# Patient Record
Sex: FEMALE | Race: WHITE | NOT HISPANIC OR LATINO | Employment: OTHER | URBAN - METROPOLITAN AREA
[De-identification: names, ages, dates, MRNs, and addresses within clinical notes are randomized per-mention and may not be internally consistent; named-entity substitution may affect disease eponyms.]

---

## 2017-01-09 ENCOUNTER — TREATMENT (OUTPATIENT)
Dept: PHYSICAL THERAPY | Facility: CLINIC | Age: 77
End: 2017-01-09

## 2017-01-09 DIAGNOSIS — M54.13 RADICULOPATHY OF CERVICOTHORACIC REGION: Primary | ICD-10-CM

## 2017-01-09 PROCEDURE — G8984 CARRY CURRENT STATUS: HCPCS | Performed by: PHYSICAL THERAPIST

## 2017-01-09 PROCEDURE — G8985 CARRY GOAL STATUS: HCPCS | Performed by: PHYSICAL THERAPIST

## 2017-01-09 PROCEDURE — G0283 ELEC STIM OTHER THAN WOUND: HCPCS | Performed by: PHYSICAL THERAPIST

## 2017-01-09 PROCEDURE — 97140 MANUAL THERAPY 1/> REGIONS: CPT | Performed by: PHYSICAL THERAPIST

## 2017-01-09 PROCEDURE — 97161 PT EVAL LOW COMPLEX 20 MIN: CPT | Performed by: PHYSICAL THERAPIST

## 2017-01-10 NOTE — PROGRESS NOTES
Physical Therapy Plan of Care      Patient Name: Shu Mckeon       Patient MRN: [id number]  : 1940  Physician:Mariann Fallon MD  Date: 2017    Encounter Diagnoses   Name Primary?   • Radiculopathy of cervicothoracic region Yes     OBJECTIVE  General  Palpation: ttp and severe muscle tightness in the upper trap  Observation: right shoulder elevated  AROM:               Cervical Spine Range of Motion  Cervical Spine Flexion: 30  Cervical Spine Extension: 20  Cervical Spine Sidebend Right: 30  Cervical Spine Sidebend Left: 40  Cervical Spine Rotation Right: 40  Cervical Spine Rotation Left: 60       Special Tests:  + ulnar and median nerve testing  Pain Scale:   8/10    THERAPY ASSESSMENT:  Physical Therapy Diagnosis: Patient was referred to physical therapy for cervical radiculopathy.  My exam findings concur with tis diagnosis. Patient has very tight cervical musculature with stiffness and tenderness at the cervical spinous process limiting movement especially on the right side.  Patient's symptoms follow the ulnar nerve distribution. At this time skilled PT is medically necessary to reduce her pain and improve her cerivical ROM so that she may return to PLOF.    Functional Limitations: Lifting, Pushing, Carrying, Pulling, Complaints of Pain, Difficulty moving, Decreased Strength, Decreased ROM, Postural Dysfunction, Decreased ability to perform ADL's, Poor segmental mobility   Length of Therapy: 1 month   PT Frequency: PT 2x week   PT Interventions: Therapeutic exercise - AROM, Therapeutic exercise - stretching, Therapeutic exercise - strengthening, Manual Therapy, Soft Tissue mobilization, Ultrasound, Hot packs/ Moist heat, Traction, Electrical stimulation, Posture training,  training, Home Exercise Program   Patient Agrees with Plan of Care: Yes    REHAB POTENTIAL: fair            Short Term Goals: 2 weeks  1. Patient will report being able to lie on her right side at night to sleep.  2.  Patient will be able to write for 20 minutes without pain.    Long Term Goals: 4 weeks  1. Patient will report a reduction in pain to 2/10 for all ADLS and IADLs.  2. Patient will demonstrate full cervical ROM for performing ADLs.     Manual PT [id number] 25 minutes    Timed Code Treatment: 25   Minutes     Total Treatment Time: 55     Minutes    PT SIGNATURE: Deepa Reese, PT   DATE TREATMENT INITIATED: 1/10/2017    Medicare Initial Certification  Certification Period: 2/9/2017  I certify that the therapy services are furnished while this patient is under my care.  The services outlined above are required by this patient, and will be reviewed every 30 days.     PHYSICIAN: Mariann Fallon MD      DATE:     Please sign and return via fax to [phone].. Thank you, [facility].

## 2017-01-17 ENCOUNTER — TREATMENT (OUTPATIENT)
Dept: PHYSICAL THERAPY | Facility: CLINIC | Age: 77
End: 2017-01-17

## 2017-01-17 DIAGNOSIS — M54.13 RADICULOPATHY OF CERVICOTHORACIC REGION: Primary | ICD-10-CM

## 2017-01-17 PROCEDURE — 97140 MANUAL THERAPY 1/> REGIONS: CPT | Performed by: PHYSICAL THERAPIST

## 2017-01-17 PROCEDURE — G0283 ELEC STIM OTHER THAN WOUND: HCPCS | Performed by: PHYSICAL THERAPIST

## 2017-01-18 NOTE — PROGRESS NOTES
Daily Progress Note      Subjective   Patient reports that her neck and shoulder and arm continually bother her resulting in high levels of pain and limited ability to perform aDLs.    Pain Scale (0-10): 6/10      Objective     See the Objective Evaluation flowsheet for any objective testing that may have been performed today.    PROCEDURES AND MODALITIES:  Paraffin:    Moist Heat: Rx Minutes: 15 mins  Ice:    E-Stim: Rx Minutes: 15 mins  Ultrasound:    Ionto:   Traction:      Manual PT [id number] 25 minutes     Timed Code Treatment: 25 Minutes  Total Treatment Time: 40 Minutes    Assessment/Plan   Shu continues to have a lot of pain in her neck and shoulder.  I spoke with her about her PMHx again to see if she has seen a neurosurgeon.  She has not.  Patient has severe radicular symptoms at this time.  We will continue to work on reducing them.    Progress per Plan of Care         Deepa Reese, PT  Physical Therapist

## 2017-01-20 ENCOUNTER — TREATMENT (OUTPATIENT)
Dept: PHYSICAL THERAPY | Facility: CLINIC | Age: 77
End: 2017-01-20

## 2017-01-20 PROCEDURE — G0283 ELEC STIM OTHER THAN WOUND: HCPCS | Performed by: PHYSICAL THERAPIST

## 2017-01-20 PROCEDURE — 97140 MANUAL THERAPY 1/> REGIONS: CPT | Performed by: PHYSICAL THERAPIST

## 2017-01-23 NOTE — PROGRESS NOTES
Physical Therapy Daily Progress Note      Subjective   Shu Mckeon reports: that her shoulder and neck continue to be very painful. She reports that she is still having a lot of numbness in her arm.    Objective   See Exercise, Manual, and Modality Logs for complete treatment.       Assessment/Plan  Shu continues to have high levels of pain with physical therapy.  She continues to benefit from PT to reduce her cervical radiculopathy.    Progress per Plan of Care           Manual Therapy:    25     mins  49177;  Therapeutic Exercise:        mins  43193;     Neuromuscular Tahira:        mins  81412;    Therapeutic Activity:       mins  38675;     Gait Training:         mins  36750;     Ultrasound:          mins  60115;    Electrical Stimulation:    15     mins  35027 ( );  Dry Needling        mins self-pay    Timed Treatment:   25   mins   Total Treatment:     40   mins    Deepa Reese, SARAH  Physical Therapist  [place] License # [id number]

## 2017-01-24 ENCOUNTER — TREATMENT (OUTPATIENT)
Dept: PHYSICAL THERAPY | Facility: CLINIC | Age: 77
End: 2017-01-24

## 2017-01-24 DIAGNOSIS — M54.13 RADICULOPATHY OF CERVICOTHORACIC REGION: Primary | ICD-10-CM

## 2017-01-24 DIAGNOSIS — S32.9XXD: ICD-10-CM

## 2017-01-24 PROCEDURE — G0283 ELEC STIM OTHER THAN WOUND: HCPCS | Performed by: PHYSICAL THERAPIST

## 2017-01-24 PROCEDURE — 97140 MANUAL THERAPY 1/> REGIONS: CPT | Performed by: PHYSICAL THERAPIST

## 2017-01-25 ENCOUNTER — TELEPHONE (OUTPATIENT)
Dept: INTERNAL MEDICINE | Facility: CLINIC | Age: 77
End: 2017-01-25

## 2017-01-25 RX ORDER — TRAMADOL HYDROCHLORIDE 50 MG/1
50 TABLET ORAL 2 TIMES DAILY PRN
Qty: 30 TABLET | Refills: 1 | OUTPATIENT
Start: 2017-01-25 | End: 2017-09-19

## 2017-01-25 NOTE — PROGRESS NOTES
Physical Therapy Daily Progress Note      Subjective     Shu Mckeon reports: that her neck continues to be extremely sore with severe radicular pain.       Objective   See Exercise, Manual, and Modality Logs for complete treatment.       Assessment/Plan  Shu continues to have a lot of radicular symptoms that have not subsided.  She continues to require skilled PT to reduce these symptoms.  Patient asked if her symptoms would resolve on their own with out PT and stated that they would not.       Progress per Plan of Care           Manual PT [id number] 25 minutes    Timed Treatment:   25   mins   Total Treatment:     40   mins    Deepa Reese, PT  Physical Therapist  [place] License # [id number]

## 2017-01-25 NOTE — TELEPHONE ENCOUNTER
PER VO FROM DR GIL RX FOR TRAMADOL 50 MG WAS CALLED IN TO THE PHARMACY. PT AWARE TO TAKE ONLY AS NEEDED PRIOR TO A PHYSICAL THERAPY APPT.    ----- Message from Mariann Gil MD sent at 1/25/2017  1:52 PM EST -----  Regarding: RE: Physical therapy patient  I already have her on amitriptyline at night as well as an anti-inflammatory and when necessary tramadol.  We will call her and make sure she is taking her tramadol before therapy however that probably means someone should drive her to therapy  ----- Message -----     From: Deepa Reese, PT     Sent: 1/25/2017   1:31 PM       To: Mariann Gil MD  Subject: Physical therapy patient                         Hi Dr. Gil,    I have seen Shu several times now and her radicular symptoms continue to be extremely painful for her.  Is there anything you could prescribe her to alleviate some of the radicular symptoms while I work with her?

## 2017-01-31 ENCOUNTER — TREATMENT (OUTPATIENT)
Dept: PHYSICAL THERAPY | Facility: CLINIC | Age: 77
End: 2017-01-31

## 2017-01-31 DIAGNOSIS — M54.13 RADICULOPATHY OF CERVICOTHORACIC REGION: Primary | ICD-10-CM

## 2017-01-31 PROCEDURE — 97140 MANUAL THERAPY 1/> REGIONS: CPT | Performed by: PHYSICAL THERAPIST

## 2017-01-31 PROCEDURE — G0283 ELEC STIM OTHER THAN WOUND: HCPCS | Performed by: PHYSICAL THERAPIST

## 2017-01-31 NOTE — PROGRESS NOTES
Physical Therapy Daily Progress Note      Subjective     Shu Mckeon reports: that she is not feeling any better and is frustrated. She reports that she does not feel like PT is helping.       Objective   See Exercise, Manual, and Modality Logs for complete treatment.       Assessment/Plan    I spoke with the patient about her phone call with her Md and asked her if she has been taking her medication and she stated that she has not been taking the tramadol.  She continues to have severe tightness in her upper trap which causes her a lot of numbness and tingling in her hand. I spoke with her about returning to her MD and possibly seeing a neurologist.  Patient was in agreement with this.     Progress per Plan of Care           Manual PT 13348 25 minutes    Timed Treatment:   25   mins   Total Treatment:    40   mins    Deepa Reese, PT  Physical Therapist  [place] License # [id number]

## 2017-02-14 RX ORDER — ATORVASTATIN CALCIUM 40 MG/1
TABLET, FILM COATED ORAL
Qty: 90 TABLET | Refills: 1 | Status: SHIPPED | OUTPATIENT
Start: 2017-02-14 | End: 2017-08-16 | Stop reason: SDUPTHER

## 2017-02-23 ENCOUNTER — DOCUMENTATION (OUTPATIENT)
Dept: PHYSICAL THERAPY | Facility: CLINIC | Age: 77
End: 2017-02-23

## 2017-02-23 NOTE — PROGRESS NOTES
Discharge Report    Patient Name: Shu Mckeon       Patient MRN: [id number]  : 1940  Physician:  Date: 2017    No diagnosis found.     Treatment has included therapeutic exercise, manual therapy, electrical stimulation and moist heat    No notes on file      Assessment: Patient was frustrated with her progress so I referred her back to her MD.     Progress towards goals: Not Met    Discharge Reason: Plateau in patient's progress      Plan of Care: Patient to return to referring/providing physician    Prognosis: fair    Understanding at Discharge: good

## 2017-03-13 ENCOUNTER — RESULTS ENCOUNTER (OUTPATIENT)
Dept: INTERNAL MEDICINE | Facility: CLINIC | Age: 77
End: 2017-03-13

## 2017-03-13 DIAGNOSIS — M79.602 PAIN IN BOTH UPPER EXTREMITIES: ICD-10-CM

## 2017-03-13 DIAGNOSIS — M79.601 PAIN IN BOTH UPPER EXTREMITIES: ICD-10-CM

## 2017-03-13 DIAGNOSIS — E11.9 CONTROLLED TYPE 2 DIABETES MELLITUS WITHOUT COMPLICATION, WITHOUT LONG-TERM CURRENT USE OF INSULIN (HCC): ICD-10-CM

## 2017-03-14 ENCOUNTER — OFFICE VISIT (OUTPATIENT)
Dept: INTERNAL MEDICINE | Facility: CLINIC | Age: 77
End: 2017-03-14

## 2017-03-14 DIAGNOSIS — Z00.00 MEDICARE ANNUAL WELLNESS VISIT, INITIAL: Primary | ICD-10-CM

## 2017-03-14 DIAGNOSIS — I10 ESSENTIAL HYPERTENSION: ICD-10-CM

## 2017-03-14 DIAGNOSIS — M54.50 LOW BACK PAIN WITHOUT SCIATICA, UNSPECIFIED BACK PAIN LATERALITY, UNSPECIFIED CHRONICITY: ICD-10-CM

## 2017-03-14 DIAGNOSIS — M25.511 CHRONIC RIGHT SHOULDER PAIN: ICD-10-CM

## 2017-03-14 DIAGNOSIS — G89.29 CHRONIC RIGHT SHOULDER PAIN: ICD-10-CM

## 2017-03-14 PROCEDURE — G0438 PPPS, INITIAL VISIT: HCPCS | Performed by: INTERNAL MEDICINE

## 2017-03-14 PROCEDURE — 99213 OFFICE O/P EST LOW 20 MIN: CPT | Performed by: INTERNAL MEDICINE

## 2017-03-14 NOTE — PROGRESS NOTES
Subjective   Shu Mckeon is a 76 y.o. female.   Fu on right arm pain    History of Present Illness   She is cont to have right arm pain.  Some better with PT or meloxicam.  She is now able to lift her arm over her head better.  Continues to have low back pain.  SHe does not take her tramadol   She says she gets around okay but is not able to do regular exercise  Pt has been taking BP meds as prescribed without any problems.  No HA  No episodes of orthostasis  She does try to watch her carb intake.  She does not check her sugars  She denies any polyuria    The following portions of the patient's history were reviewed and updated as appropriate: allergies, current medications, past medical history, past social history and problem list.  She does not eat a healthy diet she likes chesses   but is not exercising regularly    Review of Systems   Musculoskeletal: Positive for arthralgias (left shoulder) and back pain.   All other systems reviewed and are negative.      Objective   Physical Exam   Constitutional: She is oriented to person, place, and time. She appears well-developed and well-nourished.   HENT:   Head: Normocephalic and atraumatic.   Right Ear: External ear normal.   Left Ear: External ear normal.   Mouth/Throat: Oropharynx is clear and moist.   Eyes: Conjunctivae and EOM are normal. Pupils are equal, round, and reactive to light.   Neck: Normal range of motion. No tracheal deviation present. No thyromegaly present.   Cardiovascular: Normal rate, regular rhythm, normal heart sounds and intact distal pulses.    Pulmonary/Chest: Effort normal and breath sounds normal.   Abdominal: Soft. Bowel sounds are normal. She exhibits no distension. There is no tenderness.   Musculoskeletal: Normal range of motion. She exhibits no edema or deformity.    Shu had a diabetic foot exam performed today.   During the foot exam she had a monofilament test performed (normal).    Neurological Sensory Findings - Unaltered  hot/cold right ankle/foot discrimination and unaltered hot/cold left ankle/foot discrimination. Unaltered sharp/dull right ankle/foot discrimination and unaltered sharp/dull left ankle/foot discrimination. No right ankle/foot altered proprioception and no left ankle/foot altered proprioception    Vascular Status -  Her exam exhibits right foot vasculature abnormal and no right foot edema. Her exam exhibits left foot vasculature abnormal and no left foot edema.   Skin Integrity  -  Her right foot skin is not intact.   Shu's left foot skin is not intact. .   Foot Structure and Biomechanics -  Her right foot exhibits hallux valgus.  Neurological: She is alert and oriented to person, place, and time.   Skin: Skin is warm and dry.   Psychiatric: She has a normal mood and affect. Her behavior is normal. Judgment and thought content normal.   Vitals reviewed.      There were no vitals filed for this visit.       Assessment/Plan   Diagnoses and all orders for this visit:    Medicare annual wellness visit, initial    Low back pain without sciatica, unspecified back pain laterality, unspecified chronicity    Chronic right shoulder pain    Essential hypertension        1.  Right shoulder pain: This is some better but she does continue to have troubles.  I do believe she may have some rotator cuff tendinitis.  As it is still bothering her we will go ahead and refer her to orthopedics  2.  Chronic low back pain: This is doing somewhat better.  She does take occasional tramadol.  She is cautious when walking  3.  Retention: Blood pressure is been well-controlled  4.  Diabetes mellitus: Patient's A1c was 7 last visit.  We will recheck this today

## 2017-03-14 NOTE — PATIENT INSTRUCTIONS
"Heart-Healthy Eating Plan  Heart-healthy meal planning includes:  · Limiting unhealthy fats.  · Increasing healthy fats.  · Making other small dietary changes.  You may need to talk with your doctor or a diet specialist (dietitian) to create an eating plan that is right for you.  WHAT TYPES OF FAT SHOULD I CHOOSE?  · Choose healthy fats. These include olive oil and canola oil, flaxseeds, walnuts, almonds, and seeds.  · Eat more omega-3 fats. These include salmon, mackerel, sardines, tuna, flaxseed oil, and ground flaxseeds. Try to eat fish at least twice each week.  · Limit saturated fats.    Saturated fats are often found in animal products, such as meats, butter, and cream.    Plant sources of saturated fats include palm oil, palm kernel oil, and coconut oil.  · Avoid foods with partially hydrogenated oils in them. These include stick margarine, some tub margarines, cookies, crackers, and other baked goods. These contain trans fats.  WHAT GENERAL GUIDELINES DO I NEED TO FOLLOW?  · Check food labels carefully. Identify foods with trans fats or high amounts of saturated fat.  · Fill one half of your plate with vegetables and green salads. Eat 4-5 servings of vegetables per day. A serving of vegetables is:    1 cup of raw leafy vegetables.    ½ cup of raw or cooked cut-up vegetables.    ½ cup of vegetable juice.  · Fill one fourth of your plate with whole grains. Look for the word \"whole\" as the first word in the ingredient list.  · Fill one fourth of your plate with lean protein foods.  · Eat 4-5 servings of fruit per day. A serving of fruit is:    One medium whole fruit.    ¼ cup of dried fruit.    ½ cup of fresh, frozen, or canned fruit.    ½ cup of 100% fruit juice.  · Eat more foods that contain soluble fiber. These include apples, broccoli, carrots, beans, peas, and barley. Try to get 20-30 g of fiber per day.  · Eat more home-cooked food. Eat less restaurant, buffet, and fast food.  · Limit or avoid " alcohol.  · Limit foods high in starch and sugar.  · Avoid fried foods.  · Avoid frying your food. Try baking, boiling, grilling, or broiling it instead. You can also reduce fat by:    Removing the skin from poultry.    Removing all visible fats from meats.    Skimming the fat off of stews, soups, and gravies before serving them.    Steaming vegetables in water or broth.  · Lose weight if you are overweight.  · Eat 4-5 servings of nuts, legumes, and seeds per week:    One serving of dried beans or legumes equals ½ cup after being cooked.    One serving of nuts equals 1½ ounces.    One serving of seeds equals ½ ounce or one tablespoon.  · You may need to keep track of how much salt or sodium you eat. This is especially true if you have high blood pressure. Talk with your doctor or dietitian to get more information.  WHAT FOODS CAN I EAT?  Grains  Breads, including Libyan, white, srikanth, wheat, raisin, rye, oatmeal, and Italian. Tortillas that are neither fried nor made with lard or trans fat. Low-fat rolls, including hotdog and hamburger buns and English muffins. Biscuits. Muffins. Waffles. Pancakes. Light popcorn. Whole-grain cereals. Flatbread. [place] toast. Pretzels. Breadsticks. Rusks. Low-fat snacks. Low-fat crackers, including oyster, saltine, matzo, joann, animal, and rye. Rice and pasta, including brown rice and pastas that are made with whole wheat.   Vegetables  All vegetables.   Fruits  All fruits, but limit coconut.  Meats and Other Protein Sources  Lean, well-trimmed beef, veal, pork, and lamb. Chicken and turkey without skin. All fish and shellfish. Wild duck, rabbit, pheasant, and venison. Egg whites or low-cholesterol egg substitutes. Dried beans, peas, lentils, and tofu. Seeds and most nuts.  Dairy  Low-fat or nonfat cheeses, including ricotta, string, and mozzarella. Skim or 1% milk that is liquid, powdered, or evaporated. Buttermilk that is made with low-fat milk. Nonfat or low-fat  yogurt.  Beverages  Mineral water. Diet carbonated beverages.  Sweets and Desserts  Sherbets and fruit ices. Honey, jam, marmalade, jelly, and syrups. Meringues and gelatins. Pure sugar candy, such as hard candy, jelly beans, gumdrops, mints, marshmallows, and small amounts of dark chocolate. [place] food cake.  Eat all sweets and desserts in moderation.  Fats and Oils  Nonhydrogenated (trans-free) margarines. Vegetable oils, including soybean, sesame, sunflower, olive, peanut, safflower, corn, canola, and cottonseed. Salad dressings or mayonnaise made with a vegetable oil. Limit added fats and oils that you use for cooking, baking, salads, and as spreads.  Other  Cocoa powder. Coffee and tea. All seasonings and condiments.  The items listed above may not be a complete list of recommended foods or beverages. Contact your dietitian for more options.  WHAT FOODS ARE NOT RECOMMENDED?  Grains  Breads that are made with saturated or trans fats, oils, or whole milk. Croissants. Butter rolls. Cheese breads. Sweet rolls. Donuts. Buttered popcorn. Chow mein noodles. High-fat crackers, such as cheese or butter crackers.  Meats and Other Protein Sources  Fatty meats, such as hotdogs, short ribs, sausage, spareribs, roy, rib eye roast or steak, and mutton. High-fat deli meats, such as salami and bologna. Caviar. Domestic duck and goose. Organ meats, such as kidney, liver, sweetbreads, and heart.  Dairy  Cream, sour cream, cream cheese, and creamed cottage cheese. Whole-milk cheeses, including blue (christine), [place]i, Brie, Guido, American, Havarti, Swiss, cheddar, Camembert, and Byers. Whole or 2% milk that is liquid, evaporated, or condensed. Whole buttermilk. Cream sauce or high-fat cheese sauce. Yogurt that is made from whole milk.  Beverages  Regular sodas and juice drinks with added sugar.  Sweets and Desserts  Frosting. Pudding. Cookies. Cakes other than pacheco food cake. Candy that has milk chocolate or white  chocolate, hydrogenated fat, butter, coconut, or unknown ingredients. Buttered syrups. Full-fat ice cream or ice cream drinks.  Fats and Oils  Gravy that has suet, meat fat, or shortening. Cocoa butter, hydrogenated oils, palm oil, coconut oil, palm kernel oil. These can often be found in baked products, candy, fried foods, nondairy creamers, and whipped toppings. Solid fats and shortenings, including roy fat, salt pork, lard, and butter. Nondairy cream substitutes, such as coffee creamers and sour cream substitutes. Salad dressings that are made of unknown oils, cheese, or sour cream.  The items listed above may not be a complete list of foods and beverages to avoid. Contact your dietitian for more information.     This information is not intended to replace advice given to you by your health care provider. Make sure you discuss any questions you have with your health care provider.     Document Released: 2013 Document Revised: 2016 Document Reviewed: 2015  [employer] Interactive Patient Education © [employer]    Medicare Wellness  Personal Prevention Plan of Service     Date of Office Visit:  2017  Encounter Provider:  Mariann Fallon MD  Place of Service:  [facility] INTER[facility] MEDIC[facility]  Patient Name: Shu Mckeon  :  1940    As part of the Medicare Wellness portion of your visit today, we are providing you with this personalized preventive plan of services (PPPS). This plan is based upon recommendations of the [place] Preventive Services Task Force (USPSTF) and the Advisory Committee on Immunization Practices (ACIP).    This lists the preventive care services that should be considered, and provides dates of when you are due. Items listed as completed are up-to-date and do not require any further intervention.    Health Maintenance   Topic Date Due   • TDAP/TD VACCINES (1 - Tdap) 08/10/1957   • ZOSTER VACCINE  2016   • DXA SCAN  2017   •  HEMOGLOBIN A1C  06/12/2017   • MAMMOGRAM  08/14/2017   • LIPID PANEL  09/16/2017   • MEDICARE ANNUAL WELLNESS  03/14/2018   • DIABETIC FOOT EXAM  03/14/2018   • DIABETIC EYE EXAM  03/14/2018   • URINE MICROALBUMIN  03/14/2018   • INFLUENZA VACCINE  Completed   • PNEUMOCOCCAL VACCINES (65+ LOW/MEDIUM RISK)  Completed         There are no diagnoses linked to this encounter.            Fall Prevention in the Home   Falls can cause injuries and can affect people from all age groups. There are many simple things that you can do to make your home safe and to help prevent falls.  WHAT CAN I DO ON THE OUTSIDE OF MY HOME?  · Regularly repair the edges of walkways and driveways and fix any cracks.  · Remove high doorway thresholds.  · Trim any shrubbery on the main path into your home.  · Use bright outdoor lighting.  · Clear walkways of debris and clutter, including tools and rocks.  · Regularly check that handrails are securely fastened and in good repair. Both sides of any steps should have handrails.  · Install guardrails along the edges of any raised decks or porches.  · Have leaves, snow, and ice cleared regularly.  · Use sand or salt on walkways during winter months.  · In the garage, clean up any spills right away, including grease or oil spills.  WHAT CAN I DO IN THE BATHROOM?  · Use night lights.  · Install grab bars by the toilet and in the tub and shower. Do not use towel bars as grab bars.  · Use non-skid mats or decals on the floor of the tub or shower.  · If you need to sit down while you are in the shower, use a plastic, non-slip stool..  · Keep the floor dry. Immediately clean up any water that spills on the floor.  · Remove soap buildup in the tub or shower on a regular basis.  · Attach bath mats securely with double-sided non-slip rug tape.  · Remove throw rugs and other tripping hazards from the floor.  WHAT CAN I DO IN THE BEDROOM?  · Use night lights.  · Make sure that a bedside light is easy to  reach.  · Do not use oversized bedding that drapes onto the floor.  · Have a firm chair that has side arms to use for getting dressed.  · Remove throw rugs and other tripping hazards from the floor.  WHAT CAN I DO IN THE KITCHEN?   · Clean up any spills right away.  · Avoid walking on wet floors.  · Place frequently used items in easy-to-reach places.  · If you need to reach for something above you, use a sturdy step stool that has a grab bar.  · Keep electrical cables out of the way.  · Do not use floor polish or wax that makes floors slippery. If you have to use wax, make sure that it is non-skid floor wax.  · Remove throw rugs and other tripping hazards from the floor.  WHAT CAN I DO IN THE STAIRWAYS?  · Do not leave any items on the stairs.  · Make sure that there are handrails on both sides of the stairs. Fix handrails that are broken or loose. Make sure that handrails are as long as the stairways.  · Check any carpeting to make sure that it is firmly attached to the stairs. Fix any carpet that is loose or worn.  · Avoid having throw rugs at the top or bottom of stairways, or secure the rugs with carpet tape to prevent them from moving.  · Make sure that you have a light switch at the top of the stairs and the bottom of the stairs. If you do not have them, have them installed.  WHAT ARE SOME OTHER FALL PREVENTION TIPS?  · Wear closed-toe shoes that fit well and support your feet. Wear shoes that have rubber soles or low heels.  · When you use a stepladder, make sure that it is completely opened and that the sides are firmly locked. Have someone hold the ladder while you are using it. Do not climb a closed stepladder.  · Add color or contrast paint or tape to grab bars and handrails in your home. Place contrasting color strips on the first and last steps.  · Use mobility aids as needed, such as canes, walkers, scooters, and crutches.  · Turn on lights if it is dark. Replace any light bulbs that burn out.  · Set  up furniture so that there are clear paths. Keep the furniture in the same spot.  · Fix any uneven floor surfaces.  · Choose a carpet design that does not hide the edge of steps of a stairway.  · Be aware of any and all pets.  · Review your medicines with your healthcare provider. Some medicines can cause dizziness or changes in blood pressure, which increase your risk of falling.  Talk with your health care provider about other ways that you can decrease your risk of falls. This may include working with a physical therapist or  to improve your strength, balance, and endurance.     This information is not intended to replace advice given to you by your health care provider. Make sure you discuss any questions you have with your health care provider.     Document Released: 12/08/2003 Document Revised: 05/03/2016 Document Reviewed: 01/22/2016  Elsevier Interactive Patient Education ©2016 [employer] Inc.

## 2017-03-14 NOTE — PROGRESS NOTES
QUICK REFERENCE INFORMATION:  The ABCs of the Annual Wellness Visit    Initial Medicare Wellness Visit    HEALTH RISK ASSESSMENT    1940    Recent Hospitalizations:  No recent hospitalization(s)..        Current Medical Providers:  Patient Care Team:  Mariann Fallon MD as PCP - General  Mariann Fallon MD as PCP - Family Medicine        Smoking Status:  History   Smoking Status   • Never Smoker   Smokeless Tobacco   • Never Used       Alcohol Consumption:  History   Alcohol Use   • Yes       Depression Screen:   PHQ-9 Depression Screening 3/14/2017   Little interest or pleasure in doing things 0   Feeling down, depressed, or hopeless 0   Trouble falling or staying asleep, or sleeping too much 0   Feeling tired or having little energy 2   Poor appetite or overeating 0   Feeling bad about yourself - or that you are a failure or have let yourself or your family down 0   Trouble concentrating on things, such as reading the newspaper or watching television 0   Moving or speaking so slowly that other people could have noticed. Or the opposite - being so fidgety or restless that you have been moving around a lot more than usual 0   Thoughts that you would be better off dead, or of hurting yourself in some way 0   PHQ-9 Total Score 2       Health Habits and Functional and Cognitive Screening:  Functional & Cognitive Status 3/14/2017   Do you have difficulty preparing food and eating? No   Do you have difficulty bathing yourself? No   Do you have difficulty getting dressed? No   Do you have difficulty using the toilet? No   Do you have difficulty moving around from place to place? No   In the past year have you fallen or experienced a near fall? No   Do you need help using the phone?  No   Are you deaf or do you have serious difficulty hearing?  No   Do you need help with transportation? No   Do you need help shopping? No   Do you need help preparing meals?  No   Do you need help with housework?  Yes   Do you need help with  laundry? No   Do you need help taking your medications? No   Do you need help managing money? No   Do you have difficulty concentrating, remembering or making decisions? No     She does have trouble vacumming and using due to chronic back              Does the patient have evidence of cognitive impairment? No    Asprin use counseling:yes      Recent Lab Results:    Visual Acuity:  No exam data present    Age-appropriate Screening Schedule:  Refer to the list below for future screening recommendations based on patient's age, sex and/or medical conditions. Orders for these recommended tests are listed in the plan section. The patient has been provided with a written plan.    Health Maintenance   Topic Date Due   • TDAP/TD VACCINES (1 - Tdap) 08/10/1957   • ZOSTER VACCINE  02/11/2016   • DXA SCAN  04/01/2017   • HEMOGLOBIN A1C  06/12/2017   • MAMMOGRAM  08/14/2017   • LIPID PANEL  09/16/2017   • DIABETIC FOOT EXAM  03/14/2018   • DIABETIC EYE EXAM  03/14/2018   • URINE MICROALBUMIN  03/14/2018   • INFLUENZA VACCINE  Completed   • PNEUMOCOCCAL VACCINES (65+ LOW/MEDIUM RISK)  Completed        Subjective   History of Present Illness    Shu Mckeon is a 76 y.o. female who presents for an Annual Wellness Visit.    The following portions of the patient's history were reviewed and updated as appropriate: allergies, current medications, past family history, past medical history, past social history, past surgical history and problem list.    Outpatient Medications Prior to Visit   Medication Sig Dispense Refill   • amitriptyline (ELAVIL) 10 MG tablet Take 1 tablet by mouth Every Night. 30 tablet 5   • aspirin 81 MG tablet Take 1 tablet by mouth daily.     • atorvastatin (LIPITOR) 40 MG tablet TAKE 1 TABLET BY MOUTH AT BEDTIME 90 tablet 1   • BIOTIN PO Take 1 tablet by mouth daily.     • Cholecalciferol (VITAMIN D3) 5000 UNITS tablet Take 1 tablet by mouth daily.     • losartan (COZAAR) 50 MG tablet Take 1 tablet by mouth  Daily. 90 tablet 3   • meloxicam (MOBIC) 7.5 MG tablet Take 1 tablet by mouth Daily. With food 30 tablet 1   • Multiple Vitamin (MULTIVITAMIN) capsule Take 1 capsule by mouth daily.     • sertraline (ZOLOFT) 50 MG tablet TAKE 1 TABLET DAILY 90 tablet 1   • traMADol (ULTRAM) 50 MG tablet Take 1 tablet by mouth 2 (Two) Times a Day As Needed for moderate pain (4-6). 30 tablet 1   • Cholecalciferol 5000 UNITS tablet Take  by mouth.       No facility-administered medications prior to visit.        Patient Active Problem List   Diagnosis   • Ankle fracture   • Diabetes type 2, controlled   • Fatigue   • HLD (hyperlipidemia)   • BP (high blood pressure)   • OP (osteoporosis)   • Closed fracture of right pelvis   • Low back pain       Advanced Care Planning:  has an advanced directive - a copy HAS NOT been provided. Have asked the patient to send this to us to add to record    Identification of Risk Factors:  Risk factors include: weight , cardiovascular risk, inactivity, increased fall risk and chronic pain.    Review of Systems    Compared to one year ago, the patient feels her physical health is better.  Compared to one year ago, the patient feels her mental health is better.    Objective     Physical Exam    There were no vitals filed for this visit.    There is no height or weight on file to calculate BMI.  Discussed the patient's BMI with her. The BMI is above average; BMI management plan is completed.    Assessment/Plan   Patient Self-Management and Personalized Health Advice  The patient has been provided with information about: diet, exercise, weight management and fall prevention and preventive services including:   · Exercise counseling provided, Fall Risk plan of care done, Zostavax vaccine (Herpes Zoster).    Visit Diagnoses:  No diagnosis found.    No orders of the defined types were placed in this encounter.      Outpatient Encounter Prescriptions as of 3/14/2017   Medication Sig Dispense Refill   •  amitriptyline (ELAVIL) 10 MG tablet Take 1 tablet by mouth Every Night. 30 tablet 5   • aspirin 81 MG tablet Take 1 tablet by mouth daily.     • atorvastatin (LIPITOR) 40 MG tablet TAKE 1 TABLET BY MOUTH AT BEDTIME 90 tablet 1   • BIOTIN PO Take 1 tablet by mouth daily.     • Cholecalciferol (VITAMIN D3) 5000 UNITS tablet Take 1 tablet by mouth daily.     • losartan (COZAAR) 50 MG tablet Take 1 tablet by mouth Daily. 90 tablet 3   • meloxicam (MOBIC) 7.5 MG tablet Take 1 tablet by mouth Daily. With food 30 tablet 1   • Multiple Vitamin (MULTIVITAMIN) capsule Take 1 capsule by mouth daily.     • sertraline (ZOLOFT) 50 MG tablet TAKE 1 TABLET DAILY 90 tablet 1   • traMADol (ULTRAM) 50 MG tablet Take 1 tablet by mouth 2 (Two) Times a Day As Needed for moderate pain (4-6). 30 tablet 1   • [DISCONTINUED] Cholecalciferol 5000 UNITS tablet Take  by mouth.       No facility-administered encounter medications on file as of 3/14/2017.        Reviewed use of high risk medication in the elderly: yes  Reviewed for potential of harmful drug interactions in the elderly: yes    Follow Up:  No Follow-up on file.     An After Visit Summary and PPPS with all of these plans were given to the patient.   Patient has had some limited activity due to some chronic low back pain and some acute left arm pain.  We did discuss trying to get up to some regular physical activity  In addition we discussed her increased risk of falling due to some of her chronic low back pain.  She is going to be cautious upon standing and make sure she always walks on level ground.  She says physical therapy worked with her balance on this as well  She does not eat a very healthy diet  She is going to work on increasing fruits and vegetables

## 2017-03-15 LAB
ALBUMIN SERPL-MCNC: 4.8 G/DL (ref 3.5–5.2)
ALBUMIN/GLOB SERPL: 1.5 G/DL
ALP SERPL-CCNC: 94 U/L (ref 39–117)
ALT SERPL-CCNC: 15 U/L (ref 1–33)
AST SERPL-CCNC: 21 U/L (ref 1–32)
BILIRUB SERPL-MCNC: 0.7 MG/DL (ref 0.1–1.2)
BUN SERPL-MCNC: 15 MG/DL (ref 8–23)
BUN/CREAT SERPL: 18.1 (ref 7–25)
CALCIUM SERPL-MCNC: 10.3 MG/DL (ref 8.6–10.5)
CHLORIDE SERPL-SCNC: 97 MMOL/L (ref 98–107)
CHOLEST SERPL-MCNC: 151 MG/DL (ref 0–200)
CO2 SERPL-SCNC: 28.6 MMOL/L (ref 22–29)
CREAT SERPL-MCNC: 0.83 MG/DL (ref 0.57–1)
GLOBULIN SER CALC-MCNC: 3.1 GM/DL
GLUCOSE SERPL-MCNC: 117 MG/DL (ref 65–99)
HBA1C MFR BLD: 6.6 % (ref 4.8–5.6)
HDLC SERPL-MCNC: 65 MG/DL (ref 40–60)
LDLC SERPL CALC-MCNC: 68 MG/DL (ref 0–100)
LDLC/HDLC SERPL: 1.05 {RATIO}
POTASSIUM SERPL-SCNC: 4.3 MMOL/L (ref 3.5–5.2)
PROT SERPL-MCNC: 7.9 G/DL (ref 6–8.5)
SODIUM SERPL-SCNC: 139 MMOL/L (ref 136–145)
TRIGL SERPL-MCNC: 90 MG/DL (ref 0–150)
TSH SERPL DL<=0.005 MIU/L-ACNC: 1.72 MIU/ML (ref 0.27–4.2)
VLDLC SERPL CALC-MCNC: 18 MG/DL (ref 5–40)

## 2017-05-08 DIAGNOSIS — M79.601 PAIN IN BOTH UPPER EXTREMITIES: ICD-10-CM

## 2017-05-08 DIAGNOSIS — M79.602 PAIN IN BOTH UPPER EXTREMITIES: ICD-10-CM

## 2017-05-08 RX ORDER — MELOXICAM 7.5 MG/1
TABLET ORAL
Qty: 30 TABLET | Refills: 5 | Status: SHIPPED | OUTPATIENT
Start: 2017-05-08 | End: 2017-09-19

## 2017-08-16 RX ORDER — ATORVASTATIN CALCIUM 40 MG/1
TABLET, FILM COATED ORAL
Qty: 90 TABLET | Refills: 1 | Status: SHIPPED | OUTPATIENT
Start: 2017-08-16 | End: 2017-09-19 | Stop reason: SDUPTHER

## 2017-09-12 ENCOUNTER — TELEPHONE (OUTPATIENT)
Dept: INTERNAL MEDICINE | Facility: CLINIC | Age: 77
End: 2017-09-12

## 2017-09-12 DIAGNOSIS — I10 ESSENTIAL HYPERTENSION: Primary | ICD-10-CM

## 2017-09-12 DIAGNOSIS — E11.9 TYPE 2 DIABETES MELLITUS WITHOUT COMPLICATION, WITHOUT LONG-TERM CURRENT USE OF INSULIN (HCC): ICD-10-CM

## 2017-09-12 DIAGNOSIS — E78.5 HYPERLIPIDEMIA, UNSPECIFIED HYPERLIPIDEMIA TYPE: ICD-10-CM

## 2017-09-12 LAB
ALBUMIN SERPL-MCNC: 4.3 G/DL (ref 3.5–5.2)
ALBUMIN/GLOB SERPL: 1.4 G/DL
ALP SERPL-CCNC: 92 U/L (ref 39–117)
ALT SERPL-CCNC: 13 U/L (ref 1–33)
AST SERPL-CCNC: 13 U/L (ref 1–32)
BILIRUB SERPL-MCNC: 0.5 MG/DL (ref 0.1–1.2)
BUN SERPL-MCNC: 10 MG/DL (ref 8–23)
BUN/CREAT SERPL: 12.7 (ref 7–25)
CALCIUM SERPL-MCNC: 9.8 MG/DL (ref 8.6–10.5)
CHLORIDE SERPL-SCNC: 100 MMOL/L (ref 98–107)
CHOLEST SERPL-MCNC: 141 MG/DL (ref 0–200)
CO2 SERPL-SCNC: 27 MMOL/L (ref 22–29)
CREAT SERPL-MCNC: 0.79 MG/DL (ref 0.57–1)
GLOBULIN SER CALC-MCNC: 3.1 GM/DL
GLUCOSE SERPL-MCNC: 136 MG/DL (ref 65–99)
HBA1C MFR BLD: 6.7 % (ref 4.8–5.6)
HDLC SERPL-MCNC: 59 MG/DL (ref 40–60)
LDLC SERPL CALC-MCNC: 67 MG/DL (ref 0–100)
LDLC/HDLC SERPL: 1.13 {RATIO}
POTASSIUM SERPL-SCNC: 5 MMOL/L (ref 3.5–5.2)
PROT SERPL-MCNC: 7.4 G/DL (ref 6–8.5)
SODIUM SERPL-SCNC: 142 MMOL/L (ref 136–145)
TRIGL SERPL-MCNC: 76 MG/DL (ref 0–150)
TSH SERPL DL<=0.005 MIU/L-ACNC: 1.08 MIU/ML (ref 0.27–4.2)
VLDLC SERPL CALC-MCNC: 15.2 MG/DL (ref 5–40)

## 2017-09-19 ENCOUNTER — OFFICE VISIT (OUTPATIENT)
Dept: INTERNAL MEDICINE | Facility: CLINIC | Age: 77
End: 2017-09-19

## 2017-09-19 VITALS
HEIGHT: 64 IN | HEART RATE: 89 BPM | OXYGEN SATURATION: 96 % | SYSTOLIC BLOOD PRESSURE: 100 MMHG | DIASTOLIC BLOOD PRESSURE: 60 MMHG | WEIGHT: 188 LBS | BODY MASS INDEX: 32.1 KG/M2

## 2017-09-19 DIAGNOSIS — G89.29 CHRONIC LOW BACK PAIN WITHOUT SCIATICA, UNSPECIFIED BACK PAIN LATERALITY: ICD-10-CM

## 2017-09-19 DIAGNOSIS — I10 ESSENTIAL HYPERTENSION: Primary | ICD-10-CM

## 2017-09-19 DIAGNOSIS — M54.50 CHRONIC LOW BACK PAIN WITHOUT SCIATICA, UNSPECIFIED BACK PAIN LATERALITY: ICD-10-CM

## 2017-09-19 DIAGNOSIS — R30.0 DYSURIA: ICD-10-CM

## 2017-09-19 DIAGNOSIS — E78.5 HYPERLIPIDEMIA, UNSPECIFIED HYPERLIPIDEMIA TYPE: ICD-10-CM

## 2017-09-19 LAB
BILIRUB BLD-MCNC: NEGATIVE MG/DL
CLARITY, POC: CLEAR
COLOR UR: YELLOW
GLUCOSE UR STRIP-MCNC: NEGATIVE MG/DL
KETONES UR QL: NEGATIVE
LEUKOCYTE EST, POC: ABNORMAL
NITRITE UR-MCNC: NEGATIVE MG/ML
PH UR: 6 [PH] (ref 5–8)
PROT UR STRIP-MCNC: NEGATIVE MG/DL
RBC # UR STRIP: ABNORMAL /UL
SP GR UR: 1.01 (ref 1–1.03)
UROBILINOGEN UR QL: NORMAL

## 2017-09-19 PROCEDURE — 99214 OFFICE O/P EST MOD 30 MIN: CPT | Performed by: INTERNAL MEDICINE

## 2017-09-19 PROCEDURE — G0008 ADMIN INFLUENZA VIRUS VAC: HCPCS | Performed by: INTERNAL MEDICINE

## 2017-09-19 PROCEDURE — 81003 URINALYSIS AUTO W/O SCOPE: CPT | Performed by: INTERNAL MEDICINE

## 2017-09-19 RX ORDER — ATORVASTATIN CALCIUM 40 MG/1
40 TABLET, FILM COATED ORAL DAILY
Qty: 90 TABLET | Refills: 3 | Status: SHIPPED | OUTPATIENT
Start: 2017-09-19 | End: 2018-12-03 | Stop reason: SDUPTHER

## 2017-09-19 RX ORDER — LOSARTAN POTASSIUM 50 MG/1
50 TABLET ORAL DAILY
Qty: 90 TABLET | Refills: 3 | Status: SHIPPED | OUTPATIENT
Start: 2017-09-19 | End: 2018-12-03 | Stop reason: SDUPTHER

## 2017-09-19 RX ORDER — OXYCODONE AND ACETAMINOPHEN 10; 325 MG/1; MG/1
1 TABLET ORAL
COMMUNITY
End: 2018-04-09

## 2017-09-19 NOTE — PROGRESS NOTES
Subjective   Shu Mckeon is a 77 y.o. female who is here to follow up on DM, HPL, and HTN.     History of Present Illness   sHE HAS HAD A LITTLE ELEVATED SUGAR  SHE WANTS to avoid medicine and work on her diet  Pt has been taking cholesterol meds as prescribed.  No difficulties with myalgias.   She does get a little light headed upon standing  SHe has been having some increasing urinary frequency  She has some suprapubic pain.  No fevers or chills  She cont to have LBP no sig radicular sx.  Worse with walking a lot. Sometimes it does radiate into right hip.  She does not know if the mobic helped.  She had injections in the past with no relief    The following portions of the patient's history were reviewed and updated as appropriate: allergies, current medications, past medical history, past social history and problem list.  She has been eating more sugar lately    Review of Systems   All other systems reviewed and are negative.      Objective   Physical Exam   Constitutional: She is oriented to person, place, and time. She appears well-developed and well-nourished.   HENT:   Head: Normocephalic and atraumatic.   Right Ear: External ear normal.   Left Ear: External ear normal.   Mouth/Throat: Oropharynx is clear and moist.   Eyes: Conjunctivae and EOM are normal. Pupils are equal, round, and reactive to light.   Neck: Normal range of motion. No tracheal deviation present. No thyromegaly present.   Cardiovascular: Normal rate, regular rhythm, normal heart sounds and intact distal pulses.    Pulmonary/Chest: Effort normal and breath sounds normal.   Abdominal: Soft. Bowel sounds are normal. She exhibits no distension. There is no tenderness.   Musculoskeletal: Normal range of motion. She exhibits no edema or deformity.   Neurological: She is alert and oriented to person, place, and time.   Skin: Skin is warm and dry.   Psychiatric: She has a normal mood and affect. Her behavior is normal. Judgment and thought  content normal.   Vitals reviewed.      Assessment/Plan    Telephone on 09/12/2017   Component Date Value Ref Range Status   • Glucose 09/12/2017 136* 65 - 99 mg/dL Final   • BUN 09/12/2017 10  8 - 23 mg/dL Final   • Creatinine 09/12/2017 0.79  0.57 - 1.00 mg/dL Final   • eGFR Non  Am 09/12/2017 70  >60 mL/min/1.73 Final    Comment: The MDRD GFR formula is only valid for adults with stable  renal function between ages 18 and 70.     • eGFR  Am 09/12/2017 85  >60 mL/min/1.73 Final   • BUN/Creatinine Ratio 09/12/2017 12.7  7.0 - 25.0 Final   • Sodium 09/12/2017 142  136 - 145 mmol/L Final   • Potassium 09/12/2017 5.0  3.5 - 5.2 mmol/L Final   • Chloride 09/12/2017 100  98 - 107 mmol/L Final   • Total CO2 09/12/2017 27.0  22.0 - 29.0 mmol/L Final   • Calcium 09/12/2017 9.8  8.6 - 10.5 mg/dL Final   • Total Protein 09/12/2017 7.4  6.0 - 8.5 g/dL Final   • Albumin 09/12/2017 4.30  3.50 - 5.20 g/dL Final   • Globulin 09/12/2017 3.1  gm/dL Final   • A/G Ratio 09/12/2017 1.4  g/dL Final   • Total Bilirubin 09/12/2017 0.5  0.1 - 1.2 mg/dL Final   • Alkaline Phosphatase 09/12/2017 92  39 - 117 U/L Final   • AST (SGOT) 09/12/2017 13  1 - 32 U/L Final   • ALT (SGPT) 09/12/2017 13  1 - 33 U/L Final   • Total Cholesterol 09/12/2017 141  0 - 200 mg/dL Final   • Triglycerides 09/12/2017 76  0 - 150 mg/dL Final   • HDL Cholesterol 09/12/2017 59  40 - 60 mg/dL Final   • VLDL Cholesterol 09/12/2017 15.2  5 - 40 mg/dL Final   • LDL Cholesterol  09/12/2017 67  0 - 100 mg/dL Final   • LDL/HDL Ratio 09/12/2017 1.13   Final   • Hemoglobin A1C 09/12/2017 6.70* 4.80 - 5.60 % Final    Comment: Hemoglobin A1C Ranges:  Increased Risk for Diabetes  5.7% to 6.4%  Diabetes                     >= 6.5%  Diabetic Goal                < 7.0%     • TSH 09/12/2017 1.08  0.27 - 4.2 mIU/mL Final     Shu was seen today for hyperlipidemia, diabetes and hypertension.    Diagnoses and all orders for this visit:    Essential  hypertension    Hyperlipidemia, unspecified hyperlipidemia type    Chronic low back pain without sciatica, unspecified back pain laterality    1. HTN-ok with losartan-  she is a little bit low.  Patient does stop in a few weeks and let us recheck this again  2. HPL-cholesterol is doing well with atorvastatin  3. Elevated gluc -cont to work on diet  4. Dysuria-  We will check a urine cx given her sx  5. LBP-patient may be having worse back pain due to an infection.  I'm going to check a culture on her.  If this is negative she might benefit from either a Flector or Lidoderm patch

## 2017-09-22 LAB
BACTERIA UR CULT: ABNORMAL
BACTERIA UR CULT: ABNORMAL
OTHER ANTIBIOTIC SUSC ISLT: ABNORMAL

## 2017-09-25 RX ORDER — CIPROFLOXACIN 250 MG/1
250 TABLET, FILM COATED ORAL 2 TIMES DAILY
Qty: 10 TABLET | Refills: 0 | Status: SHIPPED | OUTPATIENT
Start: 2017-09-25 | End: 2018-03-27

## 2018-02-05 ENCOUNTER — OFFICE VISIT (OUTPATIENT)
Dept: INTERNAL MEDICINE | Facility: CLINIC | Age: 78
End: 2018-02-05

## 2018-02-05 VITALS
DIASTOLIC BLOOD PRESSURE: 72 MMHG | TEMPERATURE: 98.4 F | WEIGHT: 185 LBS | BODY MASS INDEX: 31.58 KG/M2 | SYSTOLIC BLOOD PRESSURE: 110 MMHG | HEART RATE: 79 BPM | HEIGHT: 64 IN | OXYGEN SATURATION: 97 %

## 2018-02-05 DIAGNOSIS — R31.9 HEMATURIA, UNSPECIFIED TYPE: ICD-10-CM

## 2018-02-05 DIAGNOSIS — R10.9 LEFT SIDED ABDOMINAL PAIN: Primary | ICD-10-CM

## 2018-02-05 LAB
BILIRUB BLD-MCNC: NEGATIVE MG/DL
CLARITY, POC: CLEAR
COLOR UR: YELLOW
GLUCOSE UR STRIP-MCNC: NEGATIVE MG/DL
KETONES UR QL: NEGATIVE
LEUKOCYTE EST, POC: NEGATIVE
NITRITE UR-MCNC: NEGATIVE MG/ML
PH UR: 6.5 [PH] (ref 5–8)
PROT UR STRIP-MCNC: NEGATIVE MG/DL
RBC # UR STRIP: ABNORMAL /UL
SP GR UR: 1.02 (ref 1–1.03)
UROBILINOGEN UR QL: NORMAL

## 2018-02-05 PROCEDURE — 99214 OFFICE O/P EST MOD 30 MIN: CPT | Performed by: INTERNAL MEDICINE

## 2018-02-05 PROCEDURE — 81003 URINALYSIS AUTO W/O SCOPE: CPT | Performed by: INTERNAL MEDICINE

## 2018-02-05 NOTE — PROGRESS NOTES
Subjective   Shu Mckeon is a 77 y.o. female.Patient complains of pain left side that radiates aroudn to back and under rib cage    History of Present Illness   Pt has had an ache in her side over the past few weeks.    SHe says that the pain is constant.  She says it started last week  Pain is really on the side.  Patient denies any burning on urination.  She denies noticing any blood in her urine.  No history of a fall.  She also has not had history of kidney stone.    The following portions of the patient's history were reviewed and updated as appropriate: allergies, current medications, past medical history, past social history and problem list.  Patient denies any new physical activity.  No change in her diet  Review of Systems   Gastrointestinal: Positive for abdominal pain. Negative for abdominal distention, constipation, diarrhea and nausea.   Genitourinary: Positive for flank pain. Negative for dysuria, frequency and hematuria.       Objective   Physical Exam   Constitutional: She is oriented to person, place, and time. She appears well-developed and well-nourished.   HENT:   Head: Normocephalic and atraumatic.   Right Ear: External ear normal.   Left Ear: External ear normal.   Mouth/Throat: Oropharynx is clear and moist.   Eyes: Conjunctivae and EOM are normal. Pupils are equal, round, and reactive to light.   Neck: Normal range of motion. No tracheal deviation present. No thyromegaly present.   Cardiovascular: Normal rate, regular rhythm, normal heart sounds and intact distal pulses.    Pulmonary/Chest: Effort normal and breath sounds normal.   Abdominal: Soft. Bowel sounds are normal. She exhibits no distension. There is no tenderness.   Musculoskeletal: Normal range of motion. She exhibits no edema or deformity.   Neurological: She is alert and oriented to person, place, and time.   Skin: Skin is warm and dry.   Psychiatric: She has a normal mood and affect. Her behavior is normal. Judgment and  thought content normal.   Vitals reviewed.      Assessment/Plan   Shu was seen today for flank pain.    Diagnoses and all orders for this visit:    Left sided abdominal pain  -     CT abdomen pelvis stone protocol  -     Comprehensive Metabolic Panel  -     CBC & Differential    Hematuria, unspecified type  -     CT abdomen pelvis stone protocol  -     Comprehensive Metabolic Panel  -     CBC & Differential      1.  Left-sided abdominal pain with hematuria: Patient doesn't really have flank pain and on exam she really doesn't have any tenderness in the anterior aspect of her abdomen that she is very tender along the side underneath the rib but not on the rib.  I really suspect that this is muscular however given her hematuria we will go ahead and get a stone protocol CT scan.  I have encouraged her to stay well-hydrated and try some gentle stretching.  I will also check some labs today

## 2018-02-06 LAB
ALBUMIN SERPL-MCNC: 4.3 G/DL (ref 3.5–5.2)
ALBUMIN/GLOB SERPL: 1.3 G/DL
ALP SERPL-CCNC: 93 U/L (ref 39–117)
ALT SERPL-CCNC: 12 U/L (ref 1–33)
AST SERPL-CCNC: 14 U/L (ref 1–32)
BASOPHILS # BLD AUTO: 0.03 10*3/MM3 (ref 0–0.2)
BASOPHILS NFR BLD AUTO: 0.4 % (ref 0–1.5)
BILIRUB SERPL-MCNC: 0.6 MG/DL (ref 0.1–1.2)
BUN SERPL-MCNC: 12 MG/DL (ref 8–23)
BUN/CREAT SERPL: 16.7 (ref 7–25)
CALCIUM SERPL-MCNC: 9.8 MG/DL (ref 8.6–10.5)
CHLORIDE SERPL-SCNC: 100 MMOL/L (ref 98–107)
CO2 SERPL-SCNC: 26.3 MMOL/L (ref 22–29)
CREAT SERPL-MCNC: 0.72 MG/DL (ref 0.57–1)
EOSINOPHIL # BLD AUTO: 0.16 10*3/MM3 (ref 0–0.7)
EOSINOPHIL NFR BLD AUTO: 2.1 % (ref 0.3–6.2)
ERYTHROCYTE [DISTWIDTH] IN BLOOD BY AUTOMATED COUNT: 13.9 % (ref 11.7–13)
GFR SERPLBLD CREATININE-BSD FMLA CKD-EPI: 78 ML/MIN/1.73
GFR SERPLBLD CREATININE-BSD FMLA CKD-EPI: 95 ML/MIN/1.73
GLOBULIN SER CALC-MCNC: 3.4 GM/DL
GLUCOSE SERPL-MCNC: 127 MG/DL (ref 65–99)
HCT VFR BLD AUTO: 44.6 % (ref 35.6–45.5)
HGB BLD-MCNC: 13.8 G/DL (ref 11.9–15.5)
IMM GRANULOCYTES # BLD: 0 10*3/MM3 (ref 0–0.03)
IMM GRANULOCYTES NFR BLD: 0 % (ref 0–0.5)
LYMPHOCYTES # BLD AUTO: 1.96 10*3/MM3 (ref 0.9–4.8)
LYMPHOCYTES NFR BLD AUTO: 25.8 % (ref 19.6–45.3)
MCH RBC QN AUTO: 29.2 PG (ref 26.9–32)
MCHC RBC AUTO-ENTMCNC: 30.9 G/DL (ref 32.4–36.3)
MCV RBC AUTO: 94.5 FL (ref 80.5–98.2)
MONOCYTES # BLD AUTO: 0.54 10*3/MM3 (ref 0.2–1.2)
MONOCYTES NFR BLD AUTO: 7.1 % (ref 5–12)
NEUTROPHILS # BLD AUTO: 4.92 10*3/MM3 (ref 1.9–8.1)
NEUTROPHILS NFR BLD AUTO: 64.6 % (ref 42.7–76)
PLATELET # BLD AUTO: 287 10*3/MM3 (ref 140–500)
POTASSIUM SERPL-SCNC: 4.3 MMOL/L (ref 3.5–5.2)
PROT SERPL-MCNC: 7.7 G/DL (ref 6–8.5)
RBC # BLD AUTO: 4.72 10*6/MM3 (ref 3.9–5.2)
SODIUM SERPL-SCNC: 139 MMOL/L (ref 136–145)
WBC # BLD AUTO: 7.61 10*3/MM3 (ref 4.5–10.7)

## 2018-02-08 ENCOUNTER — HOSPITAL ENCOUNTER (OUTPATIENT)
Dept: CT IMAGING | Facility: HOSPITAL | Age: 78
Discharge: HOME | End: 2018-02-08
Admitting: INTERNAL MEDICINE

## 2018-02-08 PROCEDURE — 74176 CT ABD & PELVIS W/O CONTRAST: CPT

## 2018-02-12 DIAGNOSIS — R73.9 HYPERGLYCEMIA: ICD-10-CM

## 2018-02-12 DIAGNOSIS — E78.5 HYPERLIPIDEMIA, UNSPECIFIED HYPERLIPIDEMIA TYPE: ICD-10-CM

## 2018-02-12 DIAGNOSIS — R53.83 FATIGUE, UNSPECIFIED TYPE: Primary | ICD-10-CM

## 2018-03-12 ENCOUNTER — RESULTS ENCOUNTER (OUTPATIENT)
Dept: INTERNAL MEDICINE | Facility: CLINIC | Age: 78
End: 2018-03-12

## 2018-03-12 DIAGNOSIS — R73.9 HYPERGLYCEMIA: ICD-10-CM

## 2018-03-12 DIAGNOSIS — E78.5 HYPERLIPIDEMIA, UNSPECIFIED HYPERLIPIDEMIA TYPE: ICD-10-CM

## 2018-03-12 DIAGNOSIS — R53.83 FATIGUE, UNSPECIFIED TYPE: ICD-10-CM

## 2018-03-20 LAB
ALBUMIN SERPL-MCNC: 4.5 G/DL (ref 3.5–5.2)
ALBUMIN/GLOB SERPL: 1.5 G/DL
ALP SERPL-CCNC: 85 U/L (ref 39–117)
ALT SERPL-CCNC: 14 U/L (ref 1–33)
AST SERPL-CCNC: 14 U/L (ref 1–32)
BILIRUB SERPL-MCNC: 0.6 MG/DL (ref 0.1–1.2)
BUN SERPL-MCNC: 9 MG/DL (ref 8–23)
BUN/CREAT SERPL: 12.5 (ref 7–25)
CALCIUM SERPL-MCNC: 10 MG/DL (ref 8.6–10.5)
CHLORIDE SERPL-SCNC: 102 MMOL/L (ref 98–107)
CHOLEST SERPL-MCNC: 143 MG/DL (ref 0–200)
CO2 SERPL-SCNC: 27 MMOL/L (ref 22–29)
CREAT SERPL-MCNC: 0.72 MG/DL (ref 0.57–1)
GFR SERPLBLD CREATININE-BSD FMLA CKD-EPI: 78 ML/MIN/1.73
GFR SERPLBLD CREATININE-BSD FMLA CKD-EPI: 95 ML/MIN/1.73
GLOBULIN SER CALC-MCNC: 3.1 GM/DL
GLUCOSE SERPL-MCNC: 122 MG/DL (ref 65–99)
HBA1C MFR BLD: 6.35 % (ref 4.8–5.6)
HDLC SERPL-MCNC: 61 MG/DL (ref 40–60)
LDLC SERPL CALC-MCNC: 63 MG/DL (ref 0–100)
LDLC/HDLC SERPL: 1.04 {RATIO}
POTASSIUM SERPL-SCNC: 4.7 MMOL/L (ref 3.5–5.2)
PROT SERPL-MCNC: 7.6 G/DL (ref 6–8.5)
SODIUM SERPL-SCNC: 143 MMOL/L (ref 136–145)
TRIGL SERPL-MCNC: 94 MG/DL (ref 0–150)
TSH SERPL DL<=0.005 MIU/L-ACNC: 1.58 MIU/ML (ref 0.27–4.2)
VIT B12 SERPL-MCNC: 525 PG/ML (ref 211–946)
VLDLC SERPL CALC-MCNC: 18.8 MG/DL (ref 5–40)

## 2018-03-27 ENCOUNTER — OFFICE VISIT (OUTPATIENT)
Dept: INTERNAL MEDICINE | Facility: CLINIC | Age: 78
End: 2018-03-27

## 2018-03-27 VITALS
HEART RATE: 82 BPM | TEMPERATURE: 97.3 F | BODY MASS INDEX: 31.24 KG/M2 | HEIGHT: 64 IN | SYSTOLIC BLOOD PRESSURE: 116 MMHG | WEIGHT: 183 LBS | OXYGEN SATURATION: 97 % | DIASTOLIC BLOOD PRESSURE: 78 MMHG

## 2018-03-27 DIAGNOSIS — M54.50 LOW BACK PAIN WITHOUT SCIATICA, UNSPECIFIED BACK PAIN LATERALITY, UNSPECIFIED CHRONICITY: Primary | ICD-10-CM

## 2018-03-27 DIAGNOSIS — R19.7 DIARRHEA, UNSPECIFIED TYPE: ICD-10-CM

## 2018-03-27 DIAGNOSIS — Z78.0 POSTMENOPAUSE: ICD-10-CM

## 2018-03-27 DIAGNOSIS — I10 ESSENTIAL HYPERTENSION: ICD-10-CM

## 2018-03-27 PROCEDURE — 99214 OFFICE O/P EST MOD 30 MIN: CPT | Performed by: INTERNAL MEDICINE

## 2018-03-27 RX ORDER — AMITRIPTYLINE HYDROCHLORIDE 10 MG/1
10 TABLET, FILM COATED ORAL NIGHTLY
Qty: 30 TABLET | Refills: 3 | Status: SHIPPED | OUTPATIENT
Start: 2018-03-27 | End: 2018-05-01

## 2018-03-27 RX ORDER — TURMERIC ROOT EXTRACT 500 MG
500 TABLET ORAL DAILY
COMMUNITY
Start: 2018-03-27 | End: 2018-07-17

## 2018-03-27 RX ORDER — PRASTERONE (DHEA) 50 MG
500 CAPSULE ORAL DAILY
COMMUNITY
Start: 2018-03-27 | End: 2018-07-17

## 2018-03-27 NOTE — PROGRESS NOTES
Subjective   Shu Mckeon is a 77 y.o. female.   She has cont to have LBP    History of Present Illness   She has chronic LBP that is not getting any better.  Pain is worse on the right than on the left.  She says she is even having a hard time walking.  She has had epidurals in the past with no relief  She has been having worsening sx of int diarrhea.  It seems to come and go  She says coffee triggers it.  She says it happens 2-3 days a week.   Pt has been taking BP meds as prescribed without any problems.  No HA  No episodes of orthostasis  Pt has been taking cholesterol meds as prescribed.  No difficulties with myalgias.     The following portions of the patient's history were reviewed and updated as appropriate: allergies, current medications, past medical history, past social history and problem list.  She denies any change in diet    Review of Systems   All other systems reviewed and are negative.      Objective   Physical Exam   Constitutional: She is oriented to person, place, and time. She appears well-developed and well-nourished.   HENT:   Head: Normocephalic and atraumatic.   Right Ear: External ear normal.   Left Ear: External ear normal.   Mouth/Throat: Oropharynx is clear and moist.   Eyes: Conjunctivae and EOM are normal. Pupils are equal, round, and reactive to light.   Neck: Normal range of motion. No tracheal deviation present. No thyromegaly present.   Cardiovascular: Normal rate, regular rhythm, normal heart sounds and intact distal pulses.    Pulmonary/Chest: Effort normal and breath sounds normal.   Abdominal: Soft. Bowel sounds are normal. She exhibits no distension. There is no tenderness.   Musculoskeletal: Normal range of motion. She exhibits no edema or deformity.   Neurological: She is alert and oriented to person, place, and time.   Skin: Skin is warm and dry.   Psychiatric: She has a normal mood and affect. Her behavior is normal. Judgment and thought content normal.   Vitals  reviewed.      Vitals:    03/27/18 1504   BP: 116/78   Pulse: 82   Temp: 97.3 °F (36.3 °C)   SpO2: 97%          Assessment/Plan   Shu was seen today for hyperlipidemia, hypertension, diabetes and abdominal pain.    Diagnoses and all orders for this visit:    Low back pain without sciatica, unspecified back pain laterality, unspecified chronicity  -     Ambulatory Referral to Pain Management  -     amitriptyline (ELAVIL) 10 MG tablet; Take 1 tablet by mouth Every Night.    Diarrhea, unspecified type  -     amitriptyline (ELAVIL) 10 MG tablet; Take 1 tablet by mouth Every Night.    Essential hypertension    Postmenopause  -     DEXA Bone Density Axial    1.  LBP- bilat  No sig radicular sx.  This is chronic  I am going to refer to pain and see what they say  2.  IBS- with diarrhea.  Likely worse due to stress  She is also going to try elavil 10mg at night and see how she does with this.  Watch diet  Nothing spicy or fatty  3.  HTN- She is doing well with current dose of losartan  4. Osteoporosis-  SHe is due a FU dexa as she has been off fosamax for years  5.  HPL- ok with current

## 2018-04-06 ENCOUNTER — HOSPITAL ENCOUNTER (OUTPATIENT)
Dept: BONE DENSITY | Facility: HOSPITAL | Age: 78
Discharge: HOME | End: 2018-04-06
Admitting: INTERNAL MEDICINE

## 2018-04-06 PROCEDURE — 77080 DXA BONE DENSITY AXIAL: CPT

## 2018-04-09 ENCOUNTER — OFFICE VISIT (OUTPATIENT)
Dept: PAIN MEDICINE | Facility: CLINIC | Age: 78
End: 2018-04-09

## 2018-04-09 ENCOUNTER — PRIOR AUTHORIZATION (OUTPATIENT)
Dept: PAIN MEDICINE | Facility: CLINIC | Age: 78
End: 2018-04-09

## 2018-04-09 VITALS
SYSTOLIC BLOOD PRESSURE: 119 MMHG | HEART RATE: 97 BPM | HEIGHT: 64 IN | BODY MASS INDEX: 31.72 KG/M2 | DIASTOLIC BLOOD PRESSURE: 70 MMHG | RESPIRATION RATE: 18 BRPM | WEIGHT: 185.8 LBS | OXYGEN SATURATION: 97 % | TEMPERATURE: 98.5 F

## 2018-04-09 DIAGNOSIS — M54.50 CHRONIC BILATERAL LOW BACK PAIN WITHOUT SCIATICA: Primary | ICD-10-CM

## 2018-04-09 DIAGNOSIS — G89.29 CHRONIC BILATERAL LOW BACK PAIN WITHOUT SCIATICA: Primary | ICD-10-CM

## 2018-04-09 DIAGNOSIS — M43.06 LUMBAR SPONDYLOLYSIS: ICD-10-CM

## 2018-04-09 LAB
POC AMPHETAMINES: NEGATIVE
POC BARBITURATES: NEGATIVE
POC BENZODIAZEPHINES: NEGATIVE
POC COCAINE: NEGATIVE
POC METHADONE: NEGATIVE
POC METHAMPHETAMINE SCREEN URINE: NEGATIVE
POC OPIATES: NEGATIVE
POC OXYCODONE: NEGATIVE
POC PHENCYCLIDINE: NEGATIVE
POC PROPOXYPHENE: NEGATIVE
POC THC: NEGATIVE
POC TRICYCLIC ANTIDEPRESSANTS: POSITIVE

## 2018-04-09 PROCEDURE — 99204 OFFICE O/P NEW MOD 45 MIN: CPT | Performed by: PAIN MEDICINE

## 2018-04-09 PROCEDURE — 80305 DRUG TEST PRSMV DIR OPT OBS: CPT | Performed by: PAIN MEDICINE

## 2018-04-09 RX ORDER — LIDOCAINE 50 MG/G
1 PATCH TOPICAL EVERY 24 HOURS
Qty: 30 PATCH | Refills: 3 | Status: SHIPPED | OUTPATIENT
Start: 2018-04-09 | End: 2018-07-23
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
## 2018-04-09 NOTE — TELEPHONE ENCOUNTER
Sent PA for Lidocaine to [employer] through [employer] (Key # [id number]) and received approval message:    Shu Mckeon (Key: [id number])     This request has been approved.  Please note any additional information provided by [employer] at the bottom of your screen.

## 2018-04-10 ENCOUNTER — TELEPHONE (OUTPATIENT)
Dept: INTERNAL MEDICINE | Facility: CLINIC | Age: 78
End: 2018-04-10

## 2018-04-10 NOTE — TELEPHONE ENCOUNTER
Per VO FROM DR GIL FOR PT TO TRY THE IBGARD FIRST IF THAT DOES NOT HELP SHE WILL CALL AND WE WILL REFER TO GI    ----- Message from Mariann Gil MD sent at 4/10/2018  8:25 AM EDT -----  Stop it  We can refer to GI  ----- Message -----  From: Araceli Bennett MA  Sent: 4/10/2018   8:06 AM  To: Mariann Gil MD        ----- Message -----  From: Ayala García  Sent: 4/9/2018   3:29 PM  To: Araceli Bennett MA    Pt said Dr Gil prescribed some medication for her irritable bowel. She said now she is having the opposite effect. Now she is so constipated. What to do?  Pt phone: [phone]

## 2018-04-14 ENCOUNTER — RESULTS ENCOUNTER (OUTPATIENT)
Dept: PAIN MEDICINE | Facility: CLINIC | Age: 78
End: 2018-04-14

## 2018-04-14 DIAGNOSIS — M54.50 CHRONIC BILATERAL LOW BACK PAIN WITHOUT SCIATICA: ICD-10-CM

## 2018-04-14 DIAGNOSIS — G89.29 CHRONIC BILATERAL LOW BACK PAIN WITHOUT SCIATICA: ICD-10-CM

## 2018-04-14 DIAGNOSIS — M43.06 LUMBAR SPONDYLOLYSIS: ICD-10-CM

## 2018-04-17 ENCOUNTER — TELEPHONE (OUTPATIENT)
Dept: PAIN MEDICINE | Facility: CLINIC | Age: 78
End: 2018-04-17

## 2018-04-25 ENCOUNTER — OUTSIDE FACILITY SERVICE (OUTPATIENT)
Dept: PAIN MEDICINE | Facility: CLINIC | Age: 78
End: 2018-04-25

## 2018-04-25 ENCOUNTER — DOCUMENTATION (OUTPATIENT)
Dept: PAIN MEDICINE | Facility: CLINIC | Age: 78
End: 2018-04-25

## 2018-04-25 PROCEDURE — 64494 INJ PARAVERT F JNT L/S 2 LEV: CPT | Performed by: PAIN MEDICINE

## 2018-04-25 PROCEDURE — 64493 INJ PARAVERT F JNT L/S 1 LEV: CPT | Performed by: PAIN MEDICINE

## 2018-04-25 NOTE — PROGRESS NOTES
RIGHT L3-5 Lumbar Medial Branch Blockade  [facility]    PREOPERATIVE DIAGNOSIS:  Lumbar spondylosis without myelopathy    POSTOPERATIVE DIAGNOSIS:  Lumbar spondylosis without myelopathy    PROCEDURE:   Diagnostic Right Lumbar Medial Branch Nerve Blockades, with fluoroscopy:  L3, L4, and L5 nerves (at the L4, L5 transverse processes and the sacral alar groove) to block facet joints L4-5, and L5-S1  1. 73122 -- Lumbar Facet block, 1st Level  2. 12866 -- Lumbar Facet block, 2nd  Level    PRE-PROCEDURE DISCUSSION WITH PATIENT:    Risks and complications were discussed with the patient prior to starting the procedure and informed consent was obtained.      SURGEON:   Darleen Cheung MD    REASON FOR PROCEDURE:    The patient complains of pain that seems to have a significant axial component, Pain on extension of the lumbar spine and Positive lumbar facet loading maneuver    SEDATION:  Versed 2mg IV  ANESTHETIC:  Marcaine 0.25%  STEROID:  Methylprednisolone (DEPO MEDROL) 80mg/ml  TOTAL VOLUME OF SOLUTION:  3 mL    DESCRIPTON OF PROCEDURE:  After obtaining informed consent, IV access was obtained in the preoperative area.   The patient was taken to the operating room.  The patient was placed in the prone position with a pillow under the abdomen. All pressure points were well padded.  EKG, blood pressure, and pulse oximeter were monitored.  The patient was monitored and sedated by the RN under my direction. The lumbosacral area was prepped with Chloraprep and draped in a sterile fashion. Under fluoroscopic guidance the transverse processes of the L4 and L5 vertebrae at the junctions of the superior articular processes were identified on the affected side.  Also identified was the groove between the ala and the superior articular process of the sacrum on the ipsilateral side.  Skin and subcutaneous tissue were anesthetized with 1% lidocaine above each of these points. A spinal needle was introduced  under fluoroscopic guidance at the above junctions. Aspiration was negative for blood and CSF.  After confirming the position of the needle with fluoroscope in all views, 1 mL of the anesthetic solution noted above was injected at each of these points.  Needles were removed intact from each of the areas.   Onset of analgesia was noted.  Vital signs remained stable throughout.      ESTIMATED BLOOD LOSS:  <5 mL  SPECIMENS:  none    COMPLICATIONS:   No complications were noted.    TOLERANCE & DISCHARGE CONDITION:    The patient tolerated the procedure well.  The patient was transported to the recovery area without difficulties.  The patient was discharged to home under the care of family in stable and satisfactory condition.    PLAN OF CARE:  1. The patient was given our standard instruction sheet.  2. We discussed that Lumbar Medial Branch Blockade is a diagnostic procedure in consideration for radiofrequency ablation if two diagnostic procedures prove to be positive for significant benefit.  If sustained relief of 6 to eight weeks is obtained, then an alternative plan could be therapeutic lumbar branch blockades.  3. The patient is asked to keep a pain log each hour for 8 hours after the procedure today.  4. The patient will  Repeat injection 4 wks  5. The patient will resume all medications as per the medication reconciliation sheet.

## 2018-05-01 ENCOUNTER — OFFICE VISIT (OUTPATIENT)
Dept: INTERNAL MEDICINE | Facility: CLINIC | Age: 78
End: 2018-05-01

## 2018-05-01 VITALS
SYSTOLIC BLOOD PRESSURE: 122 MMHG | DIASTOLIC BLOOD PRESSURE: 60 MMHG | OXYGEN SATURATION: 97 % | BODY MASS INDEX: 31.09 KG/M2 | HEART RATE: 64 BPM | HEIGHT: 64 IN | WEIGHT: 182.1 LBS | TEMPERATURE: 98 F

## 2018-05-01 DIAGNOSIS — E78.5 HYPERLIPIDEMIA, UNSPECIFIED HYPERLIPIDEMIA TYPE: ICD-10-CM

## 2018-05-01 DIAGNOSIS — R55 POSTURAL DIZZINESS WITH PRESYNCOPE: Primary | ICD-10-CM

## 2018-05-01 DIAGNOSIS — R42 POSTURAL DIZZINESS WITH PRESYNCOPE: Primary | ICD-10-CM

## 2018-05-01 DIAGNOSIS — I10 ESSENTIAL HYPERTENSION: ICD-10-CM

## 2018-05-01 DIAGNOSIS — E11.9 CONTROLLED TYPE 2 DIABETES MELLITUS WITHOUT COMPLICATION, WITHOUT LONG-TERM CURRENT USE OF INSULIN (HCC): ICD-10-CM

## 2018-05-01 DIAGNOSIS — Z00.00 MEDICARE ANNUAL WELLNESS VISIT, SUBSEQUENT: ICD-10-CM

## 2018-05-01 DIAGNOSIS — R07.9 CHEST PAIN, UNSPECIFIED TYPE: ICD-10-CM

## 2018-05-01 PROCEDURE — 99214 OFFICE O/P EST MOD 30 MIN: CPT | Performed by: INTERNAL MEDICINE

## 2018-05-01 PROCEDURE — G0439 PPPS, SUBSEQ VISIT: HCPCS | Performed by: INTERNAL MEDICINE

## 2018-05-01 PROCEDURE — 93000 ELECTROCARDIOGRAM COMPLETE: CPT | Performed by: INTERNAL MEDICINE

## 2018-05-01 NOTE — PROGRESS NOTES
QUICK REFERENCE INFORMATION:  The ABCs of the Annual Wellness Visit    Subsequent Medicare Wellness Visit    HEALTH RISK ASSESSMENT    1940    Recent Hospitalizations:  No hospitalization(s) within the last year..        Current Medical Providers:  Patient Care Team:  Mariann Fallon MD as PCP - General  Mariann Fallon MD as PCP - Family Medicine        Smoking Status:  History   Smoking Status   • Never Smoker   Smokeless Tobacco   • Never Used       Alcohol Consumption:  History   Alcohol Use   • Yes       Depression Screen:   PHQ-2/PHQ-9 Depression Screening 5/1/2018   Little interest or pleasure in doing things 0   Feeling down, depressed, or hopeless 1   Trouble falling or staying asleep, or sleeping too much 2   Feeling tired or having little energy 2   Poor appetite or overeating 0   Feeling bad about yourself - or that you are a failure or have let yourself or your family down 0   Trouble concentrating on things, such as reading the newspaper or watching television 0   Moving or speaking so slowly that other people could have noticed. Or the opposite - being so fidgety or restless that you have been moving around a lot more than usual 0   Thoughts that you would be better off dead, or of hurting yourself in some way 0   Total Score 5   If you checked off any problems, how difficult have these problems made it for you to do your work, take care of things at home, or get along with other people? Not difficult at all       Health Habits and Functional and Cognitive Screening:  Functional & Cognitive Status 5/1/2018   Do you have difficulty preparing food and eating? No   Do you have difficulty bathing yourself, getting dressed or grooming yourself? No   Do you have difficulty using the toilet? No   Do you have difficulty moving around from place to place? No   Do you have trouble with steps or getting out of a bed or a chair? Yes   In the past year have you fallen or experienced a near fall? No   Current Diet  Unhealthy Diet   Dental Exam Up to date   Eye Exam Up to date   Exercise (times per week) 0 times per week   Current Exercise Activities Include Walking   Do you need help using the phone?  No   Are you deaf or do you have serious difficulty hearing?  No   Do you need help with transportation? No   Do you need help shopping? No   Do you need help preparing meals?  No   Do you need help with housework?  Yes   Do you need help with laundry? No   Do you need help taking your medications? No   Do you need help managing money? No   Do you ever drive or ride in a car without wearing a seat belt? No   Have you felt unusual stress, anger or loneliness in the last month? Yes   Who do you live with? Spouse   If you need help, do you have trouble finding someone available to you? No   Have you been bothered in the last four weeks by sexual problems? No   Do you have difficulty concentrating, remembering or making decisions? No           Does the patient have evidence of cognitive impairment? No    Aspirin use counseling: Taking ASA appropriately as indicated      Recent Lab Results:  CMP:  Lab Results   Component Value Date     (H) 03/20/2018    BUN 9 03/20/2018    CREATININE 0.72 03/20/2018    EGFRIFNONA 78 03/20/2018    EGFRIFAFRI 95 03/20/2018    BCR 12.5 03/20/2018     03/20/2018    K 4.7 03/20/2018    CO2 27.0 03/20/2018    CALCIUM 10.0 03/20/2018    PROTENTOTREF 7.6 03/20/2018    ALBUMIN 4.50 03/20/2018    LABGLOBREF 3.1 03/20/2018    LABIL2 1.5 03/20/2018    BILITOT 0.6 03/20/2018    ALKPHOS 85 03/20/2018    AST 14 03/20/2018    ALT 14 03/20/2018     Lipid Panel:  Lab Results   Component Value Date    TRIG 94 03/20/2018    HDL 61 (H) 03/20/2018    VLDL 18.8 03/20/2018    LDLHDL 1.04 03/20/2018     HbA1c:  Lab Results   Component Value Date    HGBA1C 6.35 (H) 03/20/2018       Visual Acuity:  No exam data present    Age-appropriate Screening Schedule:  Refer to the list below for future screening  recommendations based on patient's age, sex and/or medical conditions. Orders for these recommended tests are listed in the plan section. The patient has been provided with a written plan.    Health Maintenance   Topic Date Due   • TDAP/TD VACCINES (1 - Tdap) 08/10/1959   • INFLUENZA VACCINE  08/01/2018   • MAMMOGRAM  08/01/2018   • HEMOGLOBIN A1C  09/20/2018   • LIPID PANEL  03/20/2019   • DIABETIC FOOT EXAM  05/01/2019   • DIABETIC EYE EXAM  05/01/2019   • URINE MICROALBUMIN  05/01/2019   • DXA SCAN  04/06/2020   • PNEUMOCOCCAL VACCINES (65+ LOW/MEDIUM RISK)  Completed   • ZOSTER VACCINE  Addressed        Subjective   History of Present Illness    Shu Mckeon is a 77 y.o. female who presents for an Subsequent Wellness Visit.    The following portions of the patient's history were reviewed and updated as appropriate: allergies, current medications, past family history, past medical history, past social history, past surgical history and problem list.    Outpatient Medications Prior to Visit   Medication Sig Dispense Refill   • aspirin 81 MG tablet Take 1 tablet by mouth daily.     • atorvastatin (LIPITOR) 40 MG tablet Take 1 tablet by mouth Daily. 90 tablet 3   • BIOTIN PO Take 1 tablet by mouth daily.     • Cholecalciferol (VITAMIN D3) 5000 UNITS tablet Take 1 tablet by mouth daily.     • diclofenac (VOLTAREN) 1 % gel gel Apply 4 g topically 4 (Four) Times a Day. 1 tube 3   • Ilana 500 MG capsule Take 500 mg by mouth Daily.     • losartan (COZAAR) 50 MG tablet Take 1 tablet by mouth Daily. 90 tablet 3   • Multiple Vitamin (MULTIVITAMIN) capsule Take 1 capsule by mouth daily.     • Probiotic Product (PROBIOTIC FORMULA PO) Take 1 tablet by mouth Daily.     • sertraline (ZOLOFT) 50 MG tablet TAKE 1 TABLET DAILY 90 tablet 1   • Turmeric 500 MG tablet Take 500 mg by mouth Daily.     • lidocaine (LIDODERM) 5 % Place 1 patch on the skin Daily. Remove & Discard patch within 12 hours or as directed by MD 30 patch 3   •  "amitriptyline (ELAVIL) 10 MG tablet Take 1 tablet by mouth Every Night. 30 tablet 3     No facility-administered medications prior to visit.        Patient Active Problem List   Diagnosis   • Ankle fracture   • Diabetes type 2, controlled   • Fatigue   • HLD (hyperlipidemia)   • BP (high blood pressure)   • OP (osteoporosis)   • Closed fracture of right pelvis   • Low back pain   • Lumbar spondylolysis       Advance Care Planning:  has an advance directive - a copy has been provided and is in file    Identification of Risk Factors:  Risk factors include: weight , cardiovascular risk, inactivity and increased fall risk.    Review of Systems    Compared to one year ago, the patient feels her physical health is better.  Compared to one year ago, the patient feels her mental health is the same.    Objective     Physical Exam    Vitals:    05/01/18 1418   BP: 122/60   BP Location: Left arm   Patient Position: Sitting   Pulse: 64   Temp: 98 °F (36.7 °C)   TempSrc: Oral   SpO2: 97%   Weight: 82.6 kg (182 lb 1.6 oz)   Height: 162.6 cm (64\")   PainSc:   5       Patient's Body mass index is 31.26 kg/m². BMI is above normal parameters. Follow-up plan includes:  exercise counseling and nutrition counseling.      Assessment/Plan   Patient Self-Management and Personalized Health Advice  The patient has been provided with information about: diet, exercise, prevention of cardiac or vascular disease, the relationship between weight and GERD and fall prevention and preventive services including:   · Exercise counseling provided, Fall Risk plan of care done, Nutrition counseling provided.    Visit Diagnoses:    ICD-10-CM ICD-9-CM   1. Postural dizziness with presyncope R42 780.4    R55 780.2   2. Controlled type 2 diabetes mellitus without complication, without long-term current use of insulin E11.9 250.00   3. Essential hypertension I10 401.9   4. Hyperlipidemia, unspecified hyperlipidemia type E78.5 272.4       No orders of the " defined types were placed in this encounter.      Outpatient Encounter Prescriptions as of 5/1/2018   Medication Sig Dispense Refill   • aspirin 81 MG tablet Take 1 tablet by mouth daily.     • atorvastatin (LIPITOR) 40 MG tablet Take 1 tablet by mouth Daily. 90 tablet 3   • BIOTIN PO Take 1 tablet by mouth daily.     • Cholecalciferol (VITAMIN D3) 5000 UNITS tablet Take 1 tablet by mouth daily.     • diclofenac (VOLTAREN) 1 % gel gel Apply 4 g topically 4 (Four) Times a Day. 1 tube 3   • Ilana 500 MG capsule Take 500 mg by mouth Daily.     • losartan (COZAAR) 50 MG tablet Take 1 tablet by mouth Daily. 90 tablet 3   • Multiple Vitamin (MULTIVITAMIN) capsule Take 1 capsule by mouth daily.     • Probiotic Product (PROBIOTIC FORMULA PO) Take 1 tablet by mouth Daily.     • sertraline (ZOLOFT) 50 MG tablet TAKE 1 TABLET DAILY 90 tablet 1   • Turmeric 500 MG tablet Take 500 mg by mouth Daily.     • lidocaine (LIDODERM) 5 % Place 1 patch on the skin Daily. Remove & Discard patch within 12 hours or as directed by MD 30 patch 3   • [DISCONTINUED] amitriptyline (ELAVIL) 10 MG tablet Take 1 tablet by mouth Every Night. 30 tablet 3     No facility-administered encounter medications on file as of 5/1/2018.        Reviewed use of high risk medication in the elderly: yes  Reviewed for potential of harmful drug interactions in the elderly: yes    Follow Up:  No Follow-up on file.     An After Visit Summary and PPPS with all of these plans were given to the patient.    She is not going to the   I have rec silver sneakers classes for balance

## 2018-05-01 NOTE — PROGRESS NOTES
Subjective   Shu Mckeon is a 77 y.o. female here today to f/u on IBS.  Pt c/o dizziness and nauseous on multiple occasions.  Pt states that she has stopped the amitriptyline.      History of Present Illness   Pt has been having some occasional dizzy spells on and off for a few weeks.  Worse upon standing.  The last episode made her feel like she was going to vomit and pass out.  She says she got cold and clammy.  no SOB. No CP  She has not had any new meds and she is compliant with her sertraline      The following portions of the patient's history were reviewed and updated as appropriate: allergies, current medications, past medical history, past social history and problem list.  SHe is watching her diet  No change in PA    Review of Systems   All other systems reviewed and are negative.      Objective   Physical Exam   Constitutional: She is oriented to person, place, and time. She appears well-developed and well-nourished.   HENT:   Head: Normocephalic and atraumatic.   Right Ear: External ear normal.   Left Ear: External ear normal.   Mouth/Throat: Oropharynx is clear and moist.   Eyes: Conjunctivae and EOM are normal. Pupils are equal, round, and reactive to light.   Neck: Normal range of motion. No tracheal deviation present. No thyromegaly present.   Cardiovascular: Normal rate, regular rhythm, normal heart sounds and intact distal pulses.    Pulmonary/Chest: Effort normal and breath sounds normal.   Abdominal: Soft. Bowel sounds are normal. She exhibits no distension. There is no tenderness.   Musculoskeletal: Normal range of motion. She exhibits no edema or deformity.   Neurological: She is alert and oriented to person, place, and time.   Skin: Skin is warm and dry.   Psychiatric: She has a normal mood and affect. Her behavior is normal. Judgment and thought content normal.   Vitals reviewed.      Vitals:    05/01/18 1418   BP: 122/60   Pulse: 64   Temp: 98 °F (36.7 °C)   SpO2: 97%          Current  Outpatient Prescriptions:   •  aspirin 81 MG tablet, Take 1 tablet by mouth daily., Disp: , Rfl:   •  atorvastatin (LIPITOR) 40 MG tablet, Take 1 tablet by mouth Daily., Disp: 90 tablet, Rfl: 3  •  BIOTIN PO, Take 1 tablet by mouth daily., Disp: , Rfl:   •  Cholecalciferol (VITAMIN D3) 5000 UNITS tablet, Take 1 tablet by mouth daily., Disp: , Rfl:   •  diclofenac (VOLTAREN) 1 % gel gel, Apply 4 g topically 4 (Four) Times a Day., Disp: 1 tube, Rfl: 3  •  Ilana 500 MG capsule, Take 500 mg by mouth Daily., Disp: , Rfl:   •  losartan (COZAAR) 50 MG tablet, Take 1 tablet by mouth Daily., Disp: 90 tablet, Rfl: 3  •  Multiple Vitamin (MULTIVITAMIN) capsule, Take 1 capsule by mouth daily., Disp: , Rfl:   •  Probiotic Product (PROBIOTIC FORMULA PO), Take 1 tablet by mouth Daily., Disp: , Rfl:   •  sertraline (ZOLOFT) 50 MG tablet, TAKE 1 TABLET DAILY, Disp: 90 tablet, Rfl: 1  •  Turmeric 500 MG tablet, Take 500 mg by mouth Daily., Disp: , Rfl:   •  lidocaine (LIDODERM) 5 %, Place 1 patch on the skin Daily. Remove & Discard patch within 12 hours or as directed by MD, Disp: 30 patch, Rfl: 3    EKG-  NSR with LBBB ni sig change from 2013    Assessment/Plan   Diagnoses and all orders for this visit:    Postural dizziness with presyncope  -     Ambulatory Referral to Cardiology    Controlled type 2 diabetes mellitus without complication, without long-term current use of insulin    Essential hypertension    Hyperlipidemia, unspecified hyperlipidemia type    Medicare annual wellness visit, subsequent    Chest pain, unspecified type  -     Ambulatory Referral to Cardiology      1.  Presyncope: Patient also had one episode of some chest pain that was not associated with shortness of breath.  Her EKG is a left bundle branch block sinus difficult to read anything from this.  Her most recent episode of dizziness she says she almost passed out.  I'm going to have her see cardiology for an evaluation of this.  2.  hypertension: Blood  pressure is been well-controlled without no evidence of low or high blood pressures.  We will continue losartan  3.  Hyperlipidemia: Continue the atorvastatin  4.  Elevated gluc  Stable

## 2018-05-01 NOTE — PATIENT INSTRUCTIONS
Fall Prevention in the Home  Falls can cause injuries. They can happen to people of all ages. There are many things you can do to make your home safe and to help prevent falls.  What can I do on the outside of my home?  · Regularly fix the edges of walkways and driveways and fix any cracks.  · Remove anything that might make you trip as you walk through a door, such as a raised step or threshold.  · Trim any bushes or trees on the path to your home.  · Use bright outdoor lighting.  · Clear any walking paths of anything that might make someone trip, such as rocks or tools.  · Regularly check to see if handrails are loose or broken. Make sure that both sides of any steps have handrails.  · Any raised decks and porches should have guardrails on the edges.  · Have any leaves, snow, or ice cleared regularly.  · Use sand or salt on walking paths during winter.  · Clean up any spills in your garage right away. This includes oil or grease spills.  What can I do in the bathroom?  · Use night lights.  · Install grab bars by the toilet and in the tub and shower. Do not use towel bars as grab bars.  · Use non-skid mats or decals in the tub or shower.  · If you need to sit down in the shower, use a plastic, non-slip stool.  · Keep the floor dry. Clean up any water that spills on the floor as soon as it happens.  · Remove soap buildup in the tub or shower regularly.  · Attach bath mats securely with double-sided non-slip rug tape.  · Do not have throw rugs and other things on the floor that can make you trip.  What can I do in the bedroom?  · Use night lights.  · Make sure that you have a light by your bed that is easy to reach.  · Do not use any sheets or blankets that are too big for your bed. They should not hang down onto the floor.  · Have a firm chair that has side arms. You can use this for support while you get dressed.  · Do not have throw rugs and other things on the floor that can make you trip.  What can I do in the  kitchen?  · Clean up any spills right away.  · Avoid walking on wet floors.  · Keep items that you use a lot in easy-to-reach places.  · If you need to reach something above you, use a strong step stool that has a grab bar.  · Keep electrical cords out of the way.  · Do not use floor polish or wax that makes floors slippery. If you must use wax, use non-skid floor wax.  · Do not have throw rugs and other things on the floor that can make you trip.  What can I do with my stairs?  · Do not leave any items on the stairs.  · Make sure that there are handrails on both sides of the stairs and use them. Fix handrails that are broken or loose. Make sure that handrails are as long as the stairways.  · Check any carpeting to make sure that it is firmly attached to the stairs. Fix any carpet that is loose or worn.  · Avoid having throw rugs at the top or bottom of the stairs. If you do have throw rugs, attach them to the floor with carpet tape.  · Make sure that you have a light switch at the top of the stairs and the bottom of the stairs. If you do not have them, ask someone to add them for you.  What else can I do to help prevent falls?  · Wear shoes that:  ¨ Do not have high heels.  ¨ Have rubber bottoms.  ¨ Are comfortable and fit you well.  ¨ Are closed at the toe. Do not wear sandals.  · If you use a stepladder:  ¨ Make sure that it is fully opened. Do not climb a closed stepladder.  ¨ Make sure that both sides of the stepladder are locked into place.  ¨ Ask someone to hold it for you, if possible.  · Clearly kourtney and make sure that you can see:  ¨ Any grab bars or handrails.  ¨ First and last steps.  ¨ Where the edge of each step is.  · Use tools that help you move around (mobility aids) if they are needed. These include:  ¨ Canes.  ¨ Walkers.  ¨ Scooters.  ¨ Crutches.  · Turn on the lights when you go into a dark area. Replace any light bulbs as soon as they burn out.  · Set up your furniture so you have a clear path.  Avoid moving your furniture around.  · If any of your floors are uneven, fix them.  · If there are any pets around you, be aware of where they are.  · Review your medicines with your doctor. Some medicines can make you feel dizzy. This can increase your chance of falling.  Ask your doctor what other things that you can do to help prevent falls.  This information is not intended to replace advice given to you by your health care provider. Make sure you discuss any questions you have with your health care provider.  Document Released: 10/14/2010 Document Revised: 05/25/2017 Document Reviewed: 01/22/2016  [employer] Interactive Patient Education © 2017 [employer]  Exercising to Lose Weight  Exercising can help you to lose weight. In order to lose weight through exercise, you need to do vigorous-intensity exercise. You can tell that you are exercising with vigorous intensity if you are breathing very hard and fast and cannot hold a conversation while exercising.  Moderate-intensity exercise helps to maintain your current weight. You can tell that you are exercising at a moderate level if you have a higher heart rate and faster breathing, but you are still able to hold a conversation.  How often should I exercise?  Choose an activity that you enjoy and set realistic goals. Your health care provider can help you to make an activity plan that works for you. Exercise regularly as directed by your health care provider. This may include:  · Doing resistance training twice each week, such as:  ¨ Push-ups.  ¨ Sit-ups.  ¨ Lifting weights.  ¨ Using resistance bands.  · Doing a given intensity of exercise for a given amount of time. Choose from these options:  ¨ 150 minutes of moderate-intensity exercise every week.  ¨ 75 minutes of vigorous-intensity exercise every week.  ¨ A mix of moderate-intensity and vigorous-intensity exercise every week.  Children, pregnant women, people who are out of shape, people who are overweight,  and older adults may need to consult a health care provider for individual recommendations. If you have any sort of medical condition, be sure to consult your health care provider before starting a new exercise program.  What are some activities that can help me to lose weight?  · Walking at a rate of at least 4.5 miles an hour.  · Jogging or running at a rate of 5 miles per hour.  · Biking at a rate of at least 10 miles per hour.  · Lap swimming.  · Roller-skating or in-line skating.  · Cross-country skiing.  · Vigorous competitive sports, such as football, basketball, and soccer.  · Jumping rope.  · Aerobic dancing.  How can I be more active in my day-to-day activities?  · Use the stairs instead of the elevator.  · Take a walk during your lunch break.  · If you drive, park your car farther away from work or school.  · If you take public transportation, get off one stop early and walk the rest of the way.  · Make all of your phone calls while standing up and walking around.  · Get up, stretch, and walk around every 30 minutes throughout the day.  What guidelines should I follow while exercising?  · Do not exercise so much that you hurt yourself, feel dizzy, or get very short of breath.  · Consult your health care provider prior to starting a new exercise program.  · Wear comfortable clothes and shoes with good support.  · Drink plenty of water while you exercise to prevent dehydration or heat stroke. Body water is lost during exercise and must be replaced.  · Work out until you breathe faster and your heart beats faster.  This information is not intended to replace advice given to you by your health care provider. Make sure you discuss any questions you have with your health care provider.  Document Released: 01/20/2012 Document Revised: 05/25/2017 Document Reviewed: 05/21/2015  [employer] Interactive Patient Education © 2017 [employer]    Medicare Wellness  Personal Prevention Plan of Service     Date of Office  Visit:  2018  Encounter Provider:  Mariann Fallon MD  Place of Service:  [facility]  Patient Name: Shu Mckeon  :  1940    As part of the Medicare Wellness portion of your visit today, we are providing you with this personalized preventive plan of services (PPPS). This plan is based upon recommendations of the [place] Preventive Services Task Force (USPSTF) and the Advisory Committee on Immunization Practices (ACIP).    This lists the preventive care services that should be considered, and provides dates of when you are due. Items listed as completed are up-to-date and do not require any further intervention.    Health Maintenance   Topic Date Due   • TDAP/TD VACCINES (1 - Tdap) 08/10/1959   • MEDICARE ANNUAL WELLNESS  2018   • INFLUENZA VACCINE  2018   • MAMMOGRAM  2018   • HEMOGLOBIN A1C  2018   • LIPID PANEL  2019   • DIABETIC FOOT EXAM  2019   • DIABETIC EYE EXAM  2019   • URINE MICROALBUMIN  2019   • DXA SCAN  2020   • PNEUMOCOCCAL VACCINES (65+ LOW/MEDIUM RISK)  Completed   • ZOSTER VACCINE  Addressed       No orders of the defined types were placed in this encounter.      No Follow-up on file.

## 2018-05-09 ENCOUNTER — TELEPHONE (OUTPATIENT)
Dept: PAIN MEDICINE | Facility: CLINIC | Age: 78
End: 2018-05-09

## 2018-05-09 NOTE — TELEPHONE ENCOUNTER
Called pt to get % relief from her Right L3-L5 LMBB on 4-25-18. States she got 90% relief and is still feeling great from it.

## 2018-06-08 ENCOUNTER — APPOINTMENT (OUTPATIENT)
Dept: GENERAL RADIOLOGY | Facility: HOSPITAL | Age: 78
End: 2018-06-08

## 2018-06-08 PROCEDURE — 71101 X-RAY EXAM UNILAT RIBS/CHEST: CPT | Performed by: FAMILY MEDICINE

## 2018-06-12 ENCOUNTER — HOSPITAL ENCOUNTER (OUTPATIENT)
Dept: GENERAL RADIOLOGY | Facility: HOSPITAL | Age: 78
Discharge: HOME | End: 2018-06-12
Admitting: INTERNAL MEDICINE

## 2018-06-12 ENCOUNTER — OFFICE VISIT (OUTPATIENT)
Dept: INTERNAL MEDICINE | Facility: CLINIC | Age: 78
End: 2018-06-12

## 2018-06-12 VITALS
HEART RATE: 85 BPM | WEIGHT: 181 LBS | DIASTOLIC BLOOD PRESSURE: 66 MMHG | TEMPERATURE: 98 F | OXYGEN SATURATION: 97 % | SYSTOLIC BLOOD PRESSURE: 110 MMHG | BODY MASS INDEX: 30.9 KG/M2 | HEIGHT: 64 IN

## 2018-06-12 DIAGNOSIS — M25.532 LEFT WRIST PAIN: ICD-10-CM

## 2018-06-12 DIAGNOSIS — R05.9 COUGH: ICD-10-CM

## 2018-06-12 DIAGNOSIS — M81.6 LOCALIZED OSTEOPOROSIS, UNSPECIFIED PATHOLOGICAL FRACTURE PRESENCE: Primary | ICD-10-CM

## 2018-06-12 DIAGNOSIS — S22.31XA CLOSED FRACTURE OF ONE RIB OF RIGHT SIDE, INITIAL ENCOUNTER: ICD-10-CM

## 2018-06-12 DIAGNOSIS — S20.211A CONTUSION OF RIGHT CHEST WALL, INITIAL ENCOUNTER: ICD-10-CM

## 2018-06-12 LAB
BILIRUB BLD-MCNC: NEGATIVE MG/DL
CLARITY, POC: CLEAR
COLOR UR: YELLOW
GLUCOSE UR STRIP-MCNC: NEGATIVE MG/DL
KETONES UR QL: NEGATIVE
LEUKOCYTE EST, POC: NEGATIVE
NITRITE UR-MCNC: NEGATIVE MG/ML
PH UR: 7.5 [PH] (ref 5–8)
PROT UR STRIP-MCNC: NEGATIVE MG/DL
RBC # UR STRIP: NEGATIVE /UL
SP GR UR: 1.01 (ref 1–1.03)
UROBILINOGEN UR QL: NORMAL

## 2018-06-12 PROCEDURE — 73110 X-RAY EXAM OF WRIST: CPT

## 2018-06-12 PROCEDURE — 99214 OFFICE O/P EST MOD 30 MIN: CPT | Performed by: INTERNAL MEDICINE

## 2018-06-12 PROCEDURE — 81003 URINALYSIS AUTO W/O SCOPE: CPT | Performed by: INTERNAL MEDICINE

## 2018-06-12 RX ORDER — HYDROCODONE BITARTRATE AND ACETAMINOPHEN 5; 325 MG/1; MG/1
TABLET ORAL
Qty: 30 TABLET | Refills: 0 | Status: SHIPPED | OUTPATIENT
Start: 2018-06-12 | End: 2018-06-28 | Stop reason: SDUPTHER

## 2018-06-12 NOTE — PROGRESS NOTES
Subjective   Shu Mckeon is a 77 y.o. female here today to f/u from  for rib fracture from a fall x 6/6/18  Pt c/o left wrist pain, cough and elevated BP.      History of Present Illness   Pt fell at home on Wednesday.  She lost balance and hit the tub and was seen the next day at the [facility].  She was dx with rib fractures.  She is cont to have pain in the area of the rib fractures.  She has a hard time taking a deep breath.  She also has a cold and is coughing that makes it worse.  No fevers or chills  She has also been having some left wrist pain  She says it is a little swollen.  ROM ok but painful with use      The following portions of the patient's history were reviewed and updated as appropriate: allergies, current medications, past medical history, past social history and problem list.  No hx of asthma    Review of Systems   Respiratory: Positive for cough.    Musculoskeletal:        Left wrist pain   All other systems reviewed and are negative.      Objective   Physical Exam   Constitutional: She is oriented to person, place, and time. She appears well-developed and well-nourished.   HENT:   Head: Normocephalic and atraumatic.   Right Ear: External ear normal.   Left Ear: External ear normal.   Mouth/Throat: Oropharynx is clear and moist.   Eyes: Conjunctivae and EOM are normal. Pupils are equal, round, and reactive to light.   Neck: Normal range of motion. No tracheal deviation present. No thyromegaly present.   Cardiovascular: Normal rate, regular rhythm, normal heart sounds and intact distal pulses.    Pulmonary/Chest: Effort normal and breath sounds normal.   Abdominal: Soft. Bowel sounds are normal. She exhibits no distension. There is no tenderness.   Musculoskeletal: Normal range of motion. She exhibits no edema or deformity.   Neurological: She is alert and oriented to person, place, and time.   Skin: Skin is warm and dry.   Psychiatric: She has a normal mood and affect. Her behavior is normal.  Judgment and thought content normal.   Vitals reviewed.      Vitals:    06/12/18 1142   BP: 110/66   Pulse: 85   Temp: 98 °F (36.7 °C)   SpO2: 97%          Current Outpatient Prescriptions:   •  aspirin 81 MG tablet, Take 1 tablet by mouth daily., Disp: , Rfl:   •  atorvastatin (LIPITOR) 40 MG tablet, Take 1 tablet by mouth Daily., Disp: 90 tablet, Rfl: 3  •  BIOTIN PO, Take 1 tablet by mouth daily., Disp: , Rfl:   •  Cholecalciferol (VITAMIN D3) 5000 UNITS tablet, Take 1 tablet by mouth daily., Disp: , Rfl:   •  diclofenac (VOLTAREN) 1 % gel gel, Apply 4 g topically 4 (Four) Times a Day., Disp: 1 tube, Rfl: 3  •  Ilana 500 MG capsule, Take 500 mg by mouth Daily., Disp: , Rfl:   •  HYDROcodone-acetaminophen (NORCO) 5-325 MG per tablet, 1 or 2 tabs PO Q6 hours prn chest pain, Disp: 30 tablet, Rfl: 0  •  lidocaine (LIDODERM) 5 %, Place 1 patch on the skin Daily. Remove & Discard patch within 12 hours or as directed by MD, Disp: 30 patch, Rfl: 3  •  losartan (COZAAR) 50 MG tablet, Take 1 tablet by mouth Daily., Disp: 90 tablet, Rfl: 3  •  Multiple Vitamin (MULTIVITAMIN) capsule, Take 1 capsule by mouth daily., Disp: , Rfl:   •  Probiotic Product (PROBIOTIC FORMULA PO), Take 1 tablet by mouth Daily., Disp: , Rfl:   •  sertraline (ZOLOFT) 50 MG tablet, TAKE 1 TABLET DAILY, Disp: 90 tablet, Rfl: 1  •  Turmeric 500 MG tablet, Take 500 mg by mouth Daily., Disp: , Rfl:       Assessment/Plan   Diagnoses and all orders for this visit:    Localized osteoporosis, unspecified pathological fracture presence    Cough    Contusion of right chest wall, initial encounter  -     HYDROcodone-acetaminophen (NORCO) 5-325 MG per tablet; 1 or 2 tabs PO Q6 hours prn chest pain    Closed fracture of one rib of right side, initial encounter  -     HYDROcodone-acetaminophen (NORCO) 5-325 MG per tablet; 1 or 2 tabs PO Q6 hours prn chest pain    1. left sided rib fractures-  She has been on narco with some minmal relief  We will refill some  today   2.  Cough- recent URI-  Lungs clear  She will monitor sx and try breo for the cough  3.  Osteoporosis-  She has been on fosamax for many years and is now off.  We will consider prolia for this  4. Left wrist pain-  She is cont ot have pain

## 2018-06-15 ENCOUNTER — TELEPHONE (OUTPATIENT)
Dept: PAIN MEDICINE | Facility: CLINIC | Age: 78
End: 2018-06-15

## 2018-06-15 NOTE — TELEPHONE ENCOUNTER
Patient called and states that she fell and fractured some ribs and she has an appointment scheduled 6/20/18 for an injection and wanted to make sure that she could complete her injection.

## 2018-06-20 ENCOUNTER — OUTSIDE FACILITY SERVICE (OUTPATIENT)
Dept: PAIN MEDICINE | Facility: CLINIC | Age: 78
End: 2018-06-20

## 2018-06-20 ENCOUNTER — DOCUMENTATION (OUTPATIENT)
Dept: PAIN MEDICINE | Facility: CLINIC | Age: 78
End: 2018-06-20

## 2018-06-20 PROCEDURE — 64494 INJ PARAVERT F JNT L/S 2 LEV: CPT | Performed by: PAIN MEDICINE

## 2018-06-20 PROCEDURE — 64493 INJ PARAVERT F JNT L/S 1 LEV: CPT | Performed by: PAIN MEDICINE

## 2018-06-20 PROCEDURE — 64495 INJ PARAVERT F JNT L/S 3 LEV: CPT | Performed by: PAIN MEDICINE

## 2018-06-20 NOTE — PROGRESS NOTES
RIGHT L2-5 Lumbar Medial Branch Blockade  [facility]    PREOPERATIVE DIAGNOSIS:  Lumbar spondylosis without myelopathy    POSTOPERATIVE DIAGNOSIS:  Lumbar spondylosis without myelopathy    PROCEDURE:   Diagnostic Right Lumbar Medial Branch Nerve Blockades, with fluoroscopy:  L2, L3, L4, and L5 nerves (at the L3, L4, L5 transverse processes and the sacral alar groove) to block facet joints L3-4, L4-5, and L5-S1  1. 63587 -- Lumbar Facet block, 1st Level  2. 65622 -- Lumbar Facet block, 2nd  Level  3. 30222 -- Lumbar Facet block, 3rd Level    PRE-PROCEDURE DISCUSSION WITH PATIENT:    Risks and complications were discussed with the patient prior to starting the procedure and informed consent was obtained.      SURGEON:   Darleen Cheung MD    REASON FOR PROCEDURE:    The patient complains of pain that seems to have a significant axial component, Pain on extension of the lumbar spine and Positive lumbar facet loading maneuver    SEDATION:  Versed 2mg & Fentanyl 50 mcg IV  ANESTHETIC:  Marcaine 0.25%  STEROID:  Methylprednisolone (DEPO MEDROL) 40mg/ml  TOTAL VOLUME OF SOLUTION:  4 mL    DESCRIPTON OF PROCEDURE:  After obtaining informed consent, IV access was obtained in the preoperative area.   The patient was taken to the operating room.  The patient was placed in the prone position with a pillow under the abdomen. All pressure points were well padded.  EKG, blood pressure, and pulse oximeter were monitored.  The patient was monitored and sedated by the RN under my direction. The lumbosacral area was prepped with Chloraprep and draped in a sterile fashion. Under fluoroscopic guidance the transverse processes of the L3, L4, and L5 vertebrae at the junctions of the superior articular processes were identified on the affected side.  Also identified was the groove between the ala and the superior articular process of the sacrum on the ipsilateral side.  Skin and subcutaneous tissue were anesthetized  with 1% lidocaine above each of these points. A spinal needle was introduced under fluoroscopic guidance at the above junctions. Aspiration was negative for blood and CSF.  After confirming the position of the needle with fluoroscope in all views, 1 mL of the anesthetic solution noted above was injected at each of these points.  Needles were removed intact from each of the areas.   Onset of analgesia was noted.  Vital signs remained stable throughout.      ESTIMATED BLOOD LOSS:  <5 mL  SPECIMENS:  none    COMPLICATIONS:   No complications were noted.    TOLERANCE & DISCHARGE CONDITION:    The patient tolerated the procedure well.  The patient was transported to the recovery area without difficulties.  The patient was discharged to home under the care of family in stable and satisfactory condition.    PLAN OF CARE:  1. The patient was given our standard instruction sheet.  2. We discussed that Lumbar Medial Branch Blockade is a diagnostic procedure in consideration for radiofrequency ablation if two diagnostic procedures prove to be positive for significant benefit.  If sustained relief of 6 to eight weeks is obtained, then an alternative plan could be therapeutic lumbar branch blockades.  3. The patient is asked to keep a pain log each hour for 8 hours after the procedure today.  4. The patient will  Return to clinic 4 wks  5. The patient will resume all medications as per the medication reconciliation sheet.

## 2018-06-28 DIAGNOSIS — S20.211A CONTUSION OF RIGHT CHEST WALL, INITIAL ENCOUNTER: ICD-10-CM

## 2018-06-28 DIAGNOSIS — S22.31XA CLOSED FRACTURE OF ONE RIB OF RIGHT SIDE, INITIAL ENCOUNTER: ICD-10-CM

## 2018-06-28 RX ORDER — HYDROCODONE BITARTRATE AND ACETAMINOPHEN 5; 325 MG/1; MG/1
TABLET ORAL
Qty: 30 TABLET | Refills: 0 | Status: SHIPPED | OUTPATIENT
Start: 2018-06-28 | End: 2018-07-23

## 2018-06-28 NOTE — TELEPHONE ENCOUNTER
[facility]      ----- Message from Todd Fenton sent at 6/28/2018  9:09 AM EDT -----  PT would like a refill onHYDROcodone-acetaminophen (NORCO) 5-325 MG per tablet  .She is out and her ribs are still hurting.       [facility]: [phone] (Phone)

## 2018-07-01 ENCOUNTER — RESULTS ENCOUNTER (OUTPATIENT)
Dept: INTERNAL MEDICINE | Facility: CLINIC | Age: 78
End: 2018-07-01

## 2018-07-01 DIAGNOSIS — I10 ESSENTIAL HYPERTENSION: ICD-10-CM

## 2018-07-01 DIAGNOSIS — E78.5 HYPERLIPIDEMIA, UNSPECIFIED HYPERLIPIDEMIA TYPE: ICD-10-CM

## 2018-07-01 DIAGNOSIS — E11.9 CONTROLLED TYPE 2 DIABETES MELLITUS WITHOUT COMPLICATION, WITHOUT LONG-TERM CURRENT USE OF INSULIN (HCC): ICD-10-CM

## 2018-07-03 LAB
BASOPHILS # BLD AUTO: 0.05 10*3/MM3 (ref 0–0.2)
BASOPHILS NFR BLD AUTO: 0.6 % (ref 0–1.5)
CHOLEST SERPL-MCNC: 161 MG/DL (ref 0–200)
EOSINOPHIL # BLD AUTO: 0.33 10*3/MM3 (ref 0–0.7)
EOSINOPHIL NFR BLD AUTO: 4.2 % (ref 0.3–6.2)
ERYTHROCYTE [DISTWIDTH] IN BLOOD BY AUTOMATED COUNT: 14.7 % (ref 11.7–13)
HBA1C MFR BLD: 6.4 % (ref 4.8–5.6)
HCT VFR BLD AUTO: 44.2 % (ref 35.6–45.5)
HDLC SERPL-MCNC: 67 MG/DL (ref 40–60)
HGB BLD-MCNC: 13.9 G/DL (ref 11.9–15.5)
IMM GRANULOCYTES # BLD: 0.02 10*3/MM3 (ref 0–0.03)
IMM GRANULOCYTES NFR BLD: 0.3 % (ref 0–0.5)
LDLC SERPL CALC-MCNC: 77 MG/DL (ref 0–100)
LDLC/HDLC SERPL: 1.15 {RATIO}
LYMPHOCYTES # BLD AUTO: 1.84 10*3/MM3 (ref 0.9–4.8)
LYMPHOCYTES NFR BLD AUTO: 23.3 % (ref 19.6–45.3)
MCH RBC QN AUTO: 29.6 PG (ref 26.9–32)
MCHC RBC AUTO-ENTMCNC: 31.4 G/DL (ref 32.4–36.3)
MCV RBC AUTO: 94 FL (ref 80.5–98.2)
MONOCYTES # BLD AUTO: 0.67 10*3/MM3 (ref 0.2–1.2)
MONOCYTES NFR BLD AUTO: 8.5 % (ref 5–12)
NEUTROPHILS # BLD AUTO: 5 10*3/MM3 (ref 1.9–8.1)
NEUTROPHILS NFR BLD AUTO: 63.4 % (ref 42.7–76)
PLATELET # BLD AUTO: 336 10*3/MM3 (ref 140–500)
RBC # BLD AUTO: 4.7 10*6/MM3 (ref 3.9–5.2)
TRIGL SERPL-MCNC: 85 MG/DL (ref 0–150)
TSH SERPL DL<=0.005 MIU/L-ACNC: 2 MIU/ML (ref 0.27–4.2)
VLDLC SERPL CALC-MCNC: 17 MG/DL (ref 5–40)
WBC # BLD AUTO: 7.89 10*3/MM3 (ref 4.5–10.7)

## 2018-07-17 ENCOUNTER — OFFICE VISIT (OUTPATIENT)
Dept: INTERNAL MEDICINE | Facility: CLINIC | Age: 78
End: 2018-07-17

## 2018-07-17 VITALS
WEIGHT: 181.8 LBS | HEIGHT: 64 IN | DIASTOLIC BLOOD PRESSURE: 62 MMHG | TEMPERATURE: 98.9 F | BODY MASS INDEX: 31.04 KG/M2 | HEART RATE: 68 BPM | OXYGEN SATURATION: 96 % | SYSTOLIC BLOOD PRESSURE: 110 MMHG

## 2018-07-17 DIAGNOSIS — I10 ESSENTIAL HYPERTENSION: ICD-10-CM

## 2018-07-17 DIAGNOSIS — E78.5 HYPERLIPIDEMIA, UNSPECIFIED HYPERLIPIDEMIA TYPE: Primary | ICD-10-CM

## 2018-07-17 DIAGNOSIS — Z12.11 SCREENING FOR COLON CANCER: ICD-10-CM

## 2018-07-17 DIAGNOSIS — I65.29 STENOSIS OF CAROTID ARTERY, UNSPECIFIED LATERALITY: ICD-10-CM

## 2018-07-17 DIAGNOSIS — M54.50 CHRONIC BILATERAL LOW BACK PAIN WITHOUT SCIATICA: ICD-10-CM

## 2018-07-17 DIAGNOSIS — Z12.31 ENCOUNTER FOR SCREENING MAMMOGRAM FOR MALIGNANT NEOPLASM OF BREAST: ICD-10-CM

## 2018-07-17 DIAGNOSIS — M81.6 LOCALIZED OSTEOPOROSIS, UNSPECIFIED PATHOLOGICAL FRACTURE PRESENCE: ICD-10-CM

## 2018-07-17 DIAGNOSIS — Z00.00 HEALTH CARE MAINTENANCE: ICD-10-CM

## 2018-07-17 DIAGNOSIS — G89.29 CHRONIC BILATERAL LOW BACK PAIN WITHOUT SCIATICA: ICD-10-CM

## 2018-07-17 DIAGNOSIS — I79.8 OTHER DISORDERS OF ARTERIES, ARTERIOLES AND CAPILLARIES IN DISEASES CLASSIFIED ELSEWHERE (HCC): ICD-10-CM

## 2018-07-17 PROCEDURE — 99397 PER PM REEVAL EST PAT 65+ YR: CPT | Performed by: INTERNAL MEDICINE

## 2018-07-17 PROCEDURE — 99214 OFFICE O/P EST MOD 30 MIN: CPT | Performed by: INTERNAL MEDICINE

## 2018-07-17 NOTE — PROGRESS NOTES
Subjective   Shu Mckeon is a 77 y.o. female here today to f/u on HTN, hyperlipidemia, osteoporosis, DM 2 and SOA.  Pt c/o back pain, she does see Pain Management.      History of Present Illness       The following portions of the patient's history were reviewed and updated as appropriate: allergies, current medications, past medical history, past social history and problem list.  She is eating ok but not exercising    Review of Systems   Constitutional: Negative.    HENT: Negative.    Eyes: Negative.    Respiratory: Negative.    All other systems reviewed and are negative.      Objective   Physical Exam   Constitutional: She is oriented to person, place, and time. She appears well-developed and well-nourished.   HENT:   Head: Normocephalic and atraumatic.   Right Ear: External ear normal.   Left Ear: External ear normal.   Mouth/Throat: Oropharynx is clear and moist.   Eyes: Conjunctivae and EOM are normal. Pupils are equal, round, and reactive to light.   Neck: Normal range of motion. No tracheal deviation present. No thyromegaly present.   Cardiovascular: Normal rate, regular rhythm, normal heart sounds and intact distal pulses.    Pulmonary/Chest: Effort normal and breath sounds normal.   Abdominal: Soft. Bowel sounds are normal. She exhibits no distension. There is no tenderness.   Musculoskeletal: Normal range of motion. She exhibits no edema or deformity.   Neurological: She is alert and oriented to person, place, and time.   Skin: Skin is warm and dry.   Psychiatric: She has a normal mood and affect. Her behavior is normal. Judgment and thought content normal.   Vitals reviewed.      Vitals:    07/17/18 1027   BP: 110/62   Pulse: 68   Temp: 98.9 °F (37.2 °C)   SpO2: 96%        Results Encounter on 07/01/2018   Component Date Value Ref Range Status   • WBC 07/03/2018 7.89  4.50 - 10.70 10*3/mm3 Final   • RBC 07/03/2018 4.70  3.90 - 5.20 10*6/mm3 Final   • Hemoglobin 07/03/2018 13.9  11.9 - 15.5 g/dL Final    • Hematocrit 07/03/2018 44.2  35.6 - 45.5 % Final   • MCV 07/03/2018 94.0  80.5 - 98.2 fL Final   • MCH 07/03/2018 29.6  26.9 - 32.0 pg Final   • MCHC 07/03/2018 31.4* 32.4 - 36.3 g/dL Final   • RDW 07/03/2018 14.7* 11.7 - 13.0 % Final   • Platelets 07/03/2018 336  140 - 500 10*3/mm3 Final   • Neutrophil Rel % 07/03/2018 63.4  42.7 - 76.0 % Final   • Lymphocyte Rel % 07/03/2018 23.3  19.6 - 45.3 % Final   • Monocyte Rel % 07/03/2018 8.5  5.0 - 12.0 % Final   • Eosinophil Rel % 07/03/2018 4.2  0.3 - 6.2 % Final   • Basophil Rel % 07/03/2018 0.6  0.0 - 1.5 % Final   • Neutrophils Absolute 07/03/2018 5.00  1.90 - 8.10 10*3/mm3 Final   • Lymphocytes Absolute 07/03/2018 1.84  0.90 - 4.80 10*3/mm3 Final   • Monocytes Absolute 07/03/2018 0.67  0.20 - 1.20 10*3/mm3 Final   • Eosinophils Absolute 07/03/2018 0.33  0.00 - 0.70 10*3/mm3 Final   • Basophils Absolute 07/03/2018 0.05  0.00 - 0.20 10*3/mm3 Final   • Immature Granulocyte Rel % 07/03/2018 0.3  0.0 - 0.5 % Final   • Immature Grans Absolute 07/03/2018 0.02  0.00 - 0.03 10*3/mm3 Final   • Total Cholesterol 07/03/2018 161  0 - 200 mg/dL Final   • Triglycerides 07/03/2018 85  0 - 150 mg/dL Final   • HDL Cholesterol 07/03/2018 67* 40 - 60 mg/dL Final   • VLDL Cholesterol 07/03/2018 17  5 - 40 mg/dL Final   • LDL Cholesterol  07/03/2018 77  0 - 100 mg/dL Final   • LDL/HDL Ratio 07/03/2018 1.15   Final   • TSH 07/03/2018 2.00  0.27 - 4.2 mIU/mL Final   • Hemoglobin A1C 07/03/2018 6.40* 4.80 - 5.60 % Final    Comment: Hemoglobin A1C Ranges:  Increased Risk for Diabetes  5.7% to 6.4%  Diabetes                     >= 6.5%  Diabetic Goal                < 7.0%         Current Outpatient Prescriptions:   •  atorvastatin (LIPITOR) 40 MG tablet, Take 1 tablet by mouth Daily., Disp: 90 tablet, Rfl: 3  •  BIOTIN PO, Take 1 tablet by mouth daily., Disp: , Rfl:   •  Cholecalciferol (VITAMIN D3) 5000 UNITS tablet, Take 1 tablet by mouth daily., Disp: , Rfl:   •  diclofenac (VOLTAREN) 1  % gel gel, Apply 4 g topically 4 (Four) Times a Day., Disp: 1 tube, Rfl: 3  •  HYDROcodone-acetaminophen (NORCO) 5-325 MG per tablet, 1 or 2 tabs PO Q6 hours prn chest pain, Disp: 30 tablet, Rfl: 0  •  losartan (COZAAR) 50 MG tablet, Take 1 tablet by mouth Daily., Disp: 90 tablet, Rfl: 3  •  Multiple Vitamin (MULTIVITAMIN) capsule, Take 1 capsule by mouth daily., Disp: , Rfl:   •  Probiotic Product (PROBIOTIC FORMULA PO), Take 1 tablet by mouth Daily., Disp: , Rfl:   •  sertraline (ZOLOFT) 50 MG tablet, TAKE 1 TABLET DAILY, Disp: 90 tablet, Rfl: 1  •  lidocaine (LIDODERM) 5 %, Place 1 patch on the skin Daily. Remove & Discard patch within 12 hours or as directed by MD, Disp: 30 patch, Rfl: 3      Assessment/Plan   There are no diagnoses linked to this encounter.

## 2018-07-20 ENCOUNTER — OFFICE VISIT (OUTPATIENT)
Dept: CARDIOLOGY | Facility: CLINIC | Age: 78
End: 2018-07-20

## 2018-07-20 VITALS — HEIGHT: 64 IN | WEIGHT: 182 LBS | BODY MASS INDEX: 31.07 KG/M2

## 2018-07-20 DIAGNOSIS — I44.7 LBBB (LEFT BUNDLE BRANCH BLOCK): ICD-10-CM

## 2018-07-20 DIAGNOSIS — I10 ESSENTIAL HYPERTENSION: ICD-10-CM

## 2018-07-20 DIAGNOSIS — R07.2 PRECORDIAL PAIN: Primary | ICD-10-CM

## 2018-07-20 DIAGNOSIS — E78.5 HYPERLIPIDEMIA, UNSPECIFIED HYPERLIPIDEMIA TYPE: ICD-10-CM

## 2018-07-20 PROCEDURE — 99204 OFFICE O/P NEW MOD 45 MIN: CPT | Performed by: INTERNAL MEDICINE

## 2018-07-20 PROCEDURE — 93000 ELECTROCARDIOGRAM COMPLETE: CPT | Performed by: INTERNAL MEDICINE

## 2018-07-20 NOTE — PROGRESS NOTES
Shu Judd  1940  Date of Office Visit: 07/20/2018  Encounter Provider: Nicolas Latif MD  Place of Service: [facility] CARDIOLOGY      CHIEF COMPLAINT:  Chest discomfort  Left bundle-branch block      HISTORY OF PRESENT ILLNESS:Dr. Fallon:    I had the pleasure of seeing your patient in consultation today. She is a very pleasant 77-year-old female with a medical history of hyperlipidemia, left bundle branch block, impaired fasting glucose who presents to me secondary to chest discomfort. The patient states that she did have an episode of chest discomfort at rest within the past month. It was central and moderate in intensity. It lasted for about 20 minutes and then went away on its own. It did not increase with deep inspiration or positional alteration. She denies any chest pain with activity following this. She does have mild dyspnea on exertion. She also complains of lightheadedness upon standing from a seated position. She denies syncope. Her lightheadedness is mild in intensity and short in duration. It tends to resolve within about 15 seconds.        Review of Systems   Constitution: Negative for fever, weakness and malaise/fatigue.   HENT: Negative for nosebleeds and sore throat.    Eyes: Negative for blurred vision and double vision.   Cardiovascular: Negative for chest pain, claudication, palpitations and syncope.   Respiratory: Negative for cough, shortness of breath and snoring.    Endocrine: Negative for cold intolerance, heat intolerance and polydipsia.   Skin: Negative for itching, poor wound healing and rash.   Musculoskeletal: Positive for joint pain. Negative for joint swelling, muscle weakness and myalgias.   Gastrointestinal: Negative for abdominal pain, melena, nausea and vomiting.   Neurological: Positive for light-headedness. Negative for loss of balance, seizures and vertigo.   Psychiatric/Behavioral: Negative for altered mental status and  "depression.          Past Medical History:   Diagnosis Date   • Depression    • Diabetes mellitus (CMS/HCC)    • Fatigue    • Groin pain    • Hypertension    • Impaired fasting glucose    • Impaired fasting glucose    • Osteoporosis    • Status post ORIF of fracture of ankle        The following portions of the patient's history were reviewed and updated as appropriate: Social history , Family history and Surgical history     Current Outpatient Prescriptions on File Prior to Visit   Medication Sig Dispense Refill   • atorvastatin (LIPITOR) 40 MG tablet Take 1 tablet by mouth Daily. 90 tablet 3   • BIOTIN PO Take 1 tablet by mouth daily.     • Cholecalciferol (VITAMIN D3) 5000 UNITS tablet Take 1 tablet by mouth daily.     • diclofenac (VOLTAREN) 1 % gel gel Apply 4 g topically 4 (Four) Times a Day. 1 tube 3   • HYDROcodone-acetaminophen (NORCO) 5-325 MG per tablet 1 or 2 tabs PO Q6 hours prn chest pain 30 tablet 0   • lidocaine (LIDODERM) 5 % Place 1 patch on the skin Daily. Remove & Discard patch within 12 hours or as directed by MD 30 patch 3   • losartan (COZAAR) 50 MG tablet Take 1 tablet by mouth Daily. 90 tablet 3   • Multiple Vitamin (MULTIVITAMIN) capsule Take 1 capsule by mouth daily.     • Probiotic Product (PROBIOTIC FORMULA PO) Take 1 tablet by mouth Daily.     • sertraline (ZOLOFT) 50 MG tablet TAKE 1 TABLET DAILY 90 tablet 1     No current facility-administered medications on file prior to visit.        No Known Allergies     Supine blood pressures  Supine 122/72, pulse 73  Sitting 124/80, pulse 84  Standing 120/80, pulse 83    Vitals:    07/20/18 1214   Weight: 82.6 kg (182 lb)   Height: 162.6 cm (64\")     Physical Exam   Constitutional: She is oriented to person, place, and time. She appears well-developed and well-nourished.   HENT:   Head: Normocephalic and atraumatic.   Eyes: Conjunctivae and EOM are normal. No scleral icterus.   Neck: Normal range of motion. Neck supple. Normal carotid pulses, no " hepatojugular reflux and no JVD present. Carotid bruit is not present. No tracheal deviation present. No thyromegaly present.   Cardiovascular: Normal rate and regular rhythm.  Exam reveals no gallop and no friction rub.    No murmur heard.  Pulses:       Carotid pulses are 2+ on the right side, and 2+ on the left side.       Radial pulses are 2+ on the right side, and 2+ on the left side.        Femoral pulses are 2+ on the right side, and 2+ on the left side.       Dorsalis pedis pulses are 2+ on the right side, and 2+ on the left side.        Posterior tibial pulses are 2+ on the right side, and 2+ on the left side.   Pulmonary/Chest: Breath sounds normal. No respiratory distress. She has no decreased breath sounds. She has no wheezes. She has no rhonchi. She has no rales. She exhibits no tenderness.   Abdominal: Soft. Bowel sounds are normal. She exhibits no distension. There is no tenderness. There is no rebound.   Musculoskeletal: She exhibits no edema or deformity.   Neurological: She is alert and oriented to person, place, and time. She has normal strength. No sensory deficit.   Skin: No rash noted. No erythema.   Psychiatric: She has a normal mood and affect. Her behavior is normal.       No components found for: CBC  No results found for: CMP  No components found for: LIPID  No results found for: BMP      ECG 12 Lead  Date/Time: 7/20/2018 12:33 PM  Performed by: BETITO ARIAS  Authorized by: BETITO ARIAS   Comparison: compared with previous ECG from 5/1/2018  Rhythm: sinus rhythm  Rate: normal  Conduction: left bundle branch block  Clinical impression: abnormal ECG            DISCUSSION/SUMMARYVery pleasant 77-year-old female with a medical history of chronic left bundle branch block, previous cardiac evaluation with no significant coronary artery disease in the setting of left bundle branch block, essential hypertension, impaired fasting glucose levels, and hyperlipidemia who presents to  me for evaluation of chest discomfort and lightheadedness. She is not orthostatic today. Her lightheadedness upon standing from a seated position does suggest that, however.     She had an isolated episode of chest discomfort at rest and has not had a recurrence. She only exerts herself minimally secondary to lower extremity orthopedic issues.    1.  Chest discomfort/pain.  -  I will get her set up for a Lexiscan stress test. I do think that it is unlikely that she is going to have significant CAD. However, her EKG with a left bundle branch block is not helpful.  2.  Lightheadedness: Recommend staying well hydrated and compression stockings. I do not think that she should have any additional workup for this at this time.

## 2018-07-23 ENCOUNTER — OFFICE VISIT (OUTPATIENT)
Dept: PAIN MEDICINE | Facility: CLINIC | Age: 78
End: 2018-07-23

## 2018-07-23 VITALS
OXYGEN SATURATION: 95 % | BODY MASS INDEX: 30.9 KG/M2 | SYSTOLIC BLOOD PRESSURE: 133 MMHG | RESPIRATION RATE: 16 BRPM | HEIGHT: 64 IN | TEMPERATURE: 97.9 F | DIASTOLIC BLOOD PRESSURE: 86 MMHG | WEIGHT: 181 LBS | HEART RATE: 80 BPM

## 2018-07-23 DIAGNOSIS — M54.50 CHRONIC RIGHT-SIDED LOW BACK PAIN WITHOUT SCIATICA: Primary | ICD-10-CM

## 2018-07-23 DIAGNOSIS — G89.29 CHRONIC RIGHT-SIDED LOW BACK PAIN WITHOUT SCIATICA: Primary | ICD-10-CM

## 2018-07-23 DIAGNOSIS — M43.06 LUMBAR SPONDYLOLYSIS: ICD-10-CM

## 2018-07-23 DIAGNOSIS — G58.8 INTERCOSTAL NEURALGIA: ICD-10-CM

## 2018-07-23 PROCEDURE — 99214 OFFICE O/P EST MOD 30 MIN: CPT | Performed by: PAIN MEDICINE

## 2018-07-23 NOTE — PROGRESS NOTES
CHIEF COMPLAINT: Back Pain  Pt states her R sided LBP remains significant since falling on 6/20/18.  HPI  Shu Mckeon is a 77 y.o. female.  She is here to follow up for Back Pain    Shu Mckeon is a 77 y.o. female  who presents to the office for follow-up.  She completed a R L2-5 on 6/20/18. Patient reports no relief from the 2nd procedure but 90% relief with 1st procedure for several days. Does not think she gave the 2nd injection a chance to work since she fell just a few days prior to procedure and had severe pain at that time from rib fractures. Feels her back pain was located down at belt line at last visit and now has moved up to be more in the flank area.   Worse pain is low back pain. Worse pain is with walking. The patient states their pain is a 5 on a scale of 1-10.  The patient describes this pain as constant dull, ache and pressure.  The pain is located in right low back and does not radiate. This painful problem is aggravated by physical activity and walking and is alleviated by past injection and relaxation.    Past pain medications:   Percocet 10/325 mg - post op  Tramadol prn      Current pain medications:   Elavil qhs  Doesn't want to take medication     Past therapies:  Physical Therapy: yes ( neck pain 2017 and pelvic right side)  Chiropractor: yes( didn't help)  Massage Therapy: no  TENS: yes ( didn't help)  Neck or back surgery: no  Past pain management: no    Previous Injection: right L2-L5 LMBB x 2 - 4/25/2018 and 6/20/2018  Effect of Injection (%): 90% with first; minimal with 2nd    PEG Assessment   What number best describes your pain on average in the past week? 8  What number best describes how, during the past week, pain has interfered with your enjoyment of life? 8  What number best describes how, during the past week, pain has interfered with your general activity? 8      Current Outpatient Prescriptions:   •  atorvastatin (LIPITOR) 40 MG tablet, Take 1 tablet by mouth Daily.,  Disp: 90 tablet, Rfl: 3  •  BIOTIN PO, Take 1 tablet by mouth daily., Disp: , Rfl:   •  diclofenac (VOLTAREN) 1 % gel gel, Apply 4 g topically 4 (Four) Times a Day., Disp: 1 tube, Rfl: 3  •  losartan (COZAAR) 50 MG tablet, Take 1 tablet by mouth Daily., Disp: 90 tablet, Rfl: 3  •  Multiple Vitamin (MULTIVITAMIN) capsule, Take 1 capsule by mouth daily., Disp: , Rfl:   •  Probiotic Product (PROBIOTIC FORMULA PO), Take 1 tablet by mouth Daily., Disp: , Rfl:   •  sertraline (ZOLOFT) 50 MG tablet, TAKE 1 TABLET DAILY, Disp: 90 tablet, Rfl: 1  •  Cholecalciferol (VITAMIN D3) 5000 UNITS tablet, Take 1 tablet by mouth daily., Disp: , Rfl:     IMAGING  CHEST AND RIGHT RIB DETAIL X-RAYS - 6/2018   There is an acute nondisplaced fracture of the lateral aspect of the  right seventh rib. No other acute fractures are identified. An old  fracture of the anterior aspect of the right 10th rib is noted. The  lungs are well-expanded and free of infiltrates. There is no  pneumothorax. The heart is normal in size.      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 There is no evidence of herniation. There is a grade 1  anterolisthesis of L4 upon L5, mild neuroforaminal compromise at  multiple levels and multilevel left facet degenerative disease. Facet  degenerative disease is most prominent to the left at L4-L5. There is no  evidence of a focal herniation.    Imaging last reviewed: 07/24/18     PFSH:  The following portions of the patient's history were reviewed and updated as appropriate: problem list, past medical history, past surgery history, social history, family history, medications, and allergies    Review of Systems   Constitutional: Positive for fatigue. Negative for activity change (remains restricted) and appetite change.   HENT: Negative for congestion and trouble swallowing.    Eyes:  "Positive for visual disturbance (blurry vision).   Respiratory: Positive for cough. Negative for shortness of breath and wheezing.    Cardiovascular: Negative.  Negative for chest pain.   Gastrointestinal: Negative for constipation and diarrhea.   Genitourinary: Negative for difficulty urinating and dysuria.   Musculoskeletal: Positive for back pain.   Skin: Negative for rash and wound.   Allergic/Immunologic: Negative for immunocompromised state.   Neurological: Positive for dizziness (occasional-saw cardiologist) and light-headedness. Negative for seizures, weakness and numbness.   Hematological: Does not bruise/bleed easily.   Psychiatric/Behavioral: Positive for sleep disturbance. Negative for suicidal ideas. The patient is nervous/anxious.    All other systems reviewed and are negative.      Vitals:    07/23/18 1538   BP: 133/86   Pulse: 80   Resp: 16   Temp: 97.9 °F (36.6 °C)   SpO2: 95%   Weight: 82.1 kg (181 lb)   Height: 162.6 cm (64.02\")   PainSc: 5  Comment: R sided LBP ranges from 5-8/10   PainLoc: Back       Physical Exam   Constitutional: She appears well-developed and well-nourished. No distress.   HENT:   Head: Normocephalic and atraumatic.   Nose: Nose normal.   Mouth/Throat: Oropharynx is clear and moist.   Eyes: Conjunctivae and EOM are normal.   Neck: Normal range of motion. Neck supple.   Pulmonary/Chest: Effort normal. No stridor. No respiratory distress.   Musculoskeletal:        Thoracic back: She exhibits tenderness and pain.        Lumbar back: She exhibits decreased range of motion, tenderness and pain.        Back:    +bilateral lumbar facet tenderness  +bilateral lumbar facet loading    Neurological: She is alert. She has normal strength. No cranial nerve deficit or sensory deficit. Coordination and gait normal.   Skin: Skin is warm and dry. No rash noted. She is not diaphoretic.   Psychiatric: She has a normal mood and affect. Her speech is normal and behavior is normal.   Nursing note " and vitals reviewed.    Ortho Exam  Neurologic Exam     Mental Status   Speech: speech is normal     Cranial Nerves     CN III, IV, VI   Extraocular motions are normal.     Motor Exam     Strength   Strength 5/5 throughout.       Lab Results   Component Value Date    POCMETH Negative 04/09/2018    POCAMPHET Negative 04/09/2018    POCBARBITUR Negative 04/09/2018    POCBENZO Negative 04/09/2018    POCCOCAINE Negative 04/09/2018    POCMETHADO Negative 04/09/2018    POCOPIATES Negative 04/09/2018    POCOXYCODO Negative 04/09/2018    POCPHENCYC Negative 04/09/2018    POCPROPOXY Negative 04/09/2018    POCTHC Negative 04/09/2018    POCTRICYC Positive 04/09/2018     Last UDS results reviewed: 07/24/18   Last UDS: 4/9/2018  Comments: Inconsistent       Date of last CINTHYA reviewed : 07/24/18   Comments: Harsha Ireland was seen today for back pain.    Diagnoses and all orders for this visit:    Chronic right-sided low back pain without sciatica    Intercostal neuralgia  -     Case Request    Lumbar spondylolysis      Requested Prescriptions      No prescriptions requested or ordered in this encounter      - Imaging reviewed with patient.    - right side of lumbar spine has improved with R LMBB. Will not repeat at this time. I think the first LMBB significantly helped this pain but her pain is now higher up and from her fall/rib fractures.   - Discussed with the patient regarding the etiology of their pain. Informed them that they would likely benefit from a right intercostal nerve block of T9-T12.  The procedure was described in detail and the risks, benefits and alternatives were discussed with the patient (including but not limited to: pneumothorax, shortness of air, bleeding, infection, nerve damage, worsening of pain, inability to perform injection) who agreed to proceed.  Will perform under fluoroscopic guidance.   - will perform one then reassess.   - New patient UDS was INCONSISTENT. +tramadol - not on  CINTHYA.     - doesn't want to take any medication chronically. Not even OTC. Will try Lidoderm patches and diclofenac gel prn as they are topical and have minimal systemic absorption.     Wt Readings from Last 3 Encounters:   07/23/18 82.1 kg (181 lb)   07/20/18 82.6 kg (182 lb)   07/17/18 82.5 kg (181 lb 12.8 oz)     Body mass index is 31.05 kg/m². Patient counseled on the importance of weight loss to help with overall health and pain control. Patient instructed to attempt weight loss.   Plan: Calorie counting  reduce portion size, cut out extra servings and reduce fast food intake    Follow-up in 4 weeks.    Darleen Cheung MD  Pain Management

## 2018-07-27 ENCOUNTER — HOSPITAL ENCOUNTER (OUTPATIENT)
Dept: MAMMOGRAPHY | Facility: HOSPITAL | Age: 78
Discharge: HOME | End: 2018-07-27
Admitting: INTERNAL MEDICINE

## 2018-07-27 ENCOUNTER — HOSPITAL ENCOUNTER (OUTPATIENT)
Dept: CARDIOLOGY | Facility: HOSPITAL | Age: 78
Discharge: HOME | End: 2018-07-27

## 2018-07-27 DIAGNOSIS — I65.29 STENOSIS OF CAROTID ARTERY, UNSPECIFIED LATERALITY: ICD-10-CM

## 2018-07-27 DIAGNOSIS — I79.8 OTHER DISORDERS OF ARTERIES, ARTERIOLES AND CAPILLARIES IN DISEASES CLASSIFIED ELSEWHERE (HCC): ICD-10-CM

## 2018-07-27 LAB
BH CV XLRA MEAS LEFT CCA RATIO VEL: -67.6 CM/SEC
BH CV XLRA MEAS LEFT DIST CCA EDV: -17.8 CM/SEC
BH CV XLRA MEAS LEFT DIST CCA PSV: -67.6 CM/SEC
BH CV XLRA MEAS LEFT DIST ICA EDV: -20.2 CM/SEC
BH CV XLRA MEAS LEFT DIST ICA PSV: -75.9 CM/SEC
BH CV XLRA MEAS LEFT ICA RATIO VEL: -75.9 CM/SEC
BH CV XLRA MEAS LEFT ICA/CCA RATIO: 1.1
BH CV XLRA MEAS LEFT MID ICA EDV: -24.9 CM/SEC
BH CV XLRA MEAS LEFT MID ICA PSV: -72.3 CM/SEC
BH CV XLRA MEAS LEFT PROX CCA EDV: 22.5 CM/SEC
BH CV XLRA MEAS LEFT PROX CCA PSV: 86.5 CM/SEC
BH CV XLRA MEAS LEFT PROX ECA EDV: -10.7 CM/SEC
BH CV XLRA MEAS LEFT PROX ECA PSV: -60.5 CM/SEC
BH CV XLRA MEAS LEFT PROX ICA EDV: 16.6 CM/SEC
BH CV XLRA MEAS LEFT PROX ICA PSV: 65.2 CM/SEC
BH CV XLRA MEAS LEFT PROX SCLA PSV: 137 CM/SEC
BH CV XLRA MEAS LEFT VERTEBRAL A EDV: 13 CM/SEC
BH CV XLRA MEAS LEFT VERTEBRAL A PSV: 59.3 CM/SEC
BH CV XLRA MEAS RIGHT CCA RATIO VEL: -91.3 CM/SEC
BH CV XLRA MEAS RIGHT DIST CCA EDV: -20.2 CM/SEC
BH CV XLRA MEAS RIGHT DIST CCA PSV: -91.3 CM/SEC
BH CV XLRA MEAS RIGHT DIST ICA EDV: -29 CM/SEC
BH CV XLRA MEAS RIGHT DIST ICA PSV: -82.8 CM/SEC
BH CV XLRA MEAS RIGHT ICA RATIO VEL: -82.8 CM/SEC
BH CV XLRA MEAS RIGHT ICA/CCA RATIO: 0.91
BH CV XLRA MEAS RIGHT MID ICA EDV: -28.3 CM/SEC
BH CV XLRA MEAS RIGHT MID ICA PSV: -81.4 CM/SEC
BH CV XLRA MEAS RIGHT PROX CCA EDV: -20.2 CM/SEC
BH CV XLRA MEAS RIGHT PROX CCA PSV: -102 CM/SEC
BH CV XLRA MEAS RIGHT PROX ECA EDV: -12 CM/SEC
BH CV XLRA MEAS RIGHT PROX ECA PSV: -85.6 CM/SEC
BH CV XLRA MEAS RIGHT PROX ICA EDV: 15.6 CM/SEC
BH CV XLRA MEAS RIGHT PROX ICA PSV: 78.5 CM/SEC
BH CV XLRA MEAS RIGHT PROX SCLA PSV: 128 CM/SEC
BH CV XLRA MEAS RIGHT VERTEBRAL A EDV: -8.5 CM/SEC
BH CV XLRA MEAS RIGHT VERTEBRAL A PSV: -35.4 CM/SEC
LEFT ARM BP: NORMAL MMHG
RIGHT ARM BP: NORMAL MMHG

## 2018-07-27 PROCEDURE — 77067 SCR MAMMO BI INCL CAD: CPT

## 2018-07-27 PROCEDURE — 93880 EXTRACRANIAL BILAT STUDY: CPT

## 2018-07-30 ENCOUNTER — HOSPITAL ENCOUNTER (OUTPATIENT)
Dept: CARDIOLOGY | Facility: HOSPITAL | Age: 78
Discharge: HOME | End: 2018-07-30
Attending: INTERNAL MEDICINE | Admitting: INTERNAL MEDICINE

## 2018-07-30 DIAGNOSIS — E78.5 HYPERLIPIDEMIA, UNSPECIFIED HYPERLIPIDEMIA TYPE: ICD-10-CM

## 2018-07-30 DIAGNOSIS — R07.2 PRECORDIAL PAIN: ICD-10-CM

## 2018-07-30 DIAGNOSIS — I10 ESSENTIAL HYPERTENSION: ICD-10-CM

## 2018-07-30 DIAGNOSIS — I44.7 LBBB (LEFT BUNDLE BRANCH BLOCK): ICD-10-CM

## 2018-07-30 LAB
BH CV NUCLEAR PRIOR STUDY: 2
BH CV STRESS BP STAGE 1: NORMAL
BH CV STRESS COMMENTS STAGE 1: NORMAL
BH CV STRESS DOSE REGADENOSON STAGE 1: 0.4
BH CV STRESS DURATION MIN STAGE 1: 0
BH CV STRESS DURATION SEC STAGE 1: 10
BH CV STRESS HR STAGE 1: 90
BH CV STRESS PROTOCOL 1: NORMAL
BH CV STRESS RECOVERY BP: NORMAL MMHG
BH CV STRESS RECOVERY HR: 76 BPM
BH CV STRESS STAGE 1: 1
LV EF NUC BP: 47 %
MAXIMAL PREDICTED HEART RATE: 141 BPM
PERCENT MAX PREDICTED HR: 63.83 %
STRESS BASELINE BP: NORMAL MMHG
STRESS BASELINE HR: 65 BPM
STRESS PERCENT HR: 75 %
STRESS POST EXERCISE DUR SEC: 10 SEC
STRESS POST PEAK BP: NORMAL MMHG
STRESS POST PEAK HR: 90 BPM
STRESS TARGET HR: 120 BPM

## 2018-07-30 PROCEDURE — 78452 HT MUSCLE IMAGE SPECT MULT: CPT

## 2018-07-30 PROCEDURE — 25010000002 REGADENOSON 0.4 MG/5ML SOLUTION: Performed by: INTERNAL MEDICINE

## 2018-07-30 PROCEDURE — 93017 CV STRESS TEST TRACING ONLY: CPT

## 2018-07-30 PROCEDURE — 78452 HT MUSCLE IMAGE SPECT MULT: CPT | Performed by: INTERNAL MEDICINE

## 2018-07-30 PROCEDURE — 93018 CV STRESS TEST I&R ONLY: CPT | Performed by: INTERNAL MEDICINE

## 2018-07-30 PROCEDURE — 93016 CV STRESS TEST SUPVJ ONLY: CPT | Performed by: INTERNAL MEDICINE

## 2018-07-30 PROCEDURE — A9502 TC99M TETROFOSMIN: HCPCS | Performed by: INTERNAL MEDICINE

## 2018-07-30 PROCEDURE — 0 TECHNETIUM TETROFOSMIN KIT: Performed by: INTERNAL MEDICINE

## 2018-07-30 RX ADMIN — TETROFOSMIN 1 DOSE: 1.38 INJECTION, POWDER, LYOPHILIZED, FOR SOLUTION INTRAVENOUS at 11:53

## 2018-07-30 RX ADMIN — TETROFOSMIN 1 DOSE: 1.38 INJECTION, POWDER, LYOPHILIZED, FOR SOLUTION INTRAVENOUS at 13:00

## 2018-07-30 RX ADMIN — REGADENOSON 0.4 MG: 0.08 INJECTION, SOLUTION INTRAVENOUS at 13:00

## 2018-07-31 DIAGNOSIS — R92.1 CALCIFICATION OF LEFT BREAST: Primary | ICD-10-CM

## 2018-08-03 ENCOUNTER — HOSPITAL ENCOUNTER (OUTPATIENT)
Dept: MAMMOGRAPHY | Facility: HOSPITAL | Age: 78
Discharge: HOME | End: 2018-08-03
Admitting: INTERNAL MEDICINE

## 2018-08-03 ENCOUNTER — TELEPHONE (OUTPATIENT)
Dept: CARDIOLOGY | Facility: CLINIC | Age: 78
End: 2018-08-03

## 2018-08-03 DIAGNOSIS — R92.1 CALCIFICATION OF LEFT BREAST: ICD-10-CM

## 2018-08-03 PROCEDURE — 77065 DX MAMMO INCL CAD UNI: CPT

## 2018-08-03 NOTE — TELEPHONE ENCOUNTER
----- Message from Nicolas Latif MD sent at 8/3/2018  1:44 PM EDT -----  Inferior artifact. Please let her know this looks normal.

## 2018-08-06 ENCOUNTER — TELEPHONE (OUTPATIENT)
Dept: SURGERY | Facility: CLINIC | Age: 78
End: 2018-08-06

## 2018-08-06 ENCOUNTER — TELEPHONE (OUTPATIENT)
Dept: INTERNAL MEDICINE | Facility: CLINIC | Age: 78
End: 2018-08-06

## 2018-08-06 DIAGNOSIS — R92.8 ABNORMAL MAMMOGRAM: Primary | ICD-10-CM

## 2018-08-06 NOTE — TELEPHONE ENCOUNTER
LMOM WITH DETAILS    ----- Message from Mariann Fallon MD sent at 8/6/2018  2:27 PM EDT -----  No sig stenosis  ----- Message -----  From: Araceli Bennett MA  Sent: 8/6/2018   2:06 PM  To: Mariann Fallon MD    PT WOULD LIKE HER CAROTID ULTRASOUND RESULTS PLEASE

## 2018-08-10 ENCOUNTER — TELEPHONE (OUTPATIENT)
Dept: SURGERY | Facility: CLINIC | Age: 78
End: 2018-08-10

## 2018-08-10 NOTE — TELEPHONE ENCOUNTER
Scheduled Lt Breast Stereotactic Bx  With Dr LEROY and Dr Tariq  8-21-18 arrive 9:30    Re scheduled pt's NEW PT appt to  8/21/18 arrive 8:00    Spoke to pt's  he will have her call us back  To pre screen for bx    kdl

## 2018-08-14 ENCOUNTER — CLINICAL SUPPORT (OUTPATIENT)
Dept: INTERNAL MEDICINE | Facility: CLINIC | Age: 78
End: 2018-08-14

## 2018-08-14 DIAGNOSIS — M81.6 LOCALIZED OSTEOPOROSIS, UNSPECIFIED PATHOLOGICAL FRACTURE PRESENCE: Primary | ICD-10-CM

## 2018-08-14 PROCEDURE — 96372 THER/PROPH/DIAG INJ SC/IM: CPT | Performed by: INTERNAL MEDICINE

## 2018-08-15 ENCOUNTER — OUTSIDE FACILITY SERVICE (OUTPATIENT)
Dept: PAIN MEDICINE | Facility: CLINIC | Age: 78
End: 2018-08-15

## 2018-08-15 ENCOUNTER — DOCUMENTATION (OUTPATIENT)
Dept: PAIN MEDICINE | Facility: CLINIC | Age: 78
End: 2018-08-15

## 2018-08-15 PROCEDURE — 64483 NJX AA&/STRD TFRM EPI L/S 1: CPT | Performed by: PAIN MEDICINE

## 2018-08-15 PROCEDURE — 64484 NJX AA&/STRD TFRM EPI L/S EA: CPT | Performed by: PAIN MEDICINE

## 2018-08-15 PROCEDURE — 20553 NJX 1/MLT TRIGGER POINTS 3/>: CPT | Performed by: PAIN MEDICINE

## 2018-08-15 NOTE — PROGRESS NOTES
Right L2, L3 Lumbar Transforaminal Epidural Steroid Injection (two levels)  Right trigger point injection: 3+muscle groups  [facility]    PREOPERATIVE DIAGNOSIS:  Chronic low back pain and right sided rib pain, myofascial pain    POSTOPERATIVE DIAGNOSIS:  Same as preop diagnosis    PROCEDURE:    1. CPT 95333 --  Diagnostic & Therapeutic Transforaminal Epidural Steroid Injection at the L2 level, on the right   2. CPT 71372 --  Diagnostic & Therapeutic Transforaminal Epidural Steroid Injection at the L3 level, on the right   3.         CPT 12916 -- Diagnostic & Therapeutic trigger point; 3+ muscles, 3 injection sites, right lateral paraspinal muscles over max area of pain    PRE-PROCEDURE DISCUSSION WITH PATIENT:    Risks and complications were discussed with the patient prior to starting the procedure and informed consent was obtained.  We discussed various topics including but not limited to bleeding, infection, injury, nerve injury, paralysis, coma, death, postprocedural painful flare-up, postprocedural site soreness, and a lack of pain relief.  We discussed the diagnostic aspect of transforaminal epidural / selective nerve root blockade.  Pre op evaluation patient located her max spot of pain. Was visualized under fluoro and was just right of L2, L3 intervertebral levels. Given this is below any ribs, her procedure was discussed with her and agreed to change to right TFESI with TPI for diagnostic reasons. Patient agreed to precede. Consent changed.     SURGEON:  Darleen Cheung MD    REASON FOR PROCEDURE:    Diagnostic injection at this level is needed    SEDATION:  Versed 2mg & Fentanyl 50 mcg IV  ANESTHETIC:  Marcaine 0.25%  STEROID:  Methylprednisolone (DEPO MEDROL) 40mg/ml (for TPI) and Dexamethasone 8mg (For TFESI)    DESCRIPTON OF PROCEDURE:  After obtaining informed consent, an I.V. was started in the preoperative area. The patient taken to the operating room and was placed in the  prone position with a pillow under the abdomen.  All pressure points were well padded.  EKG, blood pressure, and pulse oximeter were monitored.  The lumbar area was prepped with Chloraprep and draped in a sterile fashion.   For two level TFESI:  Under fluoroscopic guidance in an oblique dimension on the above mentioned side, the transverse process of the first aforementioned vertebra at the junction of the body at 6 o'clock position was identified. Skin and subcutaneous tissue was anesthetized with 1% lidocaine. A 22-gauge spinal needle was introduced under fluoroscopic guidance at the above junction into the foramen without parasthesias and into the epidural space. After confirming the position of the needle with PA, lateral, and oblique fluoroscopic views, aspiration was checked and was clear of blood or CSF.  Next, 1 mL of Omnipaque was injected. After seeing adequate spread on the corresponding nerve root, a total volume 2mL of injectate containing local anesthetic as above and half of the above mentioned corticosteroid was injected into the epidural space.  The needle was removed intact.    Next, in similar fashion, the second level mentioned above was addressed and a similar amount of injectate was delivered after similar imaging was achieved.      For TPI:  3 sites were palpated and found to be the max site of her pain. 8 ml of Injectate usin mg of depo was used to inject the 3 sites.   Muscle groups: Latissimus dorsi, erector spinae, serratus posterior, thoracolumbar fascia.     Vital signs were stable throughout.      ESTIMATED BLOOD LOSS:  <5 mL  SPECIMENS:  none    COMPLICATIONS:   No complications were noted., There was no indication of vascular uptake on live injection of contrast dye. and There was no indication of intrathecal uptake on live injection of contrast dye.    TOLERANCE & DISCHARGE CONDITION:    The patient tolerated the procedure well.  The patient was transported to the recovery area  without difficulties.  The patient was discharged to home under the care of family in stable and satisfactory condition.    PLAN OF CARE:  1. The patient was given our standard instruction sheet.  2. The patient will Return to clinic 4 wks.  3. The patient will resume all medications as per the medication reconciliation sheet.

## 2018-08-17 ENCOUNTER — TELEPHONE (OUTPATIENT)
Dept: SURGERY | Facility: CLINIC | Age: 78
End: 2018-08-17

## 2018-08-21 ENCOUNTER — OFFICE VISIT (OUTPATIENT)
Dept: SURGERY | Facility: CLINIC | Age: 78
End: 2018-08-21

## 2018-08-21 ENCOUNTER — HOSPITAL ENCOUNTER (OUTPATIENT)
Dept: MAMMOGRAPHY | Facility: HOSPITAL | Age: 78
Discharge: HOME | End: 2018-08-21
Attending: SURGERY | Admitting: SURGERY

## 2018-08-21 ENCOUNTER — PROCEDURE VISIT (OUTPATIENT)
Dept: SURGERY | Facility: CLINIC | Age: 78
End: 2018-08-21

## 2018-08-21 VITALS
HEIGHT: 64 IN | DIASTOLIC BLOOD PRESSURE: 88 MMHG | WEIGHT: 183 LBS | HEART RATE: 64 BPM | OXYGEN SATURATION: 96 % | SYSTOLIC BLOOD PRESSURE: 146 MMHG | BODY MASS INDEX: 31.24 KG/M2

## 2018-08-21 VITALS
HEIGHT: 64 IN | OXYGEN SATURATION: 97 % | RESPIRATION RATE: 18 BRPM | TEMPERATURE: 97.3 F | DIASTOLIC BLOOD PRESSURE: 74 MMHG | WEIGHT: 181 LBS | HEART RATE: 77 BPM | BODY MASS INDEX: 30.9 KG/M2 | SYSTOLIC BLOOD PRESSURE: 131 MMHG

## 2018-08-21 DIAGNOSIS — R92.1 BREAST CALCIFICATIONS ON MAMMOGRAM: Primary | ICD-10-CM

## 2018-08-21 DIAGNOSIS — Z80.3 FH: BREAST CANCER: ICD-10-CM

## 2018-08-21 PROCEDURE — 88305 TISSUE EXAM BY PATHOLOGIST: CPT | Performed by: SURGERY

## 2018-08-21 PROCEDURE — 19081 BX BREAST 1ST LESION STRTCTC: CPT | Performed by: SURGERY

## 2018-08-21 PROCEDURE — 99203 OFFICE O/P NEW LOW 30 MIN: CPT | Performed by: SURGERY

## 2018-08-21 RX ORDER — LIDOCAINE HYDROCHLORIDE 10 MG/ML
5 INJECTION, SOLUTION INFILTRATION; PERINEURAL ONCE
Status: COMPLETED | OUTPATIENT
Start: 2018-08-21 | End: 2018-08-21

## 2018-08-21 RX ADMIN — LIDOCAINE HYDROCHLORIDE 5 ML: 10 INJECTION, SOLUTION INFILTRATION; PERINEURAL at 10:55

## 2018-08-21 NOTE — PROGRESS NOTES
Stereotactic Breast Biopsy    Procedure: Stereotactic biopsy, LEFT breast  Location:central RA  Approach: Lateral to medial  Device: 8G mammatome  Marker: hydromark  Preoperative Diagnosis: indeterminate calcifications  Indication: BR4  Description of procedure: The patient was brought to the stereo suite and was positioned on the Hoover table in the prone position, with the affected breast through the table aperture.  Her breast was placed in compression, and the lesion located on  views.  We then took a -15 and +15 degree stereotactic pair. We used the used one of the 15 degree images and the  to target the mammographic lesion of interest.  I ensured an adequate stroke margin. The mammotome device was then set into position using [employer] software assistance. We then cleaned the breast skin at the site of the anticipated mammotomy. We then performed a time out to identify correct patient, correct site.  I then anesthetized the breast skin at the site of anticipated mammotomy using 1% lidocaine with epinephrine.  I then anesthetized the breast tissue deep to this along the anticipated track of the mammotome.  I then made a nicking incision and advanced the 8G mammatome to the prefire location. I then took prefire images that demonstrating adequate positioning. I then fired the device.  We then took postfire mammogrpahic pairs to ensure good proximity of the capture aperture to the targetted lesion.  I then anesthetized the breast tissue at the 10:00, 2:00 and 6:00 locations with 1% lidocaine with epinephrine.  I then took sequential samples from the 89-5-3-6-8-10, 1-3-5-7-9-11 locations.  We then took a specimen radiograph to ensure adequate sampling of the target lesion.  I then placed a hydromark marker into the biopsy cavity and withdrew the mammatome device.  We then took post biopsy mammogrpahic pairs to ensure that the target was sampled and to ensure proper placement of the marker in the biopsy  cavity. The marker was satisfactorily seated at the biopsy site, at the site of the targeted caclcifications. We then held pressure,cleaned and dried the skin at the site of the mammotomy, and applied dermabond.  Findings on specimen radiograph and post biopsy mammographic pair:good sampling of the targeted microcalcifications.The patient tolerated the procedure well.  Disposition: We will see her back within one week to review her pathology.

## 2018-08-21 NOTE — NURSING NOTE
Patient brought Valium 10 mg P.O. prescribed by Dr. Blount with her. Administered at 0945 a.m. VSS. Denies pain. Medical/surgical history and medications reviewed. No distress noted. Procedural education completed with patient's questions addressed.

## 2018-08-21 NOTE — PROGRESS NOTES
Chief Complaint: Shu Mckeon is a 78 y.o. female who was seen in consultation at the request of No ref. provider found  for abnormal breast imaging    History of Present Illness:  Patient presents with abnormal breast imaging, LEFT breast. She noted no new masses, skin changes, nipple discharge, nipple changes prior to her most recent imaging.  Her most recent imaging includes the followin/14/15 [facility]  MAMMO SCREEN EVITA  FLUHR, SHU  Benign dermal calcifications. BIRADS category 2: benign.    18 [facility]  MAMMO SCREEN EVITA  FLUHR, SHU  Scattered fibroglandular densities anterior one third retroareolar left breast interval development of a cluster of calcifications. BIRADS category 0.    8/3/18  [facility]  MAMMO DIAG LEFT  FLUHR, SHU  Microcalcifications within the outer inferior aspect of the left breast. BIRADS category 4.      She has not had a breast biopsy in the past.  She has her uterus and ovaries, is postmenopausal, and takes nor hormones.  Her family history includes the following: She has one daughter, one sister, no maternal aunts, 2 paternal aunts.  She has a half sister on paternal he based who had breast cancer in her 70s and a paternal cousin who had breast cancer in her 50s.  She is here for evaluation.    Review of Systems:  Review of Systems   Genitourinary: Positive for nocturia.    Musculoskeletal: Positive for back pain.   Neurological: Positive for light-headedness.   All other systems reviewed and are negative.       Past Medical and Surgical History:  Breast Biopsy History:  Patient has not had a breast biopsy in the past.  Breast Cancer HIstory:  Patient does not have a past medical history of breast cancer.  Breast Operations, and year:  NONE   Obstetric/Gynecologic History:  Age menstrual periods began: 11  Patient is postmenopausal, entered menopause naturally at age:    Number of pregnancies: 3  Number of live births: 3  Number of abortions or miscarriages: 0  Age of delivery of first  "child: 20  Patient did not breast feed.  Length of time taking birth control pills: 10-12 YRS   Patient took hormone replacement during the following dates: 10 YRS   PATIENT STILL HAS UTERUS AND OVARIES.     Past Surgical History:   Procedure Laterality Date   • CARDIAC CATHETERIZATION      not sure when or where   • CARDIAC CATHETERIZATION     • OTHER SURGICAL HISTORY      Leg repair   • REPLACEMENT TOTAL KNEE Right      Past Medical History:   Diagnosis Date   • Back pain    • Depression    • Diabetes mellitus (CMS/HCC)    • Fatigue    • Groin pain    • Hypertension    • Hypertension    • Impaired fasting glucose    • Impaired fasting glucose    • Osteoporosis    • Osteoporosis    • Status post ORIF of fracture of ankle        Prior Hospitalizations, other than for surgery or childbirth, and year:  NONE     Social History     Social History   • Marital status:      Spouse name: N/A   • Number of children: N/A   • Years of education: N/A     Occupational History   • Not on file.     Social History Main Topics   • Smoking status: Never Smoker   • Smokeless tobacco: Never Used   • Alcohol use 4.2 oz/week     7 Glasses of wine per week      Comment: 1/day-socially   • Drug use: No   • Sexual activity: Not on file     Other Topics Concern   • Not on file     Social History Narrative   • No narrative on file     Patient is .  Patient is retired.  Patient drinks 2 servings of caffeine per day.    Family History:  Family History   Problem Relation Age of Onset   • Heart disease Father    • Heart attack Father 81   • Heart attack Paternal Uncle    • Breast cancer Sister 70   • Breast cancer Cousin 50       Vital Signs:  /88 (BP Location: Left arm, Patient Position: Sitting, Cuff Size: Adult)   Pulse 64   Ht 162.6 cm (64\")   Wt 83 kg (183 lb)   SpO2 96%   BMI 31.41 kg/m²      Medications:    Current Outpatient Prescriptions:   •  atorvastatin (LIPITOR) 40 MG tablet, Take 1 tablet by mouth Daily., " "Disp: 90 tablet, Rfl: 3  •  BIOTIN PO, Take 1 tablet by mouth daily., Disp: , Rfl:   •  Cholecalciferol (VITAMIN D3) 5000 UNITS tablet, Take 1 tablet by mouth daily., Disp: , Rfl:   •  losartan (COZAAR) 50 MG tablet, Take 1 tablet by mouth Daily., Disp: 90 tablet, Rfl: 3  •  Multiple Vitamin (MULTIVITAMIN) capsule, Take 1 capsule by mouth daily., Disp: , Rfl:   •  Probiotic Product (PROBIOTIC FORMULA PO), Take 1 tablet by mouth Daily., Disp: , Rfl:   •  PROLIA 60 MG/ML solution syringe, , Disp: , Rfl:   •  sertraline (ZOLOFT) 50 MG tablet, TAKE 1 TABLET DAILY, Disp: 90 tablet, Rfl: 1  •  diclofenac (VOLTAREN) 1 % gel gel, Apply 4 g topically 4 (Four) Times a Day., Disp: 1 tube, Rfl: 3     Allergies:  No Known Allergies    Physical Examination:  /88 (BP Location: Left arm, Patient Position: Sitting, Cuff Size: Adult)   Pulse 64   Ht 162.6 cm (64\")   Wt 83 kg (183 lb)   SpO2 96%   BMI 31.41 kg/m²   General Appearance:  Patient is in no distress.  She is well kept and has an obese build.   Psychiatric:  Patient with appropriate mood and affect. Alert and oriented to self, time, and place.    Breast, RIGHT:  large sized, ptotic, symmetric with the contralateral side.  Breast skin is without erythema, edema, rashes.  There are no visible abnormalities upon inspection during the arm-raising maneuver or with hands on hips in the sitting position. There is no nipple retraction, discharge or nipple/areolar skin changes.There are no masses palpable in the sitting or supine positions.    Breast, LEFT:  large sized, ptotic, symmetric with the contralateral side.  Breast skin is without erythema, edema, rashes.  There are no visible abnormalities upon inspection during the arm-raising maneuver or with hands on hips in the sitting position. There is no nipple retraction, discharge or nipple/areolar skin changes.There are no masses palpable in the sitting or supine positions.    Lymphatic:  There is no axillary, " cervical, infraclavicular, or supraclavicular adenopathy bilaterally.  Eyes:  Pupils are round and reactive to light.  Cardiovascular:  Heart rate and rhythm are regular.  Respiratory:  Lungs are clear bilaterally with no crackles or wheezes in any lung field.  Gastrointestinal:  Abdomen is soft, nondistended, and nontender.     Musculoskeletal:  Good strength in all 4 extremities.   There is good range of motion in both shoulders.    Skin:  No new skin lesions or rashes on the skin excluding the breast (see breast exam above).        Imagin/14/15 [facility]  MAMMO SCREEN EVITA  FLUHR, HEATHER  Benign dermal calcifications. BIRADS category 2: benign.    18 [facility]  MAMMO SCREEN EVITA  FLUHR, HEATHER  Scattered fibroglandular densities anterior one third retroareolar left breast interval development of a cluster of calcifications. BIRADS category 0.    8/3/18  [facility]  MAMMO DIAG LEFT  FLUHR, HEATHER  Microcalcifications within the outer inferior aspect of the left breast. BIRADS category 4.        Procedures:      Assessment:   Diagnosis Plan   1. Breast calcifications on mammogram     2. FH: breast cancer       1  LEFt breast central RA- 1.5 cm cluster- asymptomatic    2  Paternal 1/2 sister in 70s, paternal cousin in 50s    Plan:  We reviewed her most recent imaging, imaging reports, her history, and her exam together today.  The lesion of concern on her imaging is: LEFT breast central retroareolar calcifications. We discussed the nature of microcalcifications and that 1 in 5 biopsies for calcifications will return with atypia.  We discussed that the designation of a BIRADS 4 signifies that there is a 3-89% possibility of malignancy accounting for the imaging finding. We discussed the recommendation to biopsy all BIRADS 4 lesions. We discussed a second option, and recommended only if 1- the above lesion was not technically amenable to either percutaneous or to surgical biopsy, or 2-if the patient refused biopsy. This option  would be imaging surveillance in 3-6 months. She understood and wished to proceed with biopsy.  We plan to perform the following biopsy : LEFT breast stereotactic biopsy.  We discussed the risks of bleeding, infection, failure to sample.  I will call her with the pathology report as it returns, and we have given her after care instructions post biopsy.      Shayna Bolunt MD         We have spent 40 minutes in visit today, 25 in face to face .      Next Appointment:  Return for for biopsy, then post biopsy visit.      EMR Dragon/transcription disclaimer:    Much of this encounter note is an electronic transcription/translocation of spoken language to printed text.  The electronic translation of spoken language may permit erroneous, or at times, nonsensical words or phrases to be inadvertently transcribed.  Although I have reviewed the note from such areas, some may still exist.

## 2018-08-21 NOTE — NURSING NOTE
Biopsy site to left lower breast clear with Steri-strips intact. Tiny amount of blood noted on steri-strips. No firmness or swelling noted at or around biopsy site. Denies pain. Wide Ace pressure wrap applied in lieu of a bra per Dr. Blount's routine orders. Ice pack with protective covering applied between layers of Ace wrap over biopsy site. Discharge instructions discussed with understanding voiced by patient. Copies provided to patient. No distress noted. To home via private vehicle driven by her daughter-in-law, Maeve.

## 2018-08-22 LAB
CYTO UR: NORMAL
LAB AP CASE REPORT: NORMAL
LAB AP CLINICAL INFORMATION: NORMAL
PATH REPORT.FINAL DX SPEC: NORMAL
PATH REPORT.GROSS SPEC: NORMAL

## 2018-08-23 ENCOUNTER — TELEPHONE (OUTPATIENT)
Dept: SURGERY | Facility: CLINIC | Age: 78
End: 2018-08-23

## 2018-08-23 NOTE — TELEPHONE ENCOUNTER
"8-21-18- left breast stereotactic biopsy: Upper outer quadrant, 5:00: Small radial sclerosing lesion with usual hyperplasia, separate foci of usual hyperplasia, columnar cell change without atypia: Focal stromal fibrosis, apically metaplasia, and fibroadenomatoid change.  We will let her know benign. It will require excision.  COncordant per Dr Tariq.      Final Diagnosis   BREAST, LEFT, DESIGNATED \"UPPER OUTER QUADRANT, APPROXIMATE 5 O'CLOCK POSITION\",   STEREOTACTIC BIOPSY:               SMALL RADIAL SCLEROSING LESION WITH ASSOCIATED DUCTAL HYPERPLASIA OF THE                      USUAL TYPE.               SEPARATE FOCI OF DUCTAL HYPERPLASIA OF THE USUAL TYPE.               FOCAL COLUMNAR CELL CHANGE WITHOUT ATYPIA.               FOCAL STROMAL FIBROSIS AND ASSOCIATED CALCIFICATIONS.               FOCAL APOCRINE METAPLASIA.               FOCAL FIBROADENOMATOID CHANGE WITH ASSOCIATED CALCIFICATIONS.               NO EVIDENCE OF MALIGNANCY.     TDJ/th IHC/a/JULIA     CPT CODES:  1. 15599         "

## 2018-08-28 ENCOUNTER — TELEPHONE (OUTPATIENT)
Dept: SURGERY | Facility: CLINIC | Age: 78
End: 2018-08-28

## 2018-08-30 ENCOUNTER — PREP FOR SURGERY (OUTPATIENT)
Dept: OTHER | Facility: HOSPITAL | Age: 78
End: 2018-08-30

## 2018-08-30 ENCOUNTER — OFFICE VISIT (OUTPATIENT)
Dept: SURGERY | Facility: CLINIC | Age: 78
End: 2018-08-30

## 2018-08-30 VITALS
OXYGEN SATURATION: 94 % | WEIGHT: 187 LBS | HEIGHT: 64 IN | BODY MASS INDEX: 31.92 KG/M2 | HEART RATE: 94 BPM | SYSTOLIC BLOOD PRESSURE: 132 MMHG | DIASTOLIC BLOOD PRESSURE: 68 MMHG

## 2018-08-30 DIAGNOSIS — R92.1 BREAST CALCIFICATIONS ON MAMMOGRAM: ICD-10-CM

## 2018-08-30 DIAGNOSIS — Z80.3 FH: BREAST CANCER: ICD-10-CM

## 2018-08-30 DIAGNOSIS — N64.89 RADIAL SCAR OF BREAST: Primary | ICD-10-CM

## 2018-08-30 PROCEDURE — 99213 OFFICE O/P EST LOW 20 MIN: CPT | Performed by: SURGERY

## 2018-08-30 RX ORDER — SODIUM CHLORIDE, SODIUM LACTATE, POTASSIUM CHLORIDE, CALCIUM CHLORIDE 600; 310; 30; 20 MG/100ML; MG/100ML; MG/100ML; MG/100ML
100 INJECTION, SOLUTION INTRAVENOUS CONTINUOUS
Status: CANCELLED | OUTPATIENT
Start: 2018-10-25

## 2018-08-30 RX ORDER — CEFAZOLIN SODIUM 2 G/100ML
2 INJECTION, SOLUTION INTRAVENOUS ONCE
Status: CANCELLED | OUTPATIENT
Start: 2018-10-25 | End: 2018-10-25

## 2018-08-30 NOTE — H&P
There are no changes in the history or physical exam, aside from any that are listed as an addendum at the bottom of this document.   Today, patient will go for the surgery listed in the plan below.    Chief Complaint: Shu Mckeon is a 78 y.o. female who was seen in consultation at the request of No ref. provider found  for abnormal breast imaging    History of Present Illness:  Patient presents with abnormal breast imaging, LEFT breast. She noted no new masses, skin changes, nipple discharge, nipple changes prior to her most recent imaging.  Her most recent imaging includes the followin/14/15 [facility]  MAMMO SCREEN EVITA  FLUHR, SHU  Benign dermal calcifications. BIRADS category 2: benign.    18 [facility]  MAMMO SCREEN EVITA  FLUHR, SHU  Scattered fibroglandular densities anterior one third retroareolar left breast interval development of a cluster of calcifications. BIRADS category 0.    8/3/18  [facility]  MAMMO DIAG LEFT  FLUHR, SHU  Microcalcifications within the outer inferior aspect of the left breast. BIRADS category 4.      She has not had a breast biopsy in the past.  She has her uterus and ovaries, is postmenopausal, and takes nor hormones.  Her family history includes the following: She has one daughter, one sister, no maternal aunts, 2 paternal aunts.  She has a half sister on paternal he based who had breast cancer in her 70s and a paternal cousin who had breast cancer in her 50s.    Interval History:  18- left breast stereotactic biopsy: Upper outer quadrant, 5:00: Small radial sclerosing lesion with usual hyperplasia, separate foci of usual hyperplasia, columnar cell change without atypia: Focal stromal fibrosis, apically metaplasia, and fibroadenomatoid change.   Concordant per Dr Tariq.    She denies significant bruising at her biopsy site.  She denies any redness warmth or drainage from the mammotomy.    Review of Systems:  Review of Systems   Constitutional: Positive for unexpected weight  change (5 lb wt gain).   Genitourinary: Positive for nocturia.    Musculoskeletal: Positive for back pain.   Neurological: Positive for light-headedness.   All other systems reviewed and are negative.       Past Medical and Surgical History:  Breast Biopsy History:  Patient has not had a breast biopsy in the past.  Breast Cancer HIstory:  Patient does not have a past medical history of breast cancer.  Breast Operations, and year:  NONE   Obstetric/Gynecologic History:  Age menstrual periods began: 11  Patient is postmenopausal, entered menopause naturally at age:    Number of pregnancies: 3  Number of live births: 3  Number of abortions or miscarriages: 0  Age of delivery of first child: 20  Patient did not breast feed.  Length of time taking birth control pills: 10-12 YRS   Patient took hormone replacement during the following dates: 10 YRS   PATIENT STILL HAS UTERUS AND OVARIES.     Past Surgical History:   Procedure Laterality Date   • CARDIAC CATHETERIZATION      not sure when or where   • CARDIAC CATHETERIZATION     • OTHER SURGICAL HISTORY      Leg repair   • REPLACEMENT TOTAL KNEE Right      Past Medical History:   Diagnosis Date   • Back pain    • Depression    • Diabetes mellitus (CMS/HCC)    • Fatigue    • Groin pain    • Hypertension    • Hypertension    • Impaired fasting glucose    • Impaired fasting glucose    • Osteoporosis    • Osteoporosis    • Status post ORIF of fracture of ankle        Prior Hospitalizations, other than for surgery or childbirth, and year:  NONE     Social History     Social History   • Marital status:      Spouse name: N/A   • Number of children: N/A   • Years of education: N/A     Occupational History   • Not on file.     Social History Main Topics   • Smoking status: Never Smoker   • Smokeless tobacco: Never Used   • Alcohol use 4.2 oz/week     7 Glasses of wine per week      Comment: 1/day-socially   • Drug use: No   • Sexual activity: Not on file     Other Topics  "Concern   • Not on file     Social History Narrative   • No narrative on file     Patient is .  Patient is retired.  Patient drinks 2 servings of caffeine per day.    Family History:  Family History   Problem Relation Age of Onset   • Heart disease Father    • Heart attack Father 81   • Heart attack Paternal Uncle    • Breast cancer Sister 70   • Breast cancer Cousin 50       Vital Signs:  /68   Pulse 94   Ht 162.6 cm (64\")   Wt 84.8 kg (187 lb)   SpO2 94%   BMI 32.10 kg/m²      Medications:    Current Outpatient Prescriptions:   •  atorvastatin (LIPITOR) 40 MG tablet, Take 1 tablet by mouth Daily., Disp: 90 tablet, Rfl: 3  •  BIOTIN PO, Take 1 tablet by mouth daily., Disp: , Rfl:   •  Cholecalciferol (VITAMIN D3) 5000 UNITS tablet, Take 1 tablet by mouth daily., Disp: , Rfl:   •  diazePAM (VALIUM) 10 MG tablet, Take 1 tablet by mouth 30 minutes prior to procedure, Disp: 1 tablet, Rfl: 0  •  diclofenac (VOLTAREN) 1 % gel gel, Apply 4 g topically 4 (Four) Times a Day., Disp: 1 tube, Rfl: 3  •  losartan (COZAAR) 50 MG tablet, Take 1 tablet by mouth Daily., Disp: 90 tablet, Rfl: 3  •  Multiple Vitamin (MULTIVITAMIN) capsule, Take 1 capsule by mouth daily., Disp: , Rfl:   •  Probiotic Product (PROBIOTIC FORMULA PO), Take 1 tablet by mouth Daily., Disp: , Rfl:   •  PROLIA 60 MG/ML solution syringe, , Disp: , Rfl:   •  sertraline (ZOLOFT) 50 MG tablet, TAKE 1 TABLET DAILY, Disp: 90 tablet, Rfl: 1     Allergies:  No Known Allergies    Physical Examination:  /68   Pulse 94   Ht 162.6 cm (64\")   Wt 84.8 kg (187 lb)   SpO2 94%   BMI 32.10 kg/m²   General Appearance:  Patient is in no distress.  She is well kept and has an obese build.   Psychiatric:  Patient with appropriate mood and affect. Alert and oriented to self, time, and place.    Breast, RIGHT:  large sized, ptotic, symmetric with the contralateral side.  Breast skin is without erythema, edema, rashes.  There are no visible abnormalities " upon inspection during the arm-raising maneuver or with hands on hips in the sitting position. There is no nipple retraction, discharge or nipple/areolar skin changes.There are no masses palpable in the sitting or supine positions.    Breast, LEFT:  large sized, ptotic, symmetric with the contralateral side.  Breast skin is without erythema, edema, rashes.  There are no visible abnormalities upon inspection during the arm-raising maneuver or with hands on hips in the sitting position. There is no nipple retraction, discharge or nipple/areolar skin changes.There are no masses palpable in the sitting or supine positions.  Well-healing mammotomy left lower outer quadrant with minimal ecchymoses from her 2018 stereotactic biopsy for radial scar.    Lymphatic:  There is no axillary, cervical, infraclavicular, or supraclavicular adenopathy bilaterally.  Eyes:  Pupils are round and reactive to light.  Cardiovascular:  Heart rate and rhythm are regular.  Respiratory:  Lungs are clear bilaterally with no crackles or wheezes in any lung field.  Gastrointestinal:  Abdomen is soft, nondistended, and nontender.     Musculoskeletal:  Good strength in all 4 extremities.   There is good range of motion in both shoulders.    Skin:  No new skin lesions or rashes on the skin excluding the breast (see breast exam above).        Imagin/14/15 [facility]  MAMMO SCREEN EVITA  FLUHR, HEATHER  Benign dermal calcifications. BIRADS category 2: benign.    18 [facility]  MAMMO SCREEN EVITA  FLUHR, HEATHER  Scattered fibroglandular densities anterior one third retroareolar left breast interval development of a cluster of calcifications. BIRADS category 0.    8/3/18  [facility]  MAMMO DIAG LEFT  FLUHR, HEATHER  Microcalcifications within the outer inferior aspect of the left breast. BIRADS category 4.    Pathology:   18- left breast stereotactic biopsy: Upper outer quadrant, 5:00: Small radial sclerosing lesion with usual hyperplasia, separate foci of  "usual hyperplasia, columnar cell change without atypia: Focal stromal fibrosis, apically metaplasia, and fibroadenomatoid change.  We will let her know benign. It will require excision.  COncordant per Dr Tariq.        Final Diagnosis   BREAST, LEFT, DESIGNATED \"UPPER OUTER QUADRANT, APPROXIMATE 5 O'CLOCK POSITION\",   STEREOTACTIC BIOPSY:               SMALL RADIAL SCLEROSING LESION WITH ASSOCIATED DUCTAL HYPERPLASIA OF THE                      USUAL TYPE.               SEPARATE FOCI OF DUCTAL HYPERPLASIA OF THE USUAL TYPE.               FOCAL COLUMNAR CELL CHANGE WITHOUT ATYPIA.               FOCAL STROMAL FIBROSIS AND ASSOCIATED CALCIFICATIONS.               FOCAL APOCRINE METAPLASIA.               FOCAL FIBROADENOMATOID CHANGE WITH ASSOCIATED CALCIFICATIONS.               NO EVIDENCE OF MALIGNANCY.     TDJ/th IHC/a/JULIA     CPT CODES:  1. 86424           Procedures:      Assessment:   Diagnosis Plan   1. Radial scar of breast     2. Breast calcifications on mammogram     3. FH: breast cancer       1-2  LEFt breast central RA- 1.5 cm cluster- asymptomatic- stereotactic biopsy August 21, 2018 lateral to the lower outer quadrant left breast returned as   8-21-18- left breast stereotactic biopsy:  5:00: Small radial sclerosing lesion with usual hyperplasia, separate foci of usual hyperplasia, columnar cell change without atypia: Focal stromal fibrosis, apically metaplasia, and fibroadenomatoid change.   Concordant per Dr Tariq.    Hydromark in vicinity of pre biopsy calcifications.    3-  Paternal 1/2 sister in 70s, paternal cousin in 50s    Plan:  We reviewed her examination and her pathology report today.  We discussed the nature of radial scar and a recommendation for excisional biopsy of this.  We will plan for a left breast ultrasound-guided excision of radial scar.  Hydromark marker visible post stereo and in good position at site pf previous calcifications.  She has a trip planned in October and we'll be back " by October 15 and would like to do it after that time which is fine.  We discussed the risks  of bleeding, infection, failure to sample.     Her next routine screening mammogram will be due July 18, 2019 at [facility].      Shayna Blount MD         We have spent 20 minutes in visit today, 15 in face to face .      Next Appointment:  Return for surgery.      EMR Dragon/transcription disclaimer:

## 2018-08-30 NOTE — PROGRESS NOTES
Chief Complaint: Shu Mckeon is a 78 y.o. female who was seen in consultation at the request of No ref. provider found  for abnormal breast imaging    History of Present Illness:  Patient presents with abnormal breast imaging, LEFT breast. She noted no new masses, skin changes, nipple discharge, nipple changes prior to her most recent imaging.  Her most recent imaging includes the followin/14/15 [facility]  MAMMO SCREEN EVITA  FLUHR, SHU  Benign dermal calcifications. BIRADS category 2: benign.    18 [facility]  MAMMO SCREEN EVITA  FLUHR, SHU  Scattered fibroglandular densities anterior one third retroareolar left breast interval development of a cluster of calcifications. BIRADS category 0.    8/3/18  [facility]  MAMMO DIAG LEFT  FLUHR, SHU  Microcalcifications within the outer inferior aspect of the left breast. BIRADS category 4.      She has not had a breast biopsy in the past.  She has her uterus and ovaries, is postmenopausal, and takes nor hormones.  Her family history includes the following: She has one daughter, one sister, no maternal aunts, 2 paternal aunts.  She has a half sister on paternal he based who had breast cancer in her 70s and a paternal cousin who had breast cancer in her 50s.    Interval History:  18- left breast stereotactic biopsy: Upper outer quadrant, 5:00: Small radial sclerosing lesion with usual hyperplasia, separate foci of usual hyperplasia, columnar cell change without atypia: Focal stromal fibrosis, apically metaplasia, and fibroadenomatoid change.   Concordant per Dr Tariq.    She denies significant bruising at her biopsy site.  She denies any redness warmth or drainage from the mammotomy.    Review of Systems:  Review of Systems   Constitutional: Positive for unexpected weight change (5 lb wt gain).   Genitourinary: Positive for nocturia.    Musculoskeletal: Positive for back pain.   Neurological: Positive for light-headedness.   All other systems reviewed and are negative.        Past Medical and Surgical History:  Breast Biopsy History:  Patient has not had a breast biopsy in the past.  Breast Cancer HIstory:  Patient does not have a past medical history of breast cancer.  Breast Operations, and year:  NONE   Obstetric/Gynecologic History:  Age menstrual periods began: 11  Patient is postmenopausal, entered menopause naturally at age:    Number of pregnancies: 3  Number of live births: 3  Number of abortions or miscarriages: 0  Age of delivery of first child: 20  Patient did not breast feed.  Length of time taking birth control pills: 10-12 YRS   Patient took hormone replacement during the following dates: 10 YRS   PATIENT STILL HAS UTERUS AND OVARIES.     Past Surgical History:   Procedure Laterality Date   • CARDIAC CATHETERIZATION      not sure when or where   • CARDIAC CATHETERIZATION     • OTHER SURGICAL HISTORY      Leg repair   • REPLACEMENT TOTAL KNEE Right      Past Medical History:   Diagnosis Date   • Back pain    • Depression    • Diabetes mellitus (CMS/HCC)    • Fatigue    • Groin pain    • Hypertension    • Hypertension    • Impaired fasting glucose    • Impaired fasting glucose    • Osteoporosis    • Osteoporosis    • Status post ORIF of fracture of ankle        Prior Hospitalizations, other than for surgery or childbirth, and year:  NONE     Social History     Social History   • Marital status:      Spouse name: N/A   • Number of children: N/A   • Years of education: N/A     Occupational History   • Not on file.     Social History Main Topics   • Smoking status: Never Smoker   • Smokeless tobacco: Never Used   • Alcohol use 4.2 oz/week     7 Glasses of wine per week      Comment: 1/day-socially   • Drug use: No   • Sexual activity: Not on file     Other Topics Concern   • Not on file     Social History Narrative   • No narrative on file     Patient is .  Patient is retired.  Patient drinks 2 servings of caffeine per day.    Family History:  Family History  "  Problem Relation Age of Onset   • Heart disease Father    • Heart attack Father 81   • Heart attack Paternal Uncle    • Breast cancer Sister 70   • Breast cancer Cousin 50       Vital Signs:  /68   Pulse 94   Ht 162.6 cm (64\")   Wt 84.8 kg (187 lb)   SpO2 94%   BMI 32.10 kg/m²      Medications:    Current Outpatient Prescriptions:   •  atorvastatin (LIPITOR) 40 MG tablet, Take 1 tablet by mouth Daily., Disp: 90 tablet, Rfl: 3  •  BIOTIN PO, Take 1 tablet by mouth daily., Disp: , Rfl:   •  Cholecalciferol (VITAMIN D3) 5000 UNITS tablet, Take 1 tablet by mouth daily., Disp: , Rfl:   •  diazePAM (VALIUM) 10 MG tablet, Take 1 tablet by mouth 30 minutes prior to procedure, Disp: 1 tablet, Rfl: 0  •  diclofenac (VOLTAREN) 1 % gel gel, Apply 4 g topically 4 (Four) Times a Day., Disp: 1 tube, Rfl: 3  •  losartan (COZAAR) 50 MG tablet, Take 1 tablet by mouth Daily., Disp: 90 tablet, Rfl: 3  •  Multiple Vitamin (MULTIVITAMIN) capsule, Take 1 capsule by mouth daily., Disp: , Rfl:   •  Probiotic Product (PROBIOTIC FORMULA PO), Take 1 tablet by mouth Daily., Disp: , Rfl:   •  PROLIA 60 MG/ML solution syringe, , Disp: , Rfl:   •  sertraline (ZOLOFT) 50 MG tablet, TAKE 1 TABLET DAILY, Disp: 90 tablet, Rfl: 1     Allergies:  No Known Allergies    Physical Examination:  /68   Pulse 94   Ht 162.6 cm (64\")   Wt 84.8 kg (187 lb)   SpO2 94%   BMI 32.10 kg/m²   General Appearance:  Patient is in no distress.  She is well kept and has an obese build.   Psychiatric:  Patient with appropriate mood and affect. Alert and oriented to self, time, and place.    Breast, RIGHT:  large sized, ptotic, symmetric with the contralateral side.  Breast skin is without erythema, edema, rashes.  There are no visible abnormalities upon inspection during the arm-raising maneuver or with hands on hips in the sitting position. There is no nipple retraction, discharge or nipple/areolar skin changes.There are no masses palpable in the " sitting or supine positions.    Breast, LEFT:  large sized, ptotic, symmetric with the contralateral side.  Breast skin is without erythema, edema, rashes.  There are no visible abnormalities upon inspection during the arm-raising maneuver or with hands on hips in the sitting position. There is no nipple retraction, discharge or nipple/areolar skin changes.There are no masses palpable in the sitting or supine positions.  Well-healing mammotomy left lower outer quadrant with minimal ecchymoses from her 2018 stereotactic biopsy for radial scar.    Lymphatic:  There is no axillary, cervical, infraclavicular, or supraclavicular adenopathy bilaterally.  Eyes:  Pupils are round and reactive to light.  Cardiovascular:  Heart rate and rhythm are regular.  Respiratory:  Lungs are clear bilaterally with no crackles or wheezes in any lung field.  Gastrointestinal:  Abdomen is soft, nondistended, and nontender.     Musculoskeletal:  Good strength in all 4 extremities.   There is good range of motion in both shoulders.    Skin:  No new skin lesions or rashes on the skin excluding the breast (see breast exam above).        Imagin/14/15 [facility]  MAMMO SCREEN EVITA  FLUHR, HEATHER  Benign dermal calcifications. BIRADS category 2: benign.    18 [facility]  MAMMO SCREEN EVITA  FLUHR, HEATHER  Scattered fibroglandular densities anterior one third retroareolar left breast interval development of a cluster of calcifications. BIRADS category 0.    8/3/18  [facility]  MAMMO DIAG LEFT  FLUHR, HEATHER  Microcalcifications within the outer inferior aspect of the left breast. BIRADS category 4.    Pathology:   18- left breast stereotactic biopsy: Upper outer quadrant, 5:00: Small radial sclerosing lesion with usual hyperplasia, separate foci of usual hyperplasia, columnar cell change without atypia: Focal stromal fibrosis, apically metaplasia, and fibroadenomatoid change.  We will let her know benign. It will require excision.  COncordant per   "Marciano.        Final Diagnosis   BREAST, LEFT, DESIGNATED \"UPPER OUTER QUADRANT, APPROXIMATE 5 O'CLOCK POSITION\",   STEREOTACTIC BIOPSY:               SMALL RADIAL SCLEROSING LESION WITH ASSOCIATED DUCTAL HYPERPLASIA OF THE                      USUAL TYPE.               SEPARATE FOCI OF DUCTAL HYPERPLASIA OF THE USUAL TYPE.               FOCAL COLUMNAR CELL CHANGE WITHOUT ATYPIA.               FOCAL STROMAL FIBROSIS AND ASSOCIATED CALCIFICATIONS.               FOCAL APOCRINE METAPLASIA.               FOCAL FIBROADENOMATOID CHANGE WITH ASSOCIATED CALCIFICATIONS.               NO EVIDENCE OF MALIGNANCY.     TDJ/th IHC/a/JULIA     CPT CODES:  1. 28456           Procedures:      Assessment:   Diagnosis Plan   1. Radial scar of breast     2. Breast calcifications on mammogram     3. FH: breast cancer       1-2  LEFt breast central RA- 1.5 cm cluster- asymptomatic- stereotactic biopsy August 21, 2018 lateral to the lower outer quadrant left breast returned as   8-21-18- left breast stereotactic biopsy:  5:00: Small radial sclerosing lesion with usual hyperplasia, separate foci of usual hyperplasia, columnar cell change without atypia: Focal stromal fibrosis, apically metaplasia, and fibroadenomatoid change.   Concordant per Dr Tariq.    Hydromark in vicinity of pre biopsy calcifications.    3-  Paternal 1/2 sister in 70s, paternal cousin in 50s    Plan:  We reviewed her examination and her pathology report today.  We discussed the nature of radial scar and a recommendation for excisional biopsy of this.  We will plan for a left breast ultrasound-guided excision of radial scar.  Hydromark marker visible post stereo and in good position at site pf previous calcifications.  She has a trip planned in October and we'll be back by October 15 and would like to do it after that time which is fine.  We discussed the risks  of bleeding, infection, failure to sample.     Her next routine screening mammogram will be due July 18, 2019 at " [facility].      Shayna Blount MD         We have spent 20 minutes in visit today, 15 in face to face .      Next Appointment:  Return for surgery.      EMR Dragon/transcription disclaimer:    Much of this encounter note is an electronic transcription/translocation of spoken language to printed text.  The electronic translation of spoken language may permit erroneous, or at times, nonsensical words or phrases to be inadvertently transcribed.  Although I have reviewed the note from such areas, some may still exist.

## 2018-09-11 ENCOUNTER — TELEPHONE (OUTPATIENT)
Dept: SURGERY | Facility: CLINIC | Age: 78
End: 2018-09-11

## 2018-09-11 PROBLEM — N64.89 RADIAL SCAR OF BREAST: Status: ACTIVE | Noted: 2018-09-11

## 2018-09-11 NOTE — TELEPHONE ENCOUNTER
Scheduled Surgery [facility] [place]  10-25-18  Left Breast Ultrasound Guided Excision  Radial Scar    Arrive    5:45am  Surgery  7:30    PAT 10-18-18 @ 12:30  Post Op visit 11-6-18 arrive 12:15    Pt v/u with appointments  erika

## 2018-09-17 ENCOUNTER — OUTSIDE FACILITY SERVICE (OUTPATIENT)
Dept: SURGERY | Facility: CLINIC | Age: 78
End: 2018-09-17

## 2018-09-17 PROCEDURE — G0105 COLORECTAL SCRN; HI RISK IND: HCPCS | Performed by: SURGERY

## 2018-10-18 ENCOUNTER — APPOINTMENT (OUTPATIENT)
Dept: PREADMISSION TESTING | Facility: HOSPITAL | Age: 78
End: 2018-10-18

## 2018-10-18 VITALS
BODY MASS INDEX: 32.25 KG/M2 | HEIGHT: 64 IN | HEART RATE: 74 BPM | TEMPERATURE: 97.7 F | WEIGHT: 188.9 LBS | SYSTOLIC BLOOD PRESSURE: 148 MMHG | RESPIRATION RATE: 18 BRPM | DIASTOLIC BLOOD PRESSURE: 86 MMHG | OXYGEN SATURATION: 97 %

## 2018-10-18 DIAGNOSIS — N64.89 RADIAL SCAR OF BREAST: ICD-10-CM

## 2018-10-18 LAB
ANION GAP SERPL CALCULATED.3IONS-SCNC: 14.4 MMOL/L
BUN BLD-MCNC: 8 MG/DL (ref 8–23)
BUN/CREAT SERPL: 11.9 (ref 7–25)
CALCIUM SPEC-SCNC: 9.5 MG/DL (ref 8.6–10.5)
CHLORIDE SERPL-SCNC: 101 MMOL/L (ref 98–107)
CO2 SERPL-SCNC: 23.6 MMOL/L (ref 22–29)
CREAT BLD-MCNC: 0.67 MG/DL (ref 0.57–1)
DEPRECATED RDW RBC AUTO: 45.3 FL (ref 37–54)
ERYTHROCYTE [DISTWIDTH] IN BLOOD BY AUTOMATED COUNT: 13.5 % (ref 11.7–13)
GFR SERPL CREATININE-BSD FRML MDRD: 85 ML/MIN/1.73
GLUCOSE BLD-MCNC: 116 MG/DL (ref 65–99)
HCT VFR BLD AUTO: 42.2 % (ref 35.6–45.5)
HGB BLD-MCNC: 13.5 G/DL (ref 11.9–15.5)
MCH RBC QN AUTO: 29.6 PG (ref 26.9–32)
MCHC RBC AUTO-ENTMCNC: 32 G/DL (ref 32.4–36.3)
MCV RBC AUTO: 92.5 FL (ref 80.5–98.2)
PLATELET # BLD AUTO: 267 10*3/MM3 (ref 140–500)
PMV BLD AUTO: 10.6 FL (ref 6–12)
POTASSIUM BLD-SCNC: 4.4 MMOL/L (ref 3.5–5.2)
RBC # BLD AUTO: 4.56 10*6/MM3 (ref 3.9–5.2)
SODIUM BLD-SCNC: 139 MMOL/L (ref 136–145)
WBC NRBC COR # BLD: 7.12 10*3/MM3 (ref 4.5–10.7)

## 2018-10-18 PROCEDURE — 85027 COMPLETE CBC AUTOMATED: CPT | Performed by: SURGERY

## 2018-10-18 PROCEDURE — 36415 COLL VENOUS BLD VENIPUNCTURE: CPT

## 2018-10-18 PROCEDURE — 80048 BASIC METABOLIC PNL TOTAL CA: CPT | Performed by: SURGERY

## 2018-10-18 NOTE — DISCHARGE INSTRUCTIONS
Take the following medications the morning of surgery with a small sip of water:  LOSARTAN      Arrive to hospital on your day of surgery at 5:45 AM.        General Instructions:  • Do not eat solid food after midnight the night before surgery.  • You may drink clear liquids day of surgery but must stop at least one hour before your hospital arrival time.  • It is beneficial for you to have a clear drink that contains carbohydrates the day of surgery.  We suggest a 12 to 20 ounce bottle of Gatorade or Powerade for non-diabetic patients or a 12 to 20 ounce bottle of G2 or Powerade Zero for diabetic patients. (Pediatric patients, are not advised to drink a 12 to 20 ounce carbohydrate drink)    Clear liquids are liquids you can see through.  Nothing red in color.     Plain water                               Sports drinks  Sodas                                   Gelatin (Jell-O)  Fruit juices without pulp such as white grape juice and apple juice  Popsicles that contain no fruit or yogurt  Tea or coffee (no cream or milk added)  Gatorade / Powerade  G2 / Powerade Zero    • Infants may have breast milk up to four hours before surgery.  • Infants drinking formula may drink formula up to six hours before surgery.   • Patients who avoid smoking, chewing tobacco and alcohol for 4 weeks prior to surgery have a reduced risk of post-operative complications.  Quit smoking as many days before surgery as you can.  • Do not smoke, use chewing tobacco or drink alcohol the day of surgery.   • If applicable bring your C-PAP/ BI-PAP machine.  • Bring any papers given to you in the doctor’s office.  • Wear clean comfortable clothes and socks.  • Do not wear contact lenses or make-up.  Bring a case for your glasses.   • Bring crutches or walker if applicable.  • Remove all piercings.  Leave jewelry and any other valuables at home.  • Hair extensions with metal clips must be removed prior to surgery.  • The Pre-Admission Testing nurse  will instruct you to bring medications if unable to obtain an accurate list in Pre-Admission Testing.        If you were given a blood bank ID arm band remember to bring it with you the day of surgery.    Preventing a Surgical Site Infection:  • For 2 to 3 days before surgery, avoid shaving with a razor because the razor can irritate skin and make it easier to develop an infection.    • Any areas of open skin can increase the risk of a post-operative wound infection by allowing bacteria to enter and travel throughout the body.  Notify your surgeon if you have any skin wounds / rashes even if it is not near the expected surgical site.  The area will need assessed to determine if surgery should be delayed until it is healed.  • The night prior to surgery sleep in a clean bed with clean clothing.  Do not allow pets to sleep with you.  • Shower on the morning of surgery using a fresh bar of anti-bacterial soap (such as Dial) and clean washcloth.  Dry with a clean towel and dress in clean clothing.  • Ask your surgeon if you will be receiving antibiotics prior to surgery.  • Make sure you, your family, and all healthcare providers clean their hands with soap and water or an alcohol based hand  before caring for you or your wound.    Day of surgery:  Upon arrival, a Pre-op nurse and Anesthesiologist will review your health history, obtain vital signs, and answer questions you may have.  The only belongings needed at this time will be your home medications and if applicable your C-PAP/BI-PAP machine.  If you are staying overnight your family can leave the rest of your belongings in the car and bring them to your room later.  A Pre-op nurse will start an IV and you may receive medication in preparation for surgery, including something to help you relax.  Your family will be able to see you in the Pre-op area.  While you are in surgery your family should notify the waiting room  if they leave the waiting  room area and provide a contact phone number.    Please be aware that surgery does come with discomfort.  We want to make every effort to control your discomfort so please discuss any uncontrolled symptoms with your nurse.   Your doctor will most likely have prescribed pain medications.      If you are going home after surgery you will receive individualized written care instructions before being discharged.  A responsible adult must drive you to and from the hospital on the day of your surgery and stay with you for 24 hours.    If you are staying overnight following surgery, you will be transported to your hospital room following the recovery period.  [facility] has all private rooms.    You have received a list of surgical assistants for your reference.  If you have any questions please call Pre-Admission Testing at [phone].  Deductibles and co-payments are collected on the day of service. Please be prepared to pay the required co-pay, deductible or deposit on the day of service as defined by your plan.

## 2018-10-23 ENCOUNTER — PREP FOR SURGERY (OUTPATIENT)
Dept: OTHER | Facility: HOSPITAL | Age: 78
End: 2018-10-23

## 2018-10-23 NOTE — H&P
There are no changes in the history or physical exam, aside from any that are listed as an addendum at the bottom of this document.   Today, patient will go for the surgery listed in the plan below.    THis HP is created-completed 10-25-18 for surgery today.    Chief Complaint: Shu Mckeon is a 78 y.o. female who was seen in consultation at the request of No ref. provider found  for abnormal breast imaging    History of Present Illness:  Patient presents with abnormal breast imaging, LEFT breast. She noted no new masses, skin changes, nipple discharge, nipple changes prior to her most recent imaging.  Her most recent imaging includes the followin/14/15 [facility]  MAMMO SCREEN EVITA  FLUHR, SHU  Benign dermal calcifications. BIRADS category 2: benign.    18 [facility]  MAMMO SCREEN EVITA  FLUHR, SHU  Scattered fibroglandular densities anterior one third retroareolar left breast interval development of a cluster of calcifications. BIRADS category 0.    8/3/18  [facility]  MAMMO DIAG LEFT  FLUHR, SHU  Microcalcifications within the outer inferior aspect of the left breast. BIRADS category 4.      She has not had a breast biopsy in the past.  She has her uterus and ovaries, is postmenopausal, and takes nor hormones.  Her family history includes the following: She has one daughter, one sister, no maternal aunts, 2 paternal aunts.  She has a half sister on paternal he based who had breast cancer in her 70s and a paternal cousin who had breast cancer in her 50s.    Interval History:  18- left breast stereotactic biopsy: Upper outer quadrant, 5:00: Small radial sclerosing lesion with usual hyperplasia, separate foci of usual hyperplasia, columnar cell change without atypia: Focal stromal fibrosis, apically metaplasia, and fibroadenomatoid change.   Concordant per Dr Tariq.    She denies significant bruising at her biopsy site.  She denies any redness warmth or drainage from the mammotomy.    Review of Systems:  Review of  Systems   Constitutional: Positive for unexpected weight change (5 lb wt gain).   Genitourinary: Positive for nocturia.    Musculoskeletal: Positive for back pain.   Neurological: Positive for light-headedness.   All other systems reviewed and are negative.       Past Medical and Surgical History:  Breast Biopsy History:  Patient has not had a breast biopsy in the past.  Breast Cancer HIstory:  Patient does not have a past medical history of breast cancer.  Breast Operations, and year:  NONE   Obstetric/Gynecologic History:  Age menstrual periods began: 11  Patient is postmenopausal, entered menopause naturally at age:    Number of pregnancies: 3  Number of live births: 3  Number of abortions or miscarriages: 0  Age of delivery of first child: 20  Patient did not breast feed.  Length of time taking birth control pills: 10-12 YRS   Patient took hormone replacement during the following dates: 10 YRS   PATIENT STILL HAS UTERUS AND OVARIES.     Past Surgical History:   Procedure Laterality Date   • CARDIAC CATHETERIZATION      not sure when or where   • CARDIAC CATHETERIZATION     • OTHER SURGICAL HISTORY      Leg repair   • REPLACEMENT TOTAL KNEE Right      Past Medical History:   Diagnosis Date   • Back pain    • Depression    • Diabetes mellitus (CMS/HCC)    • Fatigue    • Groin pain    • Hypertension    • Hypertension    • Impaired fasting glucose    • Impaired fasting glucose    • Osteoporosis    • Osteoporosis    • Status post ORIF of fracture of ankle        Prior Hospitalizations, other than for surgery or childbirth, and year:  NONE     Social History     Social History   • Marital status:      Spouse name: N/A   • Number of children: N/A   • Years of education: N/A     Occupational History   • Not on file.     Social History Main Topics   • Smoking status: Never Smoker   • Smokeless tobacco: Never Used   • Alcohol use 4.2 oz/week     7 Glasses of wine per week      Comment: 1/day-socially   • Drug use:  "No   • Sexual activity: Not on file     Other Topics Concern   • Not on file     Social History Narrative   • No narrative on file     Patient is .  Patient is retired.  Patient drinks 2 servings of caffeine per day.    Family History:  Family History   Problem Relation Age of Onset   • Heart disease Father    • Heart attack Father 81   • Heart attack Paternal Uncle    • Breast cancer Sister 70   • Breast cancer Cousin 50       Vital Signs:  /68   Pulse 94   Ht 162.6 cm (64\")   Wt 84.8 kg (187 lb)   SpO2 94%   BMI 32.10 kg/m²      Medications:    Current Outpatient Prescriptions:   •  atorvastatin (LIPITOR) 40 MG tablet, Take 1 tablet by mouth Daily., Disp: 90 tablet, Rfl: 3  •  BIOTIN PO, Take 1 tablet by mouth daily., Disp: , Rfl:   •  Cholecalciferol (VITAMIN D3) 5000 UNITS tablet, Take 1 tablet by mouth daily., Disp: , Rfl:   •  diazePAM (VALIUM) 10 MG tablet, Take 1 tablet by mouth 30 minutes prior to procedure, Disp: 1 tablet, Rfl: 0  •  diclofenac (VOLTAREN) 1 % gel gel, Apply 4 g topically 4 (Four) Times a Day., Disp: 1 tube, Rfl: 3  •  losartan (COZAAR) 50 MG tablet, Take 1 tablet by mouth Daily., Disp: 90 tablet, Rfl: 3  •  Multiple Vitamin (MULTIVITAMIN) capsule, Take 1 capsule by mouth daily., Disp: , Rfl:   •  Probiotic Product (PROBIOTIC FORMULA PO), Take 1 tablet by mouth Daily., Disp: , Rfl:   •  PROLIA 60 MG/ML solution syringe, , Disp: , Rfl:   •  sertraline (ZOLOFT) 50 MG tablet, TAKE 1 TABLET DAILY, Disp: 90 tablet, Rfl: 1     Allergies:  No Known Allergies    Physical Examination:  /68   Pulse 94   Ht 162.6 cm (64\")   Wt 84.8 kg (187 lb)   SpO2 94%   BMI 32.10 kg/m²   General Appearance:  Patient is in no distress.  She is well kept and has an obese build.   Psychiatric:  Patient with appropriate mood and affect. Alert and oriented to self, time, and place.    Breast, RIGHT:  large sized, ptotic, symmetric with the contralateral side.  Breast skin is without " erythema, edema, rashes.  There are no visible abnormalities upon inspection during the arm-raising maneuver or with hands on hips in the sitting position. There is no nipple retraction, discharge or nipple/areolar skin changes.There are no masses palpable in the sitting or supine positions.    Breast, LEFT:  large sized, ptotic, symmetric with the contralateral side.  Breast skin is without erythema, edema, rashes.  There are no visible abnormalities upon inspection during the arm-raising maneuver or with hands on hips in the sitting position. There is no nipple retraction, discharge or nipple/areolar skin changes.There are no masses palpable in the sitting or supine positions.  Well-healing mammotomy left lower outer quadrant with minimal ecchymoses from her 2018 stereotactic biopsy for radial scar.    Lymphatic:  There is no axillary, cervical, infraclavicular, or supraclavicular adenopathy bilaterally.  Eyes:  Pupils are round and reactive to light.  Cardiovascular:  Heart rate and rhythm are regular.  Respiratory:  Lungs are clear bilaterally with no crackles or wheezes in any lung field.  Gastrointestinal:  Abdomen is soft, nondistended, and nontender.     Musculoskeletal:  Good strength in all 4 extremities.   There is good range of motion in both shoulders.    Skin:  No new skin lesions or rashes on the skin excluding the breast (see breast exam above).        Imagin/14/15 [facility]  MAMMO SCREEN EVITA  FLUHR, HEATHER  Benign dermal calcifications. BIRADS category 2: benign.    18 [facility]  MAMMO SCREEN EVITA  FLUHR, HEATHER  Scattered fibroglandular densities anterior one third retroareolar left breast interval development of a cluster of calcifications. BIRADS category 0.    8/3/18  [facility]  MAMMO DIAG LEFT  FLUHR, HEATHER  Microcalcifications within the outer inferior aspect of the left breast. BIRADS category 4.    Pathology:   18- left breast stereotactic biopsy: Upper outer quadrant, 5:00: Small radial  "sclerosing lesion with usual hyperplasia, separate foci of usual hyperplasia, columnar cell change without atypia: Focal stromal fibrosis, apically metaplasia, and fibroadenomatoid change.  We will let her know benign. It will require excision.  COncordant per Dr Tariq.        Final Diagnosis   BREAST, LEFT, DESIGNATED \"UPPER OUTER QUADRANT, APPROXIMATE 5 O'CLOCK POSITION\",   STEREOTACTIC BIOPSY:               SMALL RADIAL SCLEROSING LESION WITH ASSOCIATED DUCTAL HYPERPLASIA OF THE                      USUAL TYPE.               SEPARATE FOCI OF DUCTAL HYPERPLASIA OF THE USUAL TYPE.               FOCAL COLUMNAR CELL CHANGE WITHOUT ATYPIA.               FOCAL STROMAL FIBROSIS AND ASSOCIATED CALCIFICATIONS.               FOCAL APOCRINE METAPLASIA.               FOCAL FIBROADENOMATOID CHANGE WITH ASSOCIATED CALCIFICATIONS.               NO EVIDENCE OF MALIGNANCY.     TDJ/th IHC/a/JULIA     CPT CODES:  1. 62715           Procedures:      Assessment:   Diagnosis Plan   1. Radial scar of breast     2. Breast calcifications on mammogram     3. FH: breast cancer       1-2  LEFt breast central RA- 1.5 cm cluster- asymptomatic- stereotactic biopsy August 21, 2018 lateral to the lower outer quadrant left breast returned as   8-21-18- left breast stereotactic biopsy:  5:00: Small radial sclerosing lesion with usual hyperplasia, separate foci of usual hyperplasia, columnar cell change without atypia: Focal stromal fibrosis, apically metaplasia, and fibroadenomatoid change.   Concordant per Dr Tariq.    Hydromark in vicinity of pre biopsy calcifications.    3-  Paternal 1/2 sister in 70s, paternal cousin in 50s    Plan:  We reviewed her examination and her pathology report today.  We discussed the nature of radial scar and a recommendation for excisional biopsy of this.  We will plan for a left breast ultrasound-guided excision of radial scar.  Hydromark marker visible post stereo and in good position at site pf previous " calcifications.  She has a trip planned in October and we'll be back by October 15 and would like to do it after that time which is fine.  We discussed the risks  of bleeding, infection, failure to sample.     Her next routine screening mammogram will be due July 18, 2019 at [facility].      Shayna Blount MD         We have spent 20 minutes in visit today, 15 in face to face .      Next Appointment:  Return for surgery.      EMR Dragon/transcription disclaimer:    Much of this encounter note is an electronic transcription/translocation of spoken language to printed text.  The electronic translation of spoken language may permit erroneous, or at times, nonsensical words or phrases to be inadvertently transcribed.  Although I have reviewed the note from such areas, some may still exist.

## 2018-10-25 ENCOUNTER — ANESTHESIA EVENT (OUTPATIENT)
Dept: PERIOP | Facility: HOSPITAL | Age: 78
End: 2018-10-25

## 2018-10-25 ENCOUNTER — HOSPITAL ENCOUNTER (OUTPATIENT)
Facility: HOSPITAL | Age: 78
Setting detail: HOSPITAL OUTPATIENT SURGERY
Discharge: HOME | End: 2018-10-25
Attending: SURGERY | Admitting: SURGERY

## 2018-10-25 ENCOUNTER — ANESTHESIA (OUTPATIENT)
Dept: PERIOP | Facility: HOSPITAL | Age: 78
End: 2018-10-25

## 2018-10-25 VITALS
RESPIRATION RATE: 16 BRPM | DIASTOLIC BLOOD PRESSURE: 61 MMHG | TEMPERATURE: 97.8 F | WEIGHT: 186 LBS | BODY MASS INDEX: 31.93 KG/M2 | OXYGEN SATURATION: 96 % | SYSTOLIC BLOOD PRESSURE: 124 MMHG | HEART RATE: 74 BPM

## 2018-10-25 DIAGNOSIS — N64.89 RADIAL SCAR OF BREAST: ICD-10-CM

## 2018-10-25 LAB — GLUCOSE BLDC GLUCOMTR-MCNC: 162 MG/DL (ref 70–130)

## 2018-10-25 PROCEDURE — 19120 REMOVAL OF BREAST LESION: CPT | Performed by: SURGERY

## 2018-10-25 PROCEDURE — 25010000002 HYDROMORPHONE 1 MG/ML SOLUTION: Performed by: ANESTHESIOLOGY

## 2018-10-25 PROCEDURE — 25010000002 PROPOFOL 10 MG/ML EMULSION: Performed by: NURSE ANESTHETIST, CERTIFIED REGISTERED

## 2018-10-25 PROCEDURE — 82962 GLUCOSE BLOOD TEST: CPT

## 2018-10-25 PROCEDURE — 76998 US GUIDE INTRAOP: CPT | Performed by: SURGERY

## 2018-10-25 PROCEDURE — 25010000003 CEFAZOLIN IN DEXTROSE 2-4 GM/100ML-% SOLUTION: Performed by: SURGERY

## 2018-10-25 PROCEDURE — 25010000002 DEXAMETHASONE PER 1 MG: Performed by: NURSE ANESTHETIST, CERTIFIED REGISTERED

## 2018-10-25 PROCEDURE — 25010000002 ONDANSETRON PER 1 MG: Performed by: NURSE ANESTHETIST, CERTIFIED REGISTERED

## 2018-10-25 PROCEDURE — 88307 TISSUE EXAM BY PATHOLOGIST: CPT | Performed by: SURGERY

## 2018-10-25 PROCEDURE — 25010000002 FENTANYL CITRATE (PF) 100 MCG/2ML SOLUTION: Performed by: NURSE ANESTHETIST, CERTIFIED REGISTERED

## 2018-10-25 PROCEDURE — 25010000002 FENTANYL CITRATE (PF) 100 MCG/2ML SOLUTION: Performed by: ANESTHESIOLOGY

## 2018-10-25 RX ORDER — ACETAMINOPHEN 650 MG/1
650 SUPPOSITORY RECTAL ONCE AS NEEDED
Status: DISCONTINUED | OUTPATIENT
Start: 2018-10-25 | End: 2018-10-25 | Stop reason: HOSPADM

## 2018-10-25 RX ORDER — NALBUPHINE HCL 10 MG/ML
10 AMPUL (ML) INJECTION EVERY 4 HOURS PRN
Status: DISCONTINUED | OUTPATIENT
Start: 2018-10-25 | End: 2018-10-25 | Stop reason: HOSPADM

## 2018-10-25 RX ORDER — FENTANYL CITRATE 50 UG/ML
INJECTION, SOLUTION INTRAMUSCULAR; INTRAVENOUS AS NEEDED
Status: DISCONTINUED | OUTPATIENT
Start: 2018-10-25 | End: 2018-10-25 | Stop reason: SURG

## 2018-10-25 RX ORDER — DIPHENHYDRAMINE HYDROCHLORIDE 50 MG/ML
12.5 INJECTION INTRAMUSCULAR; INTRAVENOUS
Status: DISCONTINUED | OUTPATIENT
Start: 2018-10-25 | End: 2018-10-25 | Stop reason: HOSPADM

## 2018-10-25 RX ORDER — PROMETHAZINE HYDROCHLORIDE 25 MG/ML
6.25 INJECTION, SOLUTION INTRAMUSCULAR; INTRAVENOUS ONCE AS NEEDED
Status: DISCONTINUED | OUTPATIENT
Start: 2018-10-25 | End: 2018-10-25 | Stop reason: HOSPADM

## 2018-10-25 RX ORDER — ACETAMINOPHEN 325 MG/1
650 TABLET ORAL ONCE AS NEEDED
Status: DISCONTINUED | OUTPATIENT
Start: 2018-10-25 | End: 2018-10-25 | Stop reason: HOSPADM

## 2018-10-25 RX ORDER — PROPOFOL 10 MG/ML
VIAL (ML) INTRAVENOUS AS NEEDED
Status: DISCONTINUED | OUTPATIENT
Start: 2018-10-25 | End: 2018-10-25 | Stop reason: SURG

## 2018-10-25 RX ORDER — ACETAMINOPHEN 325 MG/1
650 TABLET ORAL ONCE
Status: CANCELLED | OUTPATIENT
Start: 2018-10-25 | End: 2018-10-25

## 2018-10-25 RX ORDER — PROMETHAZINE HYDROCHLORIDE 25 MG/1
25 SUPPOSITORY RECTAL ONCE AS NEEDED
Status: DISCONTINUED | OUTPATIENT
Start: 2018-10-25 | End: 2018-10-25 | Stop reason: HOSPADM

## 2018-10-25 RX ORDER — EPHEDRINE SULFATE 50 MG/ML
INJECTION, SOLUTION INTRAVENOUS AS NEEDED
Status: DISCONTINUED | OUTPATIENT
Start: 2018-10-25 | End: 2018-10-25 | Stop reason: SURG

## 2018-10-25 RX ORDER — LIDOCAINE HYDROCHLORIDE 10 MG/ML
0.5 INJECTION, SOLUTION EPIDURAL; INFILTRATION; INTRACAUDAL; PERINEURAL ONCE AS NEEDED
Status: CANCELLED | OUTPATIENT
Start: 2018-10-25

## 2018-10-25 RX ORDER — SODIUM CHLORIDE, SODIUM LACTATE, POTASSIUM CHLORIDE, CALCIUM CHLORIDE 600; 310; 30; 20 MG/100ML; MG/100ML; MG/100ML; MG/100ML
9 INJECTION, SOLUTION INTRAVENOUS CONTINUOUS
Status: CANCELLED | OUTPATIENT
Start: 2018-10-25

## 2018-10-25 RX ORDER — LIDOCAINE HYDROCHLORIDE 20 MG/ML
INJECTION, SOLUTION INFILTRATION; PERINEURAL AS NEEDED
Status: DISCONTINUED | OUTPATIENT
Start: 2018-10-25 | End: 2018-10-25 | Stop reason: SURG

## 2018-10-25 RX ORDER — ONDANSETRON 2 MG/ML
INJECTION INTRAMUSCULAR; INTRAVENOUS AS NEEDED
Status: DISCONTINUED | OUTPATIENT
Start: 2018-10-25 | End: 2018-10-25 | Stop reason: SURG

## 2018-10-25 RX ORDER — SODIUM CHLORIDE 0.9 % (FLUSH) 0.9 %
1-10 SYRINGE (ML) INJECTION AS NEEDED
Status: CANCELLED | OUTPATIENT
Start: 2018-10-25

## 2018-10-25 RX ORDER — OXYCODONE HYDROCHLORIDE AND ACETAMINOPHEN 5; 325 MG/1; MG/1
1 TABLET ORAL ONCE AS NEEDED
Status: DISCONTINUED | OUTPATIENT
Start: 2018-10-25 | End: 2018-10-25 | Stop reason: HOSPADM

## 2018-10-25 RX ORDER — DEXAMETHASONE SODIUM PHOSPHATE 10 MG/ML
INJECTION INTRAMUSCULAR; INTRAVENOUS AS NEEDED
Status: DISCONTINUED | OUTPATIENT
Start: 2018-10-25 | End: 2018-10-25 | Stop reason: SURG

## 2018-10-25 RX ORDER — NALOXONE HCL 0.4 MG/ML
0.4 VIAL (ML) INJECTION AS NEEDED
Status: DISCONTINUED | OUTPATIENT
Start: 2018-10-25 | End: 2018-10-25 | Stop reason: HOSPADM

## 2018-10-25 RX ORDER — MIDAZOLAM HYDROCHLORIDE 1 MG/ML
2 INJECTION INTRAMUSCULAR; INTRAVENOUS
Status: CANCELLED | OUTPATIENT
Start: 2018-10-25

## 2018-10-25 RX ORDER — CEFAZOLIN SODIUM 2 G/100ML
2 INJECTION, SOLUTION INTRAVENOUS ONCE
Status: COMPLETED | OUTPATIENT
Start: 2018-10-25 | End: 2018-10-25

## 2018-10-25 RX ORDER — FENTANYL CITRATE 50 UG/ML
50 INJECTION, SOLUTION INTRAMUSCULAR; INTRAVENOUS
Status: DISCONTINUED | OUTPATIENT
Start: 2018-10-25 | End: 2018-10-25 | Stop reason: HOSPADM

## 2018-10-25 RX ORDER — SODIUM CHLORIDE 0.9 % (FLUSH) 0.9 %
3 SYRINGE (ML) INJECTION EVERY 12 HOURS SCHEDULED
Status: CANCELLED | OUTPATIENT
Start: 2018-10-25

## 2018-10-25 RX ORDER — HYDRALAZINE HYDROCHLORIDE 20 MG/ML
5 INJECTION INTRAMUSCULAR; INTRAVENOUS
Status: DISCONTINUED | OUTPATIENT
Start: 2018-10-25 | End: 2018-10-25 | Stop reason: HOSPADM

## 2018-10-25 RX ORDER — NALBUPHINE HCL 10 MG/ML
2 AMPUL (ML) INJECTION EVERY 4 HOURS PRN
Status: DISCONTINUED | OUTPATIENT
Start: 2018-10-25 | End: 2018-10-25 | Stop reason: HOSPADM

## 2018-10-25 RX ORDER — MIDAZOLAM HYDROCHLORIDE 1 MG/ML
1 INJECTION INTRAMUSCULAR; INTRAVENOUS
Status: CANCELLED | OUTPATIENT
Start: 2018-10-25

## 2018-10-25 RX ORDER — PROMETHAZINE HYDROCHLORIDE 25 MG/1
25 TABLET ORAL ONCE AS NEEDED
Status: DISCONTINUED | OUTPATIENT
Start: 2018-10-25 | End: 2018-10-25 | Stop reason: HOSPADM

## 2018-10-25 RX ORDER — SODIUM CHLORIDE, SODIUM LACTATE, POTASSIUM CHLORIDE, CALCIUM CHLORIDE 600; 310; 30; 20 MG/100ML; MG/100ML; MG/100ML; MG/100ML
100 INJECTION, SOLUTION INTRAVENOUS CONTINUOUS
Status: DISCONTINUED | OUTPATIENT
Start: 2018-10-25 | End: 2018-10-25 | Stop reason: HOSPADM

## 2018-10-25 RX ADMIN — FENTANYL CITRATE 50 MCG: 50 INJECTION, SOLUTION INTRAMUSCULAR; INTRAVENOUS at 09:47

## 2018-10-25 RX ADMIN — PROPOFOL 150 MG: 10 INJECTION, EMULSION INTRAVENOUS at 08:26

## 2018-10-25 RX ADMIN — FENTANYL CITRATE 50 MCG: 50 INJECTION INTRAMUSCULAR; INTRAVENOUS at 08:26

## 2018-10-25 RX ADMIN — HYDROMORPHONE HYDROCHLORIDE 0.25 MG: 1 INJECTION, SOLUTION INTRAMUSCULAR; INTRAVENOUS; SUBCUTANEOUS at 10:18

## 2018-10-25 RX ADMIN — DEXAMETHASONE SODIUM PHOSPHATE 8 MG: 10 INJECTION INTRAMUSCULAR; INTRAVENOUS at 08:34

## 2018-10-25 RX ADMIN — EPHEDRINE SULFATE 5 MG: 50 INJECTION INTRAMUSCULAR; INTRAVENOUS; SUBCUTANEOUS at 08:40

## 2018-10-25 RX ADMIN — CEFAZOLIN SODIUM 2 G: 2 INJECTION, SOLUTION INTRAVENOUS at 08:18

## 2018-10-25 RX ADMIN — ONDANSETRON 4 MG: 2 INJECTION INTRAMUSCULAR; INTRAVENOUS at 09:00

## 2018-10-25 RX ADMIN — EPHEDRINE SULFATE 5 MG: 50 INJECTION INTRAMUSCULAR; INTRAVENOUS; SUBCUTANEOUS at 09:05

## 2018-10-25 RX ADMIN — FENTANYL CITRATE 50 MCG: 50 INJECTION INTRAMUSCULAR; INTRAVENOUS at 09:28

## 2018-10-25 RX ADMIN — FENTANYL CITRATE 50 MCG: 50 INJECTION, SOLUTION INTRAMUSCULAR; INTRAVENOUS at 09:58

## 2018-10-25 RX ADMIN — HYDROMORPHONE HYDROCHLORIDE 0.25 MG: 1 INJECTION, SOLUTION INTRAMUSCULAR; INTRAVENOUS; SUBCUTANEOUS at 10:13

## 2018-10-25 RX ADMIN — LIDOCAINE HYDROCHLORIDE 80 MG: 20 INJECTION, SOLUTION INFILTRATION; PERINEURAL at 08:26

## 2018-10-25 RX ADMIN — SODIUM CHLORIDE, POTASSIUM CHLORIDE, SODIUM LACTATE AND CALCIUM CHLORIDE 100 ML/HR: 600; 310; 30; 20 INJECTION, SOLUTION INTRAVENOUS at 07:23

## 2018-10-25 RX ADMIN — OXYCODONE HYDROCHLORIDE AND ACETAMINOPHEN 1 TABLET: 5; 325 TABLET ORAL at 09:57

## 2018-10-25 NOTE — DISCHARGE INSTRUCTIONS
Dr. Shayna Blount  [address]  [phone]    Lumpectomy, Marston Node Biopsy, Excisional Breast Biopsy, Port Placement  Postoperative Discharge Instructions         Keep 6 inch ace wrap in place and dressings dry postoperatively for a total of 72 hours. May then remove dressings and ACE wrap. Can shower and get affected area wet after dressings are removed. Prefer no soaking in the tub for one week. Leave steri-strips in place.   A responsible adult should accompany the patient on discharge and for 24 hours following surgery.   No heavy lifting (>5 lbs) or strenuous upper arm activity, and no raising of arms over the head until followup appointment.   For 24 hours postoperatively, or while taking pain or nausea medications - Do not drive, operate machinery or drink alcohol.   Call the office for any of the following symptoms:    Persistent bleeding   Excessive bruising or swelling   Excessive sleepiness or trouble breathing   Severe pain   Persistent nausea or vomiting   Fever greater than 101.   Patient should keep the postoperative visit that was scheduled for him/her in [facility]. If the patient has forgotten this date, please call the clinic for a reminder of the appointment time. If it is after hours, the patient can call the clinic the next business day for this reminder. [facility] phone number is [phone].

## 2018-10-25 NOTE — ANESTHESIA POSTPROCEDURE EVALUATION
Patient: Shu Mckeon    Procedure Summary     Date:  10/25/18 Room / Location:   [facility]  /  [facility]    Anesthesia Start:  0818 Anesthesia Stop:  0932    Procedure:  left breast ultrasound-guided excision of radial scar. (Left Breast) Diagnosis:       Radial scar of breast      (Radial scar of breast [N64.89])    Surgeon:  Shayna Blount MD Provider:  Evelin, Andre Huff MD    Anesthesia Type:  general ASA Status:  2          Anesthesia Type: general  Last vitals  BP   124/61 (10/25/18 1040)   Temp   36.6 °C (97.8 °F) (10/25/18 0928)   Pulse   74 (10/25/18 1040)   Resp   16 (10/25/18 1040)     SpO2   96 % (10/25/18 1040)     Post Anesthesia Care and Evaluation    Patient location during evaluation: bedside  Patient participation: complete - patient participated  Level of consciousness: awake and alert  Pain management: adequate  Airway patency: patent  Anesthetic complications: No anesthetic complications    Cardiovascular status: acceptable  Respiratory status: acceptable  Hydration status: acceptable    Comments: /61   Pulse 74   Temp 36.6 °C (97.8 °F) (Temporal Artery )   Resp 16   Wt 84.4 kg (186 lb)   SpO2 96%   BMI 31.93 kg/m²

## 2018-10-25 NOTE — ANESTHESIA PREPROCEDURE EVALUATION
Anesthesia Evaluation     no history of anesthetic complications:  NPO Solid Status: > 8 hours             Airway   Mallampati: II  Dental - normal exam     Pulmonary - normal exam   Cardiovascular     ECG reviewed  Rhythm: regular    (+) hypertension, dysrhythmias, hyperlipidemia,       Neuro/Psych  GI/Hepatic/Renal/Endo    (+)  GERD,  diabetes mellitus,     Musculoskeletal     Abdominal  - normal exam   Substance History      OB/GYN          Other                        Anesthesia Plan    ASA 2     general     intravenous induction

## 2018-10-25 NOTE — OP NOTE
Operative note    Preoperative Diagnosis: radial scar    Postoperative Diagnosis: same    Procedure Performed:left breast ultrasound guided excisional biopsy    Dictating physician and surgeon: Shayna Blount MD      EBL:3cc    FINDINGS AND DESCRIPTION OF PROCEDURE:     The patient was brought to the operating room and placed on the table in the supine position. After adequate general LMA anesthesia was obtained, we sterilly prepped and draped the breast and. We did a time out to identify correct patient and correct operative site.  She did receive IV antibiotic within an hour of and prior to incision.     I used the intraoperative ultrasound to examine the lesion of interest.  I marked the intended area for resection and planned my incision based on the imaging.  Radial LOQ incision.    I used a 15 blade to incise the skin. I then dissected using bovie electrocautery superiorly, inferiorly, medially and laterally around the lesion.  I examinted the specimen using the intraoperative ultrasound to document the presence of the lesion within the specimen.. I labelled this as follows: long stitch lateral short stitch superior, double stitch deep.    I then irrigated, assured hemostasis and closed a deep layer of breast tissue using a running 2-0 vicryl, followed by a superficial layer of breast using a running 2-0 vicryl. I then closed the skin in 2 layers- the first being n interrupted 3-0 vicryl layer, followed by a running 4-0 monocryl.    I then applied lengthwise 1/2 inch steri strips, an island dressing. Then I wrapped her in 4x4s and a 6 inch ACE wrap.    She tolerated the procedure well, there were no immediate complications, and all counts were correct at the end of the case.

## 2018-10-26 ENCOUNTER — TELEPHONE (OUTPATIENT)
Dept: SURGERY | Facility: CLINIC | Age: 78
End: 2018-10-26

## 2018-10-26 LAB
CYTO UR: NORMAL
LAB AP CASE REPORT: NORMAL
PATH REPORT.FINAL DX SPEC: NORMAL
PATH REPORT.GROSS SPEC: NORMAL

## 2018-10-26 NOTE — TELEPHONE ENCOUNTER
Pathology from  10-24-18 excision radial scar returend as radial scar and no atypia.    We will let her know benign.    Final Diagnosis   LEFT BREAST LUMPECTOMY:           BENIGN BREAST TISSUE WITH DUCT HYPERPLASIA WITHOUT ATYPIA, ADENOSIS, AND RADIAL                   SCLEROSING LESION (2 MM).          SCAR AND CHANGES OF PRIOR BIOPSY.            COMMENT: I do not detect atypia. The margin is free of radial sclerosing lesion.     CMK/th  IHC/TDJ

## 2018-11-06 ENCOUNTER — OFFICE VISIT (OUTPATIENT)
Dept: SURGERY | Facility: CLINIC | Age: 78
End: 2018-11-06

## 2018-11-06 VITALS
SYSTOLIC BLOOD PRESSURE: 158 MMHG | BODY MASS INDEX: 31.41 KG/M2 | OXYGEN SATURATION: 98 % | HEIGHT: 64 IN | DIASTOLIC BLOOD PRESSURE: 84 MMHG | WEIGHT: 184 LBS | HEART RATE: 77 BPM

## 2018-11-06 DIAGNOSIS — N64.89 RADIAL SCAR OF BREAST: Primary | ICD-10-CM

## 2018-11-06 DIAGNOSIS — Z80.3 FH: BREAST CANCER: ICD-10-CM

## 2018-11-06 DIAGNOSIS — R92.1 BREAST CALCIFICATIONS ON MAMMOGRAM: ICD-10-CM

## 2018-11-06 DIAGNOSIS — Z12.31 ENCOUNTER FOR SCREENING MAMMOGRAM FOR MALIGNANT NEOPLASM OF BREAST: ICD-10-CM

## 2018-11-06 PROCEDURE — 99024 POSTOP FOLLOW-UP VISIT: CPT | Performed by: SURGERY

## 2018-11-06 NOTE — PROGRESS NOTES
Chief Complaint: Shu Mckeon is a 78 y.o. female who was seen in consultation at the request of No ref. provider found  for abnormal breast imaging and a postoperative visit    History of Present Illness:  Patient presents with abnormal breast imaging, LEFT breast. She noted no new masses, skin changes, nipple discharge, nipple changes prior to her most recent imaging.  Her most recent imaging includes the followin/14/15 [facility]  MAMMO SCREEN EVITA  FLUHR, SHU  Benign dermal calcifications. BIRADS category 2: benign.    18 [facility]  MAMMO SCREEN EVITA  FLUHR, SHU  Scattered fibroglandular densities anterior one third retroareolar left breast interval development of a cluster of calcifications. BIRADS category 0.    8/3/18  [facility]  MAMMO DIAG LEFT  FLUHR, SHU  Microcalcifications within the outer inferior aspect of the left breast. BIRADS category 4.      She has not had a breast biopsy in the past.  She has her uterus and ovaries, is postmenopausal, and takes nor hormones.  Her family history includes the following: She has one daughter, one sister, no maternal aunts, 2 paternal aunts.  She has a half sister on paternal he based who had breast cancer in her 70s and a paternal cousin who had breast cancer in her 50s.      18- left breast stereotactic biopsy: Upper outer quadrant, 5:00: Small radial sclerosing lesion with usual hyperplasia, separate foci of usual hyperplasia, columnar cell change without atypia: Focal stromal fibrosis, apically metaplasia, and fibroadenomatoid change.   Concordant per Dr Tariq.    She denies significant bruising at her biopsy site.  She denies any redness warmth or drainage from the mammotomy.    Interval History:  Pathology from  10-24-18 excision radial scar returned as radial scar and no atypia.     She denies any redness, warmth, drainage from her incision.        Review of Systems:  Review of Systems   Constitutional: Positive for unexpected weight change (1 lb wt gain).  "  Genitourinary: Positive for nocturia.    Musculoskeletal: Positive for back pain.   Neurological: Positive for light-headedness.   All other systems reviewed and are negative.       Past Medical and Surgical History:  Breast Biopsy History:  Patient has not had a breast biopsy in the past.  Breast Cancer HIstory:  Patient does not have a past medical history of breast cancer.  Breast Operations, and year:  NONE   Obstetric/Gynecologic History:  Age menstrual periods began: 11  Patient is postmenopausal, entered menopause naturally at age:    Number of pregnancies: 3  Number of live births: 3  Number of abortions or miscarriages: 0  Age of delivery of first child: 20  Patient did not breast feed.  Length of time taking birth control pills: 10-12 YRS   Patient took hormone replacement during the following dates: 10 YRS   PATIENT STILL HAS UTERUS AND OVARIES.     Past Surgical History:   Procedure Laterality Date   • BREAST BIOPSY Left 10/25/2018    Procedure: left breast ultrasound-guided excision of radial scar.;  Surgeon: Shayna Blount MD;  Location: [facility] OR [facility] [place];  Service: General   • CARDIAC CATHETERIZATION      not sure when or where   • CARDIAC CATHETERIZATION     • CATARACT EXTRACTION WITH INTRAOCULAR LENS IMPLANT     • FEMUR FRACTURE SURGERY Right    • FRACTURE SURGERY  2015    LT ANKLE   • REPLACEMENT TOTAL KNEE Right      Past Medical History:   Diagnosis Date   • Arthritis    • Chronic low back pain    • Depression     HISTORY OF BEING \"EMOTIONAL\"   • Fatigue    • GERD (gastroesophageal reflux disease)    • Groin pain    • H/O cardiovascular stress test 07/2018    AKUA, DR ARIAS   • History of diverticulitis    • Hyperlipidemia    • Hypertension    • Hypertension    • Impaired fasting glucose    • Left bundle branch block    • Osteoporosis    • Radial scar of breast     LEFT   • Status post ORIF of fracture of ankle    • Urinary frequency        Prior Hospitalizations, other than for surgery or " "childbirth, and year:  NONE     Social History     Social History   • Marital status:      Spouse name: N/A   • Number of children: N/A   • Years of education: N/A     Occupational History   • Not on file.     Social History Main Topics   • Smoking status: Never Smoker   • Smokeless tobacco: Never Used   • Alcohol use 4.2 oz/week     7 Glasses of wine per week      Comment: 1/day-socially   • Drug use: No   • Sexual activity: Defer     Other Topics Concern   • Not on file     Social History Narrative   • No narrative on file     Patient is .  Patient is retired.  Patient drinks 2 servings of caffeine per day.    Family History:  Family History   Problem Relation Age of Onset   • Heart disease Father    • Heart attack Father 81   • Heart attack Paternal Uncle    • Breast cancer Sister 70   • Breast cancer Cousin 50   • Malig Hyperthermia Neg Hx        Vital Signs:  /84 (BP Location: Right arm, Patient Position: Sitting, Cuff Size: Adult)   Pulse 77   Ht 162.6 cm (64\")   Wt 83.5 kg (184 lb)   SpO2 98%   BMI 31.58 kg/m²      Medications:    Current Outpatient Prescriptions:   •  atorvastatin (LIPITOR) 40 MG tablet, Take 1 tablet by mouth Daily. (Patient taking differently: Take 40 mg by mouth Every Night.), Disp: 90 tablet, Rfl: 3  •  BIOTIN PO, Take 1 tablet by mouth daily., Disp: , Rfl:   •  Cholecalciferol (VITAMIN D3) 5000 UNITS tablet, Take 1 tablet by mouth daily., Disp: , Rfl:   •  losartan (COZAAR) 50 MG tablet, Take 1 tablet by mouth Daily., Disp: 90 tablet, Rfl: 3  •  Multiple Vitamin (MULTIVITAMIN) capsule, Take 1 capsule by mouth daily., Disp: , Rfl:   •  PROLIA 60 MG/ML solution syringe, Inject 60 mg under the skin into the appropriate area as directed Every 6 (Six) Months., Disp: , Rfl:   •  sertraline (ZOLOFT) 50 MG tablet, Take 50 mg by mouth Daily., Disp: , Rfl:   •  TURMERIC PO, Take 1 tablet by mouth Daily. PT TO HOLD FOR SURGERY, Disp: , Rfl:      Allergies:  No Known " "Allergies    Physical Examination:  /84 (BP Location: Right arm, Patient Position: Sitting, Cuff Size: Adult)   Pulse 77   Ht 162.6 cm (64\")   Wt 83.5 kg (184 lb)   SpO2 98%   BMI 31.58 kg/m²   General Appearance:  Patient is in no distress.  She is well kept and has an obese build.   Psychiatric:  Patient with appropriate mood and affect. Alert and oriented to self, time, and place.    Breast, RIGHT:  large sized, ptotic, symmetric with the contralateral side.  Breast skin is without erythema, edema, rashes.  There are no visible abnormalities upon inspection during the arm-raising maneuver or with hands on hips in the sitting position. There is no nipple retraction, discharge or nipple/areolar skin changes.There are no masses palpable in the sitting or supine positions.    Breast, LEFT:  large sized, ptotic, symmetric with the contralateral side.  Breast skin is without erythema, edema, rashes.  There are no visible abnormalities upon inspection during the arm-raising maneuver or with hands on hips in the sitting position. There is no nipple retraction, discharge or nipple/areolar skin changes.There are no masses palpable in the sitting or supine positions.  Well-healed radial biopsy   for radial scar. No erythema, warmth, drainage.    Lymphatic:  There is no axillary, cervical, infraclavicular, or supraclavicular adenopathy bilaterally.  Eyes:  Pupils are round and reactive to light.  Cardiovascular:  Heart rate and rhythm are regular.  Respiratory:  Lungs are clear bilaterally with no crackles or wheezes in any lung field.  Gastrointestinal:  Abdomen is soft, nondistended, and nontender.     Musculoskeletal:  Good strength in all 4 extremities.   There is good range of motion in both shoulders.    Skin:  No new skin lesions or rashes on the skin excluding the breast (see breast exam above).        Imagin/14/15 [facility]  MAMMO SCREEN EVITA  FLUHR, HEATHER  Benign dermal calcifications. BIRADS " "category 2: benign.    7/17/18 [facility]  MAMMO SCREEN EVITA  FLUHR, HEATHER  Scattered fibroglandular densities anterior one third retroareolar left breast interval development of a cluster of calcifications. BIRADS category 0.    8/3/18  [facility]  MAMMO DIAG LEFT  FLUHR, HEATHER  Microcalcifications within the outer inferior aspect of the left breast. BIRADS category 4.    Pathology:   8-21-18- left breast stereotactic biopsy: Upper outer quadrant, 5:00: Small radial sclerosing lesion with usual hyperplasia, separate foci of usual hyperplasia, columnar cell change without atypia: Focal stromal fibrosis, apically metaplasia, and fibroadenomatoid change.  We will let her know benign. It will require excision.  COncordant per Dr Tariq.        Final Diagnosis   BREAST, LEFT, DESIGNATED \"UPPER OUTER QUADRANT, APPROXIMATE 5 O'CLOCK POSITION\",   STEREOTACTIC BIOPSY:               SMALL RADIAL SCLEROSING LESION WITH ASSOCIATED DUCTAL HYPERPLASIA OF THE                      USUAL TYPE.               SEPARATE FOCI OF DUCTAL HYPERPLASIA OF THE USUAL TYPE.               FOCAL COLUMNAR CELL CHANGE WITHOUT ATYPIA.               FOCAL STROMAL FIBROSIS AND ASSOCIATED CALCIFICATIONS.               FOCAL APOCRINE METAPLASIA.               FOCAL FIBROADENOMATOID CHANGE WITH ASSOCIATED CALCIFICATIONS.               NO EVIDENCE OF MALIGNANCY.     TDJ/ IHC/a/JULIA     CPT CODES:  1. 56612       Pathology from  10-24-18 excision radial scar returend as radial scar and no atypia.     We will let her know benign.     Final Diagnosis   LEFT BREAST LUMPECTOMY:           BENIGN BREAST TISSUE WITH DUCT HYPERPLASIA WITHOUT ATYPIA, ADENOSIS, AND RADIAL                   SCLEROSING LESION (2 MM).          SCAR AND CHANGES OF PRIOR BIOPSY.     COMMENT: I do not detect atypia. The margin is free of radial sclerosing lesion.     CMK/th  IHC/TDJ       Procedures:      Assessment:   Diagnosis Plan   1. Radial scar of breast  Mammo Screening Digital Tomosynthesis " Bilateral With CAD   2. Breast calcifications on mammogram     3. FH: breast cancer     4. Encounter for screening mammogram for malignant neoplasm of breast   Mammo Screening Digital Tomosynthesis Bilateral With CAD     1-2  LEFt breast central RA- 1.5 cm cluster- asymptomatic- stereotactic biopsy August 21, 2018 lateral to the lower outer quadrant left breast returned as   8-21-18- left breast stereotactic biopsy:  5:00: Small radial sclerosing lesion with usual hyperplasia, separate foci of usual hyperplasia, columnar cell change without atypia: Focal stromal fibrosis, apically metaplasia, and fibroadenomatoid change.   Concordant per Dr Tariq.    Hydromark in vicinity of pre biopsy calcifications.    Pathology from  10-24-18 excision radial scar returned as radial scar and no atypia.        3-  Paternal 1/2 sister in 70s, paternal cousin in 50s    Plan:  Mrs. Sorenson doing well postoperatively.  She is healing nicely.  We reviewed her pathology and the nature of radial scar together today.  I will arrange for her July 18, 2019 screening mammogram with 3[facility]D at [facility] and we'll see her back after.  I asked her to continue her self breast exam and to call us in the interim with any concerns or changes and we'd be happy to see her back sooner.        Shayna Blount MD             Next Appointment:  Return for Next scheduled follow up, after imaging.      EMR Dragon/transcription disclaimer:    Much of this encounter note is an electronic transcription/translocation of spoken language to printed text.  The electronic translation of spoken language may permit erroneous, or at times, nonsensical words or phrases to be inadvertently transcribed.  Although I have reviewed the note from such areas, some may still exist.

## 2018-12-03 RX ORDER — ATORVASTATIN CALCIUM 40 MG/1
40 TABLET, FILM COATED ORAL DAILY
Qty: 90 TABLET | Refills: 3 | Status: SHIPPED | OUTPATIENT
Start: 2018-12-03 | End: 2019-02-25 | Stop reason: SDUPTHER

## 2018-12-03 RX ORDER — LOSARTAN POTASSIUM 50 MG/1
50 TABLET ORAL DAILY
Qty: 90 TABLET | Refills: 3 | Status: SHIPPED | OUTPATIENT
Start: 2018-12-03 | End: 2019-02-25 | Stop reason: SDUPTHER

## 2019-01-21 DIAGNOSIS — E78.5 HYPERLIPIDEMIA, UNSPECIFIED HYPERLIPIDEMIA TYPE: ICD-10-CM

## 2019-01-21 DIAGNOSIS — I10 ESSENTIAL HYPERTENSION: Primary | ICD-10-CM

## 2019-01-21 DIAGNOSIS — E11.9 CONTROLLED TYPE 2 DIABETES MELLITUS WITHOUT COMPLICATION, WITHOUT LONG-TERM CURRENT USE OF INSULIN (HCC): ICD-10-CM

## 2019-01-23 LAB
ALBUMIN SERPL-MCNC: 4.5 G/DL (ref 3.5–5.2)
ALBUMIN/GLOB SERPL: 1.6 G/DL
ALP SERPL-CCNC: 75 U/L (ref 39–117)
ALT SERPL-CCNC: 11 U/L (ref 1–33)
AST SERPL-CCNC: 14 U/L (ref 1–32)
BILIRUB SERPL-MCNC: 0.5 MG/DL (ref 0.1–1.2)
BUN SERPL-MCNC: 7 MG/DL (ref 8–23)
BUN/CREAT SERPL: 11.1 (ref 7–25)
CALCIUM SERPL-MCNC: 10.4 MG/DL (ref 8.6–10.5)
CHLORIDE SERPL-SCNC: 101 MMOL/L (ref 98–107)
CHOLEST SERPL-MCNC: 146 MG/DL (ref 0–200)
CO2 SERPL-SCNC: 27.1 MMOL/L (ref 22–29)
CREAT SERPL-MCNC: 0.63 MG/DL (ref 0.57–1)
GLOBULIN SER CALC-MCNC: 2.8 GM/DL
GLUCOSE SERPL-MCNC: 118 MG/DL (ref 65–99)
HBA1C MFR BLD: 6.83 % (ref 4.8–5.6)
HDLC SERPL-MCNC: 56 MG/DL (ref 40–60)
LDLC SERPL CALC-MCNC: 73 MG/DL (ref 0–100)
LDLC/HDLC SERPL: 1.31 {RATIO}
POTASSIUM SERPL-SCNC: 4.6 MMOL/L (ref 3.5–5.2)
PROT SERPL-MCNC: 7.3 G/DL (ref 6–8.5)
SODIUM SERPL-SCNC: 139 MMOL/L (ref 136–145)
TRIGL SERPL-MCNC: 84 MG/DL (ref 0–150)
TSH SERPL DL<=0.005 MIU/L-ACNC: 1.99 MIU/ML (ref 0.27–4.2)
VLDLC SERPL CALC-MCNC: 16.8 MG/DL (ref 5–40)

## 2019-01-29 ENCOUNTER — OFFICE VISIT (OUTPATIENT)
Dept: INTERNAL MEDICINE | Facility: CLINIC | Age: 79
End: 2019-01-29

## 2019-01-29 VITALS
SYSTOLIC BLOOD PRESSURE: 130 MMHG | OXYGEN SATURATION: 97 % | HEIGHT: 64 IN | HEART RATE: 79 BPM | WEIGHT: 186 LBS | TEMPERATURE: 97.8 F | DIASTOLIC BLOOD PRESSURE: 76 MMHG | BODY MASS INDEX: 31.76 KG/M2

## 2019-01-29 DIAGNOSIS — G89.29 CHRONIC RIGHT-SIDED LOW BACK PAIN WITHOUT SCIATICA: Primary | ICD-10-CM

## 2019-01-29 DIAGNOSIS — I10 ESSENTIAL HYPERTENSION: ICD-10-CM

## 2019-01-29 DIAGNOSIS — M54.50 CHRONIC RIGHT-SIDED LOW BACK PAIN WITHOUT SCIATICA: Primary | ICD-10-CM

## 2019-01-29 DIAGNOSIS — E78.5 HYPERLIPIDEMIA, UNSPECIFIED HYPERLIPIDEMIA TYPE: ICD-10-CM

## 2019-01-29 DIAGNOSIS — E11.9 CONTROLLED TYPE 2 DIABETES MELLITUS WITHOUT COMPLICATION, WITHOUT LONG-TERM CURRENT USE OF INSULIN (HCC): ICD-10-CM

## 2019-01-29 PROCEDURE — 99214 OFFICE O/P EST MOD 30 MIN: CPT | Performed by: INTERNAL MEDICINE

## 2019-01-29 RX ORDER — MELOXICAM 7.5 MG/1
7.5 TABLET ORAL DAILY
Qty: 30 TABLET | Refills: 2 | Status: SHIPPED | OUTPATIENT
Start: 2019-01-29 | End: 2019-07-17

## 2019-01-29 RX ORDER — MELOXICAM 7.5 MG/1
7.5 TABLET ORAL DAILY
Qty: 30 TABLET | Refills: 5 | Status: SHIPPED | OUTPATIENT
Start: 2019-01-29 | End: 2019-01-29 | Stop reason: SDUPTHER

## 2019-01-29 NOTE — PROGRESS NOTES
Subjective   Shu Mckeon is a 78 y.o. female here to follow up on labs.  Pt c/o back pain.    History of Present Illness   She is cont to have back pain  She is seeing pain management and last injection were around thanksgiving and she had no relief  Pt has been taking BP meds as prescribed without any problems.  No HA  No episodes of orthostasis  Pt has been taking cholesterol meds as prescribed.  No difficulties with myalgias.   She has been to diabetes educator for her DM    The following portions of the patient's history were reviewed and updated as appropriate: allergies, current medications, past medical history, past social history and problem list.  She has been eating poorly    Review of Systems   Musculoskeletal: Positive for arthralgias and back pain.   All other systems reviewed and are negative.      Objective   Physical Exam   Constitutional: She is oriented to person, place, and time. She appears well-developed and well-nourished.   HENT:   Head: Normocephalic and atraumatic.   Right Ear: External ear normal.   Left Ear: External ear normal.   Mouth/Throat: Oropharynx is clear and moist.   Eyes: Conjunctivae and EOM are normal. Pupils are equal, round, and reactive to light.   Neck: Normal range of motion. No tracheal deviation present. No thyromegaly present.   Cardiovascular: Normal rate, regular rhythm, normal heart sounds and intact distal pulses.   Pulmonary/Chest: Effort normal and breath sounds normal.   Abdominal: Soft. Bowel sounds are normal. She exhibits no distension. There is no tenderness.   Musculoskeletal: Normal range of motion. She exhibits no edema or deformity.   Neurological: She is alert and oriented to person, place, and time.   Skin: Skin is warm and dry.   Psychiatric: She has a normal mood and affect. Her behavior is normal. Judgment and thought content normal.   Vitals reviewed.      Vitals:    01/29/19 1113   BP: 130/76   Pulse: 79   Temp: 97.8 °F (36.6 °C)   SpO2: 97%      Orders Only on 01/21/2019   Component Date Value Ref Range Status   • Glucose 01/22/2019 118* 65 - 99 mg/dL Final   • BUN 01/22/2019 7* 8 - 23 mg/dL Final   • Creatinine 01/22/2019 0.63  0.57 - 1.00 mg/dL Final   • eGFR Non African Am 01/22/2019 91  >60 mL/min/1.73 Final    Comment: The MDRD GFR formula is only valid for adults with stable  renal function between ages 18 and 70.     • eGFR  Am 01/22/2019 110  >60 mL/min/1.73 Final   • BUN/Creatinine Ratio 01/22/2019 11.1  7.0 - 25.0 Final   • Sodium 01/22/2019 139  136 - 145 mmol/L Final   • Potassium 01/22/2019 4.6  3.5 - 5.2 mmol/L Final   • Chloride 01/22/2019 101  98 - 107 mmol/L Final   • Total CO2 01/22/2019 27.1  22.0 - 29.0 mmol/L Final   • Calcium 01/22/2019 10.4  8.6 - 10.5 mg/dL Final   • Total Protein 01/22/2019 7.3  6.0 - 8.5 g/dL Final   • Albumin 01/22/2019 4.50  3.50 - 5.20 g/dL Final   • Globulin 01/22/2019 2.8  gm/dL Final   • A/G Ratio 01/22/2019 1.6  g/dL Final   • Total Bilirubin 01/22/2019 0.5  0.1 - 1.2 mg/dL Final   • Alkaline Phosphatase 01/22/2019 75  39 - 117 U/L Final   • AST (SGOT) 01/22/2019 14  1 - 32 U/L Final   • ALT (SGPT) 01/22/2019 11  1 - 33 U/L Final   • Total Cholesterol 01/22/2019 146  0 - 200 mg/dL Final   • Triglycerides 01/22/2019 84  0 - 150 mg/dL Final   • HDL Cholesterol 01/22/2019 56  40 - 60 mg/dL Final   • VLDL Cholesterol 01/22/2019 16.8  5 - 40 mg/dL Final   • LDL Cholesterol  01/22/2019 73  0 - 100 mg/dL Final   • LDL/HDL Ratio 01/22/2019 1.31   Final   • TSH 01/22/2019 1.99  0.27 - 4.2 mIU/mL Final   • Hemoglobin A1C 01/22/2019 6.83* 4.80 - 5.60 % Final    Comment: Hemoglobin A1C Ranges:  Increased Risk for Diabetes  5.7% to 6.4%  Diabetes                     >= 6.5%  Diabetic Goal                < 7.0%       Current Outpatient Medications:   •  atorvastatin (LIPITOR) 40 MG tablet, TAKE 1 TABLET BY MOUTH DAILY., Disp: 90 tablet, Rfl: 3  •  BIOTIN PO, Take 1 tablet by mouth daily., Disp: , Rfl:   •   Cholecalciferol (VITAMIN D3) 5000 UNITS tablet, Take 1 tablet by mouth daily., Disp: , Rfl:   •  losartan (COZAAR) 50 MG tablet, TAKE 1 TABLET BY MOUTH DAILY., Disp: 90 tablet, Rfl: 3  •  Multiple Vitamin (MULTIVITAMIN) capsule, Take 1 capsule by mouth daily., Disp: , Rfl:   •  sertraline (ZOLOFT) 50 MG tablet, Take 50 mg by mouth Daily., Disp: , Rfl:   •  meloxicam (MOBIC) 7.5 MG tablet, Take 1 tablet by mouth Daily., Disp: 30 tablet, Rfl: 2  •  TURMERIC PO, Take 1 tablet by mouth Daily. PT TO HOLD FOR SURGERY, Disp: , Rfl:          Assessment/Plan   Diagnoses and all orders for this visit:    Chronic right-sided low back pain without sciatica    Essential hypertension    Hyperlipidemia, unspecified hyperlipidemia type    Controlled type 2 diabetes mellitus without complication, without long-term current use of insulin (CMS/[facility])    Other orders  -     Discontinue: meloxicam (MOBIC) 7.5 MG tablet; Take 1 tablet by mouth Daily.  -     meloxicam (MOBIC) 7.5 MG tablet; Take 1 tablet by mouth Daily.        1.  LBP-  No help with injections  We will try mobic  2.  HPL- ok with atorvastatin  3.  elevated glucose  She is working on diet  May do [employer] of weight watchers  4. HTN- ok with current meds

## 2019-02-19 ENCOUNTER — CLINICAL SUPPORT (OUTPATIENT)
Dept: INTERNAL MEDICINE | Facility: CLINIC | Age: 79
End: 2019-02-19

## 2019-02-19 DIAGNOSIS — M81.6 LOCALIZED OSTEOPOROSIS, UNSPECIFIED PATHOLOGICAL FRACTURE PRESENCE: Primary | ICD-10-CM

## 2019-02-19 PROCEDURE — 96372 THER/PROPH/DIAG INJ SC/IM: CPT | Performed by: INTERNAL MEDICINE

## 2019-02-25 ENCOUNTER — TELEPHONE (OUTPATIENT)
Dept: INTERNAL MEDICINE | Facility: CLINIC | Age: 79
End: 2019-02-25

## 2019-02-25 RX ORDER — LOSARTAN POTASSIUM 50 MG/1
50 TABLET ORAL DAILY
Qty: 90 TABLET | Refills: 3 | Status: SHIPPED | OUTPATIENT
Start: 2019-02-25 | End: 2020-02-12

## 2019-02-25 RX ORDER — ATORVASTATIN CALCIUM 40 MG/1
40 TABLET, FILM COATED ORAL DAILY
Qty: 90 TABLET | Refills: 3 | Status: SHIPPED | OUTPATIENT
Start: 2019-02-25 | End: 2020-02-12

## 2019-02-25 NOTE — TELEPHONE ENCOUNTER
RX SENT TO PHARMACY      ----- Message from Betty Culver sent at 2/25/2019 10:46 AM EST -----  Regarding: MED REFILL  Patient needs refills for Atorvastatin, Losartan and Sertraline, 90 day supply.  They should be sent to [facility]. thanks

## 2019-03-13 ENCOUNTER — TELEPHONE (OUTPATIENT)
Dept: SURGERY | Facility: CLINIC | Age: 79
End: 2019-03-13

## 2019-03-13 NOTE — TELEPHONE ENCOUNTER
Scheduled Bilateral 3D Screen Mammo ([facility] 7-19-19 @ 1:00  ([facility])        Return to see Dr LEROY 7-30-19 arrive 11:50    LM on patient's phone regarding appointments  erika

## 2019-05-01 ENCOUNTER — RESULTS ENCOUNTER (OUTPATIENT)
Dept: INTERNAL MEDICINE | Facility: CLINIC | Age: 79
End: 2019-05-01

## 2019-05-01 DIAGNOSIS — E11.9 CONTROLLED TYPE 2 DIABETES MELLITUS WITHOUT COMPLICATION, WITHOUT LONG-TERM CURRENT USE OF INSULIN (HCC): ICD-10-CM

## 2019-05-16 LAB
BUN SERPL-MCNC: 12 MG/DL (ref 8–23)
BUN/CREAT SERPL: 16.9 (ref 7–25)
CALCIUM SERPL-MCNC: 10.6 MG/DL (ref 8.6–10.5)
CHLORIDE SERPL-SCNC: 100 MMOL/L (ref 98–107)
CO2 SERPL-SCNC: 28 MMOL/L (ref 22–29)
CREAT SERPL-MCNC: 0.71 MG/DL (ref 0.57–1)
GLUCOSE SERPL-MCNC: 136 MG/DL (ref 65–99)
HBA1C MFR BLD: 6.7 % (ref 4.8–5.6)
POTASSIUM SERPL-SCNC: 4.7 MMOL/L (ref 3.5–5.2)
SODIUM SERPL-SCNC: 141 MMOL/L (ref 136–145)

## 2019-05-28 ENCOUNTER — OFFICE VISIT (OUTPATIENT)
Dept: INTERNAL MEDICINE | Facility: CLINIC | Age: 79
End: 2019-05-28

## 2019-05-28 VITALS
HEIGHT: 64 IN | WEIGHT: 182.4 LBS | TEMPERATURE: 98.8 F | OXYGEN SATURATION: 96 % | HEART RATE: 77 BPM | SYSTOLIC BLOOD PRESSURE: 130 MMHG | BODY MASS INDEX: 31.14 KG/M2 | DIASTOLIC BLOOD PRESSURE: 72 MMHG

## 2019-05-28 DIAGNOSIS — I10 ESSENTIAL HYPERTENSION: Primary | ICD-10-CM

## 2019-05-28 DIAGNOSIS — E78.5 HYPERLIPIDEMIA, UNSPECIFIED HYPERLIPIDEMIA TYPE: ICD-10-CM

## 2019-05-28 DIAGNOSIS — M54.50 CHRONIC RIGHT-SIDED LOW BACK PAIN WITHOUT SCIATICA: ICD-10-CM

## 2019-05-28 DIAGNOSIS — M54.50 LOW BACK PAIN WITHOUT SCIATICA, UNSPECIFIED BACK PAIN LATERALITY, UNSPECIFIED CHRONICITY: ICD-10-CM

## 2019-05-28 DIAGNOSIS — E11.9 CONTROLLED TYPE 2 DIABETES MELLITUS WITHOUT COMPLICATION, WITHOUT LONG-TERM CURRENT USE OF INSULIN (HCC): ICD-10-CM

## 2019-05-28 DIAGNOSIS — R82.998 OTHER ABNORMAL FINDINGS IN URINE: ICD-10-CM

## 2019-05-28 DIAGNOSIS — G89.29 CHRONIC RIGHT-SIDED LOW BACK PAIN WITHOUT SCIATICA: ICD-10-CM

## 2019-05-28 DIAGNOSIS — E55.9 VITAMIN D DEFICIENCY: ICD-10-CM

## 2019-05-28 LAB
BILIRUB BLD-MCNC: NEGATIVE MG/DL
CLARITY, POC: CLEAR
COLOR UR: YELLOW
GLUCOSE UR STRIP-MCNC: NEGATIVE MG/DL
KETONES UR QL: NEGATIVE
LEUKOCYTE EST, POC: ABNORMAL
NITRITE UR-MCNC: NEGATIVE MG/ML
PH UR: 7 [PH] (ref 5–8)
PROT UR STRIP-MCNC: NEGATIVE MG/DL
RBC # UR STRIP: ABNORMAL /UL
SP GR UR: 1.01 (ref 1–1.03)
UROBILINOGEN UR QL: NORMAL

## 2019-05-28 PROCEDURE — G0439 PPPS, SUBSEQ VISIT: HCPCS | Performed by: INTERNAL MEDICINE

## 2019-05-28 PROCEDURE — 99213 OFFICE O/P EST LOW 20 MIN: CPT | Performed by: INTERNAL MEDICINE

## 2019-05-28 PROCEDURE — 81003 URINALYSIS AUTO W/O SCOPE: CPT | Performed by: INTERNAL MEDICINE

## 2019-05-28 NOTE — PROGRESS NOTES
QUICK REFERENCE INFORMATION:  The ABCs of the Annual Wellness Visit    Subsequent Medicare Wellness Visit     HEALTH RISK ASSESSMENT    : 1940    Recent Hospitalizations:  No hospitalization(s) within the last year..  ccc      Current Medical Providers:  Patient Care Team:  Mariann Fallon MD as PCP - General  Mariann Fallon MD as PCP - Family Medicine  Nicolas Latif MD as Consulting Physician (Cardiology)        Smoking Status:  Social History     Tobacco Use   Smoking Status Never Smoker   Smokeless Tobacco Never Used       Alcohol Consumption:  Social History     Substance and Sexual Activity   Alcohol Use Yes   • Alcohol/week: 4.2 oz   • Types: 7 Glasses of wine per week    Comment: 1/day-socially       Depression Screen:   PHQ-2/PHQ-9 Depression Screening 2019   Little interest or pleasure in doing things 0   Feeling down, depressed, or hopeless 0   Trouble falling or staying asleep, or sleeping too much 2   Feeling tired or having little energy 3   Poor appetite or overeating 0   Feeling bad about yourself - or that you are a failure or have let yourself or your family down 0   Trouble concentrating on things, such as reading the newspaper or watching television 0   Moving or speaking so slowly that other people could have noticed. Or the opposite - being so fidgety or restless that you have been moving around a lot more than usual 0   Thoughts that you would be better off dead, or of hurting yourself in some way 0   Total Score 5   If you checked off any problems, how difficult have these problems made it for you to do your work, take care of things at home, or get along with other people? Not difficult at all       Health Habits and Functional and Cognitive Screening:  Functional & Cognitive Status 2019   Do you have difficulty preparing food and eating? No   Do you have difficulty bathing yourself, getting dressed or grooming yourself? No   Do you have difficulty using the toilet? No    Do you have difficulty moving around from place to place? No   Do you have trouble with steps or getting out of a bed or a chair? No   In the past year have you fallen or experienced a near fall? No   Current Diet Unhealthy Diet   Dental Exam Up to date   Eye Exam Up to date   Exercise (times per week) 0 times per week   Current Exercise Activities Include -   Do you need help using the phone?  No   Are you deaf or do you have serious difficulty hearing?  No   Do you need help with transportation? No   Do you need help shopping? No   Do you need help preparing meals?  No   Do you need help with housework?  No   Do you need help with laundry? No   Do you need help taking your medications? No   Do you need help managing money? No   Do you ever drive or ride in a car without wearing a seat belt? No   Have you felt unusual stress, anger or loneliness in the last month? No   Who do you live with? Spouse   If you need help, do you have trouble finding someone available to you? No   Have you been bothered in the last four weeks by sexual problems? No   Do you have difficulty concentrating, remembering or making decisions? No           Does the patient have evidence of cognitive impairment? No    Asiprin use counseling: Does not need ASA (and currently is not on it)      Recent Lab Results:    Lab Results   Component Value Date     (H) 05/16/2019     Lab Results   Component Value Date    HGBA1C 6.70 (H) 05/16/2019     Lab Results   Component Value Date    TRIG 84 01/22/2019    HDL 56 01/22/2019    VLDL 16.8 01/22/2019    LDLHDL 1.31 01/22/2019           Age-appropriate Screening Schedule:  Refer to the list below for future screening recommendations based on patient's age, sex and/or medical conditions. Orders for these recommended tests are listed in the plan section. The patient has been provided with a written plan.    Health Maintenance   Topic Date Due   • TDAP/TD VACCINES (1 - Tdap) 08/10/1959   • ZOSTER  VACCINE (2 of 2) 11/14/2017   • INFLUENZA VACCINE  08/01/2019   • HEMOGLOBIN A1C  11/16/2019   • LIPID PANEL  01/22/2020   • DXA SCAN  04/06/2020   • MAMMOGRAM  08/03/2020   • PNEUMOCOCCAL VACCINES (65+ LOW/MEDIUM RISK)  Completed   • URINE MICROALBUMIN  Discontinued        Subjective   History of Present Illness    Shu Mckeon is a 78 y.o. female who presents for an Annual Wellness Visit.    The following portions of the patient's history were reviewed and updated as appropriate: allergies, past family history, past medical history, past surgical history and problem list.    Outpatient Medications Prior to Visit   Medication Sig Dispense Refill   • atorvastatin (LIPITOR) 40 MG tablet Take 1 tablet by mouth Daily. 90 tablet 3   • BIOTIN PO Take 1 tablet by mouth daily.     • Cholecalciferol (VITAMIN D3) 5000 UNITS tablet Take 1 tablet by mouth daily.     • losartan (COZAAR) 50 MG tablet Take 1 tablet by mouth Daily. 90 tablet 3   • Multiple Vitamin (MULTIVITAMIN) capsule Take 1 capsule by mouth daily.     • Omega-3 Fatty Acids (OMEGA-3 PO) Take  by mouth.     • sertraline (ZOLOFT) 50 MG tablet Take 1 tablet by mouth Daily. 90 tablet 3   • TURMERIC PO Take 1 tablet by mouth Daily. PT TO HOLD FOR SURGERY     • meloxicam (MOBIC) 7.5 MG tablet Take 1 tablet by mouth Daily. 30 tablet 2     No facility-administered medications prior to visit.        Patient Active Problem List   Diagnosis   • Ankle fracture   • Diabetes type 2, controlled (CMS/[facility])   • Fatigue   • HLD (hyperlipidemia)   • BP (high blood pressure)   • OP (osteoporosis)   • Closed fracture of right pelvis (CMS/[facility])   • Low back pain   • Lumbar spondylolysis   • Radial scar of breast       Advance Care Planning:  Patient has an advance directive - a copy has not been provided. Have asked the patient to send this to us to add to record    Identification of Risk Factors:  Risk factors include: unhealthy diet, cardiovascular risk and increased fall  "risk.    Review of Systems    Compared to one year ago, the patient feels her physical health is the same.  Compared to one year ago, the patient feels her mental health is the same.    Objective     Physical Exam    Vitals:    05/28/19 1321   BP: 130/72   BP Location: Left arm   Patient Position: Sitting   Cuff Size: Adult   Pulse: 77   Temp: 98.8 °F (37.1 °C)   TempSrc: Oral   SpO2: 96%   Weight: 82.7 kg (182 lb 6.4 oz)   Height: 162.6 cm (64\")   PainSc:   3       Patient's Body mass index is 31.31 kg/m². BMI is above normal parameters. Recommendations include: exercise counseling and nutrition counseling.      Assessment/Plan   Patient Self-Management and Personalized Health Advice  The patient has been provided with information about: diet, exercise, weight management, the relationship between weight and GERD and fall prevention and preventive services including:   · Exercise counseling provided, Nutrition counseling provided.    Visit Diagnoses:    ICD-10-CM ICD-9-CM   1. Essential hypertension I10 401.9   2. Hyperlipidemia, unspecified hyperlipidemia type E78.5 272.4   3. Controlled type 2 diabetes mellitus without complication, without long-term current use of insulin (CMS/[facility]) E11.9 250.00   4. Chronic right-sided low back pain without sciatica M54.5 724.2    G89.29 338.29       No orders of the defined types were placed in this encounter.      Outpatient Encounter Medications as of 5/28/2019   Medication Sig Dispense Refill   • atorvastatin (LIPITOR) 40 MG tablet Take 1 tablet by mouth Daily. 90 tablet 3   • BIOTIN PO Take 1 tablet by mouth daily.     • Cholecalciferol (VITAMIN D3) 5000 UNITS tablet Take 1 tablet by mouth daily.     • losartan (COZAAR) 50 MG tablet Take 1 tablet by mouth Daily. 90 tablet 3   • Multiple Vitamin (MULTIVITAMIN) capsule Take 1 capsule by mouth daily.     • Omega-3 Fatty Acids (OMEGA-3 PO) Take  by mouth.     • sertraline (ZOLOFT) 50 MG tablet Take 1 tablet by mouth Daily. 90 " tablet 3   • [DISCONTINUED] TURMERIC PO Take 1 tablet by mouth Daily. PT TO HOLD FOR SURGERY     • meloxicam (MOBIC) 7.5 MG tablet Take 1 tablet by mouth Daily. 30 tablet 2     No facility-administered encounter medications on file as of 5/28/2019.        Reviewed use of high risk medication in the elderly: yes  Reviewed for potential of harmful drug interactions in the elderly: yes    Follow Up:  No Follow-up on file.     An After Visit Summary and PPPS with all of these plans were given to the patient.    She has been trying to limit carb intake and she stays active

## 2019-05-28 NOTE — PROGRESS NOTES
Subjective   Shu Mckeon is a 78 y.o. female here to follow up on HTN, HPL, LBP with labs.  Pt states her energy level is very low.    History of Present Illness   She has been having some increased urinary freq.  No blood no bruning no fevers or chill  Pt has been taking BP meds as prescribed without any problems.  No HA  No episodes of orthostasis  Pt has been taking cholesterol meds as prescribed.  No difficulties with myalgias.   Ok with the zoloft  She does cont ot have chronic lbp  Tylenol does not help at all    The following portions of the patient's history were reviewed and updated as appropriate: allergies, current medications, past medical history, past social history and problem list.  Limited PA due to LBP      Review of Systems   All other systems reviewed and are negative.      Objective   Physical Exam   Constitutional: She is oriented to person, place, and time. She appears well-developed and well-nourished.   HENT:   Head: Normocephalic and atraumatic.   Right Ear: External ear normal.   Left Ear: External ear normal.   Mouth/Throat: Oropharynx is clear and moist.   Eyes: Conjunctivae and EOM are normal. Pupils are equal, round, and reactive to light.   Neck: Normal range of motion. No tracheal deviation present. No thyromegaly present.   Cardiovascular: Normal rate, regular rhythm, normal heart sounds and intact distal pulses.   Pulmonary/Chest: Effort normal and breath sounds normal.   Abdominal: Soft. Bowel sounds are normal. She exhibits no distension. There is no tenderness.   Musculoskeletal: Normal range of motion. She exhibits no edema or deformity.   Neurological: She is alert and oriented to person, place, and time.   Skin: Skin is warm and dry.   Psychiatric: She has a normal mood and affect. Her behavior is normal. Judgment and thought content normal.   Vitals reviewed.      Vitals:    05/28/19 1321   BP: 130/72   Pulse: 77   Temp: 98.8 °F (37.1 °C)   SpO2: 96%     Results Encounter  on 05/01/2019   Component Date Value Ref Range Status   • Glucose 05/16/2019 136* 65 - 99 mg/dL Final   • BUN 05/16/2019 12  8 - 23 mg/dL Final   • Creatinine 05/16/2019 0.71  0.57 - 1.00 mg/dL Final   • eGFR Non African Am 05/16/2019 79  >60 mL/min/1.73 Final    Comment: The MDRD GFR formula is only valid for adults with stable  renal function between ages 18 and 70.     • eGFR  Am 05/16/2019 96  >60 mL/min/1.73 Final   • BUN/Creatinine Ratio 05/16/2019 16.9  7.0 - 25.0 Final   • Sodium 05/16/2019 141  136 - 145 mmol/L Final   • Potassium 05/16/2019 4.7  3.5 - 5.2 mmol/L Final   • Chloride 05/16/2019 100  98 - 107 mmol/L Final   • Total CO2 05/16/2019 28.0  22.0 - 29.0 mmol/L Final   • Calcium 05/16/2019 10.6* 8.6 - 10.5 mg/dL Final   • Hemoglobin A1C 05/16/2019 6.70* 4.80 - 5.60 % Final    Comment: Hemoglobin A1C Ranges:  Increased Risk for Diabetes  5.7% to 6.4%  Diabetes                     >= 6.5%  Diabetic Goal                < 7.0%       Current Outpatient Medications:   •  atorvastatin (LIPITOR) 40 MG tablet, Take 1 tablet by mouth Daily., Disp: 90 tablet, Rfl: 3  •  BIOTIN PO, Take 1 tablet by mouth daily., Disp: , Rfl:   •  Cholecalciferol (VITAMIN D3) 5000 UNITS tablet, Take 1 tablet by mouth daily., Disp: , Rfl:   •  losartan (COZAAR) 50 MG tablet, Take 1 tablet by mouth Daily., Disp: 90 tablet, Rfl: 3  •  Multiple Vitamin (MULTIVITAMIN) capsule, Take 1 capsule by mouth daily., Disp: , Rfl:   •  Omega-3 Fatty Acids (OMEGA-3 PO), Take  by mouth., Disp: , Rfl:   •  sertraline (ZOLOFT) 50 MG tablet, Take 1 tablet by mouth Daily., Disp: 90 tablet, Rfl: 3  •  meloxicam (MOBIC) 7.5 MG tablet, Take 1 tablet by mouth Daily., Disp: 30 tablet, Rfl: 2         Assessment/Plan   Diagnoses and all orders for this visit:    Essential hypertension    Hyperlipidemia, unspecified hyperlipidemia type    Controlled type 2 diabetes mellitus without complication, without long-term current use of insulin  (CMS/HCC)    Chronic right-sided low back pain without sciatica    1.  HTN- ok with current meds  2. DM- ok with det  3. OAB- she does have elevated wbc in her urine  We will check a cx  4.  LBP- chronic-  Cont tylenol with occas aleve  She understands not to take the aleve all the time but she can on occas  5.  HPL- ok with atorvastain

## 2019-05-31 LAB
BACTERIA UR CULT: ABNORMAL
BACTERIA UR CULT: ABNORMAL
OTHER ANTIBIOTIC SUSC ISLT: ABNORMAL

## 2019-05-31 RX ORDER — CIPROFLOXACIN 250 MG/1
250 TABLET, FILM COATED ORAL 2 TIMES DAILY
Qty: 10 TABLET | Refills: 0 | Status: SHIPPED | OUTPATIENT
Start: 2019-05-31 | End: 2019-07-17

## 2019-07-08 ENCOUNTER — TELEPHONE (OUTPATIENT)
Dept: INTERNAL MEDICINE | Facility: CLINIC | Age: 79
End: 2019-07-08

## 2019-07-08 RX ORDER — SULFAMETHOXAZOLE AND TRIMETHOPRIM 800; 160 MG/1; MG/1
1 TABLET ORAL 2 TIMES DAILY
Qty: 10 TABLET | Refills: 0 | Status: SHIPPED | OUTPATIENT
Start: 2019-07-08 | End: 2019-11-12

## 2019-07-08 NOTE — TELEPHONE ENCOUNTER
PER VO FROM DR GIL WE SENT IN BACTRIM DS 1 PO BID X 5 DAYS. PT AWARE. RX SENT TO PHARMACY    ----- Message from Mariann Gil MD sent at 7/8/2019  1:44 PM EDT -----  Regarding: RE: MED REQUEST  Contact: [phone]  What did she get last time?  ----- Message -----  From: Araceli Bennett MA  Sent: 7/8/2019  10:36 AM  To: Mariann Gil MD  Subject: FW: MED REQUEST                                  PLEASE ADVISE  ----- Message -----  From: Betty Culver  Sent: 7/8/2019  10:04 AM  To: Araceli Bennett MA  Subject: MED REQUEST                                      Patient is in [place] and her UTI is back. She would like to see if Dr Gil would call in the antibiotic she gave her last time to the [facility] in [place].     [facility]  [address]   [place], [place]   [phone]    Any questions call her on her cell, [phone]. Thanks

## 2019-07-17 ENCOUNTER — OFFICE VISIT (OUTPATIENT)
Dept: INTERNAL MEDICINE | Facility: CLINIC | Age: 79
End: 2019-07-17

## 2019-07-17 VITALS
WEIGHT: 180.3 LBS | BODY MASS INDEX: 30.78 KG/M2 | TEMPERATURE: 98 F | DIASTOLIC BLOOD PRESSURE: 70 MMHG | SYSTOLIC BLOOD PRESSURE: 138 MMHG | OXYGEN SATURATION: 97 % | HEIGHT: 64 IN | HEART RATE: 75 BPM

## 2019-07-17 DIAGNOSIS — R31.1 BENIGN ESSENTIAL MICROSCOPIC HEMATURIA: ICD-10-CM

## 2019-07-17 DIAGNOSIS — M54.50 CHRONIC RIGHT-SIDED LOW BACK PAIN WITHOUT SCIATICA: Primary | ICD-10-CM

## 2019-07-17 DIAGNOSIS — G89.29 CHRONIC RIGHT-SIDED LOW BACK PAIN WITHOUT SCIATICA: Primary | ICD-10-CM

## 2019-07-17 LAB
BILIRUB BLD-MCNC: NEGATIVE MG/DL
CLARITY, POC: CLEAR
COLOR UR: YELLOW
GLUCOSE UR STRIP-MCNC: NEGATIVE MG/DL
KETONES UR QL: NEGATIVE
LEUKOCYTE EST, POC: NEGATIVE
NITRITE UR-MCNC: NEGATIVE MG/ML
PH UR: 7 [PH] (ref 5–8)
PROT UR STRIP-MCNC: NEGATIVE MG/DL
RBC # UR STRIP: ABNORMAL /UL
SP GR UR: 1.01 (ref 1–1.03)
UROBILINOGEN UR QL: NORMAL

## 2019-07-17 PROCEDURE — 81003 URINALYSIS AUTO W/O SCOPE: CPT | Performed by: INTERNAL MEDICINE

## 2019-07-17 PROCEDURE — 99214 OFFICE O/P EST MOD 30 MIN: CPT | Performed by: INTERNAL MEDICINE

## 2019-07-17 RX ORDER — METHOCARBAMOL 500 MG/1
TABLET, FILM COATED ORAL
Refills: 0 | COMMUNITY
Start: 2019-07-13 | End: 2019-12-10

## 2019-07-17 RX ORDER — LIDOCAINE 50 MG/G
1 PATCH TOPICAL EVERY 24 HOURS
Qty: 30 PATCH | Refills: 1 | Status: SHIPPED | OUTPATIENT
Start: 2019-07-17 | End: 2019-12-10

## 2019-07-17 RX ORDER — METHYLPREDNISOLONE 4 MG/1
TABLET ORAL
Refills: 0 | COMMUNITY
Start: 2019-07-13 | End: 2019-11-12

## 2019-07-17 RX ORDER — ONDANSETRON 4 MG/1
TABLET, ORALLY DISINTEGRATING ORAL
Refills: 0 | COMMUNITY
Start: 2019-07-11 | End: 2019-12-10

## 2019-07-17 RX ORDER — HYDROCODONE BITARTRATE AND ACETAMINOPHEN 5; 325 MG/1; MG/1
1 TABLET ORAL EVERY 6 HOURS PRN
Qty: 20 TABLET | Refills: 0 | Status: SHIPPED | OUTPATIENT
Start: 2019-07-17 | End: 2019-12-10

## 2019-07-17 NOTE — PROGRESS NOTES
Subjective   Shu Mckeon is a 78 y.o. female c/o rt LBP that radiated towards front and recurrent uti.    History of Present Illness   Pt treated for a UTI last may  She did well until last Monday she started having right flank pain.  No dysuria no blood in her urine.  She says the pain comes and goes    She went to the [facility] last week and was treated with antibiotic and she not better   She was given steroids which have not helped.  Urine cx was neg  She did have chills and nausea and that is now better but the back pain still remains    The following portions of the patient's history were reviewed and updated as appropriate: allergies, current medications, past medical history, past social history and problem list.  Patient denies any change in physical activity.  She has not really had problems of recurrent urinary tract infections in the past but she did have an issue with flank pain like this a couple of years ago and it took several weeks for to go away.  She received injections at that time that were not helpful    Review of Systems   All other systems reviewed and are negative.      Objective   Physical Exam   Constitutional: She is oriented to person, place, and time. She appears well-developed and well-nourished.   HENT:   Head: Normocephalic and atraumatic.   Right Ear: External ear normal.   Left Ear: External ear normal.   Mouth/Throat: Oropharynx is clear and moist.   Eyes: Conjunctivae and EOM are normal. Pupils are equal, round, and reactive to light.   Neck: Normal range of motion. No tracheal deviation present. No thyromegaly present.   Cardiovascular: Normal rate, regular rhythm, normal heart sounds and intact distal pulses.   Pulmonary/Chest: Effort normal and breath sounds normal.   Abdominal: Soft. Bowel sounds are normal. She exhibits no distension. There is no tenderness.   Musculoskeletal: Normal range of motion. She exhibits no edema or deformity.   Neurological: She is alert and oriented  to person, place, and time.   Skin: Skin is warm and dry.   Psychiatric: She has a normal mood and affect. Her behavior is normal. Judgment and thought content normal.   Vitals reviewed.      Vitals:    07/17/19 1258   BP: 138/70   Pulse: 75   Temp: 98 °F (36.7 °C)   SpO2: 97%     Current Outpatient Medications:   •  atorvastatin (LIPITOR) 40 MG tablet, Take 1 tablet by mouth Daily., Disp: 90 tablet, Rfl: 3  •  BIOTIN PO, Take 1 tablet by mouth daily., Disp: , Rfl:   •  Cholecalciferol (VITAMIN D3) 5000 UNITS tablet, Take 1 tablet by mouth daily., Disp: , Rfl:   •  losartan (COZAAR) 50 MG tablet, Take 1 tablet by mouth Daily., Disp: 90 tablet, Rfl: 3  •  methocarbamol (ROBAXIN) 500 MG tablet, TK 1 T PO QID FOR 7 DAYS, Disp: , Rfl: 0  •  methylPREDNISolone (MEDROL, LAURIE,) 4 MG tablet, FPD, Disp: , Rfl: 0  •  Multiple Vitamin (MULTIVITAMIN) capsule, Take 1 capsule by mouth daily., Disp: , Rfl:   •  Omega-3 Fatty Acids (OMEGA-3 PO), Take  by mouth., Disp: , Rfl:   •  sertraline (ZOLOFT) 50 MG tablet, Take 1 tablet by mouth Daily., Disp: 90 tablet, Rfl: 3  •  sulfamethoxazole-trimethoprim (BACTRIM DS) 800-160 MG per tablet, Take 1 tablet by mouth 2 (Two) Times a Day., Disp: 10 tablet, Rfl: 0  •  ondansetron ODT (ZOFRAN-ODT) 4 MG disintegrating tablet, DISSOLVE 1 TABLET ON THE TONGUE EVERY 6 TO 8 HOURS AS NEEDED FOR NAUSEA, Disp: , Rfl: 0         Assessment/Plan   Diagnoses and all orders for this visit:    Chronic right-sided low back pain without sciatica  -     CT Abdomen Pelvis Stone Protocol  -     CBC & Differential  -     Comprehensive Metabolic Panel    Benign essential microscopic hematuria  -     CT Abdomen Pelvis Stone Protocol  -     CBC & Differential  -     Comprehensive Metabolic Panel      1.  Right flank pain- severe  She had this once before and finally went away afte several weeks  ? Possible kidney stone  We will check CT if neg likely mechanical  Use hydrocodone sparingly and lidoderm patch

## 2019-07-18 LAB
ALBUMIN SERPL-MCNC: 4.3 G/DL (ref 3.5–5.2)
ALBUMIN/GLOB SERPL: 1.4 G/DL
ALP SERPL-CCNC: 110 U/L (ref 39–117)
ALT SERPL-CCNC: 58 U/L (ref 1–33)
AST SERPL-CCNC: 20 U/L (ref 1–32)
BASOPHILS # BLD AUTO: 0.06 10*3/MM3 (ref 0–0.2)
BASOPHILS NFR BLD AUTO: 0.5 % (ref 0–1.5)
BILIRUB SERPL-MCNC: 0.6 MG/DL (ref 0.2–1.2)
BUN SERPL-MCNC: 15 MG/DL (ref 8–23)
BUN/CREAT SERPL: 19.5 (ref 7–25)
CALCIUM SERPL-MCNC: 10.1 MG/DL (ref 8.6–10.5)
CHLORIDE SERPL-SCNC: 95 MMOL/L (ref 98–107)
CO2 SERPL-SCNC: 28.7 MMOL/L (ref 22–29)
CREAT SERPL-MCNC: 0.77 MG/DL (ref 0.57–1)
EOSINOPHIL # BLD AUTO: 0.13 10*3/MM3 (ref 0–0.4)
EOSINOPHIL NFR BLD AUTO: 1.1 % (ref 0.3–6.2)
ERYTHROCYTE [DISTWIDTH] IN BLOOD BY AUTOMATED COUNT: 13.2 % (ref 12.3–15.4)
GLOBULIN SER CALC-MCNC: 3.1 GM/DL
GLUCOSE SERPL-MCNC: 116 MG/DL (ref 65–99)
HCT VFR BLD AUTO: 44 % (ref 34–46.6)
HGB BLD-MCNC: 13.9 G/DL (ref 12–15.9)
IMM GRANULOCYTES # BLD AUTO: 0.05 10*3/MM3 (ref 0–0.05)
IMM GRANULOCYTES NFR BLD AUTO: 0.4 % (ref 0–0.5)
LYMPHOCYTES # BLD AUTO: 3.07 10*3/MM3 (ref 0.7–3.1)
LYMPHOCYTES NFR BLD AUTO: 25.9 % (ref 19.6–45.3)
MCH RBC QN AUTO: 28.9 PG (ref 26.6–33)
MCHC RBC AUTO-ENTMCNC: 31.6 G/DL (ref 31.5–35.7)
MCV RBC AUTO: 91.5 FL (ref 79–97)
MONOCYTES # BLD AUTO: 0.75 10*3/MM3 (ref 0.1–0.9)
MONOCYTES NFR BLD AUTO: 6.3 % (ref 5–12)
NEUTROPHILS # BLD AUTO: 7.78 10*3/MM3 (ref 1.7–7)
NEUTROPHILS NFR BLD AUTO: 65.8 % (ref 42.7–76)
NRBC BLD AUTO-RTO: 0 /100 WBC (ref 0–0.2)
PLATELET # BLD AUTO: 348 10*3/MM3 (ref 140–450)
POTASSIUM SERPL-SCNC: 5.4 MMOL/L (ref 3.5–5.2)
PROT SERPL-MCNC: 7.4 G/DL (ref 6–8.5)
RBC # BLD AUTO: 4.81 10*6/MM3 (ref 3.77–5.28)
SODIUM SERPL-SCNC: 137 MMOL/L (ref 136–145)
WBC # BLD AUTO: 11.84 10*3/MM3 (ref 3.4–10.8)

## 2019-07-19 ENCOUNTER — HOSPITAL ENCOUNTER (OUTPATIENT)
Dept: MAMMOGRAPHY | Facility: HOSPITAL | Age: 79
Discharge: HOME | End: 2019-07-19
Admitting: SURGERY

## 2019-07-19 DIAGNOSIS — Z12.31 ENCOUNTER FOR SCREENING MAMMOGRAM FOR MALIGNANT NEOPLASM OF BREAST: ICD-10-CM

## 2019-07-19 DIAGNOSIS — N64.89 RADIAL SCAR OF BREAST: ICD-10-CM

## 2019-07-19 LAB
BACTERIA UR CULT: NORMAL
BACTERIA UR CULT: NORMAL

## 2019-07-19 PROCEDURE — 77067 SCR MAMMO BI INCL CAD: CPT

## 2019-07-19 PROCEDURE — 77063 BREAST TOMOSYNTHESIS BI: CPT

## 2019-07-22 ENCOUNTER — TELEPHONE (OUTPATIENT)
Dept: SURGERY | Facility: CLINIC | Age: 79
End: 2019-07-22

## 2019-07-22 NOTE — TELEPHONE ENCOUNTER
July 19, 2019 bilateral screening mammogram with [address] [place] [address]: Scattered fibroglandular densities.  No evidence for malignancy in either breast.  BI-RADS 1

## 2019-07-24 ENCOUNTER — HOSPITAL ENCOUNTER (OUTPATIENT)
Dept: CT IMAGING | Facility: HOSPITAL | Age: 79
Discharge: HOME | End: 2019-07-24
Admitting: INTERNAL MEDICINE

## 2019-07-24 PROCEDURE — 74176 CT ABD & PELVIS W/O CONTRAST: CPT

## 2019-07-26 DIAGNOSIS — K57.92 DIVERTICULITIS: Primary | ICD-10-CM

## 2019-07-26 RX ORDER — CIPROFLOXACIN 500 MG/1
500 TABLET, FILM COATED ORAL 2 TIMES DAILY
Qty: 20 TABLET | Refills: 0 | Status: SHIPPED | OUTPATIENT
Start: 2019-07-26 | End: 2019-11-12

## 2019-07-26 RX ORDER — METRONIDAZOLE 500 MG/1
500 TABLET ORAL 3 TIMES DAILY
Qty: 30 TABLET | Refills: 0 | Status: SHIPPED | OUTPATIENT
Start: 2019-07-26 | End: 2019-11-12

## 2019-07-30 ENCOUNTER — TELEPHONE (OUTPATIENT)
Dept: INTERNAL MEDICINE | Facility: CLINIC | Age: 79
End: 2019-07-30

## 2019-07-30 ENCOUNTER — OFFICE VISIT (OUTPATIENT)
Dept: SURGERY | Facility: CLINIC | Age: 79
End: 2019-07-30

## 2019-07-30 VITALS
WEIGHT: 180 LBS | BODY MASS INDEX: 30.73 KG/M2 | OXYGEN SATURATION: 95 % | HEART RATE: 73 BPM | DIASTOLIC BLOOD PRESSURE: 86 MMHG | HEIGHT: 64 IN | SYSTOLIC BLOOD PRESSURE: 126 MMHG

## 2019-07-30 DIAGNOSIS — N64.89 RADIAL SCAR OF BREAST: Primary | ICD-10-CM

## 2019-07-30 DIAGNOSIS — Z80.3 FH: BREAST CANCER: ICD-10-CM

## 2019-07-30 DIAGNOSIS — R92.1 BREAST CALCIFICATIONS ON MAMMOGRAM: ICD-10-CM

## 2019-07-30 PROCEDURE — 99212 OFFICE O/P EST SF 10 MIN: CPT | Performed by: SURGERY

## 2019-07-30 NOTE — PROGRESS NOTES
Chief Complaint: Shu Mckeon is a 78 y.o. female who was seen in consultation at the request of Mariann Fallon MD  for abnormal breast imaging and a followup visit    History of Present Illness:  Patient presents with abnormal breast imaging, LEFT breast. She noted no new masses, skin changes, nipple discharge, nipple changes prior to her most recent imaging.  Her most recent imaging includes the followin/14/15 [facility]  MAMMO SCREEN EVITA  FLUHR, SHU  Benign dermal calcifications. BIRADS category 2: benign.    18 [facility]  MAMMO SCREEN EVITA  FLUHR, SHU  Scattered fibroglandular densities anterior one third retroareolar left breast interval development of a cluster of calcifications. BIRADS category 0.    8/3/18  [facility]  MAMMO DIAG LEFT  FLUHR, SHU  Microcalcifications within the outer inferior aspect of the left breast. BIRADS category 4.      She has not had a breast biopsy in the past.  She has her uterus and ovaries, is postmenopausal, and takes nor hormones.  Her family history includes the following: She has one daughter, one sister, no maternal aunts, 2 paternal aunts.  She has a half sister on paternal he based who had breast cancer in her 70s and a paternal cousin who had breast cancer in her 50s.      18- left breast stereotactic biopsy: Upper outer quadrant, 5:00: Small radial sclerosing lesion with usual hyperplasia, separate foci of usual hyperplasia, columnar cell change without atypia: Focal stromal fibrosis, apically metaplasia, and fibroadenomatoid change.   Concordant per Dr Tariq.    She denies significant bruising at her biopsy site.  She denies any redness warmth or drainage from the mammotomy.    Pathology from  10-24-18 excision radial scar returned as radial scar and no atypia.     She denies any redness, warmth, drainage from her incision.      Interval History:    In the interim,  Shu Mckeon  has done well.  She has noted no changes in her breast exam. No new masses, skin changes,  "nipple changes, nipple discharge either breast.     Her most recent imaging includes the following:  July 19, 2019 bilateral screening mammogram with  [facility]: Scattered fibroglandular densities.  No evidence for malignancy in either breast.  BI-RADS 1      Review of Systems:  Review of Systems   Constitutional: Negative for unexpected weight change (4 lb wt loss).   Genitourinary: Positive for nocturia.    Musculoskeletal: Positive for back pain.   Neurological: Positive for light-headedness.   All other systems reviewed and are negative.       Past Medical and Surgical History:  Breast Biopsy History:  Patient has not had a breast biopsy in the past.  Breast Cancer HIstory:  Patient does not have a past medical history of breast cancer.  Breast Operations, and year:  NONE   Obstetric/Gynecologic History:  Age menstrual periods began: 11  Patient is postmenopausal, entered menopause naturally at age:    Number of pregnancies: 3  Number of live births: 3  Number of abortions or miscarriages: 0  Age of delivery of first child: 20  Patient did not breast feed.  Length of time taking birth control pills: 10-12 YRS   Patient took hormone replacement during the following dates: 10 YRS   PATIENT STILL HAS UTERUS AND OVARIES.     Past Surgical History:   Procedure Laterality Date   • BREAST BIOPSY Left 10/25/2018    Procedure: left breast ultrasound-guided excision of radial scar.;  Surgeon: Shayna Blount MD;  Location: [facility] OR [facility];  Service: General   • BREAST SURGERY Left    • CARDIAC CATHETERIZATION      not sure when or where   • CARDIAC CATHETERIZATION     • CATARACT EXTRACTION WITH INTRAOCULAR LENS IMPLANT     • COLONOSCOPY     • FEMUR FRACTURE SURGERY Right    • FRACTURE SURGERY  2015    LT ANKLE   • REPLACEMENT TOTAL KNEE Right      Past Medical History:   Diagnosis Date   • Arthritis    • Chronic low back pain    • Depression     HISTORY OF BEING \"EMOTIONAL\"   • Fatigue    • GERD " "(gastroesophageal reflux disease)    • Groin pain    • H/O cardiovascular stress test 07/2018    NORMAL, DR ARIAS   • History of diverticulitis    • Hyperlipidemia    • Hypertension    • Hypertension    • Impaired fasting glucose    • Left bundle branch block    • Osteoporosis    • Radial scar of breast     LEFT   • Status post ORIF of fracture of ankle    • Urinary frequency        Prior Hospitalizations, other than for surgery or childbirth, and year:  NONE     Social History     Socioeconomic History   • Marital status:      Spouse name: Not on file   • Number of children: Not on file   • Years of education: Not on file   • Highest education level: Not on file   Tobacco Use   • Smoking status: Never Smoker   • Smokeless tobacco: Never Used   Substance and Sexual Activity   • Alcohol use: Yes     Alcohol/week: 4.2 oz     Types: 7 Glasses of wine per week     Comment: 1/day-socially   • Drug use: No   • Sexual activity: Defer     Patient is .  Patient is retired.  Patient drinks 2 servings of caffeine per day.    Family History:  Family History   Problem Relation Age of Onset   • Heart disease Father    • Heart attack Father 81   • Heart attack Paternal Uncle    • Breast cancer Sister 70   • Breast cancer Cousin 50   • Malig Hyperthermia Neg Hx        Vital Signs:  /86 (BP Location: Left arm, Patient Position: Sitting, Cuff Size: Adult)   Pulse 73   Ht 162.6 cm (64\")   Wt 81.6 kg (180 lb)   LMP  (LMP Unknown)   SpO2 95%   Breastfeeding? No   BMI 30.90 kg/m²      Medications:    Current Outpatient Medications:   •  atorvastatin (LIPITOR) 40 MG tablet, Take 1 tablet by mouth Daily., Disp: 90 tablet, Rfl: 3  •  BIOTIN PO, Take 1 tablet by mouth daily., Disp: , Rfl:   •  Cholecalciferol (VITAMIN D3) 5000 UNITS tablet, Take 1 tablet by mouth daily., Disp: , Rfl:   •  ciprofloxacin (CIPRO) 500 MG tablet, Take 1 tablet by mouth 2 (Two) Times a Day., Disp: 20 tablet, Rfl: 0  •  losartan (COZAAR) " "50 MG tablet, Take 1 tablet by mouth Daily., Disp: 90 tablet, Rfl: 3  •  methocarbamol (ROBAXIN) 500 MG tablet, TK 1 T PO QID FOR 7 DAYS, Disp: , Rfl: 0  •  Multiple Vitamin (MULTIVITAMIN) capsule, Take 1 capsule by mouth daily., Disp: , Rfl:   •  Omega-3 Fatty Acids (OMEGA-3 PO), Take  by mouth., Disp: , Rfl:   •  ondansetron ODT (ZOFRAN-ODT) 4 MG disintegrating tablet, DISSOLVE 1 TABLET ON THE TONGUE EVERY 6 TO 8 HOURS AS NEEDED FOR NAUSEA, Disp: , Rfl: 0  •  sertraline (ZOLOFT) 50 MG tablet, Take 1 tablet by mouth Daily., Disp: 90 tablet, Rfl: 3  •  HYDROcodone-acetaminophen (NORCO) 5-325 MG per tablet, Take 1 tablet by mouth Every 6 (Six) Hours As Needed for Severe Pain ., Disp: 20 tablet, Rfl: 0  •  lidocaine (LIDODERM) 5 %, Place 1 patch on the skin as directed by provider Daily. Remove & Discard patch within 12 hours or as directed by MD, Disp: 30 patch, Rfl: 1  •  methylPREDNISolone (MEDROL, LAURIE,) 4 MG tablet, FPD, Disp: , Rfl: 0  •  metroNIDAZOLE (FLAGYL) 500 MG tablet, Take 1 tablet by mouth 3 (Three) Times a Day., Disp: 30 tablet, Rfl: 0  •  sulfamethoxazole-trimethoprim (BACTRIM DS) 800-160 MG per tablet, Take 1 tablet by mouth 2 (Two) Times a Day., Disp: 10 tablet, Rfl: 0     Allergies:  No Known Allergies    Physical Examination:  /86 (BP Location: Left arm, Patient Position: Sitting, Cuff Size: Adult)   Pulse 73   Ht 162.6 cm (64\")   Wt 81.6 kg (180 lb)   LMP  (LMP Unknown)   SpO2 95%   Breastfeeding? No   BMI 30.90 kg/m²   General Appearance:  Patient is in no distress.  She is well kept and has an obese build.   Psychiatric:  Patient with appropriate mood and affect. Alert and oriented to self, time, and place.    Breast, RIGHT:  large sized, ptotic, symmetric with the contralateral side.  Breast skin is without erythema, edema, rashes.  There are no visible abnormalities upon inspection during the arm-raising maneuver or with hands on hips in the sitting position. There is no nipple " retraction, discharge or nipple/areolar skin changes.There are no masses palpable in the sitting or supine positions.    Breast, LEFT:  large sized, ptotic, symmetric with the contralateral side.  Breast skin is without erythema, edema, rashes.  There are no visible abnormalities upon inspection during the arm-raising maneuver or with hands on hips in the sitting position. There is no nipple retraction, discharge or nipple/areolar skin changes.There are no masses palpable in the sitting or supine positions.  Well-healed radial biopsy   for radial scar. .    Lymphatic:  There is no axillary, cervical, infraclavicular, or supraclavicular adenopathy bilaterally.  Eyes:  Pupils are round and reactive to light.  Cardiovascular:  Heart rate and rhythm are regular.  Respiratory:  Lungs are clear bilaterally with no crackles or wheezes in any lung field.  Gastrointestinal:  Abdomen is soft, nondistended, and nontender.     Musculoskeletal:  Good strength in all 4 extremities.   There is good range of motion in both shoulders.    Skin:  No new skin lesions or rashes on the skin excluding the breast (see breast exam above).        Imagin/14/15 [facility]  MAMMO SCREEN EVITA  FLUHR, HEATHER  Benign dermal calcifications. BIRADS category 2: benign.    18 [facility]  MAMMO SCREEN EVITA  FLUHR, HEATHER  Scattered fibroglandular densities anterior one third retroareolar left breast interval development of a cluster of calcifications. BIRADS category 0.    8/3/18  [facility]  MAMMO DIAG LEFT  FLUHR, HEATHER  Microcalcifications within the outer inferior aspect of the left breast. BIRADS category 4.    [facility] Scattered fibroglandular densities.  No evidence for malignancy in either breast.  BI-RADS 1          Pathology:   18- left breast stereotactic biopsy: Upper outer quadrant, 5:00: Small radial sclerosing lesion with usual hyperplasia, separate foci of usual  "hyperplasia, columnar cell change without atypia: Focal stromal fibrosis, apically metaplasia, and fibroadenomatoid change.  We will let her know benign. It will require excision.  COncordant per Dr Tariq.        Final Diagnosis   BREAST, LEFT, DESIGNATED \"UPPER OUTER QUADRANT, APPROXIMATE 5 O'CLOCK POSITION\",   STEREOTACTIC BIOPSY:               SMALL RADIAL SCLEROSING LESION WITH ASSOCIATED DUCTAL HYPERPLASIA OF THE                      USUAL TYPE.               SEPARATE FOCI OF DUCTAL HYPERPLASIA OF THE USUAL TYPE.               FOCAL COLUMNAR CELL CHANGE WITHOUT ATYPIA.               FOCAL STROMAL FIBROSIS AND ASSOCIATED CALCIFICATIONS.               FOCAL APOCRINE METAPLASIA.               FOCAL FIBROADENOMATOID CHANGE WITH ASSOCIATED CALCIFICATIONS.               NO EVIDENCE OF MALIGNANCY.     TDJ/ IHC/a/JULIA     CPT CODES:  1. 23735       Pathology from  10-24-18 excision radial scar returend as radial scar and no atypia.     We will let her know benign.     Final Diagnosis   LEFT BREAST LUMPECTOMY:           BENIGN BREAST TISSUE WITH DUCT HYPERPLASIA WITHOUT ATYPIA, ADENOSIS, AND RADIAL                   SCLEROSING LESION (2 MM).          SCAR AND CHANGES OF PRIOR BIOPSY.     COMMENT: I do not detect atypia. The margin is free of radial sclerosing lesion.     K/  IHC/TDJ       Procedures:      Assessment:   Diagnosis Plan   1. Radial scar of breast     2. Breast calcifications on mammogram     3. FH: breast cancer       1-2  LEFt breast central RA- 1.5 cm cluster- asymptomatic- stereotactic biopsy August 21, 2018 lateral to the lower outer quadrant left breast returned as   8-21-18- left breast stereotactic biopsy:  5:00: Small radial sclerosing lesion with usual hyperplasia, separate foci of usual hyperplasia, columnar cell change without atypia: Focal stromal fibrosis, apically metaplasia, and fibroadenomatoid change.   Concordant per Dr Tariq.    Hydromark in vicinity of pre biopsy " calcifications.    Pathology from  10-24-18 excision radial scar returned as radial scar and no atypia.  [facility] mammogram 7-2019 BR1    3-  Paternal 1/2 sister in 70s, paternal cousin in 50s    Plan:  Mrs. Mckeon is doing well today at her Fu appt. We reviewed her interval history, imaging, imaging reports and examination together today.  Are no abnormal findings on her imaging.    At this juncture she can return to routine screening with an annual clinical breast exam and annual mammogram.  I have not given her a routine follow-up in our office.    Next routine mammogram will be due July 20, 2020 at [facility].        I asked her to continue her self breast exam and to call us in the interim with any concerns or changes and we'd be happy to see her back sooner.        Shayna Blount MD             Next Appointment:  Return for any future concerns.      EMR Dragon/transcription disclaimer:    Much of this encounter note is an electronic transcription/translocation of spoken language to printed text.  The electronic translation of spoken language may permit erroneous, or at times, nonsensical words or phrases to be inadvertently transcribed.  Although I have reviewed the note from such areas, some may still exist.

## 2019-07-30 NOTE — TELEPHONE ENCOUNTER
I discussed this with the patient and rec she take the cipro  She has not had problems with this in the past and she has already been taking the flagyl    ----- Message from Araceli Bennett MA sent at 7/29/2019 10:17 AM EDT -----  JANY SENT IN CIPRO AND FLAGYL. SHE DOESN'T WANT TO TAKE THE CIPRO BECAUSE OF THE SIDE EFFECTS. THEN SHE IS WORRIED ABOUT DIET FOR THE DIVERTICULITIS. SHE HAS BEEN RESEARCHING ON THE INTERNET.SHE WOULD LIKE TO DISCUSS ALL OF THIS WITH YOU.

## 2019-09-16 NOTE — PROGRESS NOTES
"Subjective   Shu Mckeon is a 77 y.o. female and is here for a comprehensive physical exam. The patient reports problems - chronic pain.  She did get some relief initially with the spinal injections but now not helping.  She does not get relief with tramadol or lidocaine  She has been approved for prolia which we start  Pt has been taking cholesterol meds as prescribed.  No difficulties with myalgias.   Pt has been taking BP meds as prescribed without any problems.  No HA  No episodes of orthostasis  Pt is UTD with annual gyn exam and mammo no pap she does get mammo    Do you take any herbs or supplements that were not prescribed by a doctor? mvi      Social History: she does not eat much but no exercise    Social History     Social History   • Marital status:      Spouse name: N/A   • Number of children: N/A   • Years of education: N/A     Occupational History   • Not on file.     Social History Main Topics   • Smoking status: Never Smoker   • Smokeless tobacco: Never Used   • Alcohol use Yes   • Drug use: No   • Sexual activity: Not on file     Other Topics Concern   • Not on file     Social History Narrative   • No narrative on file       Family History:   Family History   Problem Relation Age of Onset   • Heart attack Father    • Heart disease Father    • Heart attack Paternal Uncle        Past Medical History:   Past Medical History:   Diagnosis Date   • Depression    • Diabetes mellitus (CMS/HCC)    • Fatigue    • Groin pain    • Hypertension    • Impaired fasting glucose    • Impaired fasting glucose    • Osteoporosis    • Status post ORIF of fracture of ankle            Review of Systems    A comprehensive review of systems was negative.    Objective   /62 (BP Location: Left arm, Patient Position: Sitting)   Pulse 68   Temp 98.9 °F (37.2 °C) (Oral)   Ht 162.6 cm (64\")   Wt 82.5 kg (181 lb 12.8 oz)   SpO2 96%   BMI 31.21 kg/m²     General Appearance:    Alert, cooperative, no distress, " appears stated age   Head:    Normocephalic, without obvious abnormality, atraumatic   Eyes:    PERRL, conjunctiva/corneas clear, EOM's intact, fundi     benign, both eyes   Ears:    Normal TM's and external ear canals, both ears   Nose:   Nares normal, septum midline, mucosa normal, no drainage    or sinus tenderness   Throat:   Lips, mucosa, and tongue normal; teeth and gums normal   Neck:   Supple, symmetrical, trachea midline, no adenopathy;     thyroid:  no enlargement/tenderness/nodules; no carotid    bruit or JVD   Back:     Symmetric, no curvature, ROM normal, no CVA tenderness   Lungs:     Clear to auscultation bilaterally, respirations unlabored   Chest Wall:    No tenderness or deformity    Heart:    Regular rate and rhythm, S1 and S2 normal, no murmur, rub   or gallop       Abdomen:     Soft, non-tender, bowel sounds active all four quadrants,     no masses, no organomegaly           Extremities:   Extremities normal, atraumatic, no cyanosis or edema   Pulses:   2+ and symmetric all extremities   Skin:   Skin color, texture, turgor normal, no rashes or lesions   Lymph nodes:   Cervical, supraclavicular, and axillary nodes normal   Neurologic:   CNII-XII intact, normal strength, sensation and reflexes     throughout       Medications:   Current Outpatient Prescriptions:   •  atorvastatin (LIPITOR) 40 MG tablet, Take 1 tablet by mouth Daily., Disp: 90 tablet, Rfl: 3  •  BIOTIN PO, Take 1 tablet by mouth daily., Disp: , Rfl:   •  Cholecalciferol (VITAMIN D3) 5000 UNITS tablet, Take 1 tablet by mouth daily., Disp: , Rfl:   •  diclofenac (VOLTAREN) 1 % gel gel, Apply 4 g topically 4 (Four) Times a Day., Disp: 1 tube, Rfl: 3  •  HYDROcodone-acetaminophen (NORCO) 5-325 MG per tablet, 1 or 2 tabs PO Q6 hours prn chest pain, Disp: 30 tablet, Rfl: 0  •  losartan (COZAAR) 50 MG tablet, Take 1 tablet by mouth Daily., Disp: 90 tablet, Rfl: 3  •  Multiple Vitamin (MULTIVITAMIN) capsule, Take 1 capsule by mouth daily.,  Disp: , Rfl:   •  Probiotic Product (PROBIOTIC FORMULA PO), Take 1 tablet by mouth Daily., Disp: , Rfl:   •  sertraline (ZOLOFT) 50 MG tablet, TAKE 1 TABLET DAILY, Disp: 90 tablet, Rfl: 1  •  lidocaine (LIDODERM) 5 %, Place 1 patch on the skin Daily. Remove & Discard patch within 12 hours or as directed by MD, Disp: 30 patch, Rfl: 3       Assessment/Plan   Healthy female exam.      1. Healthcare Maintenance:  2. Patient Counseling:  --Nutrition: Stressed importance of moderation in sodium/caffeine intake, saturated fat and cholesterol, caloric balance, sufficient intake of fresh fruits, vegetables, fiber, calcium and vit D  --Exercise: no exercise due to pain  --Substance Abuse: no tob 1 glass of wine daily  --Dental health: She is not seeing the dentist reg and I have advised this  --Immunizations reviewed.i have rec the new vaccine  --Discussed benefits of screening colonoscopy.  3. HPL- she is stab;e with current meds  4. Chronic pain in the back  She is about to try nerve  Ablation  She says she has very limited activity due to the pain and she is feeling some depressed  5. HTN- she is doing well with current meds  6.Osteoporosis-  We are going to try prolia  She has had several fracture                  Detail Level: Simple Detail Level: Generalized Detail Level: Detailed Detail Level: Zone

## 2019-09-27 ENCOUNTER — CLINICAL SUPPORT (OUTPATIENT)
Dept: INTERNAL MEDICINE | Facility: CLINIC | Age: 79
End: 2019-09-27

## 2019-09-27 DIAGNOSIS — M81.6 LOCALIZED OSTEOPOROSIS, UNSPECIFIED PATHOLOGICAL FRACTURE PRESENCE: Primary | ICD-10-CM

## 2019-09-27 DIAGNOSIS — Z00.00 HEALTHCARE MAINTENANCE: ICD-10-CM

## 2019-09-27 PROCEDURE — 96372 THER/PROPH/DIAG INJ SC/IM: CPT | Performed by: INTERNAL MEDICINE

## 2019-09-27 PROCEDURE — 90653 IIV ADJUVANT VACCINE IM: CPT | Performed by: INTERNAL MEDICINE

## 2019-09-27 PROCEDURE — G0008 ADMIN INFLUENZA VIRUS VAC: HCPCS | Performed by: INTERNAL MEDICINE

## 2019-11-12 ENCOUNTER — OFFICE VISIT (OUTPATIENT)
Dept: INTERNAL MEDICINE | Facility: CLINIC | Age: 79
End: 2019-11-12

## 2019-11-12 ENCOUNTER — APPOINTMENT (OUTPATIENT)
Dept: LAB | Facility: HOSPITAL | Age: 79
End: 2019-11-12

## 2019-11-12 VITALS
BODY MASS INDEX: 29.4 KG/M2 | HEART RATE: 67 BPM | OXYGEN SATURATION: 97 % | TEMPERATURE: 98 F | HEIGHT: 64 IN | WEIGHT: 172.2 LBS | DIASTOLIC BLOOD PRESSURE: 72 MMHG | SYSTOLIC BLOOD PRESSURE: 128 MMHG

## 2019-11-12 DIAGNOSIS — N30.00 ACUTE CYSTITIS WITHOUT HEMATURIA: Primary | ICD-10-CM

## 2019-11-12 DIAGNOSIS — R19.5 DARK STOOLS: ICD-10-CM

## 2019-11-12 LAB
ALBUMIN SERPL-MCNC: 4.6 G/DL (ref 3.5–5.2)
ALBUMIN/GLOB SERPL: 1.4 G/DL
ALP SERPL-CCNC: 85 U/L (ref 39–117)
ALT SERPL W P-5'-P-CCNC: 12 U/L (ref 1–33)
ANION GAP SERPL CALCULATED.3IONS-SCNC: 11.6 MMOL/L (ref 5–15)
AST SERPL-CCNC: 18 U/L (ref 1–32)
BASOPHILS # BLD AUTO: 0.05 10*3/MM3 (ref 0–0.2)
BASOPHILS NFR BLD AUTO: 0.6 % (ref 0–1.5)
BILIRUB BLD-MCNC: NEGATIVE MG/DL
BILIRUB SERPL-MCNC: 0.6 MG/DL (ref 0.1–1.2)
BUN BLD-MCNC: 11 MG/DL (ref 8–23)
BUN/CREAT SERPL: 19 (ref 7–25)
CALCIUM SPEC-SCNC: 11.1 MG/DL (ref 8.6–10.5)
CHLORIDE SERPL-SCNC: 100 MMOL/L (ref 98–107)
CLARITY, POC: CLEAR
CO2 SERPL-SCNC: 27.4 MMOL/L (ref 22–29)
COLOR UR: YELLOW
CREAT BLD-MCNC: 0.58 MG/DL (ref 0.57–1)
DEPRECATED RDW RBC AUTO: 44.7 FL (ref 37–54)
EOSINOPHIL # BLD AUTO: 0.2 10*3/MM3 (ref 0–0.4)
EOSINOPHIL NFR BLD AUTO: 2.6 % (ref 0.3–6.2)
ERYTHROCYTE [DISTWIDTH] IN BLOOD BY AUTOMATED COUNT: 13.7 % (ref 12.3–15.4)
GFR SERPL CREATININE-BSD FRML MDRD: 100 ML/MIN/1.73
GLOBULIN UR ELPH-MCNC: 3.4 GM/DL
GLUCOSE BLD-MCNC: 121 MG/DL (ref 65–99)
GLUCOSE UR STRIP-MCNC: NEGATIVE MG/DL
HCT VFR BLD AUTO: 41.1 % (ref 34–46.6)
HGB BLD-MCNC: 13.4 G/DL (ref 12–15.9)
IMM GRANULOCYTES # BLD AUTO: 0.02 10*3/MM3 (ref 0–0.05)
IMM GRANULOCYTES NFR BLD AUTO: 0.3 % (ref 0–0.5)
KETONES UR QL: NEGATIVE
LEUKOCYTE EST, POC: ABNORMAL
LYMPHOCYTES # BLD AUTO: 1.56 10*3/MM3 (ref 0.7–3.1)
LYMPHOCYTES NFR BLD AUTO: 19.9 % (ref 19.6–45.3)
MCH RBC QN AUTO: 29.1 PG (ref 26.6–33)
MCHC RBC AUTO-ENTMCNC: 32.6 G/DL (ref 31.5–35.7)
MCV RBC AUTO: 89.2 FL (ref 79–97)
MONOCYTES # BLD AUTO: 0.59 10*3/MM3 (ref 0.1–0.9)
MONOCYTES NFR BLD AUTO: 7.5 % (ref 5–12)
NEUTROPHILS # BLD AUTO: 5.42 10*3/MM3 (ref 1.7–7)
NEUTROPHILS NFR BLD AUTO: 69.1 % (ref 42.7–76)
NITRITE UR-MCNC: NEGATIVE MG/ML
NRBC BLD AUTO-RTO: 0 /100 WBC (ref 0–0.2)
PH UR: 8.5 [PH] (ref 5–8)
PLATELET # BLD AUTO: 272 10*3/MM3 (ref 140–450)
PMV BLD AUTO: 10 FL (ref 6–12)
POTASSIUM BLD-SCNC: 4.5 MMOL/L (ref 3.5–5.2)
PROT SERPL-MCNC: 8 G/DL (ref 6–8.5)
PROT UR STRIP-MCNC: NEGATIVE MG/DL
RBC # BLD AUTO: 4.61 10*6/MM3 (ref 3.77–5.28)
RBC # UR STRIP: NEGATIVE /UL
SODIUM BLD-SCNC: 139 MMOL/L (ref 136–145)
SP GR UR: 1.01 (ref 1–1.03)
UROBILINOGEN UR QL: NORMAL
WBC NRBC COR # BLD: 7.84 10*3/MM3 (ref 3.4–10.8)

## 2019-11-12 PROCEDURE — 80053 COMPREHEN METABOLIC PANEL: CPT | Performed by: INTERNAL MEDICINE

## 2019-11-12 PROCEDURE — 85025 COMPLETE CBC W/AUTO DIFF WBC: CPT | Performed by: INTERNAL MEDICINE

## 2019-11-12 PROCEDURE — 99214 OFFICE O/P EST MOD 30 MIN: CPT | Performed by: INTERNAL MEDICINE

## 2019-11-12 PROCEDURE — 81003 URINALYSIS AUTO W/O SCOPE: CPT | Performed by: INTERNAL MEDICINE

## 2019-11-12 PROCEDURE — 36415 COLL VENOUS BLD VENIPUNCTURE: CPT | Performed by: INTERNAL MEDICINE

## 2019-11-12 RX ORDER — CEPHALEXIN 500 MG/1
500 CAPSULE ORAL 2 TIMES DAILY
Qty: 10 CAPSULE | Refills: 0 | Status: SHIPPED | OUTPATIENT
Start: 2019-11-12 | End: 2019-12-10

## 2019-11-12 NOTE — PROGRESS NOTES
Subjective   Shu Mckeon is a 79 y.o. female here today to dysuria and urinary frequency x 3 days    History of Present Illness   She has been having suprapubic pain and freq for the last 3 days  No fevers  No recent GI illness but she has had loose stool since the diverticulitis.  Until recently now having moire constipation.  She has tried a probiotic with no relief  She has been having very dark stool  No red blood noted    The following portions of the patient's history were reviewed and updated as appropriate: allergies, current medications, past medical history, past social history and problem list.  She denies any change in diet    Review of Systems   Constitutional: Negative for fatigue and fever.   Gastrointestinal: Positive for abdominal distention, constipation and diarrhea.   Genitourinary: Positive for dysuria and frequency.   All other systems reviewed and are negative.      Objective   Physical Exam   Constitutional: She is oriented to person, place, and time. She appears well-developed and well-nourished.   HENT:   Head: Normocephalic and atraumatic.   Right Ear: External ear normal.   Left Ear: External ear normal.   Mouth/Throat: Oropharynx is clear and moist.   Eyes: Conjunctivae and EOM are normal. Pupils are equal, round, and reactive to light.   Neck: Normal range of motion. No tracheal deviation present. No thyromegaly present.   Cardiovascular: Normal rate, regular rhythm, normal heart sounds and intact distal pulses.   Pulmonary/Chest: Effort normal and breath sounds normal.   Abdominal: Soft. Bowel sounds are normal. She exhibits no distension. There is no tenderness.   Musculoskeletal: Normal range of motion. She exhibits no edema or deformity.   Neurological: She is alert and oriented to person, place, and time.   Skin: Skin is warm and dry.   Psychiatric: She has a normal mood and affect. Her behavior is normal. Judgment and thought content normal.   Vitals reviewed.      Vitals:     11/12/19 0914   BP: 128/72   Pulse: 67   Temp: 98 °F (36.7 °C)   SpO2: 97%          Current Outpatient Medications:   •  atorvastatin (LIPITOR) 40 MG tablet, Take 1 tablet by mouth Daily., Disp: 90 tablet, Rfl: 3  •  BIOTIN PO, Take 1 tablet by mouth daily., Disp: , Rfl:   •  Cholecalciferol (VITAMIN D3) 5000 UNITS tablet, Take 1 tablet by mouth daily., Disp: , Rfl:   •  HYDROcodone-acetaminophen (NORCO) 5-325 MG per tablet, Take 1 tablet by mouth Every 6 (Six) Hours As Needed for Severe Pain ., Disp: 20 tablet, Rfl: 0  •  lidocaine (LIDODERM) 5 %, Place 1 patch on the skin as directed by provider Daily. Remove & Discard patch within 12 hours or as directed by MD, Disp: 30 patch, Rfl: 1  •  losartan (COZAAR) 50 MG tablet, Take 1 tablet by mouth Daily., Disp: 90 tablet, Rfl: 3  •  methocarbamol (ROBAXIN) 500 MG tablet, TK 1 T PO QID FOR 7 DAYS, Disp: , Rfl: 0  •  Multiple Vitamin (MULTIVITAMIN) capsule, Take 1 capsule by mouth daily., Disp: , Rfl:   •  Omega-3 Fatty Acids (OMEGA-3 PO), Take  by mouth., Disp: , Rfl:   •  ondansetron ODT (ZOFRAN-ODT) 4 MG disintegrating tablet, DISSOLVE 1 TABLET ON THE TONGUE EVERY 6 TO 8 HOURS AS NEEDED FOR NAUSEA, Disp: , Rfl: 0  •  sertraline (ZOLOFT) 50 MG tablet, Take 1 tablet by mouth Daily., Disp: 90 tablet, Rfl: 3      Assessment/Plan   Diagnoses and all orders for this visit:    Acute cystitis without hematuria  -     CBC & Differential  -     Comprehensive Metabolic Panel    Dark stools      1.  UTI- check a culture  We edda likely use macribid but awaiting for CBC and urine culture.  2.  Alt diarrhea and constipation with dark stool check for blood in stool.  Patient relates this change in bowel habits with her episode of diverticulitis this summer.  She is going to try a probiotic

## 2019-11-13 LAB — HEMOCCULT STL QL IA: NEGATIVE

## 2019-11-14 LAB
BACTERIA UR CULT: NORMAL
BACTERIA UR CULT: NORMAL

## 2019-11-28 ENCOUNTER — RESULTS ENCOUNTER (OUTPATIENT)
Dept: INTERNAL MEDICINE | Facility: CLINIC | Age: 79
End: 2019-11-28

## 2019-11-28 DIAGNOSIS — E78.5 HYPERLIPIDEMIA, UNSPECIFIED HYPERLIPIDEMIA TYPE: ICD-10-CM

## 2019-11-28 DIAGNOSIS — E11.9 CONTROLLED TYPE 2 DIABETES MELLITUS WITHOUT COMPLICATION, WITHOUT LONG-TERM CURRENT USE OF INSULIN (HCC): ICD-10-CM

## 2019-11-28 DIAGNOSIS — E55.9 VITAMIN D DEFICIENCY: ICD-10-CM

## 2019-11-28 DIAGNOSIS — I10 ESSENTIAL HYPERTENSION: ICD-10-CM

## 2019-12-04 LAB
25(OH)D3+25(OH)D2 SERPL-MCNC: 82.3 NG/ML (ref 30–100)
ALBUMIN SERPL-MCNC: 4.5 G/DL (ref 3.5–5.2)
ALBUMIN/GLOB SERPL: 1.6 G/DL
ALP SERPL-CCNC: 74 U/L (ref 39–117)
ALT SERPL-CCNC: 11 U/L (ref 1–33)
AST SERPL-CCNC: 15 U/L (ref 1–32)
BASOPHILS # BLD AUTO: 0.04 10*3/MM3 (ref 0–0.2)
BASOPHILS NFR BLD AUTO: 0.7 % (ref 0–1.5)
BILIRUB SERPL-MCNC: 0.6 MG/DL (ref 0.2–1.2)
BUN SERPL-MCNC: 9 MG/DL (ref 8–23)
BUN/CREAT SERPL: 13.8 (ref 7–25)
CALCIUM SERPL-MCNC: 9.4 MG/DL (ref 8.6–10.5)
CHLORIDE SERPL-SCNC: 102 MMOL/L (ref 98–107)
CHOLEST SERPL-MCNC: 146 MG/DL (ref 0–200)
CO2 SERPL-SCNC: 27.9 MMOL/L (ref 22–29)
CREAT SERPL-MCNC: 0.65 MG/DL (ref 0.57–1)
EOSINOPHIL # BLD AUTO: 0.13 10*3/MM3 (ref 0–0.4)
EOSINOPHIL NFR BLD AUTO: 2.3 % (ref 0.3–6.2)
ERYTHROCYTE [DISTWIDTH] IN BLOOD BY AUTOMATED COUNT: 12.7 % (ref 12.3–15.4)
GLOBULIN SER CALC-MCNC: 2.8 GM/DL
GLUCOSE SERPL-MCNC: 114 MG/DL (ref 65–99)
HBA1C MFR BLD: 6.5 % (ref 4.8–5.6)
HCT VFR BLD AUTO: 40.1 % (ref 34–46.6)
HDLC SERPL-MCNC: 55 MG/DL (ref 40–60)
HGB BLD-MCNC: 13.8 G/DL (ref 12–15.9)
IMM GRANULOCYTES # BLD AUTO: 0.02 10*3/MM3 (ref 0–0.05)
IMM GRANULOCYTES NFR BLD AUTO: 0.3 % (ref 0–0.5)
LDLC SERPL CALC-MCNC: 70 MG/DL (ref 0–100)
LDLC/HDLC SERPL: 1.27 {RATIO}
LYMPHOCYTES # BLD AUTO: 1.66 10*3/MM3 (ref 0.7–3.1)
LYMPHOCYTES NFR BLD AUTO: 28.8 % (ref 19.6–45.3)
MCH RBC QN AUTO: 29.9 PG (ref 26.6–33)
MCHC RBC AUTO-ENTMCNC: 34.4 G/DL (ref 31.5–35.7)
MCV RBC AUTO: 87 FL (ref 79–97)
MONOCYTES # BLD AUTO: 0.43 10*3/MM3 (ref 0.1–0.9)
MONOCYTES NFR BLD AUTO: 7.5 % (ref 5–12)
NEUTROPHILS # BLD AUTO: 3.49 10*3/MM3 (ref 1.7–7)
NEUTROPHILS NFR BLD AUTO: 60.4 % (ref 42.7–76)
NRBC BLD AUTO-RTO: 0 /100 WBC (ref 0–0.2)
PLATELET # BLD AUTO: 298 10*3/MM3 (ref 140–450)
POTASSIUM SERPL-SCNC: 4.2 MMOL/L (ref 3.5–5.2)
PROT SERPL-MCNC: 7.3 G/DL (ref 6–8.5)
RBC # BLD AUTO: 4.61 10*6/MM3 (ref 3.77–5.28)
SODIUM SERPL-SCNC: 142 MMOL/L (ref 136–145)
TRIGL SERPL-MCNC: 105 MG/DL (ref 0–150)
TSH SERPL DL<=0.005 MIU/L-ACNC: 1.7 UIU/ML (ref 0.27–4.2)
VLDLC SERPL CALC-MCNC: 21 MG/DL
WBC # BLD AUTO: 5.77 10*3/MM3 (ref 3.4–10.8)

## 2019-12-10 ENCOUNTER — OFFICE VISIT (OUTPATIENT)
Dept: INTERNAL MEDICINE | Facility: CLINIC | Age: 79
End: 2019-12-10

## 2019-12-10 VITALS
BODY MASS INDEX: 28.77 KG/M2 | OXYGEN SATURATION: 96 % | HEART RATE: 67 BPM | WEIGHT: 168.5 LBS | TEMPERATURE: 98.3 F | HEIGHT: 64 IN | DIASTOLIC BLOOD PRESSURE: 68 MMHG | SYSTOLIC BLOOD PRESSURE: 124 MMHG

## 2019-12-10 DIAGNOSIS — Z00.00 HEALTH CARE MAINTENANCE: Primary | ICD-10-CM

## 2019-12-10 DIAGNOSIS — E78.5 HYPERLIPIDEMIA, UNSPECIFIED HYPERLIPIDEMIA TYPE: ICD-10-CM

## 2019-12-10 DIAGNOSIS — E11.9 CONTROLLED TYPE 2 DIABETES MELLITUS WITHOUT COMPLICATION, WITHOUT LONG-TERM CURRENT USE OF INSULIN (HCC): ICD-10-CM

## 2019-12-10 DIAGNOSIS — I10 ESSENTIAL HYPERTENSION: ICD-10-CM

## 2019-12-10 PROCEDURE — 99397 PER PM REEVAL EST PAT 65+ YR: CPT | Performed by: INTERNAL MEDICINE

## 2019-12-10 NOTE — PROGRESS NOTES
Subjective   Shu Mckeon is a 79 y.o. female here today to f/u on HTN, hyperlipidemia and depression.  Pt c/o twinge in I lower right back. Pt c/o bilateral foot nerve pain.      History of Present Illness     {Common H&P Review Areas:32649}    Review of Systems    Objective   Physical Exam    There were no vitals filed for this visit.  There is no height or weight on file to calculate BMI.       Results Encounter on 11/28/2019   Component Date Value Ref Range Status   • WBC 12/03/2019 5.77  3.40 - 10.80 10*3/mm3 Final   • RBC 12/03/2019 4.61  3.77 - 5.28 10*6/mm3 Final   • Hemoglobin 12/03/2019 13.8  12.0 - 15.9 g/dL Final   • Hematocrit 12/03/2019 40.1  34.0 - 46.6 % Final   • MCV 12/03/2019 87.0  79.0 - 97.0 fL Final   • MCH 12/03/2019 29.9  26.6 - 33.0 pg Final   • MCHC 12/03/2019 34.4  31.5 - 35.7 g/dL Final   • RDW 12/03/2019 12.7  12.3 - 15.4 % Final   • Platelets 12/03/2019 298  140 - 450 10*3/mm3 Final   • Neutrophil Rel % 12/03/2019 60.4  42.7 - 76.0 % Final   • Lymphocyte Rel % 12/03/2019 28.8  19.6 - 45.3 % Final   • Monocyte Rel % 12/03/2019 7.5  5.0 - 12.0 % Final   • Eosinophil Rel % 12/03/2019 2.3  0.3 - 6.2 % Final   • Basophil Rel % 12/03/2019 0.7  0.0 - 1.5 % Final   • Neutrophils Absolute 12/03/2019 3.49  1.70 - 7.00 10*3/mm3 Final   • Lymphocytes Absolute 12/03/2019 1.66  0.70 - 3.10 10*3/mm3 Final   • Monocytes Absolute 12/03/2019 0.43  0.10 - 0.90 10*3/mm3 Final   • Eosinophils Absolute 12/03/2019 0.13  0.00 - 0.40 10*3/mm3 Final   • Basophils Absolute 12/03/2019 0.04  0.00 - 0.20 10*3/mm3 Final   • Immature Granulocyte Rel % 12/03/2019 0.3  0.0 - 0.5 % Final   • Immature Grans Absolute 12/03/2019 0.02  0.00 - 0.05 10*3/mm3 Final   • nRBC 12/03/2019 0.0  0.0 - 0.2 /100 WBC Final   • 25 Hydroxy, Vitamin D 12/03/2019 82.3  30.0 - 100.0 ng/ml Final    Comment: Reference Range for Total Vitamin D 25(OH)  Deficiency <20.0 ng/mL  Insufficiency 21-29 ng/mL  Sufficiency  ng/mL  Toxicity >100  ng/ml     • TSH 12/03/2019 1.700  0.270 - 4.200 uIU/mL Final   • Glucose 12/03/2019 114* 65 - 99 mg/dL Final   • BUN 12/03/2019 9  8 - 23 mg/dL Final   • Creatinine 12/03/2019 0.65  0.57 - 1.00 mg/dL Final   • eGFR Non  Am 12/03/2019 87  >60 mL/min/1.73 Final    Comment: The MDRD GFR formula is only valid for adults with stable  renal function between ages 18 and 70.     • eGFR  Am 12/03/2019 106  >60 mL/min/1.73 Final   • BUN/Creatinine Ratio 12/03/2019 13.8  7.0 - 25.0 Final   • Sodium 12/03/2019 142  136 - 145 mmol/L Final   • Potassium 12/03/2019 4.2  3.5 - 5.2 mmol/L Final   • Chloride 12/03/2019 102  98 - 107 mmol/L Final   • Total CO2 12/03/2019 27.9  22.0 - 29.0 mmol/L Final   • Calcium 12/03/2019 9.4  8.6 - 10.5 mg/dL Final   • Total Protein 12/03/2019 7.3  6.0 - 8.5 g/dL Final   • Albumin 12/03/2019 4.50  3.50 - 5.20 g/dL Final   • Globulin 12/03/2019 2.8  gm/dL Final   • A/G Ratio 12/03/2019 1.6  g/dL Final   • Total Bilirubin 12/03/2019 0.6  0.2 - 1.2 mg/dL Final   • Alkaline Phosphatase 12/03/2019 74  39 - 117 U/L Final   • AST (SGOT) 12/03/2019 15  1 - 32 U/L Final   • ALT (SGPT) 12/03/2019 11  1 - 33 U/L Final   • Total Cholesterol 12/03/2019 146  0 - 200 mg/dL Final   • Triglycerides 12/03/2019 105  0 - 150 mg/dL Final   • HDL Cholesterol 12/03/2019 55  40 - 60 mg/dL Final   • VLDL Cholesterol 12/03/2019 21  mg/dL Final   • LDL Cholesterol  12/03/2019 70  0 - 100 mg/dL Final   • LDL/HDL Ratio 12/03/2019 1.27   Final   • Hemoglobin A1C 12/03/2019 6.50* 4.80 - 5.60 % Final    Comment: Hemoglobin A1C Ranges:  Increased Risk for Diabetes  5.7% to 6.4%  Diabetes                     >= 6.5%  Diabetic Goal                < 7.0%         Current Outpatient Medications:   •  atorvastatin (LIPITOR) 40 MG tablet, Take 1 tablet by mouth Daily., Disp: 90 tablet, Rfl: 3  •  BIOTIN PO, Take 1 tablet by mouth daily., Disp: , Rfl:   •  Cholecalciferol (VITAMIN D3) 5000 UNITS tablet, Take 1 tablet by  mouth daily., Disp: , Rfl:   •  losartan (COZAAR) 50 MG tablet, Take 1 tablet by mouth Daily., Disp: 90 tablet, Rfl: 3  •  Multiple Vitamin (MULTIVITAMIN) capsule, Take 1 capsule by mouth daily., Disp: , Rfl:   •  Multiple Vitamins-Minerals (PRESERVISION AREDS 2 PO), Take 1 tablet by mouth Daily., Disp: , Rfl:   •  Omega-3 Fatty Acids (OMEGA-3 PO), Take  by mouth., Disp: , Rfl:   •  Probiotic Product (PROBIOTIC DAILY PO), Take 1 tablet by mouth Daily., Disp: , Rfl:   •  sertraline (ZOLOFT) 50 MG tablet, Take 1 tablet by mouth Daily., Disp: 90 tablet, Rfl: 3      Assessment/Plan   {Assess/PlanSmartLinks:70579}

## 2019-12-10 NOTE — PROGRESS NOTES
"Subjective   Shu Mckeon is a 79 y.o. female and is here for a comprehensive physical exam. The patient reports problems - numbness in feet.  She does occas have some numbness and restlessness in both of her feet on and off at rest. She does says they feels a little achy but not bad  She does say they are cold from time to time  Pt has been taking cholesterol meds as prescribed.  No difficulties with myalgias.   Pt has been compliant with diabetes meds. No side effects from medication No episodes of hypoglycemia. Patient denies any polyuria or polydipsia  She has recovered from diverticulitis earlier this fall and she has had some alt constipation and diarrhea since then      Pt is UTD with annual gyn exam and mammo utd    Do you take any herbs or supplements that were not prescribed by a doctor? Takes vit D      Social History: she does eat a healthy diet and tries to exercise    Social History     Socioeconomic History   • Marital status:      Spouse name: Not on file   • Number of children: Not on file   • Years of education: Not on file   • Highest education level: Not on file   Tobacco Use   • Smoking status: Never Smoker   • Smokeless tobacco: Never Used   Substance and Sexual Activity   • Alcohol use: Yes     Alcohol/week: 7.0 standard drinks     Types: 7 Glasses of wine per week     Comment: 1/day-socially   • Drug use: No   • Sexual activity: Defer       Family History:   Family History   Problem Relation Age of Onset   • Heart disease Father    • Heart attack Father 81   • Heart attack Paternal Uncle    • Breast cancer Sister 70   • Breast cancer Cousin 50   • Malig Hyperthermia Neg Hx        Past Medical History:   Past Medical History:   Diagnosis Date   • Arthritis    • Chronic low back pain    • Depression     HISTORY OF BEING \"EMOTIONAL\"   • Fatigue    • GERD (gastroesophageal reflux disease)    • Groin pain    • H/O cardiovascular stress test 07/2018    NORMAL, DR ARIAS   • History of " "diverticulitis    • Hyperlipidemia    • Hypertension    • Hypertension    • Impaired fasting glucose    • Left bundle branch block    • Osteoporosis    • Radial scar of breast     LEFT   • Status post ORIF of fracture of ankle    • Urinary frequency            Review of Systems    A comprehensive review of systems was negative.    Objective   /68 (BP Location: Left arm, Patient Position: Sitting)   Pulse 67   Temp 98.3 °F (36.8 °C) (Oral)   Ht 162.6 cm (64\")   Wt 76.4 kg (168 lb 8 oz)   LMP  (LMP Unknown)   SpO2 96%   BMI 28.92 kg/m²     General Appearance:    Alert, cooperative, no distress, appears stated age   Head:    Normocephalic, without obvious abnormality, atraumatic   Eyes:    PERRL, conjunctiva/corneas clear, EOM's intact, fundi     benign, both eyes   Ears:    Normal TM's and external ear canals, both ears   Nose:   Nares normal, septum midline, mucosa normal, no drainage    or sinus tenderness   Throat:   Lips, mucosa, and tongue normal; teeth and gums normal   Neck:   Supple, symmetrical, trachea midline, no adenopathy;     thyroid:  no enlargement/tenderness/nodules; no carotid    bruit or JVD   Back:     Symmetric, no curvature, ROM normal, no CVA tenderness   Lungs:     Clear to auscultation bilaterally, respirations unlabored   Chest Wall:    No tenderness or deformity    Heart:    Regular rate and rhythm, S1 and S2 normal, no murmur, rub   or gallop       Abdomen:     Soft, non-tender, bowel sounds active all four quadrants,     no masses, no organomegaly           Extremities:   Extremities normal, atraumatic, no cyanosis or edema   Pulses:   2+ and symmetric all extremities   Skin:   Skin color, texture, turgor normal, no rashes or lesions   Lymph nodes:   Cervical, supraclavicular, and axillary nodes normal   Neurologic:   CNII-XII intact, normal strength, sensation and reflexes     throughout       Vitals:    12/10/19 1304   BP: 124/68   Pulse: 67   Temp: 98.3 °F (36.8 °C)   SpO2: " 96%     Body mass index is 28.92 kg/m².      Medications:   Current Outpatient Medications:   •  atorvastatin (LIPITOR) 40 MG tablet, Take 1 tablet by mouth Daily., Disp: 90 tablet, Rfl: 3  •  BIOTIN PO, Take 1 tablet by mouth daily., Disp: , Rfl:   •  Cholecalciferol (VITAMIN D3) 5000 UNITS tablet, Take 1 tablet by mouth daily., Disp: , Rfl:   •  losartan (COZAAR) 50 MG tablet, Take 1 tablet by mouth Daily., Disp: 90 tablet, Rfl: 3  •  Multiple Vitamin (MULTIVITAMIN) capsule, Take 1 capsule by mouth daily., Disp: , Rfl:   •  Multiple Vitamins-Minerals (PRESERVISION AREDS 2 PO), Take 1 tablet by mouth Daily., Disp: , Rfl:   •  Omega-3 Fatty Acids (OMEGA-3 PO), Take  by mouth., Disp: , Rfl:   •  Probiotic Product (PROBIOTIC DAILY PO), Take 1 tablet by mouth Daily., Disp: , Rfl:   •  sertraline (ZOLOFT) 50 MG tablet, Take 1 tablet by mouth Daily., Disp: 90 tablet, Rfl: 3       Assessment/Plan   Healthy female exam.      1. Healthcare Maintenance:  2. Patient Counseling:  --Nutrition: Stressed importance of moderation in sodium/caffeine intake, saturated fat and cholesterol, caloric balance, sufficient intake of fresh fruits, vegetables, fiber, calcium and vit D  --Exercise: she does exercise reg  --Substance Abuse: no tob occas etoh  --Dental health: she does go to the dentist reg  --Immunizations reviewed.rec shingles vaccine  --Discussed benefits of screening colonoscopy.  3. HPL- ok on current meds  4. HTN- ok with losartan

## 2020-01-21 ENCOUNTER — OFFICE VISIT (OUTPATIENT)
Dept: INTERNAL MEDICINE | Facility: CLINIC | Age: 80
End: 2020-01-21

## 2020-01-21 ENCOUNTER — APPOINTMENT (OUTPATIENT)
Dept: LAB | Facility: HOSPITAL | Age: 80
End: 2020-01-21

## 2020-01-21 VITALS
TEMPERATURE: 98.8 F | SYSTOLIC BLOOD PRESSURE: 126 MMHG | DIASTOLIC BLOOD PRESSURE: 66 MMHG | OXYGEN SATURATION: 97 % | WEIGHT: 164.8 LBS | HEIGHT: 64 IN | BODY MASS INDEX: 28.13 KG/M2 | HEART RATE: 75 BPM

## 2020-01-21 DIAGNOSIS — R10.31 RIGHT LOWER QUADRANT ABDOMINAL PAIN: Primary | ICD-10-CM

## 2020-01-21 LAB
ALBUMIN SERPL-MCNC: 4.4 G/DL (ref 3.5–5.2)
ALBUMIN/GLOB SERPL: 1.2 G/DL
ALP SERPL-CCNC: 75 U/L (ref 39–117)
ALT SERPL W P-5'-P-CCNC: 12 U/L (ref 1–33)
ANION GAP SERPL CALCULATED.3IONS-SCNC: 12.1 MMOL/L (ref 5–15)
AST SERPL-CCNC: 17 U/L (ref 1–32)
BASOPHILS # BLD AUTO: 0.05 10*3/MM3 (ref 0–0.2)
BASOPHILS NFR BLD AUTO: 0.6 % (ref 0–1.5)
BILIRUB BLD-MCNC: NEGATIVE MG/DL
BILIRUB SERPL-MCNC: 0.7 MG/DL (ref 0.1–1.2)
BUN BLD-MCNC: 10 MG/DL (ref 8–23)
BUN/CREAT SERPL: 15.4 (ref 7–25)
CALCIUM SPEC-SCNC: 10.1 MG/DL (ref 8.6–10.5)
CHLORIDE SERPL-SCNC: 98 MMOL/L (ref 98–107)
CLARITY, POC: CLEAR
CO2 SERPL-SCNC: 26.9 MMOL/L (ref 22–29)
COLOR UR: YELLOW
CREAT BLD-MCNC: 0.65 MG/DL (ref 0.57–1)
DEPRECATED RDW RBC AUTO: 45 FL (ref 37–54)
EOSINOPHIL # BLD AUTO: 0.22 10*3/MM3 (ref 0–0.4)
EOSINOPHIL NFR BLD AUTO: 2.5 % (ref 0.3–6.2)
ERYTHROCYTE [DISTWIDTH] IN BLOOD BY AUTOMATED COUNT: 13.6 % (ref 12.3–15.4)
GFR SERPL CREATININE-BSD FRML MDRD: 87 ML/MIN/1.73
GLOBULIN UR ELPH-MCNC: 3.7 GM/DL
GLUCOSE BLD-MCNC: 120 MG/DL (ref 65–99)
GLUCOSE UR STRIP-MCNC: NEGATIVE MG/DL
HCT VFR BLD AUTO: 42.8 % (ref 34–46.6)
HGB BLD-MCNC: 13.6 G/DL (ref 12–15.9)
IMM GRANULOCYTES # BLD AUTO: 0.02 10*3/MM3 (ref 0–0.05)
IMM GRANULOCYTES NFR BLD AUTO: 0.2 % (ref 0–0.5)
KETONES UR QL: NEGATIVE
LEUKOCYTE EST, POC: NEGATIVE
LYMPHOCYTES # BLD AUTO: 1.92 10*3/MM3 (ref 0.7–3.1)
LYMPHOCYTES NFR BLD AUTO: 21.9 % (ref 19.6–45.3)
MCH RBC QN AUTO: 28.6 PG (ref 26.6–33)
MCHC RBC AUTO-ENTMCNC: 31.8 G/DL (ref 31.5–35.7)
MCV RBC AUTO: 89.9 FL (ref 79–97)
MONOCYTES # BLD AUTO: 0.72 10*3/MM3 (ref 0.1–0.9)
MONOCYTES NFR BLD AUTO: 8.2 % (ref 5–12)
NEUTROPHILS # BLD AUTO: 5.84 10*3/MM3 (ref 1.7–7)
NEUTROPHILS NFR BLD AUTO: 66.6 % (ref 42.7–76)
NITRITE UR-MCNC: NEGATIVE MG/ML
NRBC BLD AUTO-RTO: 0 /100 WBC (ref 0–0.2)
PH UR: 7.5 [PH] (ref 5–8)
PLATELET # BLD AUTO: 274 10*3/MM3 (ref 140–450)
PMV BLD AUTO: 9.7 FL (ref 6–12)
POTASSIUM BLD-SCNC: 4.1 MMOL/L (ref 3.5–5.2)
PROT SERPL-MCNC: 8.1 G/DL (ref 6–8.5)
PROT UR STRIP-MCNC: NEGATIVE MG/DL
RBC # BLD AUTO: 4.76 10*6/MM3 (ref 3.77–5.28)
RBC # UR STRIP: NEGATIVE /UL
SODIUM BLD-SCNC: 137 MMOL/L (ref 136–145)
SP GR UR: 1.01 (ref 1–1.03)
UROBILINOGEN UR QL: NORMAL
WBC NRBC COR # BLD: 8.77 10*3/MM3 (ref 3.4–10.8)

## 2020-01-21 PROCEDURE — 81003 URINALYSIS AUTO W/O SCOPE: CPT | Performed by: INTERNAL MEDICINE

## 2020-01-21 PROCEDURE — 85025 COMPLETE CBC W/AUTO DIFF WBC: CPT | Performed by: INTERNAL MEDICINE

## 2020-01-21 PROCEDURE — 80053 COMPREHEN METABOLIC PANEL: CPT | Performed by: INTERNAL MEDICINE

## 2020-01-21 PROCEDURE — 36415 COLL VENOUS BLD VENIPUNCTURE: CPT | Performed by: INTERNAL MEDICINE

## 2020-01-21 PROCEDURE — 99213 OFFICE O/P EST LOW 20 MIN: CPT | Performed by: INTERNAL MEDICINE

## 2020-01-21 RX ORDER — AMOXICILLIN AND CLAVULANATE POTASSIUM 875; 125 MG/1; MG/1
1 TABLET, FILM COATED ORAL 2 TIMES DAILY
Qty: 14 TABLET | Refills: 0 | Status: SHIPPED | OUTPATIENT
Start: 2020-01-21 | End: 2020-06-16

## 2020-01-21 RX ORDER — LANOLIN ALCOHOL/MO/W.PET/CERES
1000 CREAM (GRAM) TOPICAL DAILY
COMMUNITY

## 2020-01-21 NOTE — PROGRESS NOTES
Subjective   Shu Mckeon is a 79 y.o. female here today for right flank pain that radiates to the front and back.      History of Present Illness   She is having some right sided abd pain starting yesterday  She feels like her bowels have not been reg since her infection in July  No dysuria no hematuria    The following portions of the patient's history were reviewed and updated as appropriate: allergies, current medications, past medical history, past social history and problem list.    Review of Systems   All other systems reviewed and are negative.      Objective   Physical Exam   Constitutional: She is oriented to person, place, and time. She appears well-developed and well-nourished.   HENT:   Head: Normocephalic and atraumatic.   Right Ear: External ear normal.   Left Ear: External ear normal.   Mouth/Throat: Oropharynx is clear and moist.   Eyes: Pupils are equal, round, and reactive to light. Conjunctivae and EOM are normal.   Neck: Normal range of motion. No tracheal deviation present. No thyromegaly present.   Cardiovascular: Normal rate, regular rhythm, normal heart sounds and intact distal pulses.   Pulmonary/Chest: Effort normal and breath sounds normal.   Abdominal: Soft. Bowel sounds are normal. She exhibits no distension. There is no tenderness.   Musculoskeletal: Normal range of motion. She exhibits no edema or deformity.   Neurological: She is alert and oriented to person, place, and time.   Skin: Skin is warm and dry.   Psychiatric: She has a normal mood and affect. Her behavior is normal. Judgment and thought content normal.   Vitals reviewed.      Vitals:    01/21/20 1154   BP: 126/66   Pulse: 75   Temp: 98.8 °F (37.1 °C)   SpO2: 97%     Body mass index is 28.29 kg/m².       Current Outpatient Medications:   •  atorvastatin (LIPITOR) 40 MG tablet, Take 1 tablet by mouth Daily., Disp: 90 tablet, Rfl: 3  •  BIOTIN PO, Take 1 tablet by mouth daily., Disp: , Rfl:   •  Cholecalciferol (VITAMIN D3)  5000 UNITS tablet, Take 1 tablet by mouth daily., Disp: , Rfl:   •  losartan (COZAAR) 50 MG tablet, Take 1 tablet by mouth Daily., Disp: 90 tablet, Rfl: 3  •  Multiple Vitamin (MULTIVITAMIN) capsule, Take 1 capsule by mouth daily., Disp: , Rfl:   •  Multiple Vitamins-Minerals (PRESERVISION AREDS 2 PO), Take 1 tablet by mouth Daily., Disp: , Rfl:   •  Omega-3 Fatty Acids (OMEGA-3 PO), Take  by mouth., Disp: , Rfl:   •  Probiotic Product (PROBIOTIC DAILY PO), Take 1 tablet by mouth Daily., Disp: , Rfl:   •  sertraline (ZOLOFT) 50 MG tablet, Take 1 tablet by mouth Daily., Disp: 90 tablet, Rfl: 3  •  vitamin B-12 (CYANOCOBALAMIN) 1000 MCG tablet, Take 1,000 mcg by mouth Daily., Disp: , Rfl:   •  amoxicillin-clavulanate (AUGMENTIN) 875-125 MG per tablet, Take 1 tablet by mouth 2 (Two) Times a Day., Disp: 14 tablet, Rfl: 0      Assessment/Plan   Diagnoses and all orders for this visit:    Right lower quadrant abdominal pain  -     amoxicillin-clavulanate (AUGMENTIN) 875-125 MG per tablet; Take 1 tablet by mouth 2 (Two) Times a Day.  -     CBC & Differential  -     Comprehensive Metabolic Panel      1.  Right sided abd pain= check labs  She says this feel like when she had diverticulitis-  We will treat and may need a CT depending on labs

## 2020-02-12 RX ORDER — ATORVASTATIN CALCIUM 40 MG/1
40 TABLET, FILM COATED ORAL DAILY
Qty: 90 TABLET | Refills: 1 | Status: SHIPPED | OUTPATIENT
Start: 2020-02-12 | End: 2020-08-17 | Stop reason: SDUPTHER

## 2020-02-12 RX ORDER — LOSARTAN POTASSIUM 50 MG/1
50 TABLET ORAL DAILY
Qty: 90 TABLET | Refills: 1 | Status: SHIPPED | OUTPATIENT
Start: 2020-02-12 | End: 2020-08-25 | Stop reason: SDUPTHER

## 2020-05-06 ENCOUNTER — CLINICAL SUPPORT (OUTPATIENT)
Dept: INTERNAL MEDICINE | Facility: CLINIC | Age: 80
End: 2020-05-06

## 2020-05-06 DIAGNOSIS — M81.6 LOCALIZED OSTEOPOROSIS, UNSPECIFIED PATHOLOGICAL FRACTURE PRESENCE: Primary | ICD-10-CM

## 2020-05-06 PROCEDURE — 96372 THER/PROPH/DIAG INJ SC/IM: CPT | Performed by: INTERNAL MEDICINE

## 2020-06-09 ENCOUNTER — TELEPHONE (OUTPATIENT)
Dept: INTERNAL MEDICINE | Facility: CLINIC | Age: 80
End: 2020-06-09

## 2020-06-09 DIAGNOSIS — E78.5 HYPERLIPIDEMIA, UNSPECIFIED HYPERLIPIDEMIA TYPE: ICD-10-CM

## 2020-06-09 DIAGNOSIS — E11.9 CONTROLLED TYPE 2 DIABETES MELLITUS WITHOUT COMPLICATION, WITHOUT LONG-TERM CURRENT USE OF INSULIN (HCC): ICD-10-CM

## 2020-06-09 DIAGNOSIS — I10 ESSENTIAL HYPERTENSION: Primary | ICD-10-CM

## 2020-06-09 NOTE — TELEPHONE ENCOUNTER
LABS ORDERED      ----- Message from Mariann Fallon MD sent at 6/9/2020  8:09 AM EDT -----  cmp flp cbc tsh  ----- Message -----  From: Araceli Bennett MA  Sent: 6/8/2020   3:26 PM EDT  To: Mariann Fallon MD    PT SCHEDULED FOR LABS PLEASE Advise

## 2020-06-10 LAB
ALBUMIN SERPL-MCNC: 4.3 G/DL (ref 3.5–5.2)
ALBUMIN/GLOB SERPL: 1.5 G/DL
ALP SERPL-CCNC: 72 U/L (ref 39–117)
ALT SERPL-CCNC: 13 U/L (ref 1–33)
AST SERPL-CCNC: 16 U/L (ref 1–32)
BASOPHILS # BLD AUTO: 0.05 10*3/MM3 (ref 0–0.2)
BASOPHILS NFR BLD AUTO: 0.9 % (ref 0–1.5)
BILIRUB SERPL-MCNC: 0.7 MG/DL (ref 0.2–1.2)
BUN SERPL-MCNC: 11 MG/DL (ref 8–23)
BUN/CREAT SERPL: 16.7 (ref 7–25)
CALCIUM SERPL-MCNC: 9.9 MG/DL (ref 8.6–10.5)
CHLORIDE SERPL-SCNC: 102 MMOL/L (ref 98–107)
CHOLEST SERPL-MCNC: 137 MG/DL (ref 0–200)
CO2 SERPL-SCNC: 28.7 MMOL/L (ref 22–29)
CREAT SERPL-MCNC: 0.66 MG/DL (ref 0.57–1)
EOSINOPHIL # BLD AUTO: 0.13 10*3/MM3 (ref 0–0.4)
EOSINOPHIL NFR BLD AUTO: 2.4 % (ref 0.3–6.2)
ERYTHROCYTE [DISTWIDTH] IN BLOOD BY AUTOMATED COUNT: 12.3 % (ref 12.3–15.4)
GLOBULIN SER CALC-MCNC: 2.9 GM/DL
GLUCOSE SERPL-MCNC: 131 MG/DL (ref 65–99)
HBA1C MFR BLD: 6.3 % (ref 4.8–5.6)
HCT VFR BLD AUTO: 40.4 % (ref 34–46.6)
HDLC SERPL-MCNC: 65 MG/DL (ref 40–60)
HGB BLD-MCNC: 13.8 G/DL (ref 12–15.9)
IMM GRANULOCYTES # BLD AUTO: 0.01 10*3/MM3 (ref 0–0.05)
IMM GRANULOCYTES NFR BLD AUTO: 0.2 % (ref 0–0.5)
LDLC SERPL CALC-MCNC: 60 MG/DL (ref 0–100)
LDLC/HDLC SERPL: 0.92 {RATIO}
LYMPHOCYTES # BLD AUTO: 1.48 10*3/MM3 (ref 0.7–3.1)
LYMPHOCYTES NFR BLD AUTO: 27.5 % (ref 19.6–45.3)
MCH RBC QN AUTO: 29.8 PG (ref 26.6–33)
MCHC RBC AUTO-ENTMCNC: 34.2 G/DL (ref 31.5–35.7)
MCV RBC AUTO: 87.3 FL (ref 79–97)
MONOCYTES # BLD AUTO: 0.41 10*3/MM3 (ref 0.1–0.9)
MONOCYTES NFR BLD AUTO: 7.6 % (ref 5–12)
NEUTROPHILS # BLD AUTO: 3.3 10*3/MM3 (ref 1.7–7)
NEUTROPHILS NFR BLD AUTO: 61.4 % (ref 42.7–76)
NRBC BLD AUTO-RTO: 0 /100 WBC (ref 0–0.2)
PLATELET # BLD AUTO: 263 10*3/MM3 (ref 140–450)
POTASSIUM SERPL-SCNC: 4.5 MMOL/L (ref 3.5–5.2)
PROT SERPL-MCNC: 7.2 G/DL (ref 6–8.5)
RBC # BLD AUTO: 4.63 10*6/MM3 (ref 3.77–5.28)
SODIUM SERPL-SCNC: 139 MMOL/L (ref 136–145)
TRIGL SERPL-MCNC: 60 MG/DL (ref 0–150)
TSH SERPL DL<=0.005 MIU/L-ACNC: 1.79 UIU/ML (ref 0.27–4.2)
VLDLC SERPL CALC-MCNC: 12 MG/DL
WBC # BLD AUTO: 5.38 10*3/MM3 (ref 3.4–10.8)

## 2020-06-16 ENCOUNTER — OFFICE VISIT (OUTPATIENT)
Dept: INTERNAL MEDICINE | Facility: CLINIC | Age: 80
End: 2020-06-16

## 2020-06-16 VITALS
WEIGHT: 166.3 LBS | OXYGEN SATURATION: 96 % | DIASTOLIC BLOOD PRESSURE: 74 MMHG | SYSTOLIC BLOOD PRESSURE: 126 MMHG | HEIGHT: 64 IN | HEART RATE: 72 BPM | BODY MASS INDEX: 28.39 KG/M2

## 2020-06-16 DIAGNOSIS — E11.9 CONTROLLED TYPE 2 DIABETES MELLITUS WITHOUT COMPLICATION, WITHOUT LONG-TERM CURRENT USE OF INSULIN (HCC): ICD-10-CM

## 2020-06-16 DIAGNOSIS — I10 ESSENTIAL HYPERTENSION: ICD-10-CM

## 2020-06-16 DIAGNOSIS — E78.5 HYPERLIPIDEMIA, UNSPECIFIED HYPERLIPIDEMIA TYPE: Primary | ICD-10-CM

## 2020-06-16 PROCEDURE — G0439 PPPS, SUBSEQ VISIT: HCPCS | Performed by: INTERNAL MEDICINE

## 2020-06-16 PROCEDURE — 99213 OFFICE O/P EST LOW 20 MIN: CPT | Performed by: INTERNAL MEDICINE

## 2020-06-16 RX ORDER — DENOSUMAB 60 MG/ML
1 INJECTION SUBCUTANEOUS
COMMUNITY
Start: 2020-03-10 | End: 2020-09-01

## 2020-06-16 NOTE — PROGRESS NOTES
Subjective   Shu Mckeon is a 79 y.o. female here today to f/u on HTN, hyperlipidemia and DM 2.      History of Present Illness   Pt has been taking BP meds as prescribed without any problems.  No HA  No episodes of orthostasis  Pt has been taking cholesterol meds as prescribed.  No difficulties with myalgias.   Right medial elbow pain on and off    The following portions of the patient's history were reviewed and updated as appropriate: allergies, current medications, past medical history, past social history and problem list.  She is staying active     Review of Systems   All other systems reviewed and are negative.      Objective   Physical Exam   Constitutional: She is oriented to person, place, and time. She appears well-developed and well-nourished.   HENT:   Head: Normocephalic and atraumatic.   Right Ear: External ear normal.   Left Ear: External ear normal.   Mouth/Throat: Oropharynx is clear and moist.   Eyes: Pupils are equal, round, and reactive to light. Conjunctivae and EOM are normal.   Neck: Normal range of motion. No tracheal deviation present. No thyromegaly present.   Cardiovascular: Normal rate, regular rhythm, normal heart sounds and intact distal pulses.   Pulmonary/Chest: Effort normal and breath sounds normal.   Abdominal: Soft. Bowel sounds are normal. She exhibits no distension. There is no tenderness.   Musculoskeletal: Normal range of motion. She exhibits no edema or deformity.   Neurological: She is alert and oriented to person, place, and time.   Skin: Skin is warm and dry.   Psychiatric: She has a normal mood and affect. Her behavior is normal. Judgment and thought content normal.   Vitals reviewed.      Vitals:    06/16/20 1354   BP: 126/74   Pulse: 72   SpO2: 96%     Body mass index is 28.55 kg/m².       Telephone on 06/09/2020   Component Date Value Ref Range Status   • Glucose 06/09/2020 131* 65 - 99 mg/dL Final   • BUN 06/09/2020 11  8 - 23 mg/dL Final   • Creatinine 06/09/2020  0.66  0.57 - 1.00 mg/dL Final   • eGFR Non African Am 06/09/2020 86  >60 mL/min/1.73 Final    Comment: The MDRD GFR formula is only valid for adults with stable  renal function between ages 18 and 70.     • eGFR  Am 06/09/2020 104  >60 mL/min/1.73 Final   • BUN/Creatinine Ratio 06/09/2020 16.7  7.0 - 25.0 Final   • Sodium 06/09/2020 139  136 - 145 mmol/L Final   • Potassium 06/09/2020 4.5  3.5 - 5.2 mmol/L Final   • Chloride 06/09/2020 102  98 - 107 mmol/L Final   • Total CO2 06/09/2020 28.7  22.0 - 29.0 mmol/L Final   • Calcium 06/09/2020 9.9  8.6 - 10.5 mg/dL Final   • Total Protein 06/09/2020 7.2  6.0 - 8.5 g/dL Final   • Albumin 06/09/2020 4.30  3.50 - 5.20 g/dL Final   • Globulin 06/09/2020 2.9  gm/dL Final   • A/G Ratio 06/09/2020 1.5  g/dL Final   • Total Bilirubin 06/09/2020 0.7  0.2 - 1.2 mg/dL Final   • Alkaline Phosphatase 06/09/2020 72  39 - 117 U/L Final   • AST (SGOT) 06/09/2020 16  1 - 32 U/L Final   • ALT (SGPT) 06/09/2020 13  1 - 33 U/L Final   • Total Cholesterol 06/09/2020 137  0 - 200 mg/dL Final   • Triglycerides 06/09/2020 60  0 - 150 mg/dL Final   • HDL Cholesterol 06/09/2020 65* 40 - 60 mg/dL Final   • VLDL Cholesterol 06/09/2020 12  mg/dL Final   • LDL Cholesterol  06/09/2020 60  0 - 100 mg/dL Final   • LDL/HDL Ratio 06/09/2020 0.92   Final   • WBC 06/09/2020 5.38  3.40 - 10.80 10*3/mm3 Final   • RBC 06/09/2020 4.63  3.77 - 5.28 10*6/mm3 Final   • Hemoglobin 06/09/2020 13.8  12.0 - 15.9 g/dL Final   • Hematocrit 06/09/2020 40.4  34.0 - 46.6 % Final   • MCV 06/09/2020 87.3  79.0 - 97.0 fL Final   • MCH 06/09/2020 29.8  26.6 - 33.0 pg Final   • MCHC 06/09/2020 34.2  31.5 - 35.7 g/dL Final   • RDW 06/09/2020 12.3  12.3 - 15.4 % Final   • Platelets 06/09/2020 263  140 - 450 10*3/mm3 Final   • Neutrophil Rel % 06/09/2020 61.4  42.7 - 76.0 % Final   • Lymphocyte Rel % 06/09/2020 27.5  19.6 - 45.3 % Final   • Monocyte Rel % 06/09/2020 7.6  5.0 - 12.0 % Final   • Eosinophil Rel % 06/09/2020  2.4  0.3 - 6.2 % Final   • Basophil Rel % 06/09/2020 0.9  0.0 - 1.5 % Final   • Neutrophils Absolute 06/09/2020 3.30  1.70 - 7.00 10*3/mm3 Final   • Lymphocytes Absolute 06/09/2020 1.48  0.70 - 3.10 10*3/mm3 Final   • Monocytes Absolute 06/09/2020 0.41  0.10 - 0.90 10*3/mm3 Final   • Eosinophils Absolute 06/09/2020 0.13  0.00 - 0.40 10*3/mm3 Final   • Basophils Absolute 06/09/2020 0.05  0.00 - 0.20 10*3/mm3 Final   • Immature Granulocyte Rel % 06/09/2020 0.2  0.0 - 0.5 % Final   • Immature Grans Absolute 06/09/2020 0.01  0.00 - 0.05 10*3/mm3 Final   • nRBC 06/09/2020 0.0  0.0 - 0.2 /100 WBC Final   • TSH 06/09/2020 1.790  0.270 - 4.200 uIU/mL Final   • Hemoglobin A1C 06/09/2020 6.30* 4.80 - 5.60 % Final    Comment: Hemoglobin A1C Ranges:  Increased Risk for Diabetes  5.7% to 6.4%  Diabetes                     >= 6.5%  Diabetic Goal                < 7.0%         Current Outpatient Medications:   •  atorvastatin (LIPITOR) 40 MG tablet, TAKE 1 TABLET BY MOUTH DAILY, Disp: 90 tablet, Rfl: 1  •  BIOTIN PO, Take 1 tablet by mouth daily., Disp: , Rfl:   •  Cholecalciferol (VITAMIN D3) 5000 UNITS tablet, Take 1 tablet by mouth daily., Disp: , Rfl:   •  losartan (COZAAR) 50 MG tablet, TAKE 1 TABLET BY MOUTH DAILY, Disp: 90 tablet, Rfl: 1  •  Multiple Vitamin (MULTIVITAMIN) capsule, Take 1 capsule by mouth daily., Disp: , Rfl:   •  Multiple Vitamins-Minerals (PRESERVISION AREDS 2 PO), Take 1 tablet by mouth Daily., Disp: , Rfl:   •  Omega-3 Fatty Acids (OMEGA-3 PO), Take  by mouth., Disp: , Rfl:   •  Probiotic Product (PROBIOTIC DAILY PO), Take 1 tablet by mouth Daily., Disp: , Rfl:   •  PROLIA 60 MG/ML solution prefilled syringe syringe, 1 mL Every 6 (Six) Months., Disp: , Rfl:   •  sertraline (ZOLOFT) 50 MG tablet, TAKE 1 TABLET BY MOUTH DAILY, Disp: 90 tablet, Rfl: 1  •  vitamin B-12 (CYANOCOBALAMIN) 1000 MCG tablet, Take 1,000 mcg by mouth Daily., Disp: , Rfl:       Assessment/Plan   Diagnoses and all orders for this  visit:    Hyperlipidemia, unspecified hyperlipidemia type    Essential hypertension    Controlled type 2 diabetes mellitus without complication, without long-term current use of insulin (CMS/[facility])    1.  HTN- ok with current meds  2. HPL_ ok with current meds

## 2020-06-16 NOTE — PATIENT INSTRUCTIONS
Medicare Wellness  Personal Prevention Plan of Service     Date of Office Visit:  2020  Encounter Provider:  Mariann Fallon MD  Place of Service:  [facility]  Patient Name: Shu Mckeon  :  1940    As part of the Medicare Wellness portion of your visit today, we are providing you with this personalized preventive plan of services (PPPS). This plan is based upon recommendations of the [place] Preventive Services Task Force (USPSTF) and the Advisory Committee on Immunization Practices (ACIP).    This lists the preventive care services that should be considered, and provides dates of when you are due. Items listed as completed are up-to-date and do not require any further intervention.    Health Maintenance   Topic Date Due   • TDAP/TD VACCINES (1 - Tdap) [birth year]/10/1949   • ZOSTER VACCINE (2 of 2) 2017   • DXA SCAN  2020   • DIABETIC EYE EXAM  2020   • MEDICARE ANNUAL WELLNESS  2020   • INFLUENZA VACCINE  2020   • HEMOGLOBIN A1C  2020   • LIPID PANEL  2021   • MAMMOGRAM  2021   • Pneumococcal Vaccine Once at 65 Years Old  Completed   • URINE MICROALBUMIN  Discontinued       Orders Placed This Encounter   Procedures   • Comprehensive Metabolic Panel     Standing Status:   Future     Standing Expiration Date:   2021   • Magnesium     Standing Status:   Future     Standing Expiration Date:   2021   • TSH Rfx On Abnormal To Free T4     Standing Status:   Future     Standing Expiration Date:   2021   • Hemoglobin A1c     Standing Status:   Future     Standing Expiration Date:   2021   • CBC & Differential     Standing Status:   Future     Standing Expiration Date:   2021     Order Specific Question:   Manual Differential     Answer:   No       No follow-ups on file.          Golfer's Elbow Rehab  Ask your health care provider which exercises are safe for you. Do exercises exactly as told by your  health care provider and adjust them as directed. It is normal to feel mild stretching, pulling, tightness, or discomfort as you do these exercises. Stop right away if you feel sudden pain or your pain gets worse. Do not begin these exercises until told by your health care provider.  Stretching and range-of-motion exercises  These exercises warm up your muscles and joints and improve the movement and flexibility of your elbow.  Wrist extension    1. Straighten your left / right elbow in front of you with your palm facing up toward the ceiling.  ? If told by your health care provider, bend your left / right elbow to a 90-degree angle (right angle) at your side.  2. With your other hand, gently pull your left / right hand and fingers toward the floor (extension). Stop when you feel a gentle stretch on the palm side of your forearm.  3. Hold this position for __________ seconds.  Repeat __________ times. Complete this exercise __________ times a day.  Wrist flexion    1. Straighten your left / right elbow in front of you with your palm facing down toward the floor.  ? If told by your health care provider, bend your left / right elbow to a 90-degree angle (right angle) at your side.  2. With your other hand, gently push over the back of your left / right hand so your fingers point toward the floor (flexion). Stop when you feel a gentle stretch on the back of your forearm.  3. Hold this position for __________ seconds.  Repeat __________ times. Complete this exercise __________ times a day.  Forearm rotation, supination  1. Sit or stand with your elbows at your side.  2. Bend your left / right elbow to a 90-degree angle (right angle).  3. Using your uninjured hand, turn your left / right palm up toward the ceiling (supination) until you feel a gentle stretch along the inside of your forearm.  4. Hold this position for __________ seconds.  Repeat __________ times. Complete this exercise __________ times a day.  Forearm  rotation, pronation  1. Sit or stand with your elbows at your side.  2. Bend your left / right elbow to a 90-degree angle (right angle).  3. Using your uninjured hand, turn your left / right palm down toward the floor (pronation) until you feel a gentle stretch along the top of your forearm.  4. Hold this position for __________ seconds.  Repeat __________ times. Complete this exercise __________ times a day.  Strengthening exercises  These exercises build strength and endurance in your elbow. Endurance is the ability to use your muscles for a long time, even after they get tired.  Wrist flexion    1. Sit with your left / right forearm supported on a table or other surface and your palm turned up toward the ceiling. Let your left / right wrist extend over the edge of the surface.  2. Hold a __________ weight or a piece of rubber exercise band or tubing.  ? If using a rubber exercise band or tubing, hold the other end of the tubing with your other hand.  3. Slowly bend your wrist so your hand moves up toward the ceiling (flexion). Try to only move your wrist and keep the rest of your arm still.  4. Hold this position for __________ seconds.  5. Slowly return to the starting position.  Repeat __________ times. Complete this exercise __________ times a day.  Wrist flexion, eccentric  1. Sit with your left / right forearm palm-up and supported on a table or other surface. Let your left / right wrist extend over the edge of the surface.  2. Hold a __________ weight or a piece of rubber exercise band or tubing in your left / right hand.  ? If using a rubber exercise band or tubing, hold the other end of the tubing with your other hand.  3. Use your uninjured hand to move your left / right hand up toward the ceiling.  4. Take your uninjured hand away and slowly return to the starting position using only your left / right hand (eccentric flexion).  Repeat __________ times. Complete this exercise __________ times a  day.  Forearm rotation, pronation  To do this exercise, you will need a lightweight hammer or rubber mallet.  1. Sit with your left / right forearm supported on a table or other surface. Bend your elbow to a 90-degree angle (right angle). Position your forearm so that your palm is facing up toward the ceiling, with your hand resting over the edge of the table.  2. Hold a hammer in your left / right hand.  ? To make this exercise easier, hold the hammer near the head of the hammer.  ? To make this exercise harder, hold the hammer near the end of the handle.  3. Without moving your elbow, slowly turn (rotate) your forearm so your palm faces down toward the floor (pronation).  4. Hold this position for __________ seconds.  5. Slowly return to the starting position.  Repeat __________ times. Complete this exercise __________ times a day.  Shoulder blade squeeze  1. Sit in a stable chair or stand with good posture. If you are sitting down, do not let your back touch the back of the chair.  2. Your arms should be at your sides with your elbows bent to a 90-degree angle (right angle). Position your forearms so that your thumbs are facing the ceiling (neutral position).  3. Without lifting your shoulders up, squeeze your shoulder blades tightly together.  4. Hold this position for __________ seconds.  5. Slowly release and return to the starting position.  Repeat __________ times. Complete this exercise __________ times a day.  This information is not intended to replace advice given to you by your health care provider. Make sure you discuss any questions you have with your health care provider.  Document Released: 12/18/2006 Document Revised: 04/09/2020 Document Reviewed: 02/11/2020  Elsevier Patient Education © 2020 [employer]

## 2020-06-16 NOTE — PROGRESS NOTES
The ABCs of the Annual Wellness Visit  Subsequent Medicare Wellness Visit    No chief complaint on file.      Subjective   History of Present Illness:  Shu Mckeon is a 79 y.o. female who presents for a Subsequent Medicare Wellness Visit.    HEALTH RISK ASSESSMENT    Recent Hospitalizations:  No hospitalization(s) within the last year.    Current Medical Providers:  Patient Care Team:  Mariann Fallon MD as PCP - General  Mariann Fallon MD as PCP - Family Medicine  Nicolas Latif MD as Consulting Physician (Cardiology)    Smoking Status:  Social History     Tobacco Use   Smoking Status Never Smoker   Smokeless Tobacco Never Used       Alcohol Consumption:  Social History     Substance and Sexual Activity   Alcohol Use Yes   • Alcohol/week: 7.0 standard drinks   • Types: 7 Glasses of wine per week    Comment: 1/day-socially       Depression Screen:   PHQ-2/PHQ-9 Depression Screening 6/16/2020   Little interest or pleasure in doing things 0   Feeling down, depressed, or hopeless 0   Trouble falling or staying asleep, or sleeping too much 0   Feeling tired or having little energy 0   Poor appetite or overeating 0   Feeling bad about yourself - or that you are a failure or have let yourself or your family down 0   Trouble concentrating on things, such as reading the newspaper or watching television 0   Moving or speaking so slowly that other people could have noticed. Or the opposite - being so fidgety or restless that you have been moving around a lot more than usual 0   Thoughts that you would be better off dead, or of hurting yourself in some way 0   Total Score 0   If you checked off any problems, how difficult have these problems made it for you to do your work, take care of things at home, or get along with other people? Not difficult at all       Fall Risk Screen:  STEADI Fall Risk Assessment was completed, and patient is at LOW risk for falls.Assessment completed on:6/16/2020    Health Habits and Functional  and Cognitive Screening:  Functional & Cognitive Status 6/16/2020   Do you have difficulty preparing food and eating? No   Do you have difficulty bathing yourself, getting dressed or grooming yourself? No   Do you have difficulty using the toilet? No   Do you have difficulty moving around from place to place? No   Do you have trouble with steps or getting out of a bed or a chair? No   Current Diet Low Carb Diet   Dental Exam Not up to date   Eye Exam Up to date   Exercise (times per week) 4 times per week   Current Exercise Activities Include Walking   Do you need help using the phone?  No   Are you deaf or do you have serious difficulty hearing?  No   Do you need help with transportation? No   Do you need help shopping? No   Do you need help preparing meals?  No   Do you need help with housework?  No   Do you need help with laundry? No   Do you need help taking your medications? No   Do you need help managing money? No   Do you ever drive or ride in a car without wearing a seat belt? No   Have you felt unusual stress, anger or loneliness in the last month? No   Who do you live with? Spouse   If you need help, do you have trouble finding someone available to you? No   Have you been bothered in the last four weeks by sexual problems? No   Do you have difficulty concentrating, remembering or making decisions? No         Does the patient have evidence of cognitive impairment? No    Asprin use counseling:Does not need ASA (and currently is not on it)    Age-appropriate Screening Schedule:  Refer to the list below for future screening recommendations based on patient's age, sex and/or medical conditions. Orders for these recommended tests are listed in the plan section. The patient has been provided with a written plan.    Health Maintenance   Topic Date Due   • TDAP/TD VACCINES (1 - Tdap) 08/10/1949   • ZOSTER VACCINE (2 of 2) 11/14/2017   • DXA SCAN  04/06/2020   • DIABETIC EYE EXAM  04/22/2020   • INFLUENZA VACCINE   08/01/2020   • HEMOGLOBIN A1C  12/09/2020   • LIPID PANEL  06/09/2021   • MAMMOGRAM  07/19/2021   • URINE MICROALBUMIN  Discontinued          The following portions of the patient's history were reviewed and updated as appropriate: allergies, current medications, past medical history, past social history and problem list.    Outpatient Medications Prior to Visit   Medication Sig Dispense Refill   • atorvastatin (LIPITOR) 40 MG tablet TAKE 1 TABLET BY MOUTH DAILY 90 tablet 1   • BIOTIN PO Take 1 tablet by mouth daily.     • Cholecalciferol (VITAMIN D3) 5000 UNITS tablet Take 1 tablet by mouth daily.     • losartan (COZAAR) 50 MG tablet TAKE 1 TABLET BY MOUTH DAILY 90 tablet 1   • Multiple Vitamin (MULTIVITAMIN) capsule Take 1 capsule by mouth daily.     • Multiple Vitamins-Minerals (PRESERVISION AREDS 2 PO) Take 1 tablet by mouth Daily.     • Omega-3 Fatty Acids (OMEGA-3 PO) Take  by mouth.     • Probiotic Product (PROBIOTIC DAILY PO) Take 1 tablet by mouth Daily.     • PROLIA 60 MG/ML solution prefilled syringe syringe 1 mL Every 6 (Six) Months.     • sertraline (ZOLOFT) 50 MG tablet TAKE 1 TABLET BY MOUTH DAILY 90 tablet 1   • vitamin B-12 (CYANOCOBALAMIN) 1000 MCG tablet Take 1,000 mcg by mouth Daily.     • amoxicillin-clavulanate (AUGMENTIN) 875-125 MG per tablet Take 1 tablet by mouth 2 (Two) Times a Day. 14 tablet 0     No facility-administered medications prior to visit.        Patient Active Problem List   Diagnosis   • Ankle fracture   • Diabetes type 2, controlled (CMS/[facility])   • Fatigue   • HLD (hyperlipidemia)   • BP (high blood pressure)   • OP (osteoporosis)   • Closed fracture of right pelvis (CMS/HCC)   • Low back pain   • Lumbar spondylolysis   • Radial scar of breast       Advanced Care Planning:  ACP discussion was held with the patient during this visit. Patient has an advance directive in EMR which is still valid.     Review of Systems    Compared to one year ago, the patient feels her physical  "health is better.  Compared to one year ago, the patient feels her mental health is better.    Reviewed chart for potential of high risk medication in the elderly: yes  Reviewed chart for potential of harmful drug interactions in the elderly:yes    Objective         Vitals:    06/16/20 1354   BP: 126/74   BP Location: Left arm   Patient Position: Sitting   Pulse: 72   SpO2: 96%   Weight: 75.4 kg (166 lb 4.8 oz)   Height: 162.6 cm (64\")   PainSc:   4       Body mass index is 28.55 kg/m².  Discussed the patient's BMI with her. The BMI is in the acceptable range.    Physical Exam    Lab Results   Component Value Date     (H) 06/09/2020    CHLPL 137 06/09/2020    TRIG 60 06/09/2020    HDL 65 (H) 06/09/2020    LDL 60 06/09/2020    VLDL 12 06/09/2020    HGBA1C 6.30 (H) 06/09/2020        Assessment/Plan   Medicare Risks and Personalized Health Plan  CMS Preventative Services Quick Reference  Fall Risk    The above risks/problems have been discussed with the patient.  Pertinent information has been shared with the patient in the After Visit Summary.  Follow up plans and orders are seen below in the Assessment/Plan Section.    Diagnoses and all orders for this visit:    1. Hyperlipidemia, unspecified hyperlipidemia type (Primary)    2. Essential hypertension    3. Controlled type 2 diabetes mellitus without complication, without long-term current use of insulin (CMS/[facility])      Follow Up:  No follow-ups on file.     An After Visit Summary and PPPS were given to the patient.     I have rec some bal;ance exercises  She is walking reg          "

## 2020-06-24 ENCOUNTER — TRANSCRIBE ORDERS (OUTPATIENT)
Dept: ADMINISTRATIVE | Facility: HOSPITAL | Age: 80
End: 2020-06-24

## 2020-06-24 DIAGNOSIS — Z12.31 SCREENING MAMMOGRAM, ENCOUNTER FOR: Primary | ICD-10-CM

## 2020-07-20 ENCOUNTER — HOSPITAL ENCOUNTER (OUTPATIENT)
Dept: MAMMOGRAPHY | Facility: HOSPITAL | Age: 80
Discharge: HOME | End: 2020-07-20
Admitting: INTERNAL MEDICINE

## 2020-07-20 DIAGNOSIS — Z12.31 SCREENING MAMMOGRAM, ENCOUNTER FOR: ICD-10-CM

## 2020-07-20 PROCEDURE — 77063 BREAST TOMOSYNTHESIS BI: CPT

## 2020-07-20 PROCEDURE — 77067 SCR MAMMO BI INCL CAD: CPT

## 2020-07-22 DIAGNOSIS — R92.8 ABNORMAL MAMMOGRAM: Primary | ICD-10-CM

## 2020-08-12 ENCOUNTER — HOSPITAL ENCOUNTER (OUTPATIENT)
Dept: ULTRASOUND IMAGING | Facility: HOSPITAL | Age: 80
Discharge: HOME | End: 2020-08-12

## 2020-08-12 ENCOUNTER — HOSPITAL ENCOUNTER (OUTPATIENT)
Dept: MAMMOGRAPHY | Facility: HOSPITAL | Age: 80
Discharge: HOME | End: 2020-08-12
Admitting: INTERNAL MEDICINE

## 2020-08-12 DIAGNOSIS — R92.8 ABNORMAL MAMMOGRAM: ICD-10-CM

## 2020-08-12 PROCEDURE — 76642 ULTRASOUND BREAST LIMITED: CPT

## 2020-08-12 PROCEDURE — 77065 DX MAMMO INCL CAD UNI: CPT

## 2020-08-13 ENCOUNTER — TELEPHONE (OUTPATIENT)
Dept: SURGERY | Facility: CLINIC | Age: 80
End: 2020-08-13

## 2020-08-13 DIAGNOSIS — R92.8 ABNORMAL MAMMOGRAM: Primary | ICD-10-CM

## 2020-08-14 ENCOUNTER — TELEPHONE (OUTPATIENT)
Dept: SURGERY | Facility: CLINIC | Age: 80
End: 2020-08-14

## 2020-08-17 RX ORDER — ATORVASTATIN CALCIUM 40 MG/1
40 TABLET, FILM COATED ORAL DAILY
Qty: 90 TABLET | Refills: 1 | Status: SHIPPED | OUTPATIENT
Start: 2020-08-17 | End: 2021-02-15 | Stop reason: SDUPTHER

## 2020-08-25 ENCOUNTER — OFFICE VISIT (OUTPATIENT)
Dept: INTERNAL MEDICINE | Facility: CLINIC | Age: 80
End: 2020-08-25

## 2020-08-25 VITALS
HEART RATE: 72 BPM | DIASTOLIC BLOOD PRESSURE: 78 MMHG | SYSTOLIC BLOOD PRESSURE: 150 MMHG | HEIGHT: 64 IN | OXYGEN SATURATION: 97 % | WEIGHT: 169.8 LBS | BODY MASS INDEX: 28.99 KG/M2

## 2020-08-25 DIAGNOSIS — E55.9 VITAMIN D DEFICIENCY: ICD-10-CM

## 2020-08-25 DIAGNOSIS — I10 ESSENTIAL HYPERTENSION: Primary | ICD-10-CM

## 2020-08-25 DIAGNOSIS — R53.1 WEAKNESS: ICD-10-CM

## 2020-08-25 PROCEDURE — 93000 ELECTROCARDIOGRAM COMPLETE: CPT | Performed by: INTERNAL MEDICINE

## 2020-08-25 PROCEDURE — 99214 OFFICE O/P EST MOD 30 MIN: CPT | Performed by: INTERNAL MEDICINE

## 2020-08-25 RX ORDER — LOSARTAN POTASSIUM 50 MG/1
50 TABLET ORAL DAILY
Qty: 90 TABLET | Refills: 1 | Status: SHIPPED | OUTPATIENT
Start: 2020-08-25 | End: 2021-03-01 | Stop reason: SDUPTHER

## 2020-08-25 NOTE — PROGRESS NOTES
Subjective   Shu Mckeon is a 80 y.o. female here today to weakness and being shaky.      History of Present Illness   She has been having int spells of feeling light headed and shaky on and off for the past few weeks  She has had these before but now more freq.   Weakness all over nothing focal    The following portions of the patient's history were reviewed and updated as appropriate: allergies, current medications, past medical history, past social history and problem list.  No recent change in  Diet    Review of Systems   All other systems reviewed and are negative.      Objective   Physical Exam   Constitutional: She is oriented to person, place, and time. She appears well-developed and well-nourished.   HENT:   Head: Normocephalic and atraumatic.   Right Ear: External ear normal.   Left Ear: External ear normal.   Mouth/Throat: Oropharynx is clear and moist.   Eyes: Pupils are equal, round, and reactive to light. Conjunctivae and EOM are normal.   Neck: Normal range of motion. No tracheal deviation present. No thyromegaly present.   Cardiovascular: Normal rate, regular rhythm, normal heart sounds and intact distal pulses.   Pulmonary/Chest: Effort normal and breath sounds normal.   Abdominal: Soft. Bowel sounds are normal. She exhibits no distension. There is no tenderness.   Musculoskeletal: Normal range of motion. She exhibits no edema or deformity.   Neurological: She is alert and oriented to person, place, and time.   Skin: Skin is warm and dry.   Psychiatric: She has a normal mood and affect. Her behavior is normal. Judgment and thought content normal.   Vitals reviewed.      Vitals:    08/25/20 1449   BP: 150/78   Pulse: 72   SpO2: 97%     Body mass index is 29.15 kg/m².       Current Outpatient Medications:   •  atorvastatin (LIPITOR) 40 MG tablet, Take 1 tablet by mouth Daily., Disp: 90 tablet, Rfl: 1  •  BIOTIN PO, Take 1 tablet by mouth daily., Disp: , Rfl:   •  Cholecalciferol (VITAMIN D3) 5000  UNITS tablet, Take 1 tablet by mouth daily., Disp: , Rfl:   •  losartan (COZAAR) 50 MG tablet, Take 1 tablet by mouth Daily., Disp: 90 tablet, Rfl: 1  •  Multiple Vitamin (MULTIVITAMIN) capsule, Take 1 capsule by mouth daily., Disp: , Rfl:   •  Multiple Vitamins-Minerals (PRESERVISION AREDS 2 PO), Take 2 tablets by mouth Daily., Disp: , Rfl:   •  Multiple Vitamins-Minerals (ZINC PO), Take  by mouth., Disp: , Rfl:   •  Omega-3 Fatty Acids (OMEGA-3 PO), Take  by mouth., Disp: , Rfl:   •  Probiotic Product (PROBIOTIC DAILY PO), Take 1 tablet by mouth Daily., Disp: , Rfl:   •  PROLIA 60 MG/ML solution prefilled syringe syringe, 1 mL Every 6 (Six) Months., Disp: , Rfl:   •  sertraline (ZOLOFT) 50 MG tablet, Take 1 tablet by mouth Daily., Disp: 90 tablet, Rfl: 1  •  vitamin B-12 (CYANOCOBALAMIN) 1000 MCG tablet, Take 1,000 mcg by mouth Daily., Disp: , Rfl:       Assessment/Plan   Diagnoses and all orders for this visit:    Essential hypertension  -     CBC & Differential  -     Comprehensive Metabolic Panel  -     Vitamin B12  -     Vitamin D 25 Hydroxy    Weakness  -     CBC & Differential  -     Comprehensive Metabolic Panel  -     Vitamin B12  -     Vitamin D 25 Hydroxy    Vitamin D deficiency  -     Vitamin D 25 Hydroxy      1.  Weakness-  We will check labs  Episodes are transient  EKG is stable LBBB.  No other specific findings  I have advised her to stay well hydrated  2.  HTN-  A little high but recheck is ok  3.  Osteoporosis: Patient has had a fracture.  She is doing well with Prolia and we will continue this

## 2020-08-26 LAB
25(OH)D3+25(OH)D2 SERPL-MCNC: 70.6 NG/ML (ref 30–100)
ALBUMIN SERPL-MCNC: 4.7 G/DL (ref 3.5–5.2)
ALBUMIN/GLOB SERPL: 2.2 G/DL
ALP SERPL-CCNC: 64 U/L (ref 39–117)
ALT SERPL-CCNC: 15 U/L (ref 1–33)
AST SERPL-CCNC: 17 U/L (ref 1–32)
BASOPHILS # BLD AUTO: 0.05 10*3/MM3 (ref 0–0.2)
BASOPHILS NFR BLD AUTO: 0.7 % (ref 0–1.5)
BILIRUB SERPL-MCNC: 0.6 MG/DL (ref 0–1.2)
BUN SERPL-MCNC: 11 MG/DL (ref 8–23)
BUN/CREAT SERPL: 17.7 (ref 7–25)
CALCIUM SERPL-MCNC: 9.8 MG/DL (ref 8.6–10.5)
CHLORIDE SERPL-SCNC: 99 MMOL/L (ref 98–107)
CO2 SERPL-SCNC: 26.6 MMOL/L (ref 22–29)
CREAT SERPL-MCNC: 0.62 MG/DL (ref 0.57–1)
EOSINOPHIL # BLD AUTO: 0.21 10*3/MM3 (ref 0–0.4)
EOSINOPHIL NFR BLD AUTO: 2.8 % (ref 0.3–6.2)
ERYTHROCYTE [DISTWIDTH] IN BLOOD BY AUTOMATED COUNT: 12.1 % (ref 12.3–15.4)
GLOBULIN SER CALC-MCNC: 2.1 GM/DL
GLUCOSE SERPL-MCNC: 80 MG/DL (ref 65–99)
HCT VFR BLD AUTO: 40.5 % (ref 34–46.6)
HGB BLD-MCNC: 13.8 G/DL (ref 12–15.9)
IMM GRANULOCYTES # BLD AUTO: 0.03 10*3/MM3 (ref 0–0.05)
IMM GRANULOCYTES NFR BLD AUTO: 0.4 % (ref 0–0.5)
LYMPHOCYTES # BLD AUTO: 2.63 10*3/MM3 (ref 0.7–3.1)
LYMPHOCYTES NFR BLD AUTO: 34.7 % (ref 19.6–45.3)
MCH RBC QN AUTO: 29.4 PG (ref 26.6–33)
MCHC RBC AUTO-ENTMCNC: 34.1 G/DL (ref 31.5–35.7)
MCV RBC AUTO: 86.2 FL (ref 79–97)
MONOCYTES # BLD AUTO: 0.71 10*3/MM3 (ref 0.1–0.9)
MONOCYTES NFR BLD AUTO: 9.4 % (ref 5–12)
NEUTROPHILS # BLD AUTO: 3.94 10*3/MM3 (ref 1.7–7)
NEUTROPHILS NFR BLD AUTO: 52 % (ref 42.7–76)
NRBC BLD AUTO-RTO: 0 /100 WBC (ref 0–0.2)
PLATELET # BLD AUTO: 271 10*3/MM3 (ref 140–450)
POTASSIUM SERPL-SCNC: 3.9 MMOL/L (ref 3.5–5.2)
PROT SERPL-MCNC: 6.8 G/DL (ref 6–8.5)
RBC # BLD AUTO: 4.7 10*6/MM3 (ref 3.77–5.28)
SODIUM SERPL-SCNC: 137 MMOL/L (ref 136–145)
VIT B12 SERPL-MCNC: 776 PG/ML (ref 211–946)
WBC # BLD AUTO: 7.57 10*3/MM3 (ref 3.4–10.8)

## 2020-09-01 RX ORDER — DENOSUMAB 60 MG/ML
INJECTION SUBCUTANEOUS
Qty: 1 ML | Refills: 1 | Status: SHIPPED | OUTPATIENT
Start: 2020-09-01 | End: 2020-11-17

## 2020-09-09 ENCOUNTER — OFFICE VISIT (OUTPATIENT)
Dept: SURGERY | Facility: CLINIC | Age: 80
End: 2020-09-09

## 2020-09-09 VITALS
TEMPERATURE: 98 F | HEART RATE: 83 BPM | BODY MASS INDEX: 29.53 KG/M2 | HEIGHT: 64 IN | DIASTOLIC BLOOD PRESSURE: 74 MMHG | SYSTOLIC BLOOD PRESSURE: 118 MMHG | WEIGHT: 173 LBS | OXYGEN SATURATION: 94 %

## 2020-09-09 DIAGNOSIS — R92.1 BREAST CALCIFICATIONS ON MAMMOGRAM: ICD-10-CM

## 2020-09-09 DIAGNOSIS — R92.8 ABNORMAL MAMMOGRAM: Primary | ICD-10-CM

## 2020-09-09 DIAGNOSIS — R92.8 ABNORMAL ULTRASOUND OF BREAST: ICD-10-CM

## 2020-09-09 DIAGNOSIS — Z80.3 FH: BREAST CANCER: ICD-10-CM

## 2020-09-09 DIAGNOSIS — N64.89 RADIAL SCAR OF BREAST: ICD-10-CM

## 2020-09-09 PROCEDURE — 88305 TISSUE EXAM BY PATHOLOGIST: CPT | Performed by: SURGERY

## 2020-09-09 PROCEDURE — 88341 IMHCHEM/IMCYTCHM EA ADD ANTB: CPT | Performed by: SURGERY

## 2020-09-09 PROCEDURE — 88300 SURGICAL PATH GROSS: CPT | Performed by: SURGERY

## 2020-09-09 PROCEDURE — 88182 CELL MARKER STUDY: CPT

## 2020-09-09 PROCEDURE — 88342 IMHCHEM/IMCYTCHM 1ST ANTB: CPT | Performed by: SURGERY

## 2020-09-09 PROCEDURE — 19083 BX BREAST 1ST LESION US IMAG: CPT | Performed by: SURGERY

## 2020-09-09 PROCEDURE — 88185 FLOWCYTOMETRY/TC ADD-ON: CPT

## 2020-09-09 PROCEDURE — 88184 FLOWCYTOMETRY/ TC 1 MARKER: CPT

## 2020-09-09 PROCEDURE — 99213 OFFICE O/P EST LOW 20 MIN: CPT | Performed by: SURGERY

## 2020-09-09 NOTE — PROGRESS NOTES
Chief Complaint: Shu Mckeon is a 80 y.o. female who was seen in consultation at the request of Mariann Fallon MD  for abnormal breast imaging and a followup visit    History of Present Illness:  Patient presents with abnormal breast imaging, LEFT breast. She noted no new masses, skin changes, nipple discharge, nipple changes prior to her most recent imaging.  Her most recent imaging includes the followin/14/15 [facility]  MAMMO SCREEN EVITA  FLUHR, SHU  Benign dermal calcifications. BIRADS category 2: benign.    18 [facility]  MAMMO SCREEN EVITA  FLUHR, SHU  Scattered fibroglandular densities anterior one third retroareolar left breast interval development of a cluster of calcifications. BIRADS category 0.    8/3/18  [facility]  MAMMO DIAG LEFT  FLUHR, SHU  Microcalcifications within the outer inferior aspect of the left breast. BIRADS category 4.      She has not had a breast biopsy in the past.  She has her uterus and ovaries, is postmenopausal, and takes nor hormones.  Her family history includes the following: She has one daughter, one sister, no maternal aunts, 2 paternal aunts.  She has a half sister on paternal he based who had breast cancer in her 70s and a paternal cousin who had breast cancer in her 50s.      18- left breast stereotactic biopsy: Upper outer quadrant, 5:00: Small radial sclerosing lesion with usual hyperplasia, separate foci of usual hyperplasia, columnar cell change without atypia: Focal stromal fibrosis, apically metaplasia, and fibroadenomatoid change.   Concordant per Dr Tariq.    She denies significant bruising at her biopsy site.  She denies any redness warmth or drainage from the mammotomy.    Pathology from  10-24-18 excision radial scar returned as radial scar and no atypia.     She denies any redness, warmth, drainage from her incision.    In the interim,  Shu Mckeon  has done well.  She has noted no changes in her breast exam. No new masses, skin changes, nipple changes, nipple  discharge either breast.     Her most recent imaging includes the following:  July 19, 2019 bilateral screening mammogram with 3D [facility]: Scattered fibroglandular densities.  No evidence for malignancy in either breast.  BI-RADS 1    Interval History:  In the interim,  Shu Mckeon  has done well.  She has noted no changes in her breast exam. No new masses, skin changes, nipple changes, nipple discharge either breast.     Her most recent imaging includes the following:  [facility] point mammography July 20, 2020 bilateral screening mammogram with tomosynthesis.  Scattered areas of fibroglandular density.  There is a 1 cm mass in the lower left axilla, axillary tail that is slightly increased in size.  BI-RADS 0  August 12, 2020 left diagnostic mammogram with left breast ultrasound [facility].  On mammogram visualization of a 1.1 cm round mass consistent with a lymph node in the axillary tail with a cortex that is thickened relative to other visualized node.  On ultrasound axillary tail and left axillary ultrasound showed on the order of 16 cm from the nipple a 1.1 cm lymph node with a thickened cortex.  Impression 1.1 cm rounded left axillary tail lymph node with a thickened cortex recommend ultrasound-guided left breast biopsy BI-RADS 4.    She is here to review.      She has gained 11#.  She is here to review          Review of Systems:  Review of Systems   Constitutional: Negative for unexpected weight change (7 lb wt loss).   Genitourinary: Positive for nocturia.    Musculoskeletal: Positive for back pain.   Neurological: Positive for light-headedness.   All other systems reviewed and are negative.       Past Medical and Surgical History:  Breast Biopsy History:  Patient has not had a breast biopsy in the past.  Breast Cancer HIstory:  Patient does not have a past medical history of breast cancer.  Breast Operations, and year:  NONE   Obstetric/Gynecologic  "History:  Age menstrual periods began: 11  Patient is postmenopausal, entered menopause naturally at age:    Number of pregnancies: 3  Number of live births: 3  Number of abortions or miscarriages: 0  Age of delivery of first child: 20  Patient did not breast feed.  Length of time taking birth control pills: 10-12 YRS   Patient took hormone replacement during the following dates: 10 YRS   PATIENT STILL HAS UTERUS AND OVARIES.     Past Surgical History:   Procedure Laterality Date   • BREAST BIOPSY Left 10/25/2018    Procedure: left breast ultrasound-guided excision of radial scar.;  Surgeon: Shayna Blount MD;  [place] [facility] [place] [facility] [place]  Service: General   • BREAST SURGERY Left    • CARDIAC CATHETERIZATION      not sure when or where   • CARDIAC CATHETERIZATION     • CATARACT EXTRACTION WITH INTRAOCULAR LENS IMPLANT     • COLONOSCOPY     • FEMUR FRACTURE SURGERY Right    • FRACTURE SURGERY  2015    LT ANKLE   • REPLACEMENT TOTAL KNEE Right      Past Medical History:   Diagnosis Date   • Arthritis    • Chronic low back pain    • Depression     HISTORY OF BEING \"EMOTIONAL\"   • Fatigue    • GERD (gastroesophageal reflux disease)    • Groin pain    • H/O cardiovascular stress test 07/2018    NORMAL, DR ARIAS   • History of diverticulitis    • Hyperlipidemia    • Hypertension    • Hypertension    • Impaired fasting glucose    • Left bundle branch block    • Macular degeneration    • Osteoporosis    • Radial scar of breast     LEFT   • Status post ORIF of fracture of ankle    • Urinary frequency        Prior Hospitalizations, other than for surgery or childbirth, and year:  NONE     Social History     Socioeconomic History   • Marital status:      Spouse name: Not on file   • Number of children: Not on file   • Years of education: Not on file   • Highest education level: Not on file   Tobacco Use   • Smoking status: Never Smoker   • Smokeless tobacco: Never Used   Substance and Sexual Activity   • Alcohol " "use: Yes     Alcohol/week: 7.0 standard drinks     Types: 7 Glasses of wine per week     Comment: 1/day-socially   • Drug use: No   • Sexual activity: Defer     Patient is .  Patient is retired.  Patient drinks 2 servings of caffeine per day.    Family History:  Family History   Problem Relation Age of Onset   • Heart disease Father    • Heart attack Father 81   • Heart attack Paternal Uncle    • Breast cancer Sister 70   • Breast cancer Cousin 50   • Malig Hyperthermia Neg Hx        Vital Signs:  /74   Pulse 83   Temp 98 °F (36.7 °C)   Ht 162.6 cm (64\")   Wt 78.5 kg (173 lb)   LMP  (LMP Unknown)   SpO2 94%   Breastfeeding No   BMI 29.70 kg/m²      Medications:    Current Outpatient Medications:   •  atorvastatin (LIPITOR) 40 MG tablet, Take 1 tablet by mouth Daily., Disp: 90 tablet, Rfl: 1  •  BIOTIN PO, Take 1 tablet by mouth daily., Disp: , Rfl:   •  Cholecalciferol (VITAMIN D3) 5000 UNITS tablet, Take 1 tablet by mouth daily., Disp: , Rfl:   •  losartan (COZAAR) 50 MG tablet, Take 1 tablet by mouth Daily., Disp: 90 tablet, Rfl: 1  •  Multiple Vitamin (MULTIVITAMIN) capsule, Take 1 capsule by mouth daily., Disp: , Rfl:   •  Multiple Vitamins-Minerals (PRESERVISION AREDS 2 PO), Take 2 tablets by mouth Daily., Disp: , Rfl:   •  Multiple Vitamins-Minerals (ZINC PO), Take  by mouth., Disp: , Rfl:   •  Omega-3 Fatty Acids (OMEGA-3 PO), Take  by mouth., Disp: , Rfl:   •  Probiotic Product (PROBIOTIC DAILY PO), Take 1 tablet by mouth Daily., Disp: , Rfl:   •  PROLIA 60 MG/ML solution prefilled syringe syringe, INJECT 1 ML UNDER THE SKIN ONE TIME FOR 1 DOSE AS DIRECTED, Disp: 1 mL, Rfl: 1  •  sertraline (ZOLOFT) 50 MG tablet, Take 1 tablet by mouth Daily., Disp: 90 tablet, Rfl: 1  •  vitamin B-12 (CYANOCOBALAMIN) 1000 MCG tablet, Take 1,000 mcg by mouth Daily., Disp: , Rfl:      Allergies:  No Known Allergies    Physical Examination:  /74   Pulse 83   Temp 98 °F (36.7 °C)   Ht 162.6 cm (64\") "   Wt 78.5 kg (173 lb)   LMP  (LMP Unknown)   SpO2 94%   Breastfeeding No   BMI 29.70 kg/m²   General Appearance:  Patient is in no distress.  She is well kept and has an obese build.   Psychiatric:  Patient with appropriate mood and affect. Alert and oriented to self, time, and place.    Breast, RIGHT:  large sized, ptotic, symmetric with the contralateral side.  Breast skin is without erythema, edema, rashes.  There are no visible abnormalities upon inspection during the arm-raising maneuver or with hands on hips in the sitting position. There is no nipple retraction, discharge or nipple/areolar skin changes.There are no masses palpable in the sitting or supine positions.    Breast, LEFT:  large sized, ptotic, symmetric with the contralateral side.  Breast skin is without erythema, edema, rashes.  There are no visible abnormalities upon inspection during the arm-raising maneuver or with hands on hips in the sitting position. There is no nipple retraction, discharge or nipple/areolar skin changes.There are no masses palpable in the sitting or supine positions.  Well-healed radial biopsy   for radial scar.     Lymphatic:  There is no axillary, cervical, infraclavicular, or supraclavicular adenopathy bilaterally.  Eyes:  Pupils are round and reactive to light.  Cardiovascular:  Heart rate and rhythm are regular.  Respiratory:  Lungs are clear bilaterally with no crackles or wheezes in any lung field.  Gastrointestinal:  Abdomen is soft, nondistended, and nontender.     Musculoskeletal:  Good strength in all 4 extremities.   There is good range of motion in both shoulders.    Skin:  No new skin lesions or rashes on the skin excluding the breast (see breast exam above).        Imagin/14/15 [facility]  MAMMO SCREEN EVITA  FLUHR, HEATHER  Benign dermal calcifications. BIRADS category 2: benign.    18 [facility]  MAMMO SCREEN EVITA  FLUHR, HEATHER  Scattered fibroglandular densities anterior one third retroareolar left  "breast interval development of a cluster of calcifications. BIRADS category 0.    8/3/18  [facility]  MAMMO DIAG LEFT  FLUHR, HEATHER  Microcalcifications within the outer inferior aspect of the left breast. BIRADS category 4.    July 19, 2019 bilateral screening mammogram with 3D [facility]: Scattered fibroglandular densities.  No evidence for malignancy in either breast.  BI-RADS 1    [facility] point mammography July 20, 2020 bilateral screening mammogram with tomosynthesis.  Scattered areas of fibroglandular density.  There is a 1 cm mass in the lower left axilla, axillary tail that is slightly increased in size.  BI-RADS 0  August 12, 2020 left diagnostic mammogram with left breast ultrasound [facility].  On mammogram visualization of a 1.1 cm round mass consistent with a lymph node in the axillary tail with a cortex that is thickened relative to other visualized node.  On ultrasound axillary tail and left axillary ultrasound showed on the order of 16 cm from the nipple a 1.1 cm lymph node with a thickened cortex.  Impression 1.1 cm rounded left axillary tail lymph node with a thickened cortex recommend ultrasound-guided left breast biopsy BI-RADS 4.    Pathology:   8-21-18- left breast stereotactic biopsy: Upper outer quadrant, 5:00: Small radial sclerosing lesion with usual hyperplasia, separate foci of usual hyperplasia, columnar cell change without atypia: Focal stromal fibrosis, apically metaplasia, and fibroadenomatoid change.   It will require excision.  Concordant per Dr Tariq.        Final Diagnosis   BREAST, LEFT, DESIGNATED \"UPPER OUTER QUADRANT, APPROXIMATE 5 O'CLOCK POSITION\",   STEREOTACTIC BIOPSY:               SMALL RADIAL SCLEROSING LESION WITH ASSOCIATED DUCTAL HYPERPLASIA OF THE                      USUAL TYPE.               SEPARATE FOCI OF DUCTAL HYPERPLASIA OF THE USUAL TYPE.               FOCAL COLUMNAR CELL CHANGE WITHOUT ATYPIA.               FOCAL STROMAL " FIBROSIS AND ASSOCIATED CALCIFICATIONS.               FOCAL APOCRINE METAPLASIA.               FOCAL FIBROADENOMATOID CHANGE WITH ASSOCIATED CALCIFICATIONS.               NO EVIDENCE OF MALIGNANCY.     TDJ/ IHC/a/JULIA     CPT CODES:  1. 76240       Pathology from  10-24-18 excision radial scar returend as radial scar and no atypia.     We will let her know benign.     Final Diagnosis   LEFT BREAST LUMPECTOMY:           BENIGN BREAST TISSUE WITH DUCT HYPERPLASIA WITHOUT ATYPIA, ADENOSIS, AND RADIAL                   SCLEROSING LESION (2 MM).          SCAR AND CHANGES OF PRIOR BIOPSY.     COMMENT: I do not detect atypia. The margin is free of radial sclerosing lesion.     K/  IHC/TDJ         Procedures:  Percutaneous ultrasound-guided vacuum-assisted core breast/axillary tail lymph node biopsy 9-8-20  Indication:  ultrasound-and mammo visible rounded node with thickened cortex  Location: LEFT axillary tail, lowaxilla  Consent:  The risks, benefits, and alternatives to the procedure were discussed with the patient, who understood and wished to proceed.  The risks described included, but were not limited to, bleeding, infection, pneumothorax, and inadequate sampling requiring either repeat percutaneous or open excisional biopsy.  Description of Procedure:   After the patient was positioned supine on the procedure table, I located the lesion using ultrasound.  I prepped and draped the breast/axilla skin in sterile fashion.  I anesthetized the breast skin at the site of anticipated mammotomy with 1% lidocaine with epinephrine.  I then anesthetized the underlying subcutaneous tissue and breast parenchyma surrounding the lesion with 1% lidocaine with epinephrine under ultrasound visualization and guidance. I then made a nicking incision with an 11blade and inserted the 14 G celero biopsy device from inferolateral to superomedial through the lesion under ultrasound guidance.   I then took 4 core samples  of the lesion  under direct visualization with ultrasound.   I placed 2 of these cores in formalin and 2 of them in a cup for fresh and sent to pathology.  I withdrew the probe and placed a hydromark marker into the residual rounded node..  We held manual compression for 10 minutes, placed steri -strips at the mammotomy site, and wrapped the patient in a 6 inch super ace wrap and a cold pack.  Marker placed: hydromark  Tolerance: The patient tolerated the procedure well.  Disposition: We will see her back within a week to review her pathology.    Assessment:   Diagnosis Plan   1. Abnormal mammogram     2. Abnormal ultrasound of breast     3. Radial scar of breast     4. Breast calcifications on mammogram     5. FH: breast cancer       1-2  LEFT UOQ axillary tail/low axilla-0.1 cm rounded lymph node with thickened cortex seen on August 2020 mammogram and ultrasound BI-RADS 4.  Target for biopsy today.    3-4  LEFt breast central RA- 1.5 cm cluster- asymptomatic- stereotactic biopsy August 21, 2018 lateral to the lower outer quadrant left breast returned as   8-21-18- left breast stereotactic biopsy:  5:00: Small radial sclerosing lesion with usual hyperplasia, separate foci of usual hyperplasia, columnar cell change without atypia: Focal stromal fibrosis, apically metaplasia, and fibroadenomatoid change.   Concordant per Dr Tariq.    Hydromark in vicinity of pre biopsy calcifications.    Pathology from  10-24-18 excision radial scar returned as radial scar and no atypia.  Fu mammogram 7-2019 BR1    5-  Paternal 1/2 sister in 70s, paternal cousin in 50s    Plan:  Mrs. Mckeon is doing well today.  We reviewed her interval imaging.  We reviewed the nature of a BI-RADS 4 lesion and discussed the option of biopsy versus imaging observation and clinical observation.  She and I were both in favor of biopsy today she tolerated this procedure well.  I took 4 cores putting to in formalin and to sent for fresh to pathology.  I inserted a  hydro-Bryce into the residual lymph node.  She tolerated the procedure well.  I will see her back for post biopsy visit to review her pathology.  Her next routine mammogram will be due [facility] point July 21, 2021.          Shayna Blount MD       Next Appointment:  Return for post biopsy visit.      EMR Dragon/transcription disclaimer:    Much of this encounter note is an electronic transcription/translocation of spoken language to printed text.  The electronic translation of spoken language may permit erroneous, or at times, nonsensical words or phrases to be inadvertently transcribed.  Although I have reviewed the note from such areas, some may still exist.

## 2020-09-10 ENCOUNTER — TELEPHONE (OUTPATIENT)
Dept: SURGERY | Facility: CLINIC | Age: 80
End: 2020-09-10

## 2020-09-11 ENCOUNTER — TELEPHONE (OUTPATIENT)
Dept: SURGERY | Facility: CLINIC | Age: 80
End: 2020-09-11

## 2020-09-11 LAB
CYTO UR: NORMAL
LAB AP CASE REPORT: NORMAL
LAB AP CLINICAL INFORMATION: NORMAL
LAB AP DIAGNOSIS COMMENT: NORMAL
LAB AP FLOW CYTOMETRY SUMMARY: NORMAL
LAB AP SPECIAL STAINS: NORMAL
PATH REPORT.FINAL DX SPEC: NORMAL
PATH REPORT.GROSS SPEC: NORMAL

## 2020-09-11 NOTE — TELEPHONE ENCOUNTER
Pathology from in office core biopsy 9-9-20 returned as :  Final Diagnosis   1. Lymph Node, Left Low Axillary Tail, Core Biopsy:               A. Core-like fragments of reactive appearing lymph node.               B. No morphologic evidence of lymphoma or carcinoma.     2. Lymph Node, Left Low Axillary Tail, Core Biopsy (Gross Diagnosis Only):               A. Core-like tissue submitted to [facility] for Flow Cytometric Analysis.                     Please see the Flow Cytometry Summary below.     [facility]      Flow cytometry from [facility] dated September 9, 2020 returned as immunophenotyping fails to reveal a monoclonal B cell or abnormal T-cell immunophenotype.  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  The significance of this is uncertain.  Correlation with tissue morphology with further studies as indicated is advised.      We will let her know that the lymph node was normal.  We will review this flow cytometry report with Dr Feliciano.    We will let herk now that her histology is normal and that we are waiting on interpretation  Of her flow cytometry.    -Addendum: Reviewed with Dr. Feliciano and he thinks that it would be best for him to just evaluate her in the clinic.  We will place that consultation at the time of her post biopsy visit.

## 2020-09-18 ENCOUNTER — OFFICE VISIT (OUTPATIENT)
Dept: SURGERY | Facility: CLINIC | Age: 80
End: 2020-09-18

## 2020-09-18 ENCOUNTER — TELEPHONE (OUTPATIENT)
Dept: SURGERY | Facility: CLINIC | Age: 80
End: 2020-09-18

## 2020-09-18 VITALS
DIASTOLIC BLOOD PRESSURE: 82 MMHG | HEIGHT: 64 IN | OXYGEN SATURATION: 94 % | TEMPERATURE: 97.8 F | BODY MASS INDEX: 29.71 KG/M2 | WEIGHT: 174 LBS | SYSTOLIC BLOOD PRESSURE: 133 MMHG | HEART RATE: 71 BPM

## 2020-09-18 DIAGNOSIS — Z80.3 FH: BREAST CANCER: ICD-10-CM

## 2020-09-18 DIAGNOSIS — R92.8 ABNORMAL ULTRASOUND OF BREAST: ICD-10-CM

## 2020-09-18 DIAGNOSIS — N64.89 RADIAL SCAR OF BREAST: ICD-10-CM

## 2020-09-18 DIAGNOSIS — R92.1 BREAST CALCIFICATIONS ON MAMMOGRAM: ICD-10-CM

## 2020-09-18 DIAGNOSIS — R92.8 ABNORMAL MAMMOGRAM: Primary | ICD-10-CM

## 2020-09-18 PROCEDURE — 99213 OFFICE O/P EST LOW 20 MIN: CPT | Performed by: SURGERY

## 2020-09-18 NOTE — TELEPHONE ENCOUNTER
Scheduled Lt 3D Diag Mammo @ [facility] 2-18-21  @ 1:00    Return to see Dr LEROY 2-26-21 arrive 12:15    LM on patient's phone  Lise

## 2020-09-18 NOTE — PROGRESS NOTES
Chief Complaint: Shu Mckeon is a 80 y.o. female who was seen in consultation at the request of Mariann Fallon MD  for abnormal breast imaging, a followup visit and a post-biopsy visit    History of Present Illness:  Patient presents with abnormal breast imaging, LEFT breast. She noted no new masses, skin changes, nipple discharge, nipple changes prior to her most recent imaging.  Her most recent imaging includes the followin/14/15 [facility]  MAMMO SCREEN EVITA  FLUHR, SHU  Benign dermal calcifications. BIRADS category 2: benign.    18 [facility]  MAMMO SCREEN EVITA  FLUHR, SHU  Scattered fibroglandular densities anterior one third retroareolar left breast interval development of a cluster of calcifications. BIRADS category 0.    8/3/18  [facility]  MAMMO DIAG LEFT  FLUHR, SHU  Microcalcifications within the outer inferior aspect of the left breast. BIRADS category 4.      She has not had a breast biopsy in the past.  She has her uterus and ovaries, is postmenopausal, and takes nor hormones.  Her family history includes the following: She has one daughter, one sister, no maternal aunts, 2 paternal aunts.  She has a half sister on paternal he based who had breast cancer in her 70s and a paternal cousin who had breast cancer in her 50s.      18- left breast stereotactic biopsy: Upper outer quadrant, 5:00: Small radial sclerosing lesion with usual hyperplasia, separate foci of usual hyperplasia, columnar cell change without atypia: Focal stromal fibrosis, apically metaplasia, and fibroadenomatoid change.   Concordant per Dr Tariq.    She denies significant bruising at her biopsy site.  She denies any redness warmth or drainage from the mammotomy.    Pathology from  10-24-18 excision radial scar returned as radial scar and no atypia.     She denies any redness, warmth, drainage from her incision.    In the interim,  Shu Mckeon  has done well.  She has noted no changes in her breast exam. No new masses, skin changes,  nipple changes, nipple discharge either breast.     Her most recent imaging includes the following:  July 19, 2019 bilateral screening mammogram with 3D [facility]: Scattered fibroglandular densities.  No evidence for malignancy in either breast.  BI-RADS 1      In the interim,  Shu Mckeon  has done well.  She has noted no changes in her breast exam. No new masses, skin changes, nipple changes, nipple discharge either breast.     Her most recent imaging includes the following:  [facility] point mammography July 20, 2020 bilateral screening mammogram with tomosynthesis.  Scattered areas of fibroglandular density.  There is a 1 cm mass in the lower left axilla, axillary tail that is slightly increased in size.  BI-RADS 0  August 12, 2020 left diagnostic mammogram with left breast ultrasound [facility].  On mammogram visualization of a 1.1 cm round mass consistent with a lymph node in the axillary tail with a cortex that is thickened relative to other visualized node.  On ultrasound axillary tail and left axillary ultrasound showed on the order of 16 cm from the nipple a 1.1 cm lymph node with a thickened cortex.  Impression 1.1 cm rounded left axillary tail lymph node with a thickened cortex recommend ultrasound-guided left breast biopsy BI-RADS 4.    She has gained 11#.    Interval History:   Shu has done well since her core biopsy.  She denies any swelling or bruising or discomfort in her left axilla.  Denies any redness warmth or drainage from her dermatotomy    Pathology from in office core biopsy 9-9-20 returned as :  Final Diagnosis   1. Lymph Node, Left Low Axillary Tail, Core Biopsy:               A. Core-like fragments of reactive appearing lymph node.               B. No morphologic evidence of lymphoma or carcinoma.     2. Lymph Node, Left Low Axillary Tail, Core Biopsy (Gross Diagnosis Only):               A. Core-like tissue submitted to [facility] for Flow  Cytometric Analysis.                     Please see the Flow Cytometry Summary below.     [facility]       Flow cytometry from [facility] dated September 9, 2020 returned as immunophenotyping fails to reveal a monoclonal B cell or abnormal T-cell immunophenotype.  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  The significance of this is uncertain.  Correlation with tissue morphology with further studies as indicated is advised.       -Addendum: Reviewed with Dr. Feliciano and he thinks that it would be best for him to just evaluate her in the clinic.       She is here to review              Review of Systems:  Review of Systems   Constitutional: Positive for unexpected weight change (1 LB WT GAIN ).   Genitourinary: Positive for nocturia.    Musculoskeletal: Positive for back pain.   Neurological: Positive for light-headedness.   All other systems reviewed and are negative.       Past Medical and Surgical History:  Breast Biopsy History:  Patient has not had a breast biopsy in the past.  Breast Cancer HIstory:  Patient does not have a past medical history of breast cancer.  Breast Operations, and year:  NONE   Obstetric/Gynecologic History:  Age menstrual periods began: 11  Patient is postmenopausal, entered menopause naturally at age:    Number of pregnancies: 3  Number of live births: 3  Number of abortions or miscarriages: 0  Age of delivery of first child: 20  Patient did not breast feed.  Length of time taking birth control pills: 10-12 YRS   Patient took hormone replacement during the following dates: 10 YRS   PATIENT STILL HAS UTERUS AND OVARIES.     Past Surgical History:   Procedure Laterality Date   • BREAST BIOPSY Left 10/25/2018    Procedure: left breast ultrasound-guided excision of radial scar.;  Surgeon: Shayna Blount MD;  Location: [facility] OR [facility];  Service: General   • BREAST SURGERY Left    • CARDIAC CATHETERIZATION      not sure when or where   •  "CARDIAC CATHETERIZATION     • CATARACT EXTRACTION WITH INTRAOCULAR LENS IMPLANT     • COLONOSCOPY     • FEMUR FRACTURE SURGERY Right    • FRACTURE SURGERY  2015    LT ANKLE   • REPLACEMENT TOTAL KNEE Right      Past Medical History:   Diagnosis Date   • Arthritis    • Chronic low back pain    • Depression     HISTORY OF BEING \"EMOTIONAL\"   • Fatigue    • GERD (gastroesophageal reflux disease)    • Groin pain    • H/O cardiovascular stress test 07/2018    NORMAL, DR ARIAS   • History of diverticulitis    • Hyperlipidemia    • Hypertension    • Hypertension    • Impaired fasting glucose    • Left bundle branch block    • Macular degeneration    • Osteoporosis    • Radial scar of breast     LEFT   • Status post ORIF of fracture of ankle    • Urinary frequency        Prior Hospitalizations, other than for surgery or childbirth, and year:  NONE     Social History     Socioeconomic History   • Marital status:      Spouse name: Not on file   • Number of children: Not on file   • Years of education: Not on file   • Highest education level: Not on file   Tobacco Use   • Smoking status: Never Smoker   • Smokeless tobacco: Never Used   Substance and Sexual Activity   • Alcohol use: Yes     Alcohol/week: 7.0 standard drinks     Types: 7 Glasses of wine per week     Comment: 1/day-socially   • Drug use: No   • Sexual activity: Defer     Patient is .  Patient is retired.  Patient drinks 2 servings of caffeine per day.    Family History:  Family History   Problem Relation Age of Onset   • Heart disease Father    • Heart attack Father 81   • Heart attack Paternal Uncle    • Breast cancer Sister 70   • Breast cancer Cousin 50   • Malig Hyperthermia Neg Hx        Vital Signs:  /82   Pulse 71   Temp 97.8 °F (36.6 °C)   Ht 162.6 cm (64\")   Wt 78.9 kg (174 lb)   LMP  (LMP Unknown)   SpO2 94%   Breastfeeding No   BMI 29.87 kg/m²      Medications:    Current Outpatient Medications:   •  atorvastatin (LIPITOR) " "40 MG tablet, Take 1 tablet by mouth Daily., Disp: 90 tablet, Rfl: 1  •  BIOTIN PO, Take 1 tablet by mouth daily., Disp: , Rfl:   •  Cholecalciferol (VITAMIN D3) 5000 UNITS tablet, Take 1 tablet by mouth daily., Disp: , Rfl:   •  losartan (COZAAR) 50 MG tablet, Take 1 tablet by mouth Daily., Disp: 90 tablet, Rfl: 1  •  Multiple Vitamin (MULTIVITAMIN) capsule, Take 1 capsule by mouth daily., Disp: , Rfl:   •  Multiple Vitamins-Minerals (PRESERVISION AREDS 2 PO), Take 2 tablets by mouth Daily., Disp: , Rfl:   •  Multiple Vitamins-Minerals (ZINC PO), Take  by mouth., Disp: , Rfl:   •  Omega-3 Fatty Acids (OMEGA-3 PO), Take  by mouth., Disp: , Rfl:   •  Probiotic Product (PROBIOTIC DAILY PO), Take 1 tablet by mouth Daily., Disp: , Rfl:   •  PROLIA 60 MG/ML solution prefilled syringe syringe, INJECT 1 ML UNDER THE SKIN ONE TIME FOR 1 DOSE AS DIRECTED, Disp: 1 mL, Rfl: 1  •  sertraline (ZOLOFT) 50 MG tablet, Take 1 tablet by mouth Daily., Disp: 90 tablet, Rfl: 1  •  vitamin B-12 (CYANOCOBALAMIN) 1000 MCG tablet, Take 1,000 mcg by mouth Daily., Disp: , Rfl:      Allergies:  No Known Allergies    Physical Examination:  /82   Pulse 71   Temp 97.8 °F (36.6 °C)   Ht 162.6 cm (64\")   Wt 78.9 kg (174 lb)   LMP  (LMP Unknown)   SpO2 94%   Breastfeeding No   BMI 29.87 kg/m²   General Appearance:  Patient is in no distress.  She is well kept and has an obese build.   Psychiatric:  Patient with appropriate mood and affect. Alert and oriented to self, time, and place.    Breast, RIGHT:  large sized, ptotic, symmetric with the contralateral side.  Breast skin is without erythema, edema, rashes.  There are no visible abnormalities upon inspection during the arm-raising maneuver or with hands on hips in the sitting position. There is no nipple retraction, discharge or nipple/areolar skin changes.There are no masses palpable in the sitting or supine positions.    Breast, LEFT:  large sized, ptotic, symmetric with the " contralateral side.  Breast skin is without erythema, edema, rashes.  There are no visible abnormalities upon inspection during the arm-raising maneuver or with hands on hips in the sitting position. There is no nipple retraction, discharge or nipple/areolar skin changes.There are no masses palpable in the sitting or supine positions.  Well-healed radial biopsy   for radial scar.     Lymphatic:  There is no axillary, cervical, infraclavicular, or supraclavicular adenopathy bilaterally.  Specifically left axilla there is no palpable adenopathy.  There is a well-healed dermatotomy with no erythema warmth or drainage from her lymph node core biopsy.  Eyes:  Pupils are round and reactive to light.  Cardiovascular:  Heart rate and rhythm are regular.  Respiratory:  Lungs are clear bilaterally with no crackles or wheezes in any lung field.  Gastrointestinal:  Abdomen is soft, nondistended, and nontender.     Musculoskeletal:  Good strength in all 4 extremities.   There is good range of motion in both shoulders.    Skin:  No new skin lesions or rashes on the skin excluding the breast (see breast exam above).        Imagin/14/15 [facility]  MAMMO SCREEN EVITA  FLUHR, HEATHER  Benign dermal calcifications. BIRADS category 2: benign.    18 [facility]  MAMMO SCREEN EVITA  FLUHR, HEATHER  Scattered fibroglandular densities anterior one third retroareolar left breast interval development of a cluster of calcifications. BIRADS category 0.    8/3/18  [facility]  MAMMO DIAG LEFT  FLUHR, HEATHER  Microcalcifications within the outer inferior aspect of the left breast. BIRADS category 4.    2019 bilateral screening mammogram with 3D [facility]: Scattered fibroglandular densities.  No evidence for malignancy in either breast.  BI-RADS 1    [facility] mammography 2020 bilateral screening mammogram with tomosynthesis.  Scattered areas of fibroglandular density.  There is a 1 cm mass in the lower left  "axilla, axillary tail that is slightly increased in size.  BI-RADS 0  August 12, 2020 left diagnostic mammogram with left breast ultrasound Psychiatric.  On mammogram visualization of a 1.1 cm round mass consistent with a lymph node in the axillary tail with a cortex that is thickened relative to other visualized node.  On ultrasound axillary tail and left axillary ultrasound showed on the order of 16 cm from the nipple a 1.1 cm lymph node with a thickened cortex.  Impression 1.1 cm rounded left axillary tail lymph node with a thickened cortex recommend ultrasound-guided left breast biopsy BI-RADS 4.    Pathology:   8-21-18- left breast stereotactic biopsy: Upper outer quadrant, 5:00: Small radial sclerosing lesion with usual hyperplasia, separate foci of usual hyperplasia, columnar cell change without atypia: Focal stromal fibrosis, apically metaplasia, and fibroadenomatoid change.     Concordant per Dr Tariq.        Final Diagnosis   BREAST, LEFT, DESIGNATED \"UPPER OUTER QUADRANT, APPROXIMATE 5 O'CLOCK POSITION\",   STEREOTACTIC BIOPSY:               SMALL RADIAL SCLEROSING LESION WITH ASSOCIATED DUCTAL HYPERPLASIA OF THE                      USUAL TYPE.               SEPARATE FOCI OF DUCTAL HYPERPLASIA OF THE USUAL TYPE.               FOCAL COLUMNAR CELL CHANGE WITHOUT ATYPIA.               FOCAL STROMAL FIBROSIS AND ASSOCIATED CALCIFICATIONS.               FOCAL APOCRINE METAPLASIA.               FOCAL FIBROADENOMATOID CHANGE WITH ASSOCIATED CALCIFICATIONS.               NO EVIDENCE OF MALIGNANCY.     TDJ/th IHC/a/JULIA     CPT CODES:  1. 01291       Pathology from  10-24-18 excision radial scar returend as radial scar and no atypia.     We will let her know benign.     Final Diagnosis   LEFT BREAST LUMPECTOMY:           BENIGN BREAST TISSUE WITH DUCT HYPERPLASIA WITHOUT ATYPIA, ADENOSIS, AND RADIAL                   SCLEROSING LESION (2 MM).          SCAR AND CHANGES OF PRIOR BIOPSY.     COMMENT: I " do not detect atypia. The margin is free of radial sclerosing lesion.     CMK/th  IHC/TDJ     Pathology from in office core biopsy 9-9-20 returned as :  Final Diagnosis   1. Lymph Node, Left Low Axillary Tail, Core Biopsy:               A. Core-like fragments of reactive appearing lymph node.               B. No morphologic evidence of lymphoma or carcinoma.     2. Lymph Node, Left Low Axillary Tail, Core Biopsy (Gross Diagnosis Only):               A. Core-like tissue submitted to [facility] for Flow Cytometric Analysis.                     Please see the Flow Cytometry Summary below.     [facility]       Flow cytometry from [facility] dated September 9, 2020 returned as immunophenotyping fails to reveal a monoclonal B cell or abnormal T-cell immunophenotype.  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  The significance of this is uncertain.  Correlation with tissue morphology with further studies as indicated is advised.       Procedures:  Percutaneous ultrasound-guided vacuum-assisted core breast/axillary tail lymph node biopsy 9-8-20  Indication:  ultrasound-and mammo visible rounded node with thickened cortex  Location: LEFT axillary tail, lowaxilla  Consent:  The risks, benefits, and alternatives to the procedure were discussed with the patient, who understood and wished to proceed.  The risks described included, but were not limited to, bleeding, infection, pneumothorax, and inadequate sampling requiring either repeat percutaneous or open excisional biopsy.  Description of Procedure:   After the patient was positioned supine on the procedure table, I located the lesion using ultrasound.  I prepped and draped the breast/axilla skin in sterile fashion.  I anesthetized the breast skin at the site of anticipated mammotomy with 1% lidocaine with epinephrine.  I then anesthetized the underlying subcutaneous tissue and breast parenchyma surrounding the lesion with 1%  lidocaine with epinephrine under ultrasound visualization and guidance. I then made a nicking incision with an 11blade and inserted the 14 G celero biopsy device from inferolateral to superomedial through the lesion under ultrasound guidance.   I then took 4 core samples  of the lesion under direct visualization with ultrasound.   I placed 2 of these cores in formalin and 2 of them in a cup for fresh and sent to pathology.  I withdrew the probe and placed a hydromark marker into the residual rounded node..  We held manual compression for 10 minutes, placed steri -strips at the mammotomy site, and wrapped the patient in a 6 inch super ace wrap and a cold pack.  Marker placed: hydromark  Tolerance: The patient tolerated the procedure well.  Disposition: We will see her back within a week to review her pathology.    Assessment:   Diagnosis Plan   1. Abnormal mammogram  Mammo Diagnostic Digital Tomosynthesis Left With CAD   2. Abnormal ultrasound of breast     3. Radial scar of breast     4. Breast calcifications on mammogram     5. FH: breast cancer       1-2  LEFT UOQ axillary tail/low axilla-0.1 cm rounded lymph node with thickened cortex seen on August 2020 mammogram and ultrasound BI-RADS 4.  Target for biopsy 9-9-20    Pathology from in office core biopsy 9-9-20 returned as :  Final Diagnosis   1. Lymph Node, Left Low Axillary Tail, Core Biopsy:               A. Core-like fragments of reactive appearing lymph node.               B. No morphologic evidence of lymphoma or carcinoma.     2. Lymph Node, Left Low Axillary Tail, Core Biopsy (Gross Diagnosis Only):               A. Core-like tissue submitted to [facility] for Flow Cytometric Analysis.                     Please see the Flow Cytometry Summary below.     [facility]       Flow cytometry from [facility] dated September 9, 2020 returned as immunophenotyping fails to reveal a monoclonal B cell or abnormal T-cell immunophenotype.  A clonal T-LGL population is  detected by the beta analysis representing 7% of total events.  The significance of this is uncertain.  Correlation with tissue morphology with further studies as indicated is advised.     -Addendum: Reviewed with Dr. Feliciano and he thinks that it would be best for him to just evaluate her in the clinic.       3-4  LEFt breast central RA- 1.5 cm cluster- asymptomatic- stereotactic biopsy August 21, 2018 lateral to the lower outer quadrant left breast returned as   8-21-18- left breast stereotactic biopsy:  5:00: Small radial sclerosing lesion with usual hyperplasia, separate foci of usual hyperplasia, columnar cell change without atypia: Focal stromal fibrosis, apically metaplasia, and fibroadenomatoid change.   Concordant per Dr Tariq.    Hydromark in vicinity of pre biopsy calcifications.    Pathology from  10-24-18 excision radial scar returned as radial scar and no atypia.  Fu mammogram 7-2019 BR1    5-  Paternal 1/2 sister in 70s, paternal cousin in 50s    Plan:  Mrs. Mckeon is doing well today.  We reviewed her histology and flow cytometry reports together today.  Overall I reassured her that I did not think anything bad was going on with that lymph node.  I am going to have her see Dr. Feliciano to clarify the small print comment made on the flow cytometry.    I will see her back in February 2021 after a left diagnostic mammogram to reevaluate the lymph node.    I have asked her to call us in the interim with any concerns.      Her next routine mammogram will be due [facility] point July 21, 2021.        Shayna Blount MD       Next Appointment:  Return for Next scheduled follow up, after imaging.    15 min visit, 10 face to face   EMR Dragon/transcription disclaimer:    Much of this encounter note is an electronic transcription/translocation of spoken language to printed text.  The electronic translation of spoken language may permit erroneous, or at times, nonsensical words or phrases to be  inadvertently transcribed.  Although I have reviewed the note from such areas, some may still exist.

## 2020-09-28 ENCOUNTER — LAB (OUTPATIENT)
Dept: OTHER | Facility: HOSPITAL | Age: 80
End: 2020-09-28

## 2020-09-28 ENCOUNTER — CONSULT (OUTPATIENT)
Dept: ONCOLOGY | Facility: CLINIC | Age: 80
End: 2020-09-28

## 2020-09-28 VITALS
WEIGHT: 170.8 LBS | RESPIRATION RATE: 18 BRPM | HEIGHT: 63 IN | BODY MASS INDEX: 30.26 KG/M2 | SYSTOLIC BLOOD PRESSURE: 137 MMHG | HEART RATE: 75 BPM | DIASTOLIC BLOOD PRESSURE: 80 MMHG | OXYGEN SATURATION: 96 %

## 2020-09-28 DIAGNOSIS — R59.9 REACTIVE LYMPHADENOPATHY: Primary | ICD-10-CM

## 2020-09-28 DIAGNOSIS — R92.8 ABNORMAL MAMMOGRAM: Primary | ICD-10-CM

## 2020-09-28 LAB
BASOPHILS # BLD AUTO: 0.04 10*3/MM3 (ref 0–0.2)
BASOPHILS NFR BLD AUTO: 0.6 % (ref 0–1.5)
DEPRECATED RDW RBC AUTO: 43.7 FL (ref 37–54)
EOSINOPHIL # BLD AUTO: 0.17 10*3/MM3 (ref 0–0.4)
EOSINOPHIL NFR BLD AUTO: 2.4 % (ref 0.3–6.2)
ERYTHROCYTE [DISTWIDTH] IN BLOOD BY AUTOMATED COUNT: 13.2 % (ref 12.3–15.4)
HCT VFR BLD AUTO: 42.5 % (ref 34–46.6)
HGB BLD-MCNC: 14 G/DL (ref 12–15.9)
IMM GRANULOCYTES # BLD AUTO: 0.07 10*3/MM3 (ref 0–0.05)
IMM GRANULOCYTES NFR BLD AUTO: 1 % (ref 0–0.5)
LYMPHOCYTES # BLD AUTO: 1.69 10*3/MM3 (ref 0.7–3.1)
LYMPHOCYTES NFR BLD AUTO: 24.3 % (ref 19.6–45.3)
MCH RBC QN AUTO: 29.4 PG (ref 26.6–33)
MCHC RBC AUTO-ENTMCNC: 32.9 G/DL (ref 31.5–35.7)
MCV RBC AUTO: 89.3 FL (ref 79–97)
MONOCYTES # BLD AUTO: 0.6 10*3/MM3 (ref 0.1–0.9)
MONOCYTES NFR BLD AUTO: 8.6 % (ref 5–12)
NEUTROPHILS NFR BLD AUTO: 4.39 10*3/MM3 (ref 1.7–7)
NEUTROPHILS NFR BLD AUTO: 63.1 % (ref 42.7–76)
NRBC BLD AUTO-RTO: 0 /100 WBC (ref 0–0.2)
PLATELET # BLD AUTO: 246 10*3/MM3 (ref 140–450)
PMV BLD AUTO: 10.4 FL (ref 6–12)
RBC # BLD AUTO: 4.76 10*6/MM3 (ref 3.77–5.28)
WBC # BLD AUTO: 6.96 10*3/MM3 (ref 3.4–10.8)

## 2020-09-28 PROCEDURE — 36415 COLL VENOUS BLD VENIPUNCTURE: CPT

## 2020-09-28 PROCEDURE — 85025 COMPLETE CBC W/AUTO DIFF WBC: CPT | Performed by: INTERNAL MEDICINE

## 2020-09-28 PROCEDURE — 99203 OFFICE O/P NEW LOW 30 MIN: CPT | Performed by: INTERNAL MEDICINE

## 2020-09-28 NOTE — PROGRESS NOTES
.     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  Provide an opinion on any further workup or treatment                             REQUESTING PHYSICIAN: Shayna Blount MD    RECORDS OBTAINED:  Records of the patients history including those obtained from the referring provider were reviewed and summarized in detail.    HISTORY OF PRESENT ILLNESS:  The patient is a 80 y.o. year old female who is here for an initial visit with the above-mentioned history.  Patient has past medical history significant for hypertension, hyperlipidemia, osteoporosis (on Prolia).  She has a family history of breast cancer and a half sister (paternal) who had breast cancer at age 70 and a paternal cousin with breast cancer at age 30.  The patient underwent previous left breast biopsy 8/21/2018 with benign findings including ductal hyperplasia of the usual type, focal stromal fibrosis, focal columnar cell change without atypia, a progression metaplasia, fibroadenomatoid change.  The patient is followed by Dr. Blount and underwent routine screening mammogram on 7/20/2020 with finding of increased size of the left axillary/axillary tail mass that may represent a lymph node, BI-RADS 0.  She underwent a diagnostic left mammogram and ultrasound on 8/12/2020 which showed a left axillary tail lymph node 1.1 cm with thickened cortex relative to other lymph nodes and biopsy was recommended.  On 9/9/2020, biopsy was performed showing a reactive lymph node.  Flow cytometry was sent showing an increased CD4 to CD8 ratio, no aberrant T-cell population or B-cell population however there was a clonal T LGL population detected by V beta analysis comprising 7% of total events (TCR alpha/beta positive, CD20 6+).  There was no clonal CD4 positive T-cell population.  Results were not diagnostic for malignancy.    Patient today has no complaints apart from some mild chronic fatigue.  She  "has some chronic low back pain which again is unchanged.  She did lose 20 pounds during COVID 19 pandemic early on intentionally however has gained some of that weight back.  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  She denies any fevers or night sweats.  She denies any abdominal pain nor any left shoulder pain.        Past Medical History:   Diagnosis Date   • Arthritis    • Chronic low back pain    • Depression     HISTORY OF BEING \"EMOTIONAL\"   • Fatigue    • GERD (gastroesophageal reflux disease)    • Groin pain    • H/O cardiovascular stress test 07/2018    NORMAL, DR ARIAS   • History of diverticulitis    • Hyperlipidemia    • Hypertension    • Impaired fasting glucose    • Left bundle branch block    • Macular degeneration    • Osteoporosis    • Radial scar of breast     LEFT   • Status post ORIF of fracture of ankle    • Urinary frequency      Past Surgical History:   Procedure Laterality Date   • BREAST BIOPSY Left 10/25/2018    Procedure: left breast ultrasound-guided excision of radial scar.;  Surgeon: Shayna Blount MD;  [place] [facility] [place] [facility] [place]: General   • BREAST SURGERY Left    • CARDIAC CATHETERIZATION      not sure when or where   • CARDIAC CATHETERIZATION     • CATARACT EXTRACTION WITH INTRAOCULAR LENS IMPLANT     • COLONOSCOPY     • FEMUR FRACTURE SURGERY Right    • FRACTURE SURGERY  2015    LT ANKLE   • REPLACEMENT TOTAL KNEE Right          HEMATOLOGIC/ONCOLOGIC HISTORY:  (History from previous dates can be found in the separate document.)    MEDICATIONS    Current Outpatient Medications:   •  atorvastatin (LIPITOR) 40 MG tablet, Take 1 tablet by mouth Daily., Disp: 90 tablet, Rfl: 1  •  BIOTIN PO, Take 1 tablet by mouth daily., Disp: , Rfl:   •  Cholecalciferol (VITAMIN D3) 5000 UNITS tablet, Take 1 tablet by mouth daily., Disp: , Rfl:   •  losartan (COZAAR) 50 MG tablet, Take 1 tablet by mouth Daily., Disp: 90 tablet, Rfl: 1  •  " "Multiple Vitamin (MULTIVITAMIN) capsule, Take 1 capsule by mouth daily., Disp: , Rfl:   •  Multiple Vitamins-Minerals (PRESERVISION AREDS 2 PO), Take 2 tablets by mouth Daily., Disp: , Rfl:   •  PROLIA 60 MG/ML solution prefilled syringe syringe, INJECT 1 ML UNDER THE SKIN ONE TIME FOR 1 DOSE AS DIRECTED, Disp: 1 mL, Rfl: 1  •  sertraline (ZOLOFT) 50 MG tablet, Take 1 tablet by mouth Daily., Disp: 90 tablet, Rfl: 1  •  vitamin B-12 (CYANOCOBALAMIN) 1000 MCG tablet, Take 1,000 mcg by mouth Daily., Disp: , Rfl:   •  Multiple Vitamins-Minerals (ZINC PO), Take  by mouth., Disp: , Rfl:   •  Omega-3 Fatty Acids (OMEGA-3 PO), Take  by mouth., Disp: , Rfl:   •  Probiotic Product (PROBIOTIC DAILY PO), Take 1 tablet by mouth Daily., Disp: , Rfl:     ALLERGIES:   No Known Allergies    SOCIAL HISTORY:       Social History     Socioeconomic History   • Marital status:      Spouse name: Isidoro   • Number of children: Not on file   • Years of education: Not on file   • Highest education level: Not on file   Occupational History     Employer: RETIRED   Tobacco Use   • Smoking status: Never Smoker   • Smokeless tobacco: Never Used   Substance and Sexual Activity   • Alcohol use: Yes     Alcohol/week: 7.0 standard drinks     Types: 7 Glasses of wine per week     Comment: 1/day-socially   • Drug use: No   • Sexual activity: Defer         FAMILY HISTORY:  Family History   Problem Relation Age of Onset   • Heart disease Father    • Heart attack Father 81   • Heart attack Paternal Uncle    • Breast cancer Sister 70   • Breast cancer Cousin 50   • Malig Hyperthermia Neg Hx        REVIEW OF SYSTEMS:  A comprehensive review of systems was performed and was negative except as mentioned above in the HPI.         Vitals:    09/28/20 0909   BP: 137/80   Pulse: 75   Resp: 18   SpO2: 96%   Weight: 77.5 kg (170 lb 12.8 oz)   Height: 160.9 cm (63.35\")   PainSc: 0-No pain     Current Status 9/28/2020   ECOG score 1       Physical " Exam  Constitutional:       Appearance: She is well-developed.   Eyes:      Conjunctiva/sclera: Conjunctivae normal.   Neck:      Thyroid: No thyromegaly.   Cardiovascular:      Rate and Rhythm: Normal rate and regular rhythm.      Heart sounds: No murmur. No friction rub. No gallop.    Pulmonary:      Effort: No respiratory distress.      Breath sounds: Normal breath sounds.   Abdominal:      General: Bowel sounds are normal. There is no distension.      Palpations: Abdomen is soft.      Tenderness: There is no abdominal tenderness.   Lymphadenopathy:      Head:      Right side of head: No submandibular adenopathy.      Cervical: No cervical adenopathy.      Upper Body:      Right upper body: No supraclavicular adenopathy.      Left upper body: No supraclavicular adenopathy.   Skin:     General: Skin is warm and dry.      Findings: No rash.   Neurological:      Mental Status: She is alert and oriented to person, place, and time.      Cranial Nerves: No cranial nerve deficit.      Motor: No abnormal muscle tone.      Deep Tendon Reflexes: Reflexes normal.   Psychiatric:         Behavior: Behavior normal.         RECENT LABS:        WBC   Date Value Ref Range Status   09/28/2020 6.96 3.40 - 10.80 10*3/mm3 Final   08/25/2020 7.57 3.40 - 10.80 10*3/mm3 Final     Hemoglobin   Date Value Ref Range Status   09/28/2020 14.0 12.0 - 15.9 g/dL Final     Platelets   Date Value Ref Range Status   09/28/2020 246 140 - 450 10*3/mm3 Final     Peripheral blood smear was reviewed today showing normal-appearing WBCs in appearance and distribution.  Specifically, lymphocytes had a normal appearance with no large granular lymphocytes identified morphologically.  RBCs were normochromic and normocytic, platelets appeared normal in appearance and number.    Assessment/Plan      1. 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    · The patient is followed by Dr. Blount and underwent routine screening mammogram on 7/20/2020 with finding of increased size of the left axillary/axillary tail mass that may represent a lymph node, BI-RADS 0.    · She underwent a diagnostic left mammogram and ultrasound on 8/12/2020 which showed a left axillary tail lymph node 1.1 cm with thickened cortex relative to other lymph nodes and biopsy was recommended.    · On 9/9/2020, biopsy was performed showing a reactive lymph node.  Flow cytometry was sent showing an increased CD4 to CD8 ratio, no aberrant T-cell population or B-cell population however there was a clonal T LGL population detected by V beta analysis comprising 7% of total events (TCR alpha/beta positive, CD20 6+).  There was no clonal CD4 positive T-cell population.  Results were not diagnostic for malignancy.   · Today, the patient has no significant symptoms.  She has no palpable lymphadenopathy or splenomegaly.  Her counts were reviewed showing normal WBC at 6.96 with normal differential, no evidence of anemia nor thrombocytopenia.  We reviewed findings from the lymph node biopsy indicating reactive change in the lymph node and a small 7% clonal T LGL population.  This certainly could be a reactive population.  There is no evidence to suggest that she has LGL leukemia at this point.  We did discuss potential additional evaluation with peripheral blood flow cytometry and T-cell gene rearrangement study as well as potential CT scans of the chest abdomen and pelvis to evaluate for additional lymphadenopathy and/or hepatosplenomegaly.  The patient however declines further evaluation at this time.  She is agreeable to a course of observation.  I did suggest that we see her back in 3 months with CBC and will evaluate  clinically for any evidence of lymphadenopathy or splenomegaly.  We will determine the need for any additional evaluation at that time.  It does not appear that she has any specific clinical evidence of underlying systemic inflammatory illness that would explain the reactive change in her lymph node either.    Plan:  1. The patient declines any additional evaluation at this time.  There does not appear to be any clinical evidence of LGL leukemia currently.  2. I will plan to see her back in 3 months with repeat CBC.  She was agreeable to return to the office for follow-up and limited lab evaluation.

## 2020-10-01 ENCOUNTER — FLU SHOT (OUTPATIENT)
Dept: INTERNAL MEDICINE | Facility: CLINIC | Age: 80
End: 2020-10-01

## 2020-10-01 ENCOUNTER — TELEPHONE (OUTPATIENT)
Dept: SURGERY | Facility: CLINIC | Age: 80
End: 2020-10-01

## 2020-10-01 DIAGNOSIS — Z23 NEED FOR INFLUENZA VACCINATION: ICD-10-CM

## 2020-10-01 PROCEDURE — G0008 ADMIN INFLUENZA VIRUS VAC: HCPCS | Performed by: INTERNAL MEDICINE

## 2020-10-01 PROCEDURE — 90694 VACC AIIV4 NO PRSRV 0.5ML IM: CPT | Performed by: INTERNAL MEDICINE

## 2020-10-01 NOTE — TELEPHONE ENCOUNTER
Note from Dr. 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    The patient has no significant symptoms.  She has no palpable lymphadenopathy or splenomegaly.  Her white count was normal.  This certainly could be a reactive nisreen population.  We discussed potential additional evaluation with peripheral blood flow cytometry and T/Gel gene rearrangement study as well as potential CT scans.  The patient declines further evaluation at this time she is agreeable to a course of observation.  We will see her back in 3 months with a CBC and evaluate clinically.

## 2020-10-26 ENCOUNTER — TELEPHONE (OUTPATIENT)
Dept: INTERNAL MEDICINE | Facility: CLINIC | Age: 80
End: 2020-10-26

## 2020-10-26 NOTE — TELEPHONE ENCOUNTER
LMOM WITH DETAILS    ----- Message from Todd Xavier sent at 10/26/2020  8:26 AM EDT -----  Regarding: PATIENT CALL  Contact: [phone]  Patient wants to know if you received her Prolia?

## 2020-10-27 ENCOUNTER — OFFICE VISIT (OUTPATIENT)
Dept: INTERNAL MEDICINE | Facility: CLINIC | Age: 80
End: 2020-10-27

## 2020-10-27 ENCOUNTER — LAB (OUTPATIENT)
Dept: LAB | Facility: HOSPITAL | Age: 80
End: 2020-10-27

## 2020-10-27 ENCOUNTER — HOSPITAL ENCOUNTER (OUTPATIENT)
Dept: CT IMAGING | Facility: HOSPITAL | Age: 80
Discharge: HOME | End: 2020-10-27

## 2020-10-27 VITALS
HEIGHT: 63 IN | WEIGHT: 172.4 LBS | HEART RATE: 70 BPM | BODY MASS INDEX: 30.55 KG/M2 | SYSTOLIC BLOOD PRESSURE: 154 MMHG | OXYGEN SATURATION: 97 % | DIASTOLIC BLOOD PRESSURE: 76 MMHG

## 2020-10-27 DIAGNOSIS — R10.9 RIGHT FLANK PAIN: Primary | ICD-10-CM

## 2020-10-27 DIAGNOSIS — R10.9 RIGHT FLANK PAIN: ICD-10-CM

## 2020-10-27 DIAGNOSIS — R10.31 RIGHT LOWER QUADRANT ABDOMINAL PAIN: ICD-10-CM

## 2020-10-27 DIAGNOSIS — R10.31 RIGHT LOWER QUADRANT ABDOMINAL PAIN: Primary | ICD-10-CM

## 2020-10-27 LAB
ALBUMIN SERPL-MCNC: 4.4 G/DL (ref 3.5–5.2)
ALBUMIN/GLOB SERPL: 1.4 G/DL
ALP SERPL-CCNC: 75 U/L (ref 39–117)
ALT SERPL W P-5'-P-CCNC: 13 U/L (ref 1–33)
ANION GAP SERPL CALCULATED.3IONS-SCNC: 9.5 MMOL/L (ref 5–15)
AST SERPL-CCNC: 19 U/L (ref 1–32)
BASOPHILS # BLD AUTO: 0.06 10*3/MM3 (ref 0–0.2)
BASOPHILS NFR BLD AUTO: 0.7 % (ref 0–1.5)
BILIRUB BLD-MCNC: NEGATIVE MG/DL
BILIRUB SERPL-MCNC: 0.7 MG/DL (ref 0–1.2)
BUN SERPL-MCNC: 11 MG/DL (ref 8–23)
BUN/CREAT SERPL: 19.3 (ref 7–25)
CALCIUM SPEC-SCNC: 10 MG/DL (ref 8.6–10.5)
CHLORIDE SERPL-SCNC: 101 MMOL/L (ref 98–107)
CLARITY, POC: CLEAR
CO2 SERPL-SCNC: 26.5 MMOL/L (ref 22–29)
COLOR UR: YELLOW
CREAT BLDA-MCNC: 0.6 MG/DL (ref 0.6–1.3)
CREAT SERPL-MCNC: 0.57 MG/DL (ref 0.57–1)
DEPRECATED RDW RBC AUTO: 42.9 FL (ref 37–54)
EOSINOPHIL # BLD AUTO: 0.17 10*3/MM3 (ref 0–0.4)
EOSINOPHIL NFR BLD AUTO: 2 % (ref 0.3–6.2)
ERYTHROCYTE [DISTWIDTH] IN BLOOD BY AUTOMATED COUNT: 13.2 % (ref 12.3–15.4)
GFR SERPL CREATININE-BSD FRML MDRD: 102 ML/MIN/1.73
GLOBULIN UR ELPH-MCNC: 3.1 GM/DL
GLUCOSE SERPL-MCNC: 125 MG/DL (ref 65–99)
GLUCOSE UR STRIP-MCNC: NEGATIVE MG/DL
HCT VFR BLD AUTO: 42.3 % (ref 34–46.6)
HGB BLD-MCNC: 13.9 G/DL (ref 12–15.9)
IMM GRANULOCYTES # BLD AUTO: 0.04 10*3/MM3 (ref 0–0.05)
IMM GRANULOCYTES NFR BLD AUTO: 0.5 % (ref 0–0.5)
KETONES UR QL: NEGATIVE
LEUKOCYTE EST, POC: NEGATIVE
LYMPHOCYTES # BLD AUTO: 2.05 10*3/MM3 (ref 0.7–3.1)
LYMPHOCYTES NFR BLD AUTO: 24.1 % (ref 19.6–45.3)
MCH RBC QN AUTO: 29.6 PG (ref 26.6–33)
MCHC RBC AUTO-ENTMCNC: 32.9 G/DL (ref 31.5–35.7)
MCV RBC AUTO: 90.2 FL (ref 79–97)
MONOCYTES # BLD AUTO: 0.64 10*3/MM3 (ref 0.1–0.9)
MONOCYTES NFR BLD AUTO: 7.5 % (ref 5–12)
NEUTROPHILS NFR BLD AUTO: 5.55 10*3/MM3 (ref 1.7–7)
NEUTROPHILS NFR BLD AUTO: 65.2 % (ref 42.7–76)
NITRITE UR-MCNC: NEGATIVE MG/ML
NRBC BLD AUTO-RTO: 0 /100 WBC (ref 0–0.2)
PH UR: 7.5 [PH] (ref 5–8)
PLATELET # BLD AUTO: 273 10*3/MM3 (ref 140–450)
PMV BLD AUTO: 9.5 FL (ref 6–12)
POTASSIUM SERPL-SCNC: 4.4 MMOL/L (ref 3.5–5.2)
PROT SERPL-MCNC: 7.5 G/DL (ref 6–8.5)
PROT UR STRIP-MCNC: NEGATIVE MG/DL
RBC # BLD AUTO: 4.69 10*6/MM3 (ref 3.77–5.28)
RBC # UR STRIP: NEGATIVE /UL
SODIUM SERPL-SCNC: 137 MMOL/L (ref 136–145)
SP GR UR: 1.02 (ref 1–1.03)
UROBILINOGEN UR QL: NORMAL
WBC # BLD AUTO: 8.51 10*3/MM3 (ref 3.4–10.8)

## 2020-10-27 PROCEDURE — 80053 COMPREHEN METABOLIC PANEL: CPT | Performed by: INTERNAL MEDICINE

## 2020-10-27 PROCEDURE — 36415 COLL VENOUS BLD VENIPUNCTURE: CPT | Performed by: INTERNAL MEDICINE

## 2020-10-27 PROCEDURE — 82565 ASSAY OF CREATININE: CPT

## 2020-10-27 PROCEDURE — 85025 COMPLETE CBC W/AUTO DIFF WBC: CPT | Performed by: INTERNAL MEDICINE

## 2020-10-27 PROCEDURE — 81003 URINALYSIS AUTO W/O SCOPE: CPT | Performed by: INTERNAL MEDICINE

## 2020-10-27 PROCEDURE — 25010000002 IOPAMIDOL 61 % SOLUTION: Performed by: INTERNAL MEDICINE

## 2020-10-27 PROCEDURE — 0 DIATRIZOATE MEGLUMINE & SODIUM PER 1 ML: Performed by: INTERNAL MEDICINE

## 2020-10-27 PROCEDURE — 74177 CT ABD & PELVIS W/CONTRAST: CPT

## 2020-10-27 PROCEDURE — 99214 OFFICE O/P EST MOD 30 MIN: CPT | Performed by: INTERNAL MEDICINE

## 2020-10-27 RX ADMIN — IOPAMIDOL 85 ML: 612 INJECTION, SOLUTION INTRAVENOUS at 12:02

## 2020-10-27 RX ADMIN — DIATRIZOATE MEGLUMINE AND DIATRIZOATE SODIUM 30 ML: 660; 100 LIQUID ORAL; RECTAL at 11:40

## 2020-10-27 NOTE — PROGRESS NOTES
Subjective   Shu Mckeon is a 80 y.o. female here today for right flank pain that radiates to the back.        History of Present Illness   Had this in 2019 and was dx with diverticulitis.  She does not have any fevers or chill  Pain all in the back right flank.  No rash   No ant pain  She does have nausea from the pain  No VD    The following portions of the patient's history were reviewed and updated as appropriate: allergies, current medications, past medical history, past social history and problem list.    Review of Systems   All other systems reviewed and are negative.      Objective   Physical Exam  Vitals signs reviewed.   Constitutional:       Appearance: She is well-developed.   HENT:      Head: Normocephalic and atraumatic.      Right Ear: External ear normal.      Left Ear: External ear normal.   Eyes:      Conjunctiva/sclera: Conjunctivae normal.      Pupils: Pupils are equal, round, and reactive to light.   Neck:      Musculoskeletal: Normal range of motion.      Thyroid: No thyromegaly.      Trachea: No tracheal deviation.   Cardiovascular:      Rate and Rhythm: Normal rate and regular rhythm.      Heart sounds: Normal heart sounds.   Pulmonary:      Effort: Pulmonary effort is normal.      Breath sounds: Normal breath sounds.   Abdominal:      General: Bowel sounds are normal. There is no distension.      Palpations: Abdomen is soft. There is no mass.      Tenderness: There is abdominal tenderness. There is no guarding or rebound.      Comments: Pain in the right flank  Pain with palp on the right side but patient says mild     Musculoskeletal: Normal range of motion.         General: No deformity.   Skin:     General: Skin is warm and dry.   Neurological:      Mental Status: She is alert and oriented to person, place, and time.   Psychiatric:         Behavior: Behavior normal.         Thought Content: Thought content normal.         Judgment: Judgment normal.         Vitals:    10/27/20 1031   BP:  154/76   Pulse: 70   SpO2: 97%     Body mass index is 30.2 kg/m².       Current Outpatient Medications:   •  atorvastatin (LIPITOR) 40 MG tablet, Take 1 tablet by mouth Daily., Disp: 90 tablet, Rfl: 1  •  BIOTIN PO, Take 1 tablet by mouth daily., Disp: , Rfl:   •  Cholecalciferol (VITAMIN D3) 5000 UNITS tablet, Take 1 tablet by mouth daily., Disp: , Rfl:   •  losartan (COZAAR) 50 MG tablet, Take 1 tablet by mouth Daily., Disp: 90 tablet, Rfl: 1  •  Multiple Vitamin (MULTIVITAMIN) capsule, Take 1 capsule by mouth daily., Disp: , Rfl:   •  Multiple Vitamins-Minerals (PRESERVISION AREDS 2 PO), Take 2 tablets by mouth Daily., Disp: , Rfl:   •  Multiple Vitamins-Minerals (ZINC PO), Take  by mouth., Disp: , Rfl:   •  Omega-3 Fatty Acids (OMEGA-3 PO), Take  by mouth., Disp: , Rfl:   •  Probiotic Product (PROBIOTIC DAILY PO), Take 1 tablet by mouth Daily., Disp: , Rfl:   •  PROLIA 60 MG/ML solution prefilled syringe syringe, INJECT 1 ML UNDER THE SKIN ONE TIME FOR 1 DOSE AS DIRECTED, Disp: 1 mL, Rfl: 1  •  sertraline (ZOLOFT) 50 MG tablet, Take 1 tablet by mouth Daily., Disp: 90 tablet, Rfl: 1  •  vitamin B-12 (CYANOCOBALAMIN) 1000 MCG tablet, Take 1,000 mcg by mouth Daily., Disp: , Rfl:       Assessment/Plan   Diagnoses and all orders for this visit:    1. Right lower quadrant abdominal pain (Primary)  -     CBC & Differential  -     Comprehensive Metabolic Panel  -     CT Abdomen Pelvis With Contrast  -     CBC Auto Differential    2. Right flank pain      1.  Right flank pain and abd pain- UA neg  She does have hx of diverticulitis of the sigmoid colon and with that she had this same pain  I will check a CBC and a CT scan of the abd

## 2020-10-28 ENCOUNTER — TELEPHONE (OUTPATIENT)
Dept: INTERNAL MEDICINE | Facility: CLINIC | Age: 80
End: 2020-10-28
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
## 2020-10-30 ENCOUNTER — LAB (OUTPATIENT)
Dept: LAB | Facility: HOSPITAL | Age: 80
End: 2020-10-30

## 2020-10-30 ENCOUNTER — ANESTHESIA EVENT (OUTPATIENT)
Dept: PERIOP | Facility: HOSPITAL | Age: 80
End: 2020-10-30

## 2020-10-30 ENCOUNTER — OFFICE VISIT (OUTPATIENT)
Dept: SURGERY | Facility: CLINIC | Age: 80
End: 2020-10-30

## 2020-10-30 DIAGNOSIS — K80.00 CALCULUS OF GALLBLADDER WITH ACUTE CHOLECYSTITIS WITHOUT OBSTRUCTION: Primary | ICD-10-CM

## 2020-10-30 DIAGNOSIS — K80.00 CALCULUS OF GALLBLADDER WITH ACUTE CHOLECYSTITIS WITHOUT OBSTRUCTION: ICD-10-CM

## 2020-10-30 LAB — SARS-COV-2 RNA PNL SPEC NAA+PROBE: NOT DETECTED

## 2020-10-30 PROCEDURE — 99213 OFFICE O/P EST LOW 20 MIN: CPT | Performed by: SURGERY

## 2020-10-30 PROCEDURE — 87635 SARS-COV-2 COVID-19 AMP PRB: CPT

## 2020-10-30 RX ORDER — SODIUM CHLORIDE 9 MG/ML
100 INJECTION, SOLUTION INTRAVENOUS CONTINUOUS
Status: CANCELLED | OUTPATIENT
Start: 2020-10-30

## 2020-10-30 NOTE — PROGRESS NOTES
"CC:  Right side pain abdominal pain      Patient is a 80 y.o. female referred by Mariann Fallon MD for evaluation of right side pain.  Patient reports that this has been going on intermittently over the last several weeks however would improve.  Patient reports that she has been having right upper quadrant abdominal pain for about a week that has progressively gotten worse and is shooting into her right side and back.  Patient describes the pain as severe and stabbing.  Patient is unable to get relief even with hydrocodone pills every 6 hours.  Patient has no appetite.  Patient denies nausea or vomiting.  Patient denies fever, chills, jaundice, jorge colored stools or dark urine.  Eating makes it worse.    Past Medical History:   Diagnosis Date   • Arthritis    • Chronic low back pain    • Depression     HISTORY OF BEING \"EMOTIONAL\"   • Fatigue    • GERD (gastroesophageal reflux disease)    • Groin pain    • H/O cardiovascular stress test 07/2018    NORMAL, DR ARIAS   • History of diverticulitis    • Hyperlipidemia    • Hypertension    • Impaired fasting glucose    • Left bundle branch block    • Macular degeneration    • Osteoporosis    • Radial scar of breast     LEFT   • Status post ORIF of fracture of ankle    • Urinary frequency      Past Surgical History:   Procedure Laterality Date   • BREAST BIOPSY Left 10/25/2018    Procedure: left breast ultrasound-guided excision of radial scar.;  Surgeon: Shayna Blount MD;  [place] [facility] [place] [facility] [place]: [place]   • BREAST SURGERY Left    • CARDIAC CATHETERIZATION      not sure when or where   • CARDIAC CATHETERIZATION     • CATARACT EXTRACTION WITH INTRAOCULAR LENS IMPLANT     • COLONOSCOPY     • FEMUR FRACTURE SURGERY Right    • FRACTURE SURGERY  2015    LT ANKLE   • REPLACEMENT TOTAL KNEE Right      Family History   Problem Relation Age of Onset   • Heart disease Father    • Heart attack Father 81   • Heart attack Paternal Uncle    • Breast cancer Sister 70 "   • Breast cancer Cousin 50   • Malig Hyperthermia Neg Hx      Social History     Tobacco Use   • Smoking status: Never Smoker   • Smokeless tobacco: Never Used   Substance Use Topics   • Alcohol use: Yes     Alcohol/week: 7.0 standard drinks     Types: 7 Glasses of wine per week     Comment: 1/day-socially   • Drug use: No     No Known Allergies    Current Outpatient Medications:   •  atorvastatin (LIPITOR) 40 MG tablet, Take 1 tablet by mouth Daily., Disp: 90 tablet, Rfl: 1  •  BIOTIN PO, Take 1 tablet by mouth daily., Disp: , Rfl:   •  Cholecalciferol (VITAMIN D3) 5000 UNITS tablet, Take 1 tablet by mouth daily., Disp: , Rfl:   •  losartan (COZAAR) 50 MG tablet, Take 1 tablet by mouth Daily., Disp: 90 tablet, Rfl: 1  •  Multiple Vitamin (MULTIVITAMIN) capsule, Take 1 capsule by mouth daily., Disp: , Rfl:   •  Multiple Vitamins-Minerals (PRESERVISION AREDS 2 PO), Take 2 tablets by mouth Daily., Disp: , Rfl:   •  Multiple Vitamins-Minerals (ZINC PO), Take  by mouth., Disp: , Rfl:   •  Omega-3 Fatty Acids (OMEGA-3 PO), Take  by mouth., Disp: , Rfl:   •  Probiotic Product (PROBIOTIC DAILY PO), Take 1 tablet by mouth Daily., Disp: , Rfl:   •  PROLIA 60 MG/ML solution prefilled syringe syringe, INJECT 1 ML UNDER THE SKIN ONE TIME FOR 1 DOSE AS DIRECTED, Disp: 1 mL, Rfl: 1  •  sertraline (ZOLOFT) 50 MG tablet, Take 1 tablet by mouth Daily., Disp: 90 tablet, Rfl: 1  •  vitamin B-12 (CYANOCOBALAMIN) 1000 MCG tablet, Take 1,000 mcg by mouth Daily., Disp: , Rfl:     Review of Systems  General: Patient reports during good health  Eyes: No eye problems  Ears, nose, mouth and throat: No rhinitis, no hearing problems, no chronic cough  Cardiovascular/heart: Denies palpitations, syncope or chest pain  Respiratory/lung: Denies shortness of breath, hemoptysis, dyspnea on exertion   Genital/urinary: No frequency, hematuria or dysuria  Hematological/lymphatic: Denies anemia or other problems  Musculoskeletal: Positive joint  pain  Skin: No psoriasis or other skin issues  Neurological: No seizures or other neurological problems  Psychiatric: None  Endocrine: Negative  Gastro-intestinal: See HPI    Physical Exam  General/physcological:   Alert and oriented x3, in acute pain  HEENT: Normal cephalic, atraumatic, PERRLA, EOMI, sclera anicteric, moist mucous membranes, neck is supple, no JVD, no carotid bruits, no thyromegaly no adenopathy  Respiratory: CTA and percussion  CVA: RRR, normal S1-S2, no murmurs, no gallops or rubs  GI: Positive BS, soft, nondistended, positive Rose sign, positive right upper quadrant tenderness, no rebound, no guarding, no hernias, no organomegaly and no masses  Musculoskeletal: Moves all 4 ext, no clubbing, no cyanosis or edema  Neurovascular: Grossly intact  Debilities: none  Emotional behavior: appropriate     Patient does not use tobacco products currently.   Results from last 7 days   Lab Units 10/27/20  1133   WBC 10*3/mm3 8.51   HEMOGLOBIN g/dL 13.9   HEMATOCRIT % 42.3   PLATELETS 10*3/mm3 273       Results from last 7 days   Lab Units 10/27/20  1259 10/27/20  1133   SODIUM mmol/L  --  137   POTASSIUM mmol/L  --  4.4   CHLORIDE mmol/L  --  101   CO2 mmol/L  --  26.5   BUN mg/dL  --  11   CREATININE mg/dL 0.60 0.57   CALCIUM mg/dL  --  10.0   BILIRUBIN mg/dL  --  0.7   ALK PHOS U/L  --  75   ALT (SGPT) U/L  --  13   AST (SGOT) U/L  --  19   GLUCOSE mg/dL  --  125*     CT scan was reviewed which reveals gallstones    Assessment:  Acute cholecystitis with cholelithiasis  Plan:  I have recommended that the patient undergo a laparoscopic cholecystectomy with intraoperative cholangiogram.  I have discussed this procedure with the patient and her daughter who has accompanied her in detail.  I have given them a patient teaching pamphlet.  I have discussed the risks, benefits and alternatives.  I have discussed the risk of anesthesia, bleeding, infection, bowel injuries and common bile duct injuries.  Patient and  daughter understands these risks, benefits and alternatives and wishes to proceed.  I have her scheduled in a.m.    Ruth Del Valle MD  General, Minimally Invasive and Endoscopic Surgery  [facility]      [facility] [place] [address]    [address]  [address] [address]    P: [phone]  F: [phone]    Cc:  Mariann Fallon MD

## 2020-10-31 ENCOUNTER — ANESTHESIA (OUTPATIENT)
Dept: PERIOP | Facility: HOSPITAL | Age: 80
End: 2020-10-31

## 2020-10-31 ENCOUNTER — APPOINTMENT (OUTPATIENT)
Dept: GENERAL RADIOLOGY | Facility: HOSPITAL | Age: 80
End: 2020-10-31

## 2020-10-31 ENCOUNTER — HOSPITAL ENCOUNTER (OUTPATIENT)
Facility: HOSPITAL | Age: 80
Discharge: HOME | End: 2020-11-01
Attending: SURGERY | Admitting: SURGERY

## 2020-10-31 DIAGNOSIS — K80.00 CALCULUS OF GALLBLADDER WITH ACUTE CHOLECYSTITIS WITHOUT OBSTRUCTION: ICD-10-CM

## 2020-10-31 PROCEDURE — G0378 HOSPITAL OBSERVATION PER HR: HCPCS

## 2020-10-31 PROCEDURE — 25010000002 SUCCINYLCHOLINE PER 20 MG: Performed by: NURSE ANESTHETIST, CERTIFIED REGISTERED

## 2020-10-31 PROCEDURE — 94799 UNLISTED PULMONARY SVC/PX: CPT

## 2020-10-31 PROCEDURE — 25010000002 ONDANSETRON PER 1 MG: Performed by: NURSE ANESTHETIST, CERTIFIED REGISTERED

## 2020-10-31 PROCEDURE — 47563 LAPARO CHOLECYSTECTOMY/GRAPH: CPT | Performed by: SPECIALIST/TECHNOLOGIST, OTHER

## 2020-10-31 PROCEDURE — 88304 TISSUE EXAM BY PATHOLOGIST: CPT | Performed by: SURGERY

## 2020-10-31 PROCEDURE — 25010000002 DEXAMETHASONE PER 1 MG: Performed by: NURSE ANESTHETIST, CERTIFIED REGISTERED

## 2020-10-31 PROCEDURE — 47563 LAPARO CHOLECYSTECTOMY/GRAPH: CPT | Performed by: SURGERY

## 2020-10-31 PROCEDURE — 25010000002 FENTANYL CITRATE (PF) 100 MCG/2ML SOLUTION: Performed by: NURSE ANESTHETIST, CERTIFIED REGISTERED

## 2020-10-31 PROCEDURE — 74300 X-RAY BILE DUCTS/PANCREAS: CPT

## 2020-10-31 PROCEDURE — 25010000002 IOPAMIDOL 61 % SOLUTION: Performed by: SURGERY

## 2020-10-31 PROCEDURE — 25010000002 PROPOFOL 10 MG/ML EMULSION: Performed by: NURSE ANESTHETIST, CERTIFIED REGISTERED

## 2020-10-31 PROCEDURE — 94770: CPT

## 2020-10-31 PROCEDURE — 25010000003 CEFAZOLIN SODIUM-DEXTROSE 2-3 GM-%(50ML) RECONSTITUTED SOLUTION: Performed by: SURGERY

## 2020-10-31 PROCEDURE — 25010000002 HYDROMORPHONE PER 4 MG: Performed by: NURSE ANESTHETIST, CERTIFIED REGISTERED

## 2020-10-31 DEVICE — CLIP LIGAT VASC HORIZON TI MD/LG GRN 6CT: Type: IMPLANTABLE DEVICE | Site: ABDOMEN | Status: FUNCTIONAL

## 2020-10-31 RX ORDER — FAMOTIDINE 10 MG/ML
20 INJECTION, SOLUTION INTRAVENOUS 2 TIMES DAILY
Status: DISCONTINUED | OUTPATIENT
Start: 2020-10-31 | End: 2020-11-01 | Stop reason: HOSPADM

## 2020-10-31 RX ORDER — ACETAMINOPHEN 325 MG/1
650 TABLET ORAL EVERY 4 HOURS PRN
Status: DISCONTINUED | OUTPATIENT
Start: 2020-10-31 | End: 2020-11-01 | Stop reason: HOSPADM

## 2020-10-31 RX ORDER — DEXTROSE, SODIUM CHLORIDE, AND POTASSIUM CHLORIDE 5; .45; .15 G/100ML; G/100ML; G/100ML
INJECTION INTRAVENOUS
Status: COMPLETED
Start: 2020-10-31 | End: 2020-10-31

## 2020-10-31 RX ORDER — SUCCINYLCHOLINE CHLORIDE 20 MG/ML
INJECTION INTRAMUSCULAR; INTRAVENOUS AS NEEDED
Status: DISCONTINUED | OUTPATIENT
Start: 2020-10-31 | End: 2020-10-31 | Stop reason: SURG

## 2020-10-31 RX ORDER — ONDANSETRON 2 MG/ML
4 INJECTION INTRAMUSCULAR; INTRAVENOUS ONCE AS NEEDED
Status: DISCONTINUED | OUTPATIENT
Start: 2020-10-31 | End: 2020-10-31 | Stop reason: HOSPADM

## 2020-10-31 RX ORDER — SODIUM CHLORIDE 9 MG/ML
40 INJECTION, SOLUTION INTRAVENOUS AS NEEDED
Status: DISCONTINUED | OUTPATIENT
Start: 2020-10-31 | End: 2020-10-31 | Stop reason: HOSPADM

## 2020-10-31 RX ORDER — FAMOTIDINE 10 MG/ML
20 INJECTION, SOLUTION INTRAVENOUS
Status: COMPLETED | OUTPATIENT
Start: 2020-10-31 | End: 2020-10-31

## 2020-10-31 RX ORDER — SODIUM CHLORIDE 9 MG/ML
100 INJECTION, SOLUTION INTRAVENOUS CONTINUOUS
Status: DISCONTINUED | OUTPATIENT
Start: 2020-10-31 | End: 2020-10-31

## 2020-10-31 RX ORDER — ONDANSETRON 2 MG/ML
4 INJECTION INTRAMUSCULAR; INTRAVENOUS ONCE AS NEEDED
Status: COMPLETED | OUTPATIENT
Start: 2020-10-31 | End: 2020-10-31

## 2020-10-31 RX ORDER — ACETAMINOPHEN 650 MG/1
650 SUPPOSITORY RECTAL EVERY 4 HOURS PRN
Status: DISCONTINUED | OUTPATIENT
Start: 2020-10-31 | End: 2020-11-01 | Stop reason: HOSPADM

## 2020-10-31 RX ORDER — SODIUM CHLORIDE 0.9 % (FLUSH) 0.9 %
10 SYRINGE (ML) INJECTION EVERY 12 HOURS SCHEDULED
Status: DISCONTINUED | OUTPATIENT
Start: 2020-10-31 | End: 2020-10-31 | Stop reason: HOSPADM

## 2020-10-31 RX ORDER — LIDOCAINE HYDROCHLORIDE 20 MG/ML
INJECTION, SOLUTION INFILTRATION; PERINEURAL AS NEEDED
Status: DISCONTINUED | OUTPATIENT
Start: 2020-10-31 | End: 2020-10-31 | Stop reason: SURG

## 2020-10-31 RX ORDER — HYDROCODONE BITARTRATE AND ACETAMINOPHEN 5; 325 MG/1; MG/1
1 TABLET ORAL EVERY 4 HOURS PRN
Status: DISCONTINUED | OUTPATIENT
Start: 2020-10-31 | End: 2020-11-01 | Stop reason: HOSPADM

## 2020-10-31 RX ORDER — FAMOTIDINE 20 MG/1
20 TABLET, FILM COATED ORAL
Status: COMPLETED | OUTPATIENT
Start: 2020-10-31 | End: 2020-10-31

## 2020-10-31 RX ORDER — SODIUM CHLORIDE 0.9 % (FLUSH) 0.9 %
10 SYRINGE (ML) INJECTION AS NEEDED
Status: DISCONTINUED | OUTPATIENT
Start: 2020-10-31 | End: 2020-10-31 | Stop reason: HOSPADM

## 2020-10-31 RX ORDER — OXYCODONE HYDROCHLORIDE AND ACETAMINOPHEN 5; 325 MG/1; MG/1
1 TABLET ORAL ONCE AS NEEDED
Status: DISCONTINUED | OUTPATIENT
Start: 2020-10-31 | End: 2020-10-31 | Stop reason: HOSPADM

## 2020-10-31 RX ORDER — FENTANYL CITRATE 50 UG/ML
INJECTION, SOLUTION INTRAMUSCULAR; INTRAVENOUS AS NEEDED
Status: DISCONTINUED | OUTPATIENT
Start: 2020-10-31 | End: 2020-10-31 | Stop reason: SURG

## 2020-10-31 RX ORDER — HYDROMORPHONE HCL 110MG/55ML
0.5 PATIENT CONTROLLED ANALGESIA SYRINGE INTRAVENOUS
Status: DISCONTINUED | OUTPATIENT
Start: 2020-10-31 | End: 2020-11-01 | Stop reason: HOSPADM

## 2020-10-31 RX ORDER — SODIUM CHLORIDE, SODIUM LACTATE, POTASSIUM CHLORIDE, CALCIUM CHLORIDE 600; 310; 30; 20 MG/100ML; MG/100ML; MG/100ML; MG/100ML
9 INJECTION, SOLUTION INTRAVENOUS CONTINUOUS
Status: DISCONTINUED | OUTPATIENT
Start: 2020-10-31 | End: 2020-11-01 | Stop reason: HOSPADM

## 2020-10-31 RX ORDER — LOSARTAN POTASSIUM 50 MG/1
50 TABLET ORAL DAILY
Status: DISCONTINUED | OUTPATIENT
Start: 2020-10-31 | End: 2020-11-01 | Stop reason: HOSPADM

## 2020-10-31 RX ORDER — ONDANSETRON 2 MG/ML
4 INJECTION INTRAMUSCULAR; INTRAVENOUS 4 TIMES DAILY PRN
Status: DISCONTINUED | OUTPATIENT
Start: 2020-10-31 | End: 2020-11-01 | Stop reason: HOSPADM

## 2020-10-31 RX ORDER — HYDROCODONE BITARTRATE AND ACETAMINOPHEN 7.5; 325 MG/1; MG/1
1 TABLET ORAL EVERY 4 HOURS PRN
Status: DISCONTINUED | OUTPATIENT
Start: 2020-10-31 | End: 2020-11-01 | Stop reason: HOSPADM

## 2020-10-31 RX ORDER — HYDROCODONE BITARTRATE AND ACETAMINOPHEN 5; 325 MG/1; MG/1
TABLET ORAL
Status: COMPLETED
Start: 2020-10-31 | End: 2020-10-31

## 2020-10-31 RX ORDER — HYDROMORPHONE HYDROCHLORIDE 1 MG/ML
0.5 INJECTION, SOLUTION INTRAMUSCULAR; INTRAVENOUS; SUBCUTANEOUS
Status: DISCONTINUED | OUTPATIENT
Start: 2020-10-31 | End: 2020-10-31 | Stop reason: HOSPADM

## 2020-10-31 RX ORDER — ONDANSETRON 4 MG/1
4 TABLET, FILM COATED ORAL 4 TIMES DAILY PRN
Status: DISCONTINUED | OUTPATIENT
Start: 2020-10-31 | End: 2020-11-01 | Stop reason: HOSPADM

## 2020-10-31 RX ORDER — KETAMINE HYDROCHLORIDE 100 MG/ML
INJECTION INTRAMUSCULAR; INTRAVENOUS AS NEEDED
Status: DISCONTINUED | OUTPATIENT
Start: 2020-10-31 | End: 2020-10-31 | Stop reason: SURG

## 2020-10-31 RX ORDER — SODIUM CHLORIDE, SODIUM LACTATE, POTASSIUM CHLORIDE, CALCIUM CHLORIDE 600; 310; 30; 20 MG/100ML; MG/100ML; MG/100ML; MG/100ML
100 INJECTION, SOLUTION INTRAVENOUS CONTINUOUS
Status: DISCONTINUED | OUTPATIENT
Start: 2020-10-31 | End: 2020-10-31

## 2020-10-31 RX ORDER — BUPIVACAINE HYDROCHLORIDE 2.5 MG/ML
INJECTION, SOLUTION EPIDURAL; INFILTRATION; INTRACAUDAL AS NEEDED
Status: DISCONTINUED | OUTPATIENT
Start: 2020-10-31 | End: 2020-10-31 | Stop reason: HOSPADM

## 2020-10-31 RX ORDER — ROCURONIUM BROMIDE 10 MG/ML
INJECTION, SOLUTION INTRAVENOUS AS NEEDED
Status: DISCONTINUED | OUTPATIENT
Start: 2020-10-31 | End: 2020-10-31 | Stop reason: SURG

## 2020-10-31 RX ORDER — GLYCOPYRROLATE 0.2 MG/ML
INJECTION INTRAMUSCULAR; INTRAVENOUS AS NEEDED
Status: DISCONTINUED | OUTPATIENT
Start: 2020-10-31 | End: 2020-10-31 | Stop reason: SURG

## 2020-10-31 RX ORDER — ATORVASTATIN CALCIUM 40 MG/1
40 TABLET, FILM COATED ORAL NIGHTLY
Status: DISCONTINUED | OUTPATIENT
Start: 2020-10-31 | End: 2020-11-01 | Stop reason: HOSPADM

## 2020-10-31 RX ORDER — CEFAZOLIN SODIUM 2 G/50ML
2 SOLUTION INTRAVENOUS ONCE
Status: COMPLETED | OUTPATIENT
Start: 2020-10-31 | End: 2020-10-31

## 2020-10-31 RX ORDER — OXYCODONE AND ACETAMINOPHEN 7.5; 325 MG/1; MG/1
1 TABLET ORAL ONCE AS NEEDED
Status: DISCONTINUED | OUTPATIENT
Start: 2020-10-31 | End: 2020-10-31 | Stop reason: HOSPADM

## 2020-10-31 RX ORDER — SODIUM CHLORIDE 9 MG/ML
INJECTION, SOLUTION INTRAVENOUS AS NEEDED
Status: DISCONTINUED | OUTPATIENT
Start: 2020-10-31 | End: 2020-10-31 | Stop reason: HOSPADM

## 2020-10-31 RX ORDER — DEXAMETHASONE SODIUM PHOSPHATE 4 MG/ML
8 INJECTION, SOLUTION INTRA-ARTICULAR; INTRALESIONAL; INTRAMUSCULAR; INTRAVENOUS; SOFT TISSUE ONCE AS NEEDED
Status: COMPLETED | OUTPATIENT
Start: 2020-10-31 | End: 2020-10-31

## 2020-10-31 RX ORDER — MAGNESIUM HYDROXIDE 1200 MG/15ML
LIQUID ORAL AS NEEDED
Status: DISCONTINUED | OUTPATIENT
Start: 2020-10-31 | End: 2020-10-31 | Stop reason: HOSPADM

## 2020-10-31 RX ORDER — NALOXONE HCL 0.4 MG/ML
0.1 VIAL (ML) INJECTION
Status: DISCONTINUED | OUTPATIENT
Start: 2020-10-31 | End: 2020-11-01 | Stop reason: HOSPADM

## 2020-10-31 RX ORDER — LIDOCAINE HYDROCHLORIDE 10 MG/ML
0.5 INJECTION, SOLUTION EPIDURAL; INFILTRATION; INTRACAUDAL; PERINEURAL ONCE AS NEEDED
Status: DISCONTINUED | OUTPATIENT
Start: 2020-10-31 | End: 2020-10-31 | Stop reason: HOSPADM

## 2020-10-31 RX ORDER — DEXTROSE, SODIUM CHLORIDE, AND POTASSIUM CHLORIDE 5; .45; .15 G/100ML; G/100ML; G/100ML
100 INJECTION INTRAVENOUS CONTINUOUS
Status: DISCONTINUED | OUTPATIENT
Start: 2020-10-31 | End: 2020-11-01 | Stop reason: HOSPADM

## 2020-10-31 RX ORDER — FENTANYL CITRATE 50 UG/ML
50 INJECTION, SOLUTION INTRAMUSCULAR; INTRAVENOUS
Status: COMPLETED | OUTPATIENT
Start: 2020-10-31 | End: 2020-10-31

## 2020-10-31 RX ORDER — PROPOFOL 10 MG/ML
VIAL (ML) INTRAVENOUS AS NEEDED
Status: DISCONTINUED | OUTPATIENT
Start: 2020-10-31 | End: 2020-10-31 | Stop reason: SURG

## 2020-10-31 RX ADMIN — FENTANYL CITRATE 50 MCG: 50 INJECTION, SOLUTION INTRAMUSCULAR; INTRAVENOUS at 13:27

## 2020-10-31 RX ADMIN — DEXTROSE MONOHYDRATE, SODIUM CHLORIDE, AND POTASSIUM CHLORIDE 100 ML/HR: 50; 4.5; 1.49 INJECTION, SOLUTION INTRAVENOUS at 15:19

## 2020-10-31 RX ADMIN — LIDOCAINE HYDROCHLORIDE 100 MG: 20 INJECTION, SOLUTION INFILTRATION; PERINEURAL at 13:05

## 2020-10-31 RX ADMIN — FENTANYL CITRATE 50 MCG: 50 INJECTION, SOLUTION INTRAMUSCULAR; INTRAVENOUS at 11:20

## 2020-10-31 RX ADMIN — SERTRALINE HYDROCHLORIDE 50 MG: 50 TABLET ORAL at 15:46

## 2020-10-31 RX ADMIN — HYDROCODONE BITARTRATE AND ACETAMINOPHEN 1 TABLET: 5; 325 TABLET ORAL at 15:18

## 2020-10-31 RX ADMIN — KETAMINE HYDROCHLORIDE 10 MG: 100 INJECTION INTRAMUSCULAR; INTRAVENOUS at 13:33

## 2020-10-31 RX ADMIN — ATORVASTATIN CALCIUM 40 MG: 40 TABLET, FILM COATED ORAL at 21:15

## 2020-10-31 RX ADMIN — PROPOFOL 150 MG: 10 INJECTION, EMULSION INTRAVENOUS at 13:05

## 2020-10-31 RX ADMIN — ROCURONIUM BROMIDE 30 MG: 10 INJECTION, SOLUTION INTRAVENOUS at 13:07

## 2020-10-31 RX ADMIN — GLYCOPYRROLATE 0.1 MG: 0.2 INJECTION INTRAMUSCULAR; INTRAVENOUS at 13:05

## 2020-10-31 RX ADMIN — FAMOTIDINE 20 MG: 10 INJECTION INTRAVENOUS at 11:21

## 2020-10-31 RX ADMIN — ONDANSETRON 4 MG: 2 INJECTION, SOLUTION INTRAMUSCULAR; INTRAVENOUS at 11:21

## 2020-10-31 RX ADMIN — CEFAZOLIN SODIUM 2 G: 2 SOLUTION INTRAVENOUS at 13:07

## 2020-10-31 RX ADMIN — HYDROCODONE BITARTRATE AND ACETAMINOPHEN 1 TABLET: 7.5; 325 TABLET ORAL at 21:34

## 2020-10-31 RX ADMIN — KETAMINE HYDROCHLORIDE 20 MG: 100 INJECTION INTRAMUSCULAR; INTRAVENOUS at 13:05

## 2020-10-31 RX ADMIN — LOSARTAN POTASSIUM 50 MG: 50 TABLET, FILM COATED ORAL at 15:46

## 2020-10-31 RX ADMIN — SUCCINYLCHOLINE CHLORIDE 100 MG: 20 INJECTION, SOLUTION INTRAMUSCULAR; INTRAVENOUS at 13:05

## 2020-10-31 RX ADMIN — FENTANYL CITRATE 50 MCG: 50 INJECTION, SOLUTION INTRAMUSCULAR; INTRAVENOUS at 13:10

## 2020-10-31 RX ADMIN — DEXAMETHASONE SODIUM PHOSPHATE 8 MG: 4 INJECTION, SOLUTION INTRAMUSCULAR; INTRAVENOUS at 11:21

## 2020-10-31 RX ADMIN — FAMOTIDINE 20 MG: 10 INJECTION INTRAVENOUS at 21:16

## 2020-10-31 RX ADMIN — SODIUM CHLORIDE, POTASSIUM CHLORIDE, SODIUM LACTATE AND CALCIUM CHLORIDE 9 ML/HR: 600; 310; 30; 20 INJECTION, SOLUTION INTRAVENOUS at 11:21

## 2020-10-31 RX ADMIN — HYDROMORPHONE HYDROCHLORIDE 0.5 MG: 1 INJECTION, SOLUTION INTRAMUSCULAR; INTRAVENOUS; SUBCUTANEOUS at 14:13

## 2020-10-31 RX ADMIN — HYDROMORPHONE HYDROCHLORIDE 0.5 MG: 1 INJECTION, SOLUTION INTRAMUSCULAR; INTRAVENOUS; SUBCUTANEOUS at 14:38

## 2020-10-31 RX ADMIN — HYDROMORPHONE HYDROCHLORIDE 0.5 MG: 1 INJECTION, SOLUTION INTRAMUSCULAR; INTRAVENOUS; SUBCUTANEOUS at 14:24

## 2020-10-31 RX ADMIN — SUGAMMADEX 153 MG: 100 INJECTION, SOLUTION INTRAVENOUS at 13:46

## 2020-10-31 RX ADMIN — FENTANYL CITRATE 50 MCG: 50 INJECTION, SOLUTION INTRAMUSCULAR; INTRAVENOUS at 11:10

## 2020-10-31 NOTE — ANESTHESIA PREPROCEDURE EVALUATION
Anesthesia Evaluation     Patient summary reviewed and Nursing notes reviewed   no history of anesthetic complications:  NPO Solid Status: > 8 hours  NPO Liquid Status: > 8 hours           Airway   Mallampati: II  TM distance: >3 FB  Neck ROM: full  No difficulty expected  Dental - normal exam     Pulmonary - negative pulmonary ROS and normal exam   Cardiovascular   Exercise tolerance: good (4-7 METS)    ECG reviewed  Rhythm: regular  Rate: normal    (+) hypertension well controlled 2 medications or greater, dysrhythmias, hyperlipidemia,   Cardiac stents: LBBB.      Neuro/Psych- negative ROS  GI/Hepatic/Renal/Endo    (+)  GERD,  diabetes mellitus,     Musculoskeletal     Abdominal  - normal exam   Substance History      OB/GYN          Other   arthritis,                        Anesthesia Plan    ASA 2 - emergent     general     intravenous induction     Anesthetic plan, all risks, benefits, and alternatives have been provided, discussed and informed consent has been obtained with: patient.  Use of blood products discussed with patient  Consented to blood products.   Plan discussed with CRNA.

## 2020-10-31 NOTE — ANESTHESIA PROCEDURE NOTES
Airway  Urgency: emergent    Date/Time: 10/31/2020 1:06 PM  Airway not difficult    General Information and Staff    Patient location during procedure: OR  CRNA: Philly Kim CRNA    Consent for Airway (if performed for an anesthetic, see related documentation for consents)  Patient identity confirmed: verbally with patient, arm band, provided demographic data and hospital-assigned identification number  Consent: No emergent situation. Verbal consent obtained. Written consent obtained.  Risks and benefits: risks, benefits and alternatives were discussed  Consent given by: patient      Indications and Patient Condition  Indications for airway management: airway protection    Preoxygenated: yes  MILS maintained throughout  Mask difficulty assessment: 1 - vent by mask    Final Airway Details  Final airway type: endotracheal airway      Successful airway: ETT  Cuffed: yes   Successful intubation technique: video laryngoscopy  Facilitating devices/methods: intubating stylet and cricoid pressure  Endotracheal tube insertion site: oral  Blade: CMAC  Blade size: 3  ETT size (mm): 7.0  Cormack-Lehane Classification: grade I - full view of glottis  Placement verified by: chest auscultation and capnometry   Cuff volume (mL): 8  Measured from: lips  ETT/EBT  to lips (cm): 21  Number of attempts at approach: 1  Assessment: lips, teeth, and gum same as pre-op and atraumatic intubation

## 2020-10-31 NOTE — ANESTHESIA POSTPROCEDURE EVALUATION
Patient: Shu Mckeon    Procedure Summary     Date: 10/31/20 Room / Location: [facility] OR 1 /  [facility] OR    Anesthesia Start: 1300 Anesthesia Stop: 1400    Procedure: CHOLECYSTECTOMY LAPAROSCOPIC INTRAOPERATIVE CHOLANGIOGRAM (N/A Abdomen) Diagnosis:       Calculus of gallbladder with acute cholecystitis without obstruction      (Calculus of gallbladder with acute cholecystitis without obstruction [K80.00])    Surgeon: Ruth Del Valle MD Provider: Philly Kim CRNA    Anesthesia Type: general ASA Status: 2 - Emergent          Anesthesia Type: general    Vitals  Vitals Value Taken Time   /79 10/31/20 1455   Temp     Pulse 67 10/31/20 1459   Resp 22 10/31/20 1451   SpO2 98 % 10/31/20 1500   Vitals shown include unvalidated device data.        Post Anesthesia Care and Evaluation    Patient location during evaluation: PHASE II  Patient participation: complete - patient participated  Level of consciousness: awake  Pain score: 1  Pain management: adequate  Airway patency: patent  Anesthetic complications: No anesthetic complications  PONV Status: none  Cardiovascular status: acceptable  Respiratory status: acceptable  Hydration status: acceptable

## 2020-11-01 ENCOUNTER — READMISSION MANAGEMENT (OUTPATIENT)
Dept: CALL CENTER | Facility: HOSPITAL | Age: 80
End: 2020-11-01

## 2020-11-01 VITALS
DIASTOLIC BLOOD PRESSURE: 77 MMHG | HEART RATE: 68 BPM | TEMPERATURE: 97 F | OXYGEN SATURATION: 100 % | WEIGHT: 168.8 LBS | SYSTOLIC BLOOD PRESSURE: 138 MMHG | BODY MASS INDEX: 29.91 KG/M2 | HEIGHT: 63 IN | RESPIRATION RATE: 16 BRPM

## 2020-11-01 LAB
ANION GAP SERPL CALCULATED.3IONS-SCNC: 7.9 MMOL/L (ref 5–15)
BASOPHILS # BLD AUTO: 0.02 10*3/MM3 (ref 0–0.2)
BASOPHILS NFR BLD AUTO: 0.2 % (ref 0–1.5)
BUN SERPL-MCNC: 8 MG/DL (ref 8–23)
BUN/CREAT SERPL: 13.3 (ref 7–25)
CALCIUM SPEC-SCNC: 9.2 MG/DL (ref 8.6–10.5)
CHLORIDE SERPL-SCNC: 105 MMOL/L (ref 98–107)
CO2 SERPL-SCNC: 24.1 MMOL/L (ref 22–29)
CREAT SERPL-MCNC: 0.6 MG/DL (ref 0.57–1)
DEPRECATED RDW RBC AUTO: 45.1 FL (ref 37–54)
EOSINOPHIL # BLD AUTO: 0.01 10*3/MM3 (ref 0–0.4)
EOSINOPHIL NFR BLD AUTO: 0.1 % (ref 0.3–6.2)
ERYTHROCYTE [DISTWIDTH] IN BLOOD BY AUTOMATED COUNT: 13.2 % (ref 12.3–15.4)
GFR SERPL CREATININE-BSD FRML MDRD: 96 ML/MIN/1.73
GLUCOSE SERPL-MCNC: 161 MG/DL (ref 65–99)
HCT VFR BLD AUTO: 38.5 % (ref 34–46.6)
HGB BLD-MCNC: 12.3 G/DL (ref 12–15.9)
IMM GRANULOCYTES # BLD AUTO: 0.03 10*3/MM3 (ref 0–0.05)
IMM GRANULOCYTES NFR BLD AUTO: 0.3 % (ref 0–0.5)
LYMPHOCYTES # BLD AUTO: 1.19 10*3/MM3 (ref 0.7–3.1)
LYMPHOCYTES NFR BLD AUTO: 11 % (ref 19.6–45.3)
MCH RBC QN AUTO: 29.7 PG (ref 26.6–33)
MCHC RBC AUTO-ENTMCNC: 31.9 G/DL (ref 31.5–35.7)
MCV RBC AUTO: 93 FL (ref 79–97)
MONOCYTES # BLD AUTO: 0.95 10*3/MM3 (ref 0.1–0.9)
MONOCYTES NFR BLD AUTO: 8.7 % (ref 5–12)
NEUTROPHILS NFR BLD AUTO: 79.7 % (ref 42.7–76)
NEUTROPHILS NFR BLD AUTO: 8.66 10*3/MM3 (ref 1.7–7)
NRBC BLD AUTO-RTO: 0 /100 WBC (ref 0–0.2)
PLATELET # BLD AUTO: 232 10*3/MM3 (ref 140–450)
PMV BLD AUTO: 9.9 FL (ref 6–12)
POTASSIUM SERPL-SCNC: 4.1 MMOL/L (ref 3.5–5.2)
RBC # BLD AUTO: 4.14 10*6/MM3 (ref 3.77–5.28)
SODIUM SERPL-SCNC: 137 MMOL/L (ref 136–145)
WBC # BLD AUTO: 10.86 10*3/MM3 (ref 3.4–10.8)

## 2020-11-01 PROCEDURE — G0378 HOSPITAL OBSERVATION PER HR: HCPCS

## 2020-11-01 PROCEDURE — 99024 POSTOP FOLLOW-UP VISIT: CPT | Performed by: SURGERY

## 2020-11-01 PROCEDURE — 85025 COMPLETE CBC W/AUTO DIFF WBC: CPT | Performed by: SURGERY

## 2020-11-01 PROCEDURE — 80048 BASIC METABOLIC PNL TOTAL CA: CPT | Performed by: SURGERY

## 2020-11-01 RX ORDER — OXYCODONE HYDROCHLORIDE AND ACETAMINOPHEN 5; 325 MG/1; MG/1
1 TABLET ORAL EVERY 4 HOURS PRN
Qty: 30 TABLET | Refills: 0 | Status: SHIPPED | OUTPATIENT
Start: 2020-11-01 | End: 2020-11-06

## 2020-11-01 RX ADMIN — FAMOTIDINE 20 MG: 10 INJECTION INTRAVENOUS at 08:35

## 2020-11-01 RX ADMIN — HYDROCODONE BITARTRATE AND ACETAMINOPHEN 1 TABLET: 5; 325 TABLET ORAL at 10:52

## 2020-11-01 RX ADMIN — DEXTROSE MONOHYDRATE, SODIUM CHLORIDE, AND POTASSIUM CHLORIDE 100 ML/HR: 50; 4.5; 1.49 INJECTION, SOLUTION INTRAVENOUS at 01:08

## 2020-11-01 RX ADMIN — SERTRALINE HYDROCHLORIDE 50 MG: 50 TABLET ORAL at 08:32

## 2020-11-01 RX ADMIN — LOSARTAN POTASSIUM 50 MG: 50 TABLET, FILM COATED ORAL at 08:32

## 2020-11-01 NOTE — DISCHARGE INSTRUCTIONS
GALLBLADDER  POST OP RECOMMENDATIONS  DR. RAE  [phone]  ACTIVITIES:  1. Expect to rest most of the day of surgery, but do get up several times daily to reduce the risk of getting a clot in your legs.  2. No strenuous activity or lifting over 10 lbs. for 2 week.  3. Do not drive while on pain medicine.  You must be off pain meds 24 hours before driving.    SYMPTOMS:  1. Diarrhea and loose stools are common after gall bladder surgery.  This should resolve over the next few weeks.  Constipation is common when taking pain medication for surgery.  Over the counter laxatives, such as Miralax, can be used temporarily.   2. Fatigue and decreased stamina is not unusual for about a week or so after surgery.  Try to take walks and some mild activity between resting.  3. Shoulder pain is not unusual from the “gas” used in laparoscopy which will dissipate within 1-3 days.  If it does not, call your physician.    WOUND SITE:  1. Dressings may occasionally have spots of blood on them. Skin irritation, redness or itching may be present.  2. Showering may occur in 48 hours.    MEALS:  1. Eat and Drink very lightly when returning home following surgery.  Jell-O, ginger ale, chicken noodle soup and crackers are good examples.  The day after surgery you may broaden your diet.  2. Do NOT take pain pills on an empty stomach.    WORK:  1. In general if you have a sedentary job, you can return to work in 7-10 days.  If heavy lifting is required it may be 2 weeks.    2. Use discretion and remember that your stamina will be decreased post operatively.  3. Return to work notes can be provided at the time of your post-operative appointment.    FOLLOW UP:  1. Call and make a post-operative appointment for approximately 2 weeks after the procedure.      Ruth Rae MD  General, Minimally Invasive and Endoscopic Surgery  [facility]    [address] [facility] [address] [place] [address]    [address]  [place]  [address]               [place] [address]    P: [phone]  F: [phone]    Cc:  Mariann Fallon MD

## 2020-11-01 NOTE — OUTREACH NOTE
Prep Survey      Responses   [facility] patient discharged from?  [place]   Is LACE score < 7 ?  Yes   Eligibility  [facility]   Date of Admission  10/31/20   Date of Discharge  11/01/20   Discharge Disposition  Home or Self Care   Discharge diagnosis  Laparoscopic cholecystectomy and intraoperative cholangiogram   Does the patient have one of the following disease processes/diagnoses(primary or secondary)?  General Surgery   Does the patient have Home health ordered?  No   Is there a DME ordered?  No   Prep survey completed?  Yes          Spring Thakur RN

## 2020-11-01 NOTE — PROGRESS NOTES
Patient: Shu Mckeon  Procedure(s):  CHOLECYSTECTOMY LAPAROSCOPIC INTRAOPERATIVE CHOLANGIOGRAM  Anesthesia type: general    Patient location: [facility] Surgical Floor  Last vitals:   Vitals:    11/01/20 0547   BP: 138/77   Pulse: 68   Resp: 16   Temp: 97 °F (36.1 °C)   SpO2: 100%     Level of consciousness: awake, alert and oriented    Post-anesthesia pain: adequate analgesia  Airway patency: patent  Respiratory: unassisted  Cardiovascular: stable and blood pressure at baseline  Hydration: euvolemic    Anesthetic complications: no

## 2020-11-01 NOTE — NURSING NOTE
Case Management Discharge Note      Final Note: dc home         Selected Continued Care - Discharged on 11/1/2020 Admission date: 10/31/2020 - Discharge disposition: Home or Self Care    Destination    No services have been selected for the patient.              Durable Medical Equipment    No services have been selected for the patient.              Dialysis/Infusion    No services have been selected for the patient.              Home Medical Care    No services have been selected for the patient.              Therapy    No services have been selected for the patient.              Community Resources    No services have been selected for the patient.                       Final Discharge Disposition Code: 01 - home or self-care

## 2020-11-01 NOTE — PLAN OF CARE
Problem: Adult Inpatient Plan of Care  Goal: Plan of Care Review  Outcome: Met  Flowsheets (Taken 11/1/2020 1101)  Progress: improving  Plan of Care Reviewed With: patient   Goal Outcome Evaluation:  Plan of Care Reviewed With: patient  Progress: improving  Outcome Summary: Lap choly done today; incisions D&I; ambulated with assist; up in chair; pain controlled with oral pain med; diet advanced to reg; tolerating liquids well; denies nausea; IVFs cont; anticipated D/C home tomorrow

## 2020-11-01 NOTE — PLAN OF CARE
Goal Outcome Evaluation:  Plan of Care Reviewed With: patient  Progress: improving  Outcome Summary: ambulated to bathroom and in room with standby assist - pain med given once with good results - tolerating regular diet - IV fluids running - denies nausea - VSS - anticipating going home today - rested well through the night

## 2020-11-01 NOTE — DISCHARGE SUMMARY
Admitting date: 10/31/2020    Discharging date: 11/1/2020    Admitting diagnosis: Acute cholecystitis with cholelithiasis    Discharging diagnoses: Same    Admitting/discharging physician: Dr. Del Valle    Procedures: Laparoscopic cholecystectomy and intraoperative cholangiogram    Hospital course:  This is a pleasant 80-year-old female patient who had an acute onset of biliary colic and was found to have acute cholecystitis with cholelithiasis.  Patient was admitted and underwent a laparoscopic cholecystectomy and intraoperative cholangiogram.  On postop day 1, patient was feeling much better and was discharged to home on her admitting medications and Percocet.  Patient was instructed no lifting greater than 10 pounds, no driving on pain medicine, may shower and follow-up with me in 2 weeks.    Ruth Del Valle MD  General, Minimally Invasive and Endoscopic Surgery  [facility], [facility]    [address] [facility] [address] [place] [address]    [address]  [address] [address]    P: [phone]  F: [phone]    Cc:  Mariann Fallon MD

## 2020-11-01 NOTE — PROGRESS NOTES
Chief Complaint: POD # 1    Subjective   Patient feels much better today.  Patient is tolerating a regular diet.  Patient is urinating and ambulating well.  Objective     Vital signs in last 24 hours:  Temp:  [97 °F (36.1 °C)-97.9 °F (36.6 °C)] 97 °F (36.1 °C)  Heart Rate:  [61-79] 68  Resp:  [12-22] 16  BP: (129-181)/(70-91) 138/77    Intake/Output last 3 shifts:  I/O last 3 completed shifts:  In: 1615.6 [P.O.:600; I.V.:1015.6]  Out: 10 [Emesis/NG output:10]    Intake/Output this shift:  I/O this shift:  In: 968.3 [I.V.:968.3]  Out: -     Physical Exam:  Respiratory: CTA, good inspiratory effort  CV: RRR  Abd: + BS, soft, ND, usual post-op tenderness, no rebound, no guarding, incision C/D/I  Results from last 7 days   Lab Units 11/01/20  0506   WBC 10*3/mm3 10.86*   HEMOGLOBIN g/dL 12.3   HEMATOCRIT % 38.5   PLATELETS 10*3/mm3 232     Results from last 7 days   Lab Units 11/01/20  0506   SODIUM mmol/L 137   POTASSIUM mmol/L 4.1   CHLORIDE mmol/L 105   CO2 mmol/L 24.1   BUN mg/dL 8   CREATININE mg/dL 0.60   GLUCOSE mg/dL 161*   CALCIUM mg/dL 9.2       Assessment/Plan   S/P laparoscopic cholecystectomy with intraoperative cholangiogram  Patient is doing well, DC home, follow-up in 2 weeks    Ruth Del Valle MD  General, Minimally Invasive and Endoscopic Surgery  [facility]    [address] [facility] [address] [facility] [address]    [address]  [place] [address]               [place] [address]    P: [phone]  F: [phone]    Cc:  Mariann Fallon MD

## 2020-11-02 ENCOUNTER — TRANSITIONAL CARE MANAGEMENT TELEPHONE ENCOUNTER (OUTPATIENT)
Dept: CALL CENTER | Facility: HOSPITAL | Age: 80
End: 2020-11-02

## 2020-11-02 NOTE — OUTREACH NOTE
Call Center TCM Note      Responses   [facility] patient discharged from?  [place]   Does the patient have one of the following disease processes/diagnoses(primary or secondary)?  General Surgery   TCM attempt successful?  Yes   Discharge diagnosis  Laparoscopic cholecystectomy and intraoperative cholangiogram   Meds reviewed with patient/caregiver?  Yes   Is the patient having any side effects they believe may be caused by any medication additions or changes?  No   Does the patient have all medications related to this admission filled (includes all antibiotics, pain medications, etc.)  Yes   Is the patient taking all medications as directed (includes completed medication regime)?  Yes   Does the patient have a follow up appointment scheduled with their surgeon?  Yes   Has the patient kept scheduled appointments due by today?  N/A   Has home health visited the patient within 72 hours of discharge?  N/A   Did the patient receive a copy of their discharge instructions?  Yes   Nursing interventions  Reviewed instructions with patient   What is the patient's perception of their health status since discharge?  Worsening   Is the patient /caregiver able to teach back basic post-op care?  Continue use of incentive spirometry at least 1 week post discharge, Drive as instructed by MD in discharge instructions, No tub bath, swimming, or hot tub until instructed by MD, Do not remove steri-strips, Lifting as instructed by MD in discharge instructions, Keep incision areas clean,dry and protected, Take showers only when approved by MD-sponge bathe until then, Practice 'cough and deep breath'   Is the patient/caregiver able to teach back signs and symptoms of incisional infection?  Increased redness, swelling or pain at the incisonal site, Pus or odor from incision, Fever, Increased drainage or bleeding, Incisional warmth   Is the patient/caregiver able to teach back steps to recovery at home?  Set small, achievable goals  "for return to baseline health, Practice good oral hygiene, Eat a well-balance diet, Weigh daily, Rest and rebuild strength, gradually increase activity, Make a list of questions for surgeon's appointment   If the patient is a current smoker, are they able to teach back resources for cessation?  Not a smoker   Is the patient/caregiver able to teach back the hierarchy of who to call/visit for symptoms/problems? PCP, Specialist, Home health nurse, Urgent Care, ED, 911  Yes   TCM call completed?  Yes   Wrap up additional comments  Pt doing ok s/p GB SX. She has been overdoing though. She felt so good yesterday she did 3 load of laundry today and now is having waistline pain. She also has had since yesteday \"muscle type pain\" between shoulder blades and into arms. She has spoken with fay ofc re: this. She has not taken any pain meds but thinks she may take 1/2 one and rest now. No nausea, chest pain, SOB. She is eating and drinking. Strongly discouraged from any more \"chores\" and she agrees. Pt will keep 12/2020 fwp with PCP           Marely Coffey MA    11/2/2020, 15:43 EST      "

## 2020-11-17 ENCOUNTER — OFFICE VISIT (OUTPATIENT)
Dept: SURGERY | Facility: CLINIC | Age: 80
End: 2020-11-17

## 2020-11-17 ENCOUNTER — CLINICAL SUPPORT (OUTPATIENT)
Dept: INTERNAL MEDICINE | Facility: CLINIC | Age: 80
End: 2020-11-17

## 2020-11-17 VITALS
BODY MASS INDEX: 29.18 KG/M2 | OXYGEN SATURATION: 96 % | WEIGHT: 170.9 LBS | RESPIRATION RATE: 18 BRPM | HEIGHT: 64 IN | SYSTOLIC BLOOD PRESSURE: 134 MMHG | HEART RATE: 78 BPM | DIASTOLIC BLOOD PRESSURE: 78 MMHG

## 2020-11-17 DIAGNOSIS — M81.6 LOCALIZED OSTEOPOROSIS, UNSPECIFIED PATHOLOGICAL FRACTURE PRESENCE: Primary | ICD-10-CM

## 2020-11-17 DIAGNOSIS — Z09 FOLLOW UP: Primary | ICD-10-CM

## 2020-11-17 PROCEDURE — 99024 POSTOP FOLLOW-UP VISIT: CPT | Performed by: SURGERY

## 2020-11-17 PROCEDURE — 96372 THER/PROPH/DIAG INJ SC/IM: CPT | Performed by: INTERNAL MEDICINE

## 2020-11-17 NOTE — PROGRESS NOTES
"Chief complaint:    Chief Complaint   Patient presents with   • Cholelithiasis     fup gallbladder removal rt sided abd back pain    • Follow-up   • Abdominal Pain       History of Present Illness    This is Shu Mckeon 80 y.o. status post laparoscopic cholecystectomy and is doing very well.  Patient denies fever, chills, nausea or vomiting.  Patient's pain is well-controlled.      The following portions of the patient's history were reviewed and updated as appropriate: allergies, current medications, past family history, past medical history, past social history, past surgical history and problem list.    Vitals:    11/17/20 1049   Resp: 18   Weight: 77.5 kg (170 lb 14.4 oz)   Height: 162.6 cm (64\")       Physical Exam  Gen.: Patient is alert and oriented ×3, no acute distress  HEENT: Normal cephalic, atraumatic, moist mucous membranes, anicteric  Incision is well-healed without evidence of infection, herniation or seroma.    Assessment/plan:    S/P laparoscopic cholecystectomy  I have instructed the patient not lift greater than 10 pounds for total of 6 weeks from the time of surgery.   I have instructed the patient follow-up as needed.    Ruth Del Valle MD  General, Minimally Invasive and Endoscopic Surgery  [facility] ([facility]    [address] [facility] [address] [facility]   [address]    [address]  [address] [address]    P: [phone]  F: [phone]    Cc:  Mariann Fallon MD  "

## 2020-11-17 NOTE — PROGRESS NOTES
Subjective   Shu Mckeon is a 80 y.o. female  fup gallbladder removal rt sided abd back pain     History of Present Illness    {Common H&P Review Areas:30364}    Review of Systems   Constitutional: Negative.    HENT: Negative.    Eyes: Negative.    Respiratory: Negative.    Cardiovascular: Negative.    Gastrointestinal: Positive for abdominal pain (rt side abd back pain ), diarrhea (loose stools ) and nausea.   Endocrine: Negative.    Genitourinary: Negative.    Musculoskeletal: Positive for back pain.   Skin: Negative.    Allergic/Immunologic: Negative.    Neurological: Negative.    Hematological: Negative.    Psychiatric/Behavioral: Negative.        Objective   Physical Exam      Assessment/Plan   {Assess/PlanSmartLinks:67835}

## 2020-12-09 DIAGNOSIS — E78.5 HYPERLIPIDEMIA, UNSPECIFIED HYPERLIPIDEMIA TYPE: ICD-10-CM

## 2020-12-09 DIAGNOSIS — E11.9 CONTROLLED TYPE 2 DIABETES MELLITUS WITHOUT COMPLICATION, WITHOUT LONG-TERM CURRENT USE OF INSULIN (HCC): ICD-10-CM

## 2020-12-09 DIAGNOSIS — I10 ESSENTIAL HYPERTENSION: ICD-10-CM

## 2020-12-11 LAB
ALBUMIN SERPL-MCNC: 4.3 G/DL (ref 3.5–5.2)
ALBUMIN/GLOB SERPL: 1.6 G/DL
ALP SERPL-CCNC: 83 U/L (ref 39–117)
ALT SERPL-CCNC: 12 U/L (ref 1–33)
AST SERPL-CCNC: 15 U/L (ref 1–32)
BASOPHILS # BLD AUTO: 0.04 10*3/MM3 (ref 0–0.2)
BASOPHILS NFR BLD AUTO: 0.7 % (ref 0–1.5)
BILIRUB SERPL-MCNC: 0.6 MG/DL (ref 0–1.2)
BUN SERPL-MCNC: 10 MG/DL (ref 8–23)
BUN/CREAT SERPL: 14.7 (ref 7–25)
CALCIUM SERPL-MCNC: 9 MG/DL (ref 8.6–10.5)
CHLORIDE SERPL-SCNC: 103 MMOL/L (ref 98–107)
CO2 SERPL-SCNC: 27.1 MMOL/L (ref 22–29)
CREAT SERPL-MCNC: 0.68 MG/DL (ref 0.57–1)
EOSINOPHIL # BLD AUTO: 0.12 10*3/MM3 (ref 0–0.4)
EOSINOPHIL NFR BLD AUTO: 2.2 % (ref 0.3–6.2)
ERYTHROCYTE [DISTWIDTH] IN BLOOD BY AUTOMATED COUNT: 12.3 % (ref 12.3–15.4)
GLOBULIN SER CALC-MCNC: 2.7 GM/DL
GLUCOSE SERPL-MCNC: 117 MG/DL (ref 65–99)
HBA1C MFR BLD: 6.34 % (ref 4.8–5.6)
HCT VFR BLD AUTO: 40.7 % (ref 34–46.6)
HGB BLD-MCNC: 13.4 G/DL (ref 12–15.9)
IMM GRANULOCYTES # BLD AUTO: 0.01 10*3/MM3 (ref 0–0.05)
IMM GRANULOCYTES NFR BLD AUTO: 0.2 % (ref 0–0.5)
LYMPHOCYTES # BLD AUTO: 1.55 10*3/MM3 (ref 0.7–3.1)
LYMPHOCYTES NFR BLD AUTO: 27.8 % (ref 19.6–45.3)
MAGNESIUM SERPL-MCNC: 2.3 MG/DL (ref 1.6–2.4)
MCH RBC QN AUTO: 29.3 PG (ref 26.6–33)
MCHC RBC AUTO-ENTMCNC: 32.9 G/DL (ref 31.5–35.7)
MCV RBC AUTO: 88.9 FL (ref 79–97)
MONOCYTES # BLD AUTO: 0.44 10*3/MM3 (ref 0.1–0.9)
MONOCYTES NFR BLD AUTO: 7.9 % (ref 5–12)
NEUTROPHILS # BLD AUTO: 3.41 10*3/MM3 (ref 1.7–7)
NEUTROPHILS NFR BLD AUTO: 61.2 % (ref 42.7–76)
NRBC BLD AUTO-RTO: 0 /100 WBC (ref 0–0.2)
PLATELET # BLD AUTO: 256 10*3/MM3 (ref 140–450)
POTASSIUM SERPL-SCNC: 4.3 MMOL/L (ref 3.5–5.2)
PROT SERPL-MCNC: 7 G/DL (ref 6–8.5)
RBC # BLD AUTO: 4.58 10*6/MM3 (ref 3.77–5.28)
SODIUM SERPL-SCNC: 138 MMOL/L (ref 136–145)
TSH SERPL DL<=0.005 MIU/L-ACNC: 1.32 UIU/ML (ref 0.27–4.2)
WBC # BLD AUTO: 5.57 10*3/MM3 (ref 3.4–10.8)

## 2020-12-14 ENCOUNTER — OFFICE VISIT (OUTPATIENT)
Dept: INTERNAL MEDICINE | Facility: CLINIC | Age: 80
End: 2020-12-14

## 2020-12-14 ENCOUNTER — TELEPHONE (OUTPATIENT)
Dept: SURGERY | Facility: CLINIC | Age: 80
End: 2020-12-14

## 2020-12-14 VITALS
HEART RATE: 72 BPM | HEIGHT: 64 IN | SYSTOLIC BLOOD PRESSURE: 134 MMHG | DIASTOLIC BLOOD PRESSURE: 76 MMHG | OXYGEN SATURATION: 98 % | WEIGHT: 171.6 LBS | BODY MASS INDEX: 29.3 KG/M2

## 2020-12-14 DIAGNOSIS — Z78.0 POST-MENOPAUSAL: Primary | ICD-10-CM

## 2020-12-14 DIAGNOSIS — I10 ESSENTIAL HYPERTENSION: ICD-10-CM

## 2020-12-14 DIAGNOSIS — M81.6 LOCALIZED OSTEOPOROSIS, UNSPECIFIED PATHOLOGICAL FRACTURE PRESENCE: ICD-10-CM

## 2020-12-14 DIAGNOSIS — M43.06 LUMBAR SPONDYLOLYSIS: ICD-10-CM

## 2020-12-14 DIAGNOSIS — G89.29 CHRONIC RIGHT-SIDED LOW BACK PAIN WITHOUT SCIATICA: ICD-10-CM

## 2020-12-14 DIAGNOSIS — M54.50 CHRONIC RIGHT-SIDED LOW BACK PAIN WITHOUT SCIATICA: ICD-10-CM

## 2020-12-14 DIAGNOSIS — E11.9 CONTROLLED TYPE 2 DIABETES MELLITUS WITHOUT COMPLICATION, WITHOUT LONG-TERM CURRENT USE OF INSULIN (HCC): ICD-10-CM

## 2020-12-14 DIAGNOSIS — E78.5 HYPERLIPIDEMIA, UNSPECIFIED HYPERLIPIDEMIA TYPE: ICD-10-CM

## 2020-12-14 PROCEDURE — 99214 OFFICE O/P EST MOD 30 MIN: CPT | Performed by: INTERNAL MEDICINE

## 2020-12-14 NOTE — PROGRESS NOTES
Subjective   Shu Mckeon is a 80 y.o. female here today to f/u on DM 2, hyperlipidemia and HTN.  Pt c/o lower leg burning sensation.      History of Present Illness   Pt has been compliant with diabetes meds. No side effects from medication No episodes of hypoglycemia. Patient denies any polyuria or polydipsia  Pt has been taking cholesterol meds as prescribed.  No difficulties with myalgias.   Pt has been taking BP meds as prescribed without any problems.  No HA  No episodes of orthostasis  She was on fosamax  And ok prolia  She does have numbness and tingling in both feet  Her back pain is worse the longer she is up  She cannot walk for long.  SHe has not had   The following portions of the patient's history were reviewed and updated as appropriate: allergies, current medications, past medical history, past social history and problem list. she does eat more     Review of Systems   All other systems reviewed and are negative.      Objective   Physical Exam  Vitals signs reviewed.   Constitutional:       Appearance: She is well-developed.   HENT:      Head: Normocephalic and atraumatic.      Right Ear: External ear normal.      Left Ear: External ear normal.   Eyes:      Conjunctiva/sclera: Conjunctivae normal.      Pupils: Pupils are equal, round, and reactive to light.   Neck:      Musculoskeletal: Normal range of motion.      Thyroid: No thyromegaly.      Trachea: No tracheal deviation.   Cardiovascular:      Rate and Rhythm: Normal rate and regular rhythm.      Heart sounds: Normal heart sounds.   Pulmonary:      Effort: Pulmonary effort is normal.      Breath sounds: Normal breath sounds.   Abdominal:      General: Bowel sounds are normal. There is no distension.      Palpations: Abdomen is soft.      Tenderness: There is no abdominal tenderness.   Musculoskeletal: Normal range of motion.         General: No deformity.   Skin:     General: Skin is warm and dry.   Neurological:      Mental Status: She is alert  and oriented to person, place, and time.   Psychiatric:         Behavior: Behavior normal.         Thought Content: Thought content normal.         Judgment: Judgment normal.         Vitals:    12/14/20 1029   BP: 134/76   Pulse: 72   SpO2: 98%     Body mass index is 29.46 kg/m².    Orders Only on 12/09/2020   Component Date Value Ref Range Status   • WBC 12/10/2020 5.57  3.40 - 10.80 10*3/mm3 Final   • RBC 12/10/2020 4.58  3.77 - 5.28 10*6/mm3 Final   • Hemoglobin 12/10/2020 13.4  12.0 - 15.9 g/dL Final   • Hematocrit 12/10/2020 40.7  34.0 - 46.6 % Final   • MCV 12/10/2020 88.9  79.0 - 97.0 fL Final   • MCH 12/10/2020 29.3  26.6 - 33.0 pg Final   • MCHC 12/10/2020 32.9  31.5 - 35.7 g/dL Final   • RDW 12/10/2020 12.3  12.3 - 15.4 % Final   • Platelets 12/10/2020 256  140 - 450 10*3/mm3 Final   • Neutrophil Rel % 12/10/2020 61.2  42.7 - 76.0 % Final   • Lymphocyte Rel % 12/10/2020 27.8  19.6 - 45.3 % Final   • Monocyte Rel % 12/10/2020 7.9  5.0 - 12.0 % Final   • Eosinophil Rel % 12/10/2020 2.2  0.3 - 6.2 % Final   • Basophil Rel % 12/10/2020 0.7  0.0 - 1.5 % Final   • Neutrophils Absolute 12/10/2020 3.41  1.70 - 7.00 10*3/mm3 Final   • Lymphocytes Absolute 12/10/2020 1.55  0.70 - 3.10 10*3/mm3 Final   • Monocytes Absolute 12/10/2020 0.44  0.10 - 0.90 10*3/mm3 Final   • Eosinophils Absolute 12/10/2020 0.12  0.00 - 0.40 10*3/mm3 Final   • Basophils Absolute 12/10/2020 0.04  0.00 - 0.20 10*3/mm3 Final   • Immature Granulocyte Rel % 12/10/2020 0.2  0.0 - 0.5 % Final   • Immature Grans Absolute 12/10/2020 0.01  0.00 - 0.05 10*3/mm3 Final   • nRBC 12/10/2020 0.0  0.0 - 0.2 /100 WBC Final   • Glucose 12/10/2020 117* 65 - 99 mg/dL Final   • BUN 12/10/2020 10  8 - 23 mg/dL Final   • Creatinine 12/10/2020 0.68  0.57 - 1.00 mg/dL Final   • eGFR Non African Am 12/10/2020 83  >60 mL/min/1.73 Final    Comment: The MDRD GFR formula is only valid for adults with stable  renal function between ages 18 and 70.     • eGFR  Am  12/10/2020 100  >60 mL/min/1.73 Final   • BUN/Creatinine Ratio 12/10/2020 14.7  7.0 - 25.0 Final   • Sodium 12/10/2020 138  136 - 145 mmol/L Final   • Potassium 12/10/2020 4.3  3.5 - 5.2 mmol/L Final   • Chloride 12/10/2020 103  98 - 107 mmol/L Final   • Total CO2 12/10/2020 27.1  22.0 - 29.0 mmol/L Final   • Calcium 12/10/2020 9.0  8.6 - 10.5 mg/dL Final   • Total Protein 12/10/2020 7.0  6.0 - 8.5 g/dL Final   • Albumin 12/10/2020 4.30  3.50 - 5.20 g/dL Final   • Globulin 12/10/2020 2.7  gm/dL Final   • A/G Ratio 12/10/2020 1.6  g/dL Final   • Total Bilirubin 12/10/2020 0.6  0.0 - 1.2 mg/dL Final   • Alkaline Phosphatase 12/10/2020 83  39 - 117 U/L Final   • AST (SGOT) 12/10/2020 15  1 - 32 U/L Final   • ALT (SGPT) 12/10/2020 12  1 - 33 U/L Final   • Magnesium 12/10/2020 2.3  1.6 - 2.4 mg/dL Final   • TSH 12/10/2020 1.320  0.270 - 4.200 uIU/mL Final   • Hemoglobin A1C 12/10/2020 6.34* 4.80 - 5.60 % Final    Comment: Hemoglobin A1C Ranges:  Increased Risk for Diabetes  5.7% to 6.4%  Diabetes                     >= 6.5%  Diabetic Goal                < 7.0%          Current Outpatient Medications:   •  atorvastatin (LIPITOR) 40 MG tablet, Take 1 tablet by mouth Daily. (Patient taking differently: Take 40 mg by mouth Every Night.), Disp: 90 tablet, Rfl: 1  •  BIOTIN PO, Take 1 tablet by mouth daily., Disp: , Rfl:   •  Cholecalciferol (VITAMIN D3) 5000 UNITS tablet, Take 1 tablet by mouth daily., Disp: , Rfl:   •  losartan (COZAAR) 50 MG tablet, Take 1 tablet by mouth Daily., Disp: 90 tablet, Rfl: 1  •  Multiple Vitamin (MULTIVITAMIN) capsule, Take 1 capsule by mouth daily., Disp: , Rfl:   •  Multiple Vitamins-Minerals (PRESERVISION AREDS 2 PO), Take 2 tablets by mouth 2 (two) times a day., Disp: , Rfl:   •  Multiple Vitamins-Minerals (ZINC PO), Take  by mouth., Disp: , Rfl:   •  Omega-3 Fatty Acids (OMEGA-3 PO), Take  by mouth., Disp: , Rfl:   •  Probiotic Product (PROBIOTIC DAILY PO), Take 1 tablet by mouth Daily.,  Disp: , Rfl:   •  sertraline (ZOLOFT) 50 MG tablet, Take 1 tablet by mouth Daily., Disp: 90 tablet, Rfl: 1  •  vitamin B-12 (CYANOCOBALAMIN) 1000 MCG tablet, Take 1,000 mcg by mouth Daily., Disp: , Rfl:       Assessment/Plan   Diagnoses and all orders for this visit:    1. Post-menopausal (Primary)  -     DEXA Bone Density Axial    2. Hyperlipidemia, unspecified hyperlipidemia type    3. Essential hypertension    4. Localized osteoporosis, unspecified pathological fracture presence    5. Controlled type 2 diabetes mellitus without complication, without long-term current use of insulin (CMS/[facility])      1.  HPL-  She is doing well with current meds  2. HTN-  Ok with current meds  3.  osteopororsis on prolia-  Recheck dexa  4.  DM_ well controlled  5.  LBP-  She does cont to have lbp chronic  She saw pain managemnt and has seen PT without relief

## 2021-01-04 ENCOUNTER — OFFICE VISIT (OUTPATIENT)
Dept: ONCOLOGY | Facility: CLINIC | Age: 81
End: 2021-01-04

## 2021-01-04 ENCOUNTER — LAB (OUTPATIENT)
Dept: OTHER | Facility: HOSPITAL | Age: 81
End: 2021-01-04

## 2021-01-04 VITALS
HEIGHT: 64 IN | WEIGHT: 171.8 LBS | RESPIRATION RATE: 18 BRPM | SYSTOLIC BLOOD PRESSURE: 128 MMHG | OXYGEN SATURATION: 95 % | HEART RATE: 77 BPM | DIASTOLIC BLOOD PRESSURE: 83 MMHG | TEMPERATURE: 97.8 F | BODY MASS INDEX: 29.33 KG/M2

## 2021-01-04 DIAGNOSIS — R59.9 REACTIVE LYMPHADENOPATHY: ICD-10-CM

## 2021-01-04 DIAGNOSIS — R59.9 REACTIVE LYMPHADENOPATHY: Primary | ICD-10-CM

## 2021-01-04 LAB
BASOPHILS # BLD AUTO: 0.05 10*3/MM3 (ref 0–0.2)
BASOPHILS NFR BLD AUTO: 0.7 % (ref 0–1.5)
DEPRECATED RDW RBC AUTO: 44.2 FL (ref 37–54)
EOSINOPHIL # BLD AUTO: 0.22 10*3/MM3 (ref 0–0.4)
EOSINOPHIL NFR BLD AUTO: 3.2 % (ref 0.3–6.2)
ERYTHROCYTE [DISTWIDTH] IN BLOOD BY AUTOMATED COUNT: 13.5 % (ref 12.3–15.4)
HCT VFR BLD AUTO: 41.4 % (ref 34–46.6)
HGB BLD-MCNC: 13.6 G/DL (ref 12–15.9)
IMM GRANULOCYTES # BLD AUTO: 0.03 10*3/MM3 (ref 0–0.05)
IMM GRANULOCYTES NFR BLD AUTO: 0.4 % (ref 0–0.5)
LYMPHOCYTES # BLD AUTO: 1.91 10*3/MM3 (ref 0.7–3.1)
LYMPHOCYTES NFR BLD AUTO: 28 % (ref 19.6–45.3)
MCH RBC QN AUTO: 29.4 PG (ref 26.6–33)
MCHC RBC AUTO-ENTMCNC: 32.9 G/DL (ref 31.5–35.7)
MCV RBC AUTO: 89.4 FL (ref 79–97)
MONOCYTES # BLD AUTO: 0.66 10*3/MM3 (ref 0.1–0.9)
MONOCYTES NFR BLD AUTO: 9.7 % (ref 5–12)
NEUTROPHILS NFR BLD AUTO: 3.95 10*3/MM3 (ref 1.7–7)
NEUTROPHILS NFR BLD AUTO: 58 % (ref 42.7–76)
NRBC BLD AUTO-RTO: 0 /100 WBC (ref 0–0.2)
PLATELET # BLD AUTO: 255 10*3/MM3 (ref 140–450)
PMV BLD AUTO: 10.3 FL (ref 6–12)
RBC # BLD AUTO: 4.63 10*6/MM3 (ref 3.77–5.28)
WBC # BLD AUTO: 6.82 10*3/MM3 (ref 3.4–10.8)

## 2021-01-04 PROCEDURE — 85025 COMPLETE CBC W/AUTO DIFF WBC: CPT | Performed by: INTERNAL MEDICINE

## 2021-01-04 PROCEDURE — 99212 OFFICE O/P EST SF 10 MIN: CPT | Performed by: INTERNAL MEDICINE

## 2021-01-04 PROCEDURE — 36415 COLL VENOUS BLD VENIPUNCTURE: CPT

## 2021-01-05 NOTE — PROGRESS NOTES
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  In the interval however she did develop abdominal pain and underwent laparoscopic cholecystectomy on 10/31/2020 with Dr. Del Valle.  She tolerated the procedure well and abdominal pain resolved.  She has not experienced any other difficulties.  She does continue to care for her  who is now 89 years old but does function fairly well.      Objective   Vital Signs:   /83   Pulse 77   Temp 97.8 °F (36.6 °C) (Temporal)   Resp 18   Ht 162.6 cm (64.02\")   Wt 77.9 kg (171 lb 12.8 oz)   SpO2 95%   BMI 29.47 kg/m²     Physical Exam  Constitutional:       Appearance: She is well-developed.   Eyes:      Conjunctiva/sclera: Conjunctivae normal.   Neck:      Thyroid: No thyromegaly.   Cardiovascular:      Rate and Rhythm: Normal rate and regular rhythm.      Heart sounds: No murmur. No friction rub. No gallop.    Pulmonary:      Effort: No respiratory distress.      Breath sounds: Normal breath sounds.   Abdominal:      General: Bowel sounds are normal. There is no distension.      Palpations: Abdomen is soft.      Tenderness: There is no abdominal tenderness.   Lymphadenopathy:      Head:      Right side of head: No submandibular adenopathy.      Cervical: No cervical adenopathy.      Upper Body:      Right upper body: No supraclavicular adenopathy.      Left upper body: No supraclavicular adenopathy.   Skin:     General: Skin is warm and dry.      Findings: No rash.   Neurological:      Mental Status: She is alert and oriented to person, place, and time.      Cranial Nerves: No cranial nerve deficit.      Motor: No abnormal muscle tone.      Deep Tendon Reflexes: Reflexes normal.   Psychiatric:         Behavior: Behavior normal.        Result Review : I " did review CBC from today.  Also reviewed records from cholecystectomy 10/21/2020 including operative note and pathologic results (mild acute calculus cholecystitis).  Reviewed CT abdomen and pelvis result from 10/27/2020.       Assessment and Plan   1. 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    · The patient is followed by Dr. Blount and underwent routine screening mammogram on 7/20/2020 with finding of increased size of the left axillary/axillary tail mass that may represent a lymph node, BI-RADS 0.    · She underwent a diagnostic left mammogram and ultrasound on 8/12/2020 which showed a left axillary tail lymph node 1.1 cm with thickened cortex relative to other lymph nodes and biopsy was recommended.    · On 9/9/2020, biopsy was performed showing a reactive lymph node.  Flow cytometry was sent showing an increased CD4 to CD8 ratio, no aberrant T-cell population or B-cell population however there was a clonal T LGL population detected by V beta analysis comprising 7% of total events (TCR alpha/beta positive, CD20 6+).  There was no clonal CD4 positive T-cell population.  Results were not diagnostic for malignancy.   · The patient was seen in initial consultation on 9/28/2020.  She had no palpable lymphadenopathy or splenomegaly.  Her counts were reviewed showing normal WBC at 6.96 with normal differential, no evidence of anemia nor thrombocytopenia.  The clonal population seen on flow cytometry from lymph node biopsy certainly could be a reactive population.  There was no evidence to suggest LGL leukemia.  We did discuss potential additional evaluation with peripheral blood flow cytometry and T-cell gene  rearrangement study as well as potential CT scans of the chest abdomen and pelvis to evaluate for additional lymphadenopathy and/or hepatosplenomegaly.  The patient however declined further evaluation but was agreeable to a course of observation.    · The patient developed abdominal pain and underwent CT abdomen pelvis on 10/27/2020 which showed cholelithiasis, no evidence of adenopathy or splenomegaly.  She ultimately underwent laparoscopic cholecystectomy with pathology showing mild chronic calculus cholecystitis.  · Patient returns today in follow-up doing quite well.  We reviewed her CBC today which again shows normal WBC and normal differential with no evidence of anemia nor thrombocytopenia.  Recent CT scan of the abdomen pelvis 10/27/2020 showed no evidence of splenomegaly or lymphadenopathy.  She has no palpable lymphadenopathy on exam today and no palpable splenomegaly.  I cannot see that there is any evidence of underlying LGL leukemia at this point and patient does not wish to pursue any further evaluation.  We discussed potential follow-up in our office however at this point I feel that she could follow-up with her primary care physician and be referred back here if any subsequent abnormalities arise.  Patient was agreeable with this approach.     Plan:  1. We will not plan any further routine follow-up in our office  2. The patient will return for follow-up with Dr. Fallon, her PCP.  She can be referred back at anytime with any additional concerns.

## 2021-02-15 RX ORDER — ATORVASTATIN CALCIUM 40 MG/1
40 TABLET, FILM COATED ORAL NIGHTLY
Qty: 90 TABLET | Refills: 1 | Status: SHIPPED | OUTPATIENT
Start: 2021-02-15 | End: 2021-08-09

## 2021-02-18 ENCOUNTER — HOSPITAL ENCOUNTER (OUTPATIENT)
Dept: GENERAL RADIOLOGY | Facility: HOSPITAL | Age: 81
Discharge: HOME | End: 2021-02-18

## 2021-02-18 ENCOUNTER — APPOINTMENT (OUTPATIENT)
Dept: MAMMOGRAPHY | Facility: HOSPITAL | Age: 81
End: 2021-02-18

## 2021-02-18 ENCOUNTER — OFFICE VISIT (OUTPATIENT)
Dept: INTERNAL MEDICINE | Facility: CLINIC | Age: 81
End: 2021-02-18

## 2021-02-18 VITALS
WEIGHT: 169.7 LBS | TEMPERATURE: 97.8 F | SYSTOLIC BLOOD PRESSURE: 142 MMHG | OXYGEN SATURATION: 95 % | HEART RATE: 79 BPM | DIASTOLIC BLOOD PRESSURE: 78 MMHG | HEIGHT: 64 IN | BODY MASS INDEX: 28.97 KG/M2

## 2021-02-18 DIAGNOSIS — M79.601 ARM PAIN, ANTERIOR, RIGHT: ICD-10-CM
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
## 2021-02-18 DIAGNOSIS — M25.531 ACUTE PAIN OF RIGHT WRIST: ICD-10-CM

## 2021-02-18 DIAGNOSIS — M25.511 ACUTE PAIN OF RIGHT SHOULDER: ICD-10-CM

## 2021-02-18 PROCEDURE — 73110 X-RAY EXAM OF WRIST: CPT

## 2021-02-18 PROCEDURE — 73090 X-RAY EXAM OF FOREARM: CPT

## 2021-02-18 PROCEDURE — 99213 OFFICE O/P EST LOW 20 MIN: CPT | Performed by: NURSE PRACTITIONER

## 2021-02-18 PROCEDURE — 73030 X-RAY EXAM OF SHOULDER: CPT

## 2021-02-18 PROCEDURE — 73060 X-RAY EXAM OF HUMERUS: CPT

## 2021-02-18 NOTE — PROGRESS NOTES
"Chief Complaint  Arm Pain (Pt states she has a fall on ice this morning and hurt her rt side arm.)    Subjective          Shu Mckeon presents to [facility]  History of Present Illness  She is here today as a new patient to me with c/o right shoulder, arm and wrist pain s/p falling this morning on the pavement on the ice. She hit her right shoulder when she fell. She denies falling onto an outstretched hand. She is having severe pain in the deltoid and shoulder area. Pain worse with raising the arm. Rates pain as 6/10. Denies any weakness, radiation of pain, numbness or tingling, or trauma to the head. She has not tried anything for the pain.    Review of Systems   Constitutional: Negative.    Respiratory: Negative.    Cardiovascular: Negative.    Musculoskeletal: Positive for arthralgias.   Neurological: Negative.      Objective   Vital Signs:   /78 (BP Location: Left arm, Patient Position: Sitting, Cuff Size: Adult)   Pulse 79   Temp 97.8 °F (36.6 °C)   Ht 162.6 cm (64.02\")   Wt 77 kg (169 lb 11.2 oz)   SpO2 95%   BMI 29.11 kg/m²     Physical Exam  Constitutional:       Appearance: She is well-developed.   Neck:      Thyroid: No thyroid mass, thyromegaly or thyroid tenderness.      Vascular: No carotid bruit.      Trachea: Trachea normal.   Cardiovascular:      Rate and Rhythm: Normal rate and regular rhythm.      Chest Wall: PMI is not displaced.      Pulses:           Radial pulses are 2+ on the right side and 2+ on the left side.        Dorsalis pedis pulses are 2+ on the right side and 2+ on the left side.        Posterior tibial pulses are 2+ on the right side and 2+ on the left side.      Heart sounds: S1 normal and S2 normal.   Pulmonary:      Effort: Pulmonary effort is normal.      Breath sounds: Normal breath sounds.   Musculoskeletal:      Right shoulder: She exhibits decreased range of motion, tenderness, pain and decreased strength. She exhibits no bony " tenderness, no swelling, no effusion, no crepitus, no deformity, no laceration, no spasm and normal pulse.      Right elbow: Normal.     Right wrist: She exhibits decreased range of motion, tenderness and bony tenderness. She exhibits no swelling, no effusion, no crepitus, no deformity and no laceration.      Right lower leg: No edema.      Left lower leg: No edema.      Comments: Pain and weakness with abduction of the right shoulder   strengths 5/5 symmetric and equal  Negative snuff box tenderness  Right wrist pain to palpation  Bony tenderness at the middle of the right humerus   Deltoid tender to palpation.   Lymphadenopathy:      Head:      Right side of head: No submental, submandibular, tonsillar or occipital adenopathy.      Left side of head: No submental, submandibular, tonsillar or occipital adenopathy.      Cervical: No cervical adenopathy.   Skin:     General: Skin is warm and dry.      Capillary Refill: Capillary refill takes less than 2 seconds.      Nails: There is no clubbing.     Neurological:      General: No focal deficit present.      Mental Status: She is alert and oriented to person, place, and time.      Cranial Nerves: Cranial nerves are intact.      Sensory: Sensation is intact.      Motor: Motor function is intact.      Coordination: Coordination is intact.      Gait: Gait is intact.   Psychiatric:         Attention and Perception: Attention normal.         Mood and Affect: Mood and affect normal.         Speech: Speech normal.         Behavior: Behavior normal.         Thought Content: Thought content normal.         Cognition and Memory: Cognition normal.        Result Review :     Common labs    Common Labsle 11/1/20 11/1/20 12/10/20 12/10/20 12/10/20 1/4/21    0506 [id number] 0923 0923 0923    Glucose  161 (A)       Glucose    117 (A)     BUN  8  10     Creatinine  0.60  0.68     eGFR Non  Am  96  83     eGFR African Am    100     Sodium  137  138     Potassium  4.1  4.3      Chloride  105  103     Calcium  9.2  9.0     Total Protein    7.0     Albumin    4.30     Total Bilirubin    0.6     Alkaline Phosphatase    83     AST (SGOT)    15     ALT (SGPT)    12     WBC 10.86 (A)  5.57   6.82   Hemoglobin 12.3  13.4   13.6   Hematocrit 38.5  40.7   41.4   Platelets 232  256   255   Hemoglobin A1C     6.34 (A)    (A) Abnormal value       Comments are available for some flowsheets but are not being displayed.                     Assessment and Plan    Diagnoses and all orders for this visit:    1. Fall, initial encounter (Primary)  -     XR Shoulder 2+ View Right  -     XR humerus right  -     XR Wrist 3+ View Right  -     XR forearm 2 vw right    2. Acute pain of right shoulder  -     XR Shoulder 2+ View Right  -     XR humerus right  -     XR Wrist 3+ View Right  -     XR forearm 2 vw right    3. Arm pain, anterior, right  -     XR Shoulder 2+ View Right  -     XR humerus right  -     XR Wrist 3+ View Right  -     XR forearm 2 vw right    4. Acute pain of right wrist  -     XR Shoulder 2+ View Right  -     XR humerus right  -     XR Wrist 3+ View Right  -     XR forearm 2 vw right    1. Fall with acute right sided arm pain- check X ray right arm series today. Ok for diclofenac 50 mg BID PRN for pain and inflammation. Take with food, hydrate well, no other NSAIDs with this. Apply ice 15 minutes, rest the arm. If acute findings found on imaging with refer to specialist for further management.      Follow Up   Return if symptoms worsen or fail to improve.  Patient was given instructions and counseling regarding her condition or for health maintenance advice. Please see specific information pulled into the AVS if appropriate.

## 2021-03-01 RX ORDER — LOSARTAN POTASSIUM 50 MG/1
50 TABLET ORAL DAILY
Qty: 90 TABLET | Refills: 1 | Status: SHIPPED | OUTPATIENT
Start: 2021-03-01 | End: 2021-08-30

## 2021-03-05 ENCOUNTER — HOSPITAL ENCOUNTER (OUTPATIENT)
Dept: MAMMOGRAPHY | Facility: HOSPITAL | Age: 81
End: 2021-03-05

## 2021-03-09 DIAGNOSIS — Z23 IMMUNIZATION DUE: ICD-10-CM

## 2021-03-24 ENCOUNTER — APPOINTMENT (OUTPATIENT)
Dept: BONE DENSITY | Facility: HOSPITAL | Age: 81
End: 2021-03-24

## 2021-03-26 ENCOUNTER — HOSPITAL ENCOUNTER (OUTPATIENT)
Dept: BONE DENSITY | Facility: HOSPITAL | Age: 81
Discharge: HOME | End: 2021-03-26
Admitting: INTERNAL MEDICINE

## 2021-03-26 PROCEDURE — 77080 DXA BONE DENSITY AXIAL: CPT

## 2021-04-27 ENCOUNTER — HOSPITAL ENCOUNTER (OUTPATIENT)
Dept: ULTRASOUND IMAGING | Facility: HOSPITAL | Age: 81
Discharge: HOME | End: 2021-04-27

## 2021-04-27 ENCOUNTER — HOSPITAL ENCOUNTER (OUTPATIENT)
Dept: MAMMOGRAPHY | Facility: HOSPITAL | Age: 81
Discharge: HOME | End: 2021-04-27

## 2021-04-27 DIAGNOSIS — R92.8 ABNORMAL MAMMOGRAM: ICD-10-CM

## 2021-04-27 PROCEDURE — 76882 US LMTD JT/FCL EVL NVASC XTR: CPT

## 2021-04-27 PROCEDURE — 77065 DX MAMMO INCL CAD UNI: CPT

## 2021-04-27 PROCEDURE — G0279 TOMOSYNTHESIS, MAMMO: HCPCS

## 2021-05-03 ENCOUNTER — OFFICE VISIT (OUTPATIENT)
Dept: SURGERY | Facility: CLINIC | Age: 81
End: 2021-05-03

## 2021-05-03 VITALS
HEART RATE: 74 BPM | WEIGHT: 169 LBS | BODY MASS INDEX: 28.85 KG/M2 | DIASTOLIC BLOOD PRESSURE: 77 MMHG | OXYGEN SATURATION: 97 % | SYSTOLIC BLOOD PRESSURE: 136 MMHG | HEIGHT: 64 IN

## 2021-05-03 DIAGNOSIS — R59.9 REACTIVE LYMPHADENOPATHY: Primary | ICD-10-CM

## 2021-05-03 DIAGNOSIS — N64.89 RADIAL SCAR OF BREAST: ICD-10-CM

## 2021-05-03 DIAGNOSIS — Z12.31 ENCOUNTER FOR SCREENING MAMMOGRAM FOR BREAST CANCER: ICD-10-CM

## 2021-05-03 PROCEDURE — 99213 OFFICE O/P EST LOW 20 MIN: CPT | Performed by: NURSE PRACTITIONER

## 2021-05-03 RX ORDER — DENOSUMAB 60 MG/ML
INJECTION SUBCUTANEOUS
COMMUNITY
Start: 2021-04-15 | End: 2021-05-18

## 2021-05-03 NOTE — PROGRESS NOTES
Chief Complaint: Shu Mckeon is a 80 y.o. female who was seen in consultation at the request of Mariann Fallon MD  for abnormal breast imaging, a followup visit and a post-biopsy visit    History of Present Illness:  2018:  Patient presents with abnormal breast imaging, LEFT breast. She noted no new masses, skin changes, nipple discharge, nipple changes prior to her most recent imaging.  Her most recent imaging includes the followin/14/15 [facility]  MAMMO SCREEN EVITA  FLUHR, SHU  Benign dermal calcifications. BIRADS category 2: benign.    18 [facility]  MAMMO SCREEN EVITA  FLUHR, SHU  Scattered fibroglandular densities anterior one third retroareolar left breast interval development of a cluster of calcifications. BIRADS category 0.    8/3/18  [facility]  MAMMO DIAG LEFT  FLUHR, SHU  Microcalcifications within the outer inferior aspect of the left breast. BIRADS category 4.      She has not had a breast biopsy in the past.  She has her uterus and ovaries, is postmenopausal, and takes nor hormones.  Her family history includes the following: She has one daughter, one sister, no maternal aunts, 2 paternal aunts.  She has a half sister on paternal he based who had breast cancer in her 70s and a paternal cousin who had breast cancer in her 50s.      18- left breast stereotactic biopsy: Upper outer quadrant, 5:00: Small radial sclerosing lesion with usual hyperplasia, separate foci of usual hyperplasia, columnar cell change without atypia: Focal stromal fibrosis, apically metaplasia, and fibroadenomatoid change.   Concordant per Dr Tariq.    She denies significant bruising at her biopsy site.  She denies any redness warmth or drainage from the mammotomy.  2018:  Pathology from  10-24-18 excision radial scar returned as radial scar and no atypia.     She denies any redness, warmth, drainage from her incision.  2019:  In the interim,  Shu Mckeon  has done well.  She has noted no changes in her breast exam.  No new masses, skin changes, nipple changes, nipple discharge either breast.     Her most recent imaging includes the following:  July 19, 2019 bilateral screening mammogram with 3D [facility]: Scattered fibroglandular densities.  No evidence for malignancy in either breast.  BI-RADS 1    9/09/2020:  In the interim,  Shu Mckeon  has done well.  She has noted no changes in her breast exam. No new masses, skin changes, nipple changes, nipple discharge either breast.     Her most recent imaging includes the following:  [facility] point mammography July 20, 2020 bilateral screening mammogram with tomosynthesis.  Scattered areas of fibroglandular density.  There is a 1 cm mass in the lower left axilla, axillary tail that is slightly increased in size.  BI-RADS 0  August 12, 2020 left diagnostic mammogram with left breast ultrasound [facility].  On mammogram visualization of a 1.1 cm round mass consistent with a lymph node in the axillary tail with a cortex that is thickened relative to other visualized node.  On ultrasound axillary tail and left axillary ultrasound showed on the order of 16 cm from the nipple a 1.1 cm lymph node with a thickened cortex.  Impression 1.1 cm rounded left axillary tail lymph node with a thickened cortex recommend ultrasound-guided left breast biopsy BI-RADS 4.    She has gained 11#.    9/18/2020:  Suh has done well since her core biopsy.  She denies any swelling or bruising or discomfort in her left axilla.  Denies any redness warmth or drainage from her dermatotomy    Pathology from in office core biopsy 9-9-20 returned as :  Final Diagnosis   1. Lymph Node, Left Low Axillary Tail, Core Biopsy:               A. Core-like fragments of reactive appearing lymph node.               B. No morphologic evidence of lymphoma or carcinoma.     2. Lymph Node, Left Low Axillary Tail, Core Biopsy (Gross Diagnosis Only):               A. Core-like tissue  submitted to [facility] for Flow Cytometric Analysis.                     Please see the Flow Cytometry Summary below.     [facility]       Flow cytometry from [facility] dated September 9, 2020 returned as immunophenotyping fails to reveal a monoclonal B cell or abnormal T-cell immunophenotype.  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  The significance of this is uncertain.  Correlation with tissue morphology with further studies as indicated is advised.       -Addendum: Reviewed with Dr. Feliciano and he thinks that it would be best for him to just evaluate her in the clinic.     She is here to review    Interval History:   She returns today for follow up with no breast complaints today.  She continues to take care of her 92 year old  who was working before a recent fall.  He has been in rehab and is now home and in her care.    She has seen Dr Feliciano in medical oncology given the left axillary lymph node biopsy with reactive changes and clonal T LGL population by flow cytometry.  At her most recent visit in 1/2021: normal WBC and normal differential with no evidence of anemia nor thrombocytopenia.  Recent CT scan of the abdomen pelvis 10/27/2020 showed no evidence of splenomegaly or lymphadenopathy.  We will not plan any further routine follow-up in our office  The patient will return for follow-up with Dr. Fallon, her PCP.     Left diagnostic mammogram with tomosynthesis, left axillary US on 4/27/2021 was stable, BiRads 2 ( see full report below )      Review of Systems:  Review of Systems   Constitutional: Positive for unexpected weight change (1 LB WT GAIN ).   Genitourinary: Positive for nocturia.    Musculoskeletal: Positive for back pain.   Neurological: Positive for light-headedness.   All other systems reviewed and are negative.       Past Medical and Surgical History:  Breast Biopsy History:  Patient has not had a breast biopsy in the past.  Breast  "Cancer HIstory:  Patient does not have a past medical history of breast cancer.  Breast Operations, and year:  NONE   Obstetric/Gynecologic History:  Age menstrual periods began: 11  Patient is postmenopausal, entered menopause naturally at age:    Number of pregnancies: 3  Number of live births: 3  Number of abortions or miscarriages: 0  Age of delivery of first child: 20  Patient did not breast feed.  Length of time taking birth control pills: 10-12 YRS   Patient took hormone replacement during the following dates: 10 YRS   PATIENT STILL HAS UTERUS AND OVARIES.     Past Surgical History:   Procedure Laterality Date   • BREAST BIOPSY Left 10/25/2018    Procedure: left breast ultrasound-guided excision of radial scar.;  Surgeon: Shayna Blount MD;  Location: [facility] OR [facility];  Service: General   • BREAST SURGERY Left    • CARDIAC CATHETERIZATION      not sure when or where   • CARDIAC CATHETERIZATION     • CATARACT EXTRACTION WITH INTRAOCULAR LENS IMPLANT     • CHOLECYSTECTOMY WITH INTRAOPERATIVE CHOLANGIOGRAM N/A 10/31/2020    Procedure: CHOLECYSTECTOMY LAPAROSCOPIC INTRAOPERATIVE CHOLANGIOGRAM;  Surgeon: Ruth Del Valle MD;  Location: [facility];  Service: General;  Laterality: N/A;   • COLONOSCOPY     • FEMUR FRACTURE SURGERY Right    • FRACTURE SURGERY  2015    LT ANKLE   • REPLACEMENT TOTAL KNEE Right      Past Medical History:   Diagnosis Date   • Arthritis    • Chronic low back pain    • Depression     HISTORY OF BEING \"EMOTIONAL\"   • Fatigue    • GERD (gastroesophageal reflux disease)    • Groin pain    • H/O cardiovascular stress test 07/2018    AKUA, DR ARIAS   • History of diverticulitis    • Hyperlipidemia    • Hypertension    • Impaired fasting glucose    • Left bundle branch block    • Macular degeneration    • Osteoporosis    • Radial scar of breast     LEFT   • Status post ORIF of fracture of ankle    • Urinary frequency        Prior Hospitalizations, other than for surgery or childbirth, " "and year:  NONE     Social History     Socioeconomic History   • Marital status:      Spouse name: Isidoro   • Number of children: Not on file   • Years of education: Not on file   • Highest education level: Not on file   Tobacco Use   • Smoking status: Never Smoker   • Smokeless tobacco: Never Used   Vaping Use   • Vaping Use: Never used   Substance and Sexual Activity   • Alcohol use: Yes     Alcohol/week: 7.0 standard drinks     Types: 7 Glasses of wine per week     Comment: 1/day-socially   • Drug use: No   • Sexual activity: Defer     Patient is .  Patient is retired.  Patient drinks 2 servings of caffeine per day.    Family History:  Family History   Problem Relation Age of Onset   • Heart disease Father    • Heart attack Father 81   • Heart attack Paternal Uncle    • Breast cancer Sister 70   • Breast cancer Cousin 50   • Malig Hyperthermia Neg Hx        Vital Signs:  /77 (BP Location: Right arm)   Pulse 74   Ht 162.6 cm (64\")   Wt 76.7 kg (169 lb)   LMP  (LMP Unknown)   SpO2 97%   BMI 29.01 kg/m²      Medications:    Current Outpatient Medications:   •  atorvastatin (LIPITOR) 40 MG tablet, Take 1 tablet by mouth Every Night., Disp: 90 tablet, Rfl: 1  •  BIOTIN PO, Take 1 tablet by mouth daily., Disp: , Rfl:   •  Cholecalciferol (VITAMIN D3) 5000 UNITS tablet, Take 1 tablet by mouth daily., Disp: , Rfl:   •  losartan (COZAAR) 50 MG tablet, Take 1 tablet by mouth Daily., Disp: 90 tablet, Rfl: 1  •  Multiple Vitamin (MULTIVITAMIN) capsule, Take 1 capsule by mouth daily., Disp: , Rfl:   •  Multiple Vitamins-Minerals (PRESERVISION AREDS 2 PO), Take 2 tablets by mouth 2 (two) times a day., Disp: , Rfl:   •  Multiple Vitamins-Minerals (ZINC PO), Take  by mouth., Disp: , Rfl:   •  Prolia 60 MG/ML solution prefilled syringe syringe, INJECT 1ML UNDER THE SKIN ONE TIME FOR 1 DOSE AS DIRECTED, Disp: , Rfl:   •  sertraline (ZOLOFT) 50 MG tablet, Take 1 tablet by mouth Daily., Disp: 90 tablet, Rfl: " "1  •  vitamin B-12 (CYANOCOBALAMIN) 1000 MCG tablet, Take 1,000 mcg by mouth Daily., Disp: , Rfl:      Allergies:  No Known Allergies    Physical Examination:  /77 (BP Location: Right arm)   Pulse 74   Ht 162.6 cm (64\")   Wt 76.7 kg (169 lb)   LMP  (LMP Unknown)   SpO2 97%   BMI 29.01 kg/m²   General Appearance:  Patient is in no distress.  She is well kept and has an obese build.   Psychiatric:  Patient with appropriate mood and affect. Alert and oriented to self, time, and place.    Breast, RIGHT:  large sized, ptotic, symmetric with the contralateral side.  Breast skin is without erythema, edema, rashes.  There are no visible abnormalities upon inspection during the arm-raising maneuver or with hands on hips in the sitting position. There is no nipple retraction, discharge or nipple/areolar skin changes.There are no masses palpable in the sitting or supine positions.    Breast, LEFT:  large sized, ptotic, symmetric with the contralateral side.  Breast skin is without erythema, edema, rashes.  There are no visible abnormalities upon inspection during the arm-raising maneuver or with hands on hips in the sitting position. There is no nipple retraction, discharge or nipple/areolar skin changes.There are no masses palpable in the sitting or supine positions.  Well-healed radial biopsy   for radial scar.     Lymphatic:  There is no axillary, cervical, infraclavicular, or supraclavicular adenopathy bilaterally.  Specifically left axilla there is no palpable adenopathy.  There is a well-healed dermatotomy with no erythema warmth or drainage from her lymph node core biopsy.  Musculoskeletal:  Good strength in all 4 extremities.   There is good range of motion in both shoulders.    Skin:  No new skin lesions or rashes on the skin excluding the breast (see breast exam above).        Imagin2021: left diagnostic mammogram with tomosynthesis and left axillary US at [facility]  MAMMO DIAGNOSTIC " DIGITAL TOMOSYNTHESIS LEFT W CAD-     CLINICAL INDICATION: 80 years old woman presents for short-term  follow-up imaging of the left breast. She underwent benign left axillary  lymph node biopsy in September.      COMPARISON: Prior examinations dating back to 7/10/2012     FINDINGS:  MAMMOGRAM: Left CC, MLO, and MLO spot compression 2-D and Tomosynthesis  views were obtained.       There are scattered areas of fibroglandular density.      There is a 1.0 cm low left axillary lymph node containing a hydromark coil clip, which is not significantly changed in size from 7/20/2020 and corresponds to the biopsy-proven benign lymph node. An additional smaller lymph node measuring approximately 0.9 cm is located more inferiorly and was not clearly included in the field-of-view on prior mammograms.   Otherwise, there are no new suspicious masses, calcifications, or areas of architectural distortion in the left breast.     ULTRASOUND:  Targeted left axillary ultrasound was performed for follow-up. In the superficial left axilla, there is a 0.9 x 0.5 x 0.9 cm lymph node, which is not significantly changed in size from 8/12/2020, previously 1.1 x 0.6 x 0.9 cm. A central echogenic focus is also unchanged and likely represents the fatty hilum. A deeper 0.8 x 0.7 x 1.1 cm left axillary lymph node is identified, which contains an echogenic focus within appearance of a biopsy clip, which may represent a biopsy-proven benign lymph node rather than the previously evaluated lymph node.        IMPRESSION:  A 0.9 cm superficial left axillary lymph node is not significantly changed in size from 8/12/2020. An additional 1.1 cm left axillary lymph node appears to contain an echogenic focus from a biopsy clip and represents the biopsy-proven benign lymph node. This lymph node was not previously assessed with ultrasound, however, the mammographic appearance of the previously biopsied lymph node is not significantly changed. Recommend clinical  "follow-up. Consider continued follow-up imaging.     BI-RADS Category 2: Benign      Pathology:   8-21-18- left breast stereotactic biopsy: Upper outer quadrant, 5:00: Small radial sclerosing lesion with usual hyperplasia, separate foci of usual hyperplasia, columnar cell change without atypia: Focal stromal fibrosis, apically metaplasia, and fibroadenomatoid change.     Concordant per Dr Tariq.        Final Diagnosis   BREAST, LEFT, DESIGNATED \"UPPER OUTER QUADRANT, APPROXIMATE 5 O'CLOCK POSITION\",   STEREOTACTIC BIOPSY:               SMALL RADIAL SCLEROSING LESION WITH ASSOCIATED DUCTAL HYPERPLASIA OF THE                      USUAL TYPE.               SEPARATE FOCI OF DUCTAL HYPERPLASIA OF THE USUAL TYPE.               FOCAL COLUMNAR CELL CHANGE WITHOUT ATYPIA.               FOCAL STROMAL FIBROSIS AND ASSOCIATED CALCIFICATIONS.               FOCAL APOCRINE METAPLASIA.               FOCAL FIBROADENOMATOID CHANGE WITH ASSOCIATED CALCIFICATIONS.               NO EVIDENCE OF MALIGNANCY.     TDJ/ IHC/a/JULIA     CPT CODES:  1. 60884       Pathology from  10-24-18 excision radial scar returend as radial scar and no atypia.     We will let her know benign.     Final Diagnosis   LEFT BREAST LUMPECTOMY:           BENIGN BREAST TISSUE WITH DUCT HYPERPLASIA WITHOUT ATYPIA, ADENOSIS, AND RADIAL                   SCLEROSING LESION (2 MM).          SCAR AND CHANGES OF PRIOR BIOPSY.     COMMENT: I do not detect atypia. The margin is free of radial sclerosing lesion.     K/  IHC/TDJ     Pathology from in office core biopsy 9-9-20 returned as :  Final Diagnosis   1. Lymph Node, Left Low Axillary Tail, Core Biopsy:               A. Core-like fragments of reactive appearing lymph node.               B. No morphologic evidence of lymphoma or carcinoma.     2. Lymph Node, Left Low Axillary Tail, Core Biopsy (Gross Diagnosis Only):               A. Core-like tissue submitted to [facility] for Flow Cytometric Analysis. "                     Please see the Flow Cytometry Summary below.     [facility]       Flow cytometry from [facility] dated September 9, 2020 returned as immunophenotyping fails to reveal a monoclonal B cell or abnormal T-cell immunophenotype.  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  The significance of this is uncertain.  Correlation with tissue morphology with further studies as indicated is advised.       Procedures:  Percutaneous ultrasound-guided vacuum-assisted core breast/axillary tail lymph node biopsy 9-8-20  Indication:  ultrasound-and mammo visible rounded node with thickened cortex  Location: LEFT axillary tail, lowaxilla  Consent:  The risks, benefits, and alternatives to the procedure were discussed with the patient, who understood and wished to proceed.  The risks described included, but were not limited to, bleeding, infection, pneumothorax, and inadequate sampling requiring either repeat percutaneous or open excisional biopsy.  Description of Procedure:   After the patient was positioned supine on the procedure table, I located the lesion using ultrasound.  I prepped and draped the breast/axilla skin in sterile fashion.  I anesthetized the breast skin at the site of anticipated mammotomy with 1% lidocaine with epinephrine.  I then anesthetized the underlying subcutaneous tissue and breast parenchyma surrounding the lesion with 1% lidocaine with epinephrine under ultrasound visualization and guidance. I then made a nicking incision with an 11blade and inserted the 14 G celero biopsy device from inferolateral to superomedial through the lesion under ultrasound guidance.   I then took 4 core samples  of the lesion under direct visualization with ultrasound.   I placed 2 of these cores in formalin and 2 of them in a cup for fresh and sent to pathology.  I withdrew the probe and placed a hydromark marker into the residual rounded node..  We held  manual compression for 10 minutes, placed steri -strips at the mammotomy site, and wrapped the patient in a 6 inch super ace wrap and a cold pack.  Marker placed: hydromark  Tolerance: The patient tolerated the procedure well.  Disposition: We will see her back within a week to review her pathology.    Assessment:    1- benign left breast/ left axillary biopsy 9/2020  LEFT UOQ axillary tail/low axilla-0.1 cm rounded lymph node with thickened cortex seen on August 2020 mammogram and ultrasound BI-RADS 4.  Target for biopsy 9-9-20    Pathology from in office core biopsy 9-9-20 returned as :  Final Diagnosis   1. Lymph Node, Left Low Axillary Tail, Core Biopsy:               A. Core-like fragments of reactive appearing lymph node.               B. No morphologic evidence of lymphoma or carcinoma.     2. Lymph Node, Left Low Axillary Tail, Core Biopsy (Gross Diagnosis Only):               A. Core-like tissue submitted to [facility] for Flow Cytometric Analysis.                     Please see the Flow Cytometry Summary below.     [facility]       Flow cytometry from [facility] dated September 9, 2020 returned as immunophenotyping fails to reveal a monoclonal B cell or abnormal T-cell immunophenotype.  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  The significance of this is uncertain.  Correlation with tissue morphology with further studies as indicated is advised.     -Addendum: Reviewed with Dr. Feliciano and he thinks that it would be best for him to just evaluate her in the clinic.       2- left excisional biopsy: radial scar and no atypia. 10/2018  LEFt breast central RA- 1.5 cm cluster- asymptomatic- stereotactic biopsy August 21, 2018 lateral to the lower outer quadrant left breast returned as   8-21-18- left breast stereotactic biopsy:  5:00: Small radial sclerosing lesion with usual hyperplasia, separate foci of usual hyperplasia, columnar cell change without atypia:  Focal stromal fibrosis, apically metaplasia, and fibroadenomatoid change.   Concordant per Dr Tariq.  Hydromark in vicinity of pre biopsy calcifications.    Pathology from  10-24-18 excision radial scar returned as radial scar and no atypia.  Fu mammogram 7-2019 BR1    3- remote family history of breast cancer  Paternal 1/2 sister in 70s, paternal cousin in 50s    Plan:  Mrs. Mckeon is doing well today.  We reviewed her histology and flow cytometry reports together today.  Overall I reassured her that I did not think anything bad was going on with that lymph node.  She no longer will be in follow up with Dr Feliciano.    - screening mammogram with tomosynthesis at [facility] in 6 months followed by exam  If screening mammogram is stable, she will continue follow up with her PCP for further imaging and exams.  I have asked her to call us in the interim with any concerns.           SAMUEL Stevens     CC:    Mariann Fallon MD EMR Dragon/transcription disclaimer:    Much of this encounter note is an electronic transcription/translocation of spoken language to printed text.  The electronic translation of spoken language may permit erroneous, or at times, nonsensical words or phrases to be inadvertently transcribed.  Although I have reviewed the note from such areas, some may still exist.

## 2021-05-06 ENCOUNTER — TELEPHONE (OUTPATIENT)
Dept: MAMMOGRAPHY | Facility: CLINIC | Age: 81
End: 2021-05-06

## 2021-05-10 ENCOUNTER — TELEPHONE (OUTPATIENT)
Dept: SURGERY | Facility: CLINIC | Age: 81
End: 2021-05-10

## 2021-05-10 NOTE — TELEPHONE ENCOUNTER
Scheduled bilateral 3D screen mammogram at [facility]  11-1-21 at 10:00      11-9-21 12:15 for Marely Colon    Spoke to pt richa Lezama

## 2021-05-18 ENCOUNTER — OFFICE VISIT (OUTPATIENT)
Dept: INTERNAL MEDICINE | Facility: CLINIC | Age: 81
End: 2021-05-18

## 2021-05-18 VITALS
DIASTOLIC BLOOD PRESSURE: 68 MMHG | WEIGHT: 167.9 LBS | SYSTOLIC BLOOD PRESSURE: 112 MMHG | TEMPERATURE: 98.9 F | BODY MASS INDEX: 28.66 KG/M2 | OXYGEN SATURATION: 98 % | HEIGHT: 64 IN | HEART RATE: 83 BPM

## 2021-05-18 DIAGNOSIS — B02.9 HERPES ZOSTER WITHOUT COMPLICATION: Primary | ICD-10-CM

## 2021-05-18 DIAGNOSIS — M81.6 LOCALIZED OSTEOPOROSIS, UNSPECIFIED PATHOLOGICAL FRACTURE PRESENCE: ICD-10-CM

## 2021-05-18 DIAGNOSIS — M25.511 ACUTE PAIN OF RIGHT SHOULDER: ICD-10-CM

## 2021-05-18 PROCEDURE — 99214 OFFICE O/P EST MOD 30 MIN: CPT | Performed by: INTERNAL MEDICINE

## 2021-05-18 PROCEDURE — 96372 THER/PROPH/DIAG INJ SC/IM: CPT | Performed by: INTERNAL MEDICINE

## 2021-05-18 NOTE — PROGRESS NOTES
Subjective   Shu Mckeon is a 80 y.o. female here today for right arm pain from fall in  2/2021  Pt c/o rash on the right falnk x 2 weeks    History of Present Illness   She has had pain on the right side with a rash for the last 2 weeks  She has cont to have right arm pain since she fell last fall xray ok and she cont to have pain with raising her arm. Some night pain    The following portions of the patient's history were reviewed and updated as appropriate: allergies, current medications, past medical history, past social history and problem list.    Review of Systems    Objective   Physical Exam  Vitals reviewed.   Constitutional:       Appearance: She is well-developed.   HENT:      Head: Normocephalic and atraumatic.      Right Ear: External ear normal.      Left Ear: External ear normal.   Eyes:      Conjunctiva/sclera: Conjunctivae normal.      Pupils: Pupils are equal, round, and reactive to light.   Neck:      Thyroid: No thyromegaly.      Trachea: No tracheal deviation.   Cardiovascular:      Rate and Rhythm: Normal rate and regular rhythm.      Heart sounds: Normal heart sounds.   Pulmonary:      Effort: Pulmonary effort is normal.      Breath sounds: Normal breath sounds.   Abdominal:      General: Bowel sounds are normal. There is no distension.      Palpations: Abdomen is soft.      Tenderness: There is no abdominal tenderness.   Musculoskeletal:         General: No deformity. Normal range of motion.      Cervical back: Normal range of motion.   Skin:     General: Skin is warm and dry.   Neurological:      Mental Status: She is alert and oriented to person, place, and time.   Psychiatric:         Behavior: Behavior normal.         Thought Content: Thought content normal.         Judgment: Judgment normal.         Vitals:    05/18/21 1326   BP: 112/68   Pulse: 83   Temp: 98.9 °F (37.2 °C)   SpO2: 98%     Body mass index is 28.82 kg/m².       Current Outpatient Medications   Medication Instructions    • atorvastatin (LIPITOR) 40 mg, Oral, Nightly   • BIOTIN PO 1 tablet, Oral, Daily   • Cholecalciferol (VITAMIN D3) 5000 UNITS tablet 1 tablet, Oral, Daily   • losartan (COZAAR) 50 mg, Oral, Daily   • Multiple Vitamin (MULTIVITAMIN) capsule 1 capsule, Oral, Daily   • Multiple Vitamins-Minerals (PRESERVISION AREDS 2 PO) 2 tablets, Oral, 2 times daily   • Multiple Vitamins-Minerals (ZINC PO) Oral   • sertraline (ZOLOFT) 50 mg, Oral, Daily   • vitamin B-12 (CYANOCOBALAMIN) 1,000 mcg, Oral, Daily         Assessment/Plan   Diagnoses and all orders for this visit:    1. Herpes zoster without complication (Primary)    2. Acute pain of right shoulder  -     Ambulatory Referral to Sports Medicine    3. Localized osteoporosis, unspecified pathological fracture presence  -     denosumab (PROLIA) syringe 60 mg      1.  Right shoulder pain-  After fall better but still bothering her  Will refer to sports medicine as she may venefit from an injection  2.  SHingle on the right side  Will do a cream for this to help sent to [facility] compounding pharmacy

## 2021-05-26 ENCOUNTER — OFFICE VISIT (OUTPATIENT)
Dept: SPORTS MEDICINE | Facility: CLINIC | Age: 81
End: 2021-05-26

## 2021-05-26 VITALS
RESPIRATION RATE: 16 BRPM | DIASTOLIC BLOOD PRESSURE: 72 MMHG | WEIGHT: 167 LBS | BODY MASS INDEX: 28.51 KG/M2 | OXYGEN SATURATION: 97 % | SYSTOLIC BLOOD PRESSURE: 116 MMHG | HEART RATE: 88 BPM | HEIGHT: 64 IN | TEMPERATURE: 97.8 F

## 2021-05-26 DIAGNOSIS — M75.51 SUBACROMIAL BURSITIS OF RIGHT SHOULDER JOINT: ICD-10-CM

## 2021-05-26 DIAGNOSIS — M25.511 ACUTE PAIN OF RIGHT SHOULDER: Primary | ICD-10-CM

## 2021-05-26 PROCEDURE — 20610 DRAIN/INJ JOINT/BURSA W/O US: CPT | Performed by: FAMILY MEDICINE

## 2021-05-26 PROCEDURE — 99214 OFFICE O/P EST MOD 30 MIN: CPT | Performed by: FAMILY MEDICINE

## 2021-05-26 RX ORDER — TRIAMCINOLONE ACETONIDE 40 MG/ML
40 INJECTION, SUSPENSION INTRA-ARTICULAR; INTRAMUSCULAR
Status: DISCONTINUED | OUTPATIENT
Start: 2021-05-26 | End: 2021-05-26 | Stop reason: HOSPADM

## 2021-05-26 RX ADMIN — TRIAMCINOLONE ACETONIDE 40 MG: 40 INJECTION, SUSPENSION INTRA-ARTICULAR; INTRAMUSCULAR at 14:51

## 2021-05-26 NOTE — PROGRESS NOTES
"Chief Complaint  Shoulder Pain (right shoulder - pain after suffering fall back in february 2021 - pain when raising arm above shoulder level)    Subjective          Shu Mckeon presents to [facility]  History of Present Illness  Fell on the ice onto unprotected upper arm in February 2021.  She has since had ongoing right shoulder pain especially when raising her arm overhead.  She does endorse nighttime pain and she does try to sleep on her right side typically.  Has tried over-the-counter medication.  Requests injection.    Objective   Vital Signs:   /72 (BP Location: Right arm, Patient Position: Sitting, Cuff Size: Adult)   Pulse 88   Temp 97.8 °F (36.6 °C) (Temporal)   Resp 16   Ht 162.6 cm (64\")   Wt 75.8 kg (167 lb)   SpO2 97%   BMI 28.67 kg/m²     Physical Exam  Vitals and nursing note reviewed.   Constitutional:       Appearance: She is well-developed.   HENT:      Head: Normocephalic and atraumatic.   Eyes:      Conjunctiva/sclera: Conjunctivae normal.   Pulmonary:      Effort: No accessory muscle usage or respiratory distress.   Musculoskeletal:         General: No deformity.      Comments: R shoulder: Full range of motion.  Negative drop arm.  Positive empty can.  Positive Stephens.   Skin:     General: Skin is warm and dry.      Findings: No rash.   Neurological:      Mental Status: She is alert and oriented to person, place, and time.   Psychiatric:         Behavior: Behavior normal.        Result Review :       Data reviewed: Radiologic studies XR Shoulder 2+ View Right (02/18/2021 12:49)   Large Joint Arthrocentesis: R subacromial bursa  Date/Time: 5/26/2021 2:51 PM  Consent given by: patient  Site marked: site marked  Timeout: Immediately prior to procedure a time out was called to verify the correct patient, procedure, equipment, support staff and site/side marked as required   Supporting Documentation  Indications: pain   Procedure Details  Location: " shoulder - R subacromial bursa  Needle size: 25 G  Approach: posterior  Medications administered: 40 mg triamcinolone acetonide 40 MG/ML  Patient tolerance: patient tolerated the procedure well with no immediate complications            Assessment and Plan    Diagnoses and all orders for this visit:    1. Acute pain of right shoulder (Primary)  -     Large Joint Arthrocentesis: R subacromial bursa    2. Subacromial bursitis of right shoulder joint  -     Large Joint Arthrocentesis: R subacromial bursa    Reviewed x-ray from outside source.  No fracture.  Subacromial bursitis favored.  Injection today.  Home exercise program given.  Follow-up in 4 weeks.  Consider MRI.      Follow Up   Return in about 4 weeks (around 6/23/2021) for rotator cuff tendinitis handout, Recheck.  Patient was given instructions and counseling regarding her condition or for health maintenance advice. Please see specific information pulled into the AVS if appropriate.

## 2021-06-14 ENCOUNTER — TELEPHONE (OUTPATIENT)
Dept: INTERNAL MEDICINE | Facility: CLINIC | Age: 81
End: 2021-06-14

## 2021-06-14 NOTE — TELEPHONE ENCOUNTER
PATIENT HAS HAD SHINGLES   THEY HAVE FADED   NOW SHE IS HAVING IN DIFFERENT LOCATION UNDER BREAST  CAN SHE GET A VACCINE NOW DURING THE OUTBREAK      SHE IS NEEDING A CALL BACK FROM OFFICE  Shu Mckeon (Self) [phone] (M)

## 2021-06-15 RX ORDER — NYSTATIN 100000 [USP'U]/G
POWDER TOPICAL 3 TIMES DAILY
Qty: 60 G | Refills: 0 | Status: SHIPPED | OUTPATIENT
Start: 2021-06-15 | End: 2021-11-09

## 2021-06-22 ENCOUNTER — OFFICE VISIT (OUTPATIENT)
Dept: INTERNAL MEDICINE | Facility: CLINIC | Age: 81
End: 2021-06-22

## 2021-06-22 VITALS
HEART RATE: 80 BPM | HEIGHT: 64 IN | TEMPERATURE: 98 F | OXYGEN SATURATION: 98 % | BODY MASS INDEX: 29.02 KG/M2 | WEIGHT: 170 LBS | SYSTOLIC BLOOD PRESSURE: 122 MMHG | DIASTOLIC BLOOD PRESSURE: 68 MMHG

## 2021-06-22 DIAGNOSIS — B36.9 FUNGAL DERMATITIS: Primary | ICD-10-CM

## 2021-06-22 PROCEDURE — 99213 OFFICE O/P EST LOW 20 MIN: CPT | Performed by: NURSE PRACTITIONER

## 2021-06-22 RX ORDER — CLOTRIMAZOLE AND BETAMETHASONE DIPROPIONATE 10; .64 MG/G; MG/G
CREAM TOPICAL 2 TIMES DAILY
Qty: 45 G | Refills: 1 | Status: SHIPPED | OUTPATIENT
Start: 2021-06-22 | End: 2021-11-09

## 2021-06-22 NOTE — PROGRESS NOTES
Subjective   Shu Mckeon is a 80 y.o. female. Patient is here today for   Chief Complaint   Patient presents with   • Breast Problem     Pt complains of having redness, irritation & itchy skin under bilateral breast x2 weeks. Pt recently recovered from shingles x1 month ago.    .    History of Present Illness   New problem to this provider: C/o rash under breast x 2 weeks. She called the office and her PCP prescribed nystatin powder. She has been using nystatin powder x 1 week with no relief.  She does have a history of shingles on her right torso about a month ago, but that rash has resolved    The following portions of the patient's history were reviewed and updated as appropriate: allergies, current medications, past family history, past medical history, past social history, past surgical history and problem list.    Review of Systems    Objective   Vitals:    06/22/21 0917   BP: 122/68   Pulse: 80   Temp: 98 °F (36.7 °C)   SpO2: 98%     Body mass index is 29.18 kg/m².  Physical Exam  Vitals and nursing note reviewed.   Constitutional:       Appearance: Normal appearance. She is well-developed.   Cardiovascular:      Rate and Rhythm: Normal rate and regular rhythm.      Heart sounds: Normal heart sounds.   Pulmonary:      Effort: Pulmonary effort is normal.      Breath sounds: Normal breath sounds.   Skin:     General: Skin is warm and dry.      Findings: Erythema (inferior to both breasts with the right worse than the left; erythematous between breasts) present.   Neurological:      Mental Status: She is alert.   Psychiatric:         Speech: Speech normal.         Behavior: Behavior normal.         Thought Content: Thought content normal.         Assessment/Plan   Diagnoses and all orders for this visit:    1. Fungal dermatitis (Primary)  -     clotrimazole-betamethasone (Lotrisone) 1-0.05 % cream; Apply  topically to the appropriate area as directed 2 (Two) Times a Day.  Dispense: 45 g; Refill: 1      Will  try the above cream. If no improvement, will need to be referred to dermatology

## 2021-06-29 ENCOUNTER — OFFICE VISIT (OUTPATIENT)
Dept: INTERNAL MEDICINE | Facility: CLINIC | Age: 81
End: 2021-06-29

## 2021-06-29 VITALS
HEART RATE: 75 BPM | BODY MASS INDEX: 29.16 KG/M2 | WEIGHT: 170.8 LBS | SYSTOLIC BLOOD PRESSURE: 126 MMHG | DIASTOLIC BLOOD PRESSURE: 78 MMHG | OXYGEN SATURATION: 98 % | HEIGHT: 64 IN | TEMPERATURE: 97.8 F

## 2021-06-29 DIAGNOSIS — E11.9 CONTROLLED TYPE 2 DIABETES MELLITUS WITHOUT COMPLICATION, WITHOUT LONG-TERM CURRENT USE OF INSULIN (HCC): Primary | ICD-10-CM

## 2021-06-29 DIAGNOSIS — F41.9 ANXIETY: ICD-10-CM

## 2021-06-29 DIAGNOSIS — I10 ESSENTIAL HYPERTENSION: ICD-10-CM

## 2021-06-29 PROCEDURE — 99214 OFFICE O/P EST MOD 30 MIN: CPT | Performed by: INTERNAL MEDICINE

## 2021-06-29 RX ORDER — SERTRALINE HYDROCHLORIDE 100 MG/1
100 TABLET, FILM COATED ORAL DAILY
Qty: 90 TABLET | Refills: 1 | Status: SHIPPED | OUTPATIENT
Start: 2021-06-29 | End: 2022-01-11

## 2021-06-29 NOTE — PROGRESS NOTES
Subjective   Shu Mckeon is a 80 y.o. female here today to f/u on hyperlipidemia, HTN, DM 2 and depression.  Pt c/o light headedness, dizziness and nausea.    History of Present Illness   She complains of having increasing trouble with feeling anxious and dizzy.  Sx come and go  No relted to any specific movement or activity.no cp no sob no orthopnea.  She does have int blurred vision with she think may be making things worse  She I seeing th eye doctor   She does take prolia every 6 months  Pt has been compliant with diabetic diet. No side effects from medication No episodes of hypoglycemia. Patient denies any polyuria or polydipsia  Pt has been taking BP meds as prescribed without any problems.  No HA  No episodes of orthostasis    The following portions of the patient's history were reviewed and updated as appropriate: allergies, current medications, past medical history, past social history and problem list.she is doing well with current meds    Review of Systems    Objective   Physical Exam  Vitals reviewed.   Constitutional:       Appearance: She is well-developed.   HENT:      Head: Normocephalic and atraumatic.      Right Ear: External ear normal.      Left Ear: External ear normal.   Eyes:      Conjunctiva/sclera: Conjunctivae normal.      Pupils: Pupils are equal, round, and reactive to light.   Neck:      Thyroid: No thyromegaly.      Trachea: No tracheal deviation.   Cardiovascular:      Rate and Rhythm: Normal rate and regular rhythm.      Heart sounds: Normal heart sounds.   Pulmonary:      Effort: Pulmonary effort is normal.      Breath sounds: Normal breath sounds.   Abdominal:      General: Bowel sounds are normal. There is no distension.      Palpations: Abdomen is soft.      Tenderness: There is no abdominal tenderness.   Musculoskeletal:         General: No deformity. Normal range of motion.      Cervical back: Normal range of motion.   Skin:     General: Skin is warm and dry.   Neurological:       Mental Status: She is alert and oriented to person, place, and time.   Psychiatric:         Behavior: Behavior normal.         Thought Content: Thought content normal.         Judgment: Judgment normal.         Vitals:    06/29/21 1011   BP: 126/78   Pulse: 75   Temp: 97.8 °F (36.6 °C)   SpO2: 98%     Body mass index is 29.32 kg/m².       Results Encounter on 06/14/2021   Component Date Value Ref Range Status   • WBC 06/22/2021 6.45  3.40 - 10.80 10*3/mm3 Final   • RBC 06/22/2021 4.72  3.77 - 5.28 10*6/mm3 Final   • Hemoglobin 06/22/2021 14.1  12.0 - 15.9 g/dL Final   • Hematocrit 06/22/2021 42.5  34.0 - 46.6 % Final   • MCV 06/22/2021 90.0  79.0 - 97.0 fL Final   • MCH 06/22/2021 29.9  26.6 - 33.0 pg Final   • MCHC 06/22/2021 33.2  31.5 - 35.7 g/dL Final   • RDW 06/22/2021 12.7  12.3 - 15.4 % Final   • Platelets 06/22/2021 255  140 - 450 10*3/mm3 Final   • Neutrophil Rel % 06/22/2021 65.9  42.7 - 76.0 % Final   • Lymphocyte Rel % 06/22/2021 21.7  19.6 - 45.3 % Final   • Monocyte Rel % 06/22/2021 9.0  5.0 - 12.0 % Final   • Eosinophil Rel % 06/22/2021 2.5  0.3 - 6.2 % Final   • Basophil Rel % 06/22/2021 0.6  0.0 - 1.5 % Final   • Neutrophils Absolute 06/22/2021 4.25  1.70 - 7.00 10*3/mm3 Final   • Lymphocytes Absolute 06/22/2021 1.40  0.70 - 3.10 10*3/mm3 Final   • Monocytes Absolute 06/22/2021 0.58  0.10 - 0.90 10*3/mm3 Final   • Eosinophils Absolute 06/22/2021 0.16  0.00 - 0.40 10*3/mm3 Final   • Basophils Absolute 06/22/2021 0.04  0.00 - 0.20 10*3/mm3 Final   • Immature Granulocyte Rel % 06/22/2021 0.3  0.0 - 0.5 % Final   • Immature Grans Absolute 06/22/2021 0.02  0.00 - 0.05 10*3/mm3 Final   • nRBC 06/22/2021 0.0  0.0 - 0.2 /100 WBC Final   • Glucose 06/22/2021 122* 65 - 99 mg/dL Final   • BUN 06/22/2021 11  8 - 23 mg/dL Final   • Creatinine 06/22/2021 0.58  0.57 - 1.00 mg/dL Final   • eGFR Non African Am 06/22/2021 100  >60 mL/min/1.73 Final    Comment: The MDRD GFR formula is only valid for adults with  stable  renal function between ages 18 and 70.     • eGFR  Am 06/22/2021 121  >60 mL/min/1.73 Final   • BUN/Creatinine Ratio 06/22/2021 19.0  7.0 - 25.0 Final   • Sodium 06/22/2021 138  136 - 145 mmol/L Final   • Potassium 06/22/2021 4.6  3.5 - 5.2 mmol/L Final   • Chloride 06/22/2021 103  98 - 107 mmol/L Final   • Total CO2 06/22/2021 27.9  22.0 - 29.0 mmol/L Final   • Calcium 06/22/2021 9.3  8.6 - 10.5 mg/dL Final   • Total Protein 06/22/2021 6.8  6.0 - 8.5 g/dL Final   • Albumin 06/22/2021 4.30  3.50 - 5.20 g/dL Final   • Globulin 06/22/2021 2.5  gm/dL Final   • A/G Ratio 06/22/2021 1.7  g/dL Final   • Total Bilirubin 06/22/2021 0.5  0.0 - 1.2 mg/dL Final   • Alkaline Phosphatase 06/22/2021 72  39 - 117 U/L Final   • AST (SGOT) 06/22/2021 13  1 - 32 U/L Final   • ALT (SGPT) 06/22/2021 11  1 - 33 U/L Final   • Hemoglobin A1C 06/22/2021 6.40* 4.80 - 5.60 % Final    Comment: Hemoglobin A1C Ranges:  Increased Risk for Diabetes  5.7% to 6.4%  Diabetes                     >= 6.5%  Diabetic Goal                < 7.0%     • Total Cholesterol 06/22/2021 138  0 - 200 mg/dL Final    Comment: Cholesterol Reference Ranges  (U.S. Department of Health and Human Services ATP III  Classifications)  Desirable          <200 mg/dL  Borderline High    200-239 mg/dL  High Risk          >240 mg/dL  Triglyceride Reference Ranges  (U.S. Department of Health and Human Services ATP III  Classifications)  Normal           <150 mg/dL  Borderline High  150-199 mg/dL  High             200-499 mg/dL  Very High        >500 mg/dL  HDL Reference Ranges  (U.S. Department of Health and Human Services ATP III  Classifcations)  Low     <40 mg/dl (major risk factor for CHD)  High    >60 mg/dl ('negative' risk factor for CHD)  LDL Reference Ranges  (U.S. Department of Health and Human Services ATP III  Classifcations)  Optimal          <100 mg/dL  Near Optimal     100-129 mg/dL  Borderline High  130-159 mg/dL  High             160-189 mg/dL  Very  High        >189 mg/dL     • Triglycerides 06/22/2021 63  0 - 150 mg/dL Final   • HDL Cholesterol 06/22/2021 70* 40 - 60 mg/dL Final   • VLDL Cholesterol Fro[place] 06/22/2021 13  5 - 40 mg/dL Final   • LDL Chol Calc (NIH) 06/22/2021 55  0 - 100 mg/dL Final   • LDL/HDL RATIO 06/22/2021 0.79   Final   • TSH 06/22/2021 1.950  0.270 - 4.200 uIU/mL Final       Current Outpatient Medications:   •  atorvastatin (LIPITOR) 40 MG tablet, Take 1 tablet by mouth Every Night., Disp: 90 tablet, Rfl: 1  •  BIOTIN PO, Take 1 tablet by mouth daily., Disp: , Rfl:   •  Cholecalciferol (VITAMIN D3) 5000 UNITS tablet, Take 1 tablet by mouth daily., Disp: , Rfl:   •  clotrimazole-betamethasone (Lotrisone) 1-0.05 % cream, Apply  topically to the appropriate area as directed 2 (Two) Times a Day., Disp: 45 g, Rfl: 1  •  losartan (COZAAR) 50 MG tablet, Take 1 tablet by mouth Daily., Disp: 90 tablet, Rfl: 1  •  Multiple Vitamin (MULTIVITAMIN) capsule, Take 1 capsule by mouth daily., Disp: , Rfl:   •  Multiple Vitamins-Minerals (PRESERVISION AREDS 2 PO), Take 2 tablets by mouth 2 (two) times a day., Disp: , Rfl:   •  Multiple Vitamins-Minerals (ZINC PO), Take  by mouth., Disp: , Rfl:   •  sertraline (ZOLOFT) 100 MG tablet, Take 1 tablet by mouth Daily., Disp: 90 tablet, Rfl: 1  •  vitamin B-12 (CYANOCOBALAMIN) 1000 MCG tablet, Take 1,000 mcg by mouth Daily., Disp: , Rfl:   •  nystatin (MYCOSTATIN) 131064 UNIT/GM powder, Apply  topically to the appropriate area as directed 3 (Three) Times a Day., Disp: 60 g, Rfl: 0      Assessment/Plan   Diagnoses and all orders for this visit:    1. Controlled type 2 diabetes mellitus without complication, without long-term current use of insulin (CMS/[facility]) (Primary)    2. Essential hypertension    3. Anxiety    Other orders  -     sertraline (ZOLOFT) 100 MG tablet; Take 1 tablet by mouth Daily.  Dispense: 90 tablet; Refill: 1    1. HTN- ok with current meds  2.  DM- she is well controlled with diet  She will cont to  wokr on this  3. Osteopororis-  Stable with prolia  4.  ANxiety-we will try to increase the zoloft to 100mg and see if that helps with some of her sx

## 2021-07-21 ENCOUNTER — APPOINTMENT (OUTPATIENT)
Dept: MAMMOGRAPHY | Facility: HOSPITAL | Age: 81
End: 2021-07-21

## 2021-08-09 RX ORDER — ATORVASTATIN CALCIUM 40 MG/1
40 TABLET, FILM COATED ORAL NIGHTLY
Qty: 90 TABLET | Refills: 1 | Status: SHIPPED | OUTPATIENT
Start: 2021-08-09 | End: 2022-01-21

## 2021-08-10 ENCOUNTER — OFFICE VISIT (OUTPATIENT)
Dept: INTERNAL MEDICINE | Facility: CLINIC | Age: 81
End: 2021-08-10

## 2021-08-10 VITALS
WEIGHT: 172.9 LBS | DIASTOLIC BLOOD PRESSURE: 64 MMHG | OXYGEN SATURATION: 97 % | HEIGHT: 64 IN | SYSTOLIC BLOOD PRESSURE: 116 MMHG | HEART RATE: 79 BPM | BODY MASS INDEX: 29.52 KG/M2 | TEMPERATURE: 97.8 F

## 2021-08-10 DIAGNOSIS — E11.9 CONTROLLED TYPE 2 DIABETES MELLITUS WITHOUT COMPLICATION, WITHOUT LONG-TERM CURRENT USE OF INSULIN (HCC): ICD-10-CM

## 2021-08-10 DIAGNOSIS — I10 ESSENTIAL HYPERTENSION: ICD-10-CM

## 2021-08-10 DIAGNOSIS — E78.5 HYPERLIPIDEMIA, UNSPECIFIED HYPERLIPIDEMIA TYPE: Primary | ICD-10-CM

## 2021-08-10 PROCEDURE — 1125F AMNT PAIN NOTED PAIN PRSNT: CPT | Performed by: INTERNAL MEDICINE

## 2021-08-10 PROCEDURE — 99213 OFFICE O/P EST LOW 20 MIN: CPT | Performed by: INTERNAL MEDICINE

## 2021-08-10 PROCEDURE — 1170F FXNL STATUS ASSESSED: CPT | Performed by: INTERNAL MEDICINE

## 2021-08-10 PROCEDURE — G0439 PPPS, SUBSEQ VISIT: HCPCS | Performed by: INTERNAL MEDICINE

## 2021-08-10 PROCEDURE — 1160F RVW MEDS BY RX/DR IN RCRD: CPT | Performed by: INTERNAL MEDICINE

## 2021-08-10 NOTE — PATIENT INSTRUCTIONS
Medicare Wellness  Personal Prevention Plan of Service     Date of Office Visit:  08/10/2021  Encounter Provider:  Mariann Fallon MD  Place of Service:  [facility]  Patient Name: Shu Mckeon  :  1940    As part of the Medicare Wellness portion of your visit today, we are providing you with this personalized preventive plan of services (PPPS). This plan is based upon recommendations of the [place] Preventive Services Task Force (USPSTF) and the Advisory Committee on Immunization Practices (ACIP).    This lists the preventive care services that should be considered, and provides dates of when you are due. Items listed as completed are up-to-date and do not require any further intervention.    Health Maintenance   Topic Date Due   • TDAP/TD VACCINES (1 - Tdap) Never done   • ZOSTER VACCINE (2 of 2) 2017   • INFLUENZA VACCINE  10/01/2021   • HEMOGLOBIN A1C  2021   • DIABETIC EYE EXAM  2022   • LIPID PANEL  2022   • ANNUAL WELLNESS VISIT  08/10/2022   • DXA SCAN  2023   • MAMMOGRAM  2023   • COVID-19 Vaccine  Completed   • Pneumococcal Vaccine 65+  Completed   • URINE MICROALBUMIN  Discontinued       No orders of the defined types were placed in this encounter.      No follow-ups on file.

## 2021-08-10 NOTE — PROGRESS NOTES
Subjective   Shu Mckeon is a 81 y.o. female here to follow up on anxiety depression, dizzy spells, and medicare wellness.    History of Present Illness   No more bad dizzy spells  She is doing better  Pt has been taking BP meds as prescribed without any problems.  No HA  No episodes of orthostasis  She does feel like the zoloft is helping    The following portions of the patient's history were reviewed and updated as appropriate: allergies, current medications, past medical history, past social history and problem list.    Review of Systems    Objective   Physical Exam  Vitals reviewed.   Constitutional:       Appearance: She is well-developed.   HENT:      Head: Normocephalic and atraumatic.      Right Ear: External ear normal.      Left Ear: External ear normal.   Eyes:      Conjunctiva/sclera: Conjunctivae normal.      Pupils: Pupils are equal, round, and reactive to light.   Neck:      Thyroid: No thyromegaly.      Trachea: No tracheal deviation.   Cardiovascular:      Rate and Rhythm: Normal rate and regular rhythm.      Heart sounds: Normal heart sounds.   Pulmonary:      Effort: Pulmonary effort is normal.      Breath sounds: Normal breath sounds.   Abdominal:      General: Bowel sounds are normal. There is no distension.      Palpations: Abdomen is soft.      Tenderness: There is no abdominal tenderness.   Musculoskeletal:         General: No deformity. Normal range of motion.      Cervical back: Normal range of motion.   Skin:     General: Skin is warm and dry.   Neurological:      Mental Status: She is alert and oriented to person, place, and time.   Psychiatric:         Behavior: Behavior normal.         Thought Content: Thought content normal.         Judgment: Judgment normal.         Vitals:    08/10/21 1104   BP: 116/64   Pulse: 79   Temp: 97.8 °F (36.6 °C)   SpO2: 97%     Current Outpatient Medications:   •  atorvastatin (LIPITOR) 40 MG tablet, TAKE 1 TABLET BY MOUTH EVERY NIGHT, Disp: 90 tablet,  Rfl: 1  •  BIOTIN PO, Take 1 tablet by mouth daily., Disp: , Rfl:   •  Cholecalciferol (VITAMIN D3) 5000 UNITS tablet, Take 1 tablet by mouth daily., Disp: , Rfl:   •  losartan (COZAAR) 50 MG tablet, Take 1 tablet by mouth Daily., Disp: 90 tablet, Rfl: 1  •  Multiple Vitamin (MULTIVITAMIN) capsule, Take 1 capsule by mouth daily., Disp: , Rfl:   •  Multiple Vitamins-Minerals (PRESERVISION AREDS 2 PO), Take 2 tablets by mouth 2 (two) times a day., Disp: , Rfl:   •  Multiple Vitamins-Minerals (ZINC PO), Take  by mouth., Disp: , Rfl:   •  nystatin (MYCOSTATIN) 502744 UNIT/GM powder, Apply  topically to the appropriate area as directed 3 (Three) Times a Day., Disp: 60 g, Rfl: 0  •  sertraline (ZOLOFT) 100 MG tablet, Take 1 tablet by mouth Daily., Disp: 90 tablet, Rfl: 1  •  vitamin B-12 (CYANOCOBALAMIN) 1000 MCG tablet, Take 1,000 mcg by mouth Daily., Disp: , Rfl:   •  clotrimazole-betamethasone (Lotrisone) 1-0.05 % cream, Apply  topically to the appropriate area as directed 2 (Two) Times a Day., Disp: 45 g, Rfl: 1    Body mass index is 29.68 kg/m².         Assessment/Plan   Diagnoses and all orders for this visit:    1. Hyperlipidemia, unspecified hyperlipidemia type (Primary)    2. Essential hypertension    3. Controlled type 2 diabetes mellitus without complication, without long-term current use of insulin (CMS/[facility])      1. HTN- ok with current meds  2. HPL- ok with atorvastatin  3.  DM- ok with diet and exercise

## 2021-08-10 NOTE — PROGRESS NOTES
The ABCs of the Annual Wellness Visit  Subsequent Medicare Wellness Visit    No chief complaint on file.      Subjective   History of Present Illness:  Shu Mckeon is a 81 y.o. female who presents for a Subsequent Medicare Wellness Visit.    HEALTH RISK ASSESSMENT    Recent Hospitalizations:  No hospitalization(s) within the last year.    Current Medical Providers:  Patient Care Team:  Mariann Fallon MD as PCP - General  Mariann Fallon MD as PCP - Family Medicine  Nicolas Latif MD as Consulting Physician (Cardiology)  Shayna Blount MD as Referring Physician (Breast Surgery)  Brown Feliciano Jr., MD as Consulting Physician (Hematology and Oncology)    Smoking Status:  Social History     Tobacco Use   Smoking Status Never Smoker   Smokeless Tobacco Never Used       Alcohol Consumption:  Social History     Substance and Sexual Activity   Alcohol Use Yes   • Alcohol/week: 7.0 standard drinks   • Types: 7 Glasses of wine per week    Comment: 1/day-socially       Depression Screen:   PHQ-2/PHQ-9 Depression Screening 8/10/2021   Little interest or pleasure in doing things 0   Feeling down, depressed, or hopeless 0   Trouble falling or staying asleep, or sleeping too much 0   Feeling tired or having little energy 1   Poor appetite or overeating 0   Feeling bad about yourself - or that you are a failure or have let yourself or your family down 0   Trouble concentrating on things, such as reading the newspaper or watching television 0   Moving or speaking so slowly that other people could have noticed. Or the opposite - being so fidgety or restless that you have been moving around a lot more than usual 0   Thoughts that you would be better off dead, or of hurting yourself in some way 0   Total Score 1   If you checked off any problems, how difficult have these problems made it for you to do your work, take care of things at home, or get along with other people? Not difficult at all       Fall Risk Screen:  PEÑA  Fall Risk Assessment was completed, and patient is at HIGH risk for falls. Assessment completed on:2/18/2021    Health Habits and Functional and Cognitive Screening:  Functional & Cognitive Status 8/10/2021   Do you have difficulty preparing food and eating? No   Do you have difficulty bathing yourself, getting dressed or grooming yourself? No   Do you have difficulty using the toilet? No   Do you have difficulty moving around from place to place? No   Do you have trouble with steps or getting out of a bed or a chair? Yes   Current Diet Well Balanced Diet   Dental Exam Up to date   Eye Exam Up to date   Exercise (times per week) 2 times per week   Current Exercises Include Walking   Current Exercise Activities Include -   Do you need help using the phone?  No   Are you deaf or do you have serious difficulty hearing?  No   Do you need help with transportation? No   Do you need help shopping? No   Do you need help preparing meals?  No   Do you need help with housework?  Yes   Do you need help with laundry? No   Do you need help taking your medications? No   Do you need help managing money? No   Do you ever drive or ride in a car without wearing a seat belt? No   Have you felt unusual stress, anger or loneliness in the last month? No   Who do you live with? Spouse   If you need help, do you have trouble finding someone available to you? No   Have you been bothered in the last four weeks by sexual problems? No   Do you have difficulty concentrating, remembering or making decisions? No         Does the patient have evidence of cognitive impairment? No    Asprin use counseling:Does not need ASA (and currently is not on it)    Age-appropriate Screening Schedule:  Refer to the list below for future screening recommendations based on patient's age, sex and/or medical conditions. Orders for these recommended tests are listed in the plan section. The patient has been provided with a written plan.    Health Maintenance   Topic  Date Due   • TDAP/TD VACCINES (1 - Tdap) Never done   • ZOSTER VACCINE (2 of 2) 11/14/2017   • INFLUENZA VACCINE  10/01/2021   • HEMOGLOBIN A1C  12/22/2021   • DIABETIC EYE EXAM  01/06/2022   • LIPID PANEL  06/22/2022   • DXA SCAN  03/26/2023   • MAMMOGRAM  04/27/2023   • URINE MICROALBUMIN  Discontinued          The following portions of the patient's history were reviewed and updated as appropriate: allergies, current medications, past medical history, past social history and problem list.    Outpatient Medications Prior to Visit   Medication Sig Dispense Refill   • atorvastatin (LIPITOR) 40 MG tablet TAKE 1 TABLET BY MOUTH EVERY NIGHT 90 tablet 1   • BIOTIN PO Take 1 tablet by mouth daily.     • Cholecalciferol (VITAMIN D3) 5000 UNITS tablet Take 1 tablet by mouth daily.     • losartan (COZAAR) 50 MG tablet Take 1 tablet by mouth Daily. 90 tablet 1   • Multiple Vitamin (MULTIVITAMIN) capsule Take 1 capsule by mouth daily.     • Multiple Vitamins-Minerals (PRESERVISION AREDS 2 PO) Take 2 tablets by mouth 2 (two) times a day.     • Multiple Vitamins-Minerals (ZINC PO) Take  by mouth.     • nystatin (MYCOSTATIN) 157029 UNIT/GM powder Apply  topically to the appropriate area as directed 3 (Three) Times a Day. 60 g 0   • sertraline (ZOLOFT) 100 MG tablet Take 1 tablet by mouth Daily. 90 tablet 1   • vitamin B-12 (CYANOCOBALAMIN) 1000 MCG tablet Take 1,000 mcg by mouth Daily.     • clotrimazole-betamethasone (Lotrisone) 1-0.05 % cream Apply  topically to the appropriate area as directed 2 (Two) Times a Day. 45 g 1     No facility-administered medications prior to visit.       Patient Active Problem List   Diagnosis   • Ankle fracture   • Diabetes type 2, controlled (CMS/[facility])   • Fatigue   • HLD (hyperlipidemia)   • BP (high blood pressure)   • OP (osteoporosis)   • Closed fracture of right pelvis (CMS/HCC)   • Low back pain   • Lumbar spondylolysis   • Radial scar of breast   • Reactive lymphadenopathy   • Calculus of  "gallbladder with acute cholecystitis without obstruction   • Encounter for screening mammogram for breast cancer       Advanced Care Planning:  ACP discussion was held with the patient during this visit. Patient has an advance directive in EMR which is still valid.     Review of Systems    Compared to one year ago, the patient feels her physical health is worse.balnce a little worse  Compared to one year ago, the patient feels her mental health is the same.    Reviewed chart for potential of high risk medication in the elderly: yes  Reviewed chart for potential of harmful drug interactions in the elderly:yes    Objective         Vitals:    08/10/21 1104   BP: 116/64   BP Location: Left arm   Patient Position: Sitting   Cuff Size: Large Adult   Pulse: 79   Temp: 97.8 °F (36.6 °C)   SpO2: 97%   Weight: 78.4 kg (172 lb 14.4 oz)   Height: 162.6 cm (64\")   PainSc:   3       Body mass index is 29.68 kg/m².  Discussed the patient's BMI with her. The BMI is in the acceptable range.    Physical Exam    Lab Results   Component Value Date     (H) 06/22/2021    CHLPL 138 06/22/2021    TRIG 63 06/22/2021    HDL 70 (H) 06/22/2021    LDL 55 06/22/2021    VLDL 13 06/22/2021    HGBA1C 6.40 (H) 06/22/2021        Assessment/Plan   Medicare Risks and Personalized Health Plan  CMS Preventative Services Quick Reference  Fall Risk  She does feel a little off balance      The above risks/problems have been discussed with the patient.  Pertinent information has been shared with the patient in the After Visit Summary.  Follow up plans and orders are seen below in the Assessment/Plan Section.    Diagnoses and all orders for this visit:    1. Hyperlipidemia, unspecified hyperlipidemia type (Primary)    2. Essential hypertension    3. Controlled type 2 diabetes mellitus without complication, without long-term current use of insulin (CMS/[facility])      Follow Up:  No follow-ups on file.     An After Visit Summary and PPPS were given to the " patient.     I did discuss balance exercises

## 2021-08-30 ENCOUNTER — TELEPHONE (OUTPATIENT)
Dept: INTERNAL MEDICINE | Facility: CLINIC | Age: 81
End: 2021-08-30

## 2021-08-30 RX ORDER — LOSARTAN POTASSIUM 50 MG/1
50 TABLET ORAL DAILY
Qty: 90 TABLET | Refills: 1 | Status: SHIPPED | OUTPATIENT
Start: 2021-08-30 | End: 2022-01-21

## 2021-08-30 NOTE — TELEPHONE ENCOUNTER
PATIENT WOULD LIKE TO KNOW IF DR. GIL WOULD ADVISE HER TO RECEIVE THE BOOSTER SHOT FOR COVID.      PLEASE ADVISE

## 2021-11-01 ENCOUNTER — HOSPITAL ENCOUNTER (OUTPATIENT)
Dept: MAMMOGRAPHY | Facility: HOSPITAL | Age: 81
Discharge: HOME | End: 2021-11-01
Admitting: NURSE PRACTITIONER

## 2021-11-01 DIAGNOSIS — Z12.31 ENCOUNTER FOR SCREENING MAMMOGRAM FOR BREAST CANCER: ICD-10-CM

## 2021-11-01 PROCEDURE — 77063 BREAST TOMOSYNTHESIS BI: CPT

## 2021-11-01 PROCEDURE — 77067 SCR MAMMO BI INCL CAD: CPT

## 2021-11-05 ENCOUNTER — TELEPHONE (OUTPATIENT)
Dept: INTERNAL MEDICINE | Facility: CLINIC | Age: 81
End: 2021-11-05

## 2021-11-05 NOTE — TELEPHONE ENCOUNTER
Caller: Shu Mckeon    Relationship to patient: Self    Best call back number: [phone] (M)    Patient is needing: PATIENT CALLED REGARDING HER PROLIA SHOT. STATES SHE CAN NOT GET ANY FUNDING FOR IT  CURRENTLY AND WOULD LIKE TO SPEAK T MD BHARATHI GIL OR HER MA TO DISCUSS THE SIDE EFFECTS SHE THINKS IT MAY HAVE ON HER. PLEASE ADVISE. THANK YOU.

## 2021-11-09 ENCOUNTER — OFFICE VISIT (OUTPATIENT)
Dept: SURGERY | Facility: CLINIC | Age: 81
End: 2021-11-09

## 2021-11-09 VITALS
HEIGHT: 64 IN | OXYGEN SATURATION: 97 % | SYSTOLIC BLOOD PRESSURE: 123 MMHG | HEART RATE: 81 BPM | WEIGHT: 172 LBS | BODY MASS INDEX: 29.37 KG/M2 | DIASTOLIC BLOOD PRESSURE: 80 MMHG

## 2021-11-09 DIAGNOSIS — N64.89 RADIAL SCAR OF BREAST: Primary | ICD-10-CM

## 2021-11-09 PROCEDURE — 99213 OFFICE O/P EST LOW 20 MIN: CPT | Performed by: NURSE PRACTITIONER

## 2021-11-09 NOTE — PROGRESS NOTES
Chief Complaint: Shu Mckeon is a 81 y.o. female who was seen in consultation at the request of Mariann Fallon MD  for abnormal breast imaging, a followup visit and a post-biopsy visit    History of Present Illness:  2018:  Patient presents with abnormal breast imaging, LEFT breast. She noted no new masses, skin changes, nipple discharge, nipple changes prior to her most recent imaging.  Her most recent imaging includes the followin/14/15 [facility]  MAMMO SCREEN EVITA  FLUHR, SHU  Benign dermal calcifications. BIRADS category 2: benign.    18 [facility]  MAMMO SCREEN EVITA  FLUHR, SHU  Scattered fibroglandular densities anterior one third retroareolar left breast interval development of a cluster of calcifications. BIRADS category 0.    8/3/18  [facility]  MAMMO DIAG LEFT  FLUHR, SHU  Microcalcifications within the outer inferior aspect of the left breast. BIRADS category 4.      She has not had a breast biopsy in the past.  She has her uterus and ovaries, is postmenopausal, and takes nor hormones.  Her family history includes the following: She has one daughter, one sister, no maternal aunts, 2 paternal aunts.  She has a half sister on paternal he based who had breast cancer in her 70s and a paternal cousin who had breast cancer in her 50s.      18- left breast stereotactic biopsy: Upper outer quadrant, 5:00: Small radial sclerosing lesion with usual hyperplasia, separate foci of usual hyperplasia, columnar cell change without atypia: Focal stromal fibrosis, apically metaplasia, and fibroadenomatoid change.   Concordant per Dr Tariq.    She denies significant bruising at her biopsy site.  She denies any redness warmth or drainage from the mammotomy.  2018:  Pathology from  10-24-18 excision radial scar returned as radial scar and no atypia.     She denies any redness, warmth, drainage from her incision.  2019:  In the interim,  Shu Mckeon  has done well.  She has noted no changes in her breast exam.  No new masses, skin changes, nipple changes, nipple discharge either breast.     Her most recent imaging includes the following:  July 19, 2019 bilateral screening mammogram with 3D [facility]: Scattered fibroglandular densities.  No evidence for malignancy in either breast.  BI-RADS 1    9/09/2020:  In the interim,  Shu Mckeon  has done well.  She has noted no changes in her breast exam. No new masses, skin changes, nipple changes, nipple discharge either breast.     Her most recent imaging includes the following:  [facility] point mammography July 20, 2020 bilateral screening mammogram with tomosynthesis.  Scattered areas of fibroglandular density.  There is a 1 cm mass in the lower left axilla, axillary tail that is slightly increased in size.  BI-RADS 0  August 12, 2020 left diagnostic mammogram with left breast ultrasound [facility].  On mammogram visualization of a 1.1 cm round mass consistent with a lymph node in the axillary tail with a cortex that is thickened relative to other visualized node.  On ultrasound axillary tail and left axillary ultrasound showed on the order of 16 cm from the nipple a 1.1 cm lymph node with a thickened cortex.  Impression 1.1 cm rounded left axillary tail lymph node with a thickened cortex recommend ultrasound-guided left breast biopsy BI-RADS 4.    She has gained 11#.    9/18/2020:  Shu has done well since her core biopsy.  She denies any swelling or bruising or discomfort in her left axilla.  Denies any redness warmth or drainage from her dermatotomy    Pathology from in office core biopsy 9-9-20 returned as :  Final Diagnosis   1. Lymph Node, Left Low Axillary Tail, Core Biopsy:               A. Core-like fragments of reactive appearing lymph node.               B. No morphologic evidence of lymphoma or carcinoma.     2. Lymph Node, Left Low Axillary Tail, Core Biopsy (Gross Diagnosis Only):               A. Core-like tissue  submitted to [facility] for Flow Cytometric Analysis.                     Please see the Flow Cytometry Summary below.     [facility]       Flow cytometry from [facility] dated September 9, 2020 returned as immunophenotyping fails to reveal a monoclonal B cell or abnormal T-cell immunophenotype.  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  The significance of this is uncertain.  Correlation with tissue morphology with further studies as indicated is advised.       -Addendum: Reviewed with Dr. Feliciano and he thinks that it would be best for him to just evaluate her in the clinic.     She is here to review    5/3/21:  She returns today for follow up with no breast complaints today.  She continues to take care of her 92 year old  who was working before a recent fall.  He has been in rehab and is now home and in her care.    She has seen Dr Feliciano in medical oncology given the left axillary lymph node biopsy with reactive changes and clonal T LGL population by flow cytometry.  At her most recent visit in 1/2021: normal WBC and normal differential with no evidence of anemia nor thrombocytopenia.  Recent CT scan of the abdomen pelvis 10/27/2020 showed no evidence of splenomegaly or lymphadenopathy.  We will not plan any further routine follow-up in our office  The patient will return for follow-up with Dr. Fallon, her PCP.     Left diagnostic mammogram with tomosynthesis, left axillary US on 4/27/2021 was stable, BiRads 2 ( see full report below )    11/9/21, Interval History:   She returns today for follow up with non breast concerns, her 's health continues to decline.  She is taking care of him mostly on her own.  He is more mobile, uses a walker.    Screening mammogram on 11/1/21 was stable, BiRads 1      Review of Systems:  Review of Systems   Constitutional: Positive for unexpected weight change (1 LB WT GAIN ).   Genitourinary: Positive for nocturia.   "  Musculoskeletal: Positive for back pain.   Neurological: Positive for light-headedness.   All other systems reviewed and are negative.       Past Medical and Surgical History:  Breast Biopsy History:  Patient has not had a breast biopsy in the past.  Breast Cancer HIstory:  Patient does not have a past medical history of breast cancer.  Breast Operations, and year:  NONE   Obstetric/Gynecologic History:  Age menstrual periods began: 11  Patient is postmenopausal, entered menopause naturally at age:    Number of pregnancies: 3  Number of live births: 3  Number of abortions or miscarriages: 0  Age of delivery of first child: 20  Patient did not breast feed.  Length of time taking birth control pills: 10-12 YRS   Patient took hormone replacement during the following dates: 10 YRS   PATIENT STILL HAS UTERUS AND OVARIES.     Past Surgical History:   Procedure Laterality Date   • BREAST BIOPSY Left 10/25/2018    Procedure: left breast ultrasound-guided excision of radial scar.;  Surgeon: Shayna Blount MD;  [place] [facility] [place] [facility] [place]  Service: General   • BREAST SURGERY Left    • CARDIAC CATHETERIZATION      not sure when or where   • CARDIAC CATHETERIZATION     • CATARACT EXTRACTION WITH INTRAOCULAR LENS IMPLANT     • CHOLECYSTECTOMY WITH INTRAOPERATIVE CHOLANGIOGRAM N/A 10/31/2020    Procedure: CHOLECYSTECTOMY LAPAROSCOPIC INTRAOPERATIVE CHOLANGIOGRAM;  Surgeon: Ruth Del Valle MD;  Location: [facility];  Service: General;  Laterality: N/A;   • COLONOSCOPY     • FEMUR FRACTURE SURGERY Right    • FRACTURE SURGERY  2015    LT ANKLE   • REPLACEMENT TOTAL KNEE Right      Past Medical History:   Diagnosis Date   • Arthritis    • Chronic low back pain    • Depression     HISTORY OF BEING \"EMOTIONAL\"   • Fatigue    • GERD (gastroesophageal reflux disease)    • Groin pain    • H/O cardiovascular stress test 07/2018    NORMAL, DR ARIAS   • History of diverticulitis    • Hyperlipidemia    • Hypertension    • Impaired " "fasting glucose    • Left bundle branch block    • Macular degeneration    • Osteoporosis    • Radial scar of breast     LEFT   • Status post ORIF of fracture of ankle    • Urinary frequency        Prior Hospitalizations, other than for surgery or childbirth, and year:  NONE     Social History     Socioeconomic History   • Marital status:      Spouse name: Isidoro   Tobacco Use   • Smoking status: Never Smoker   • Smokeless tobacco: Never Used   Vaping Use   • Vaping Use: Never used   Substance and Sexual Activity   • Alcohol use: Yes     Alcohol/week: 7.0 standard drinks     Types: 7 Glasses of wine per week     Comment: 1/day-socially   • Drug use: No   • Sexual activity: Defer     Patient is .  Patient is retired.  Patient drinks 2 servings of caffeine per day.    Family History:  Family History   Problem Relation Age of Onset   • Heart disease Father    • Heart attack Father 81   • Heart attack Paternal Uncle    • Breast cancer Sister 70   • Breast cancer Cousin 50   • Malig Hyperthermia Neg Hx        Vital Signs:  /80 (BP Location: Right arm)   Pulse 81   Ht 162.6 cm (64\")   Wt 78 kg (172 lb)   LMP  (LMP Unknown)   SpO2 97%   BMI 29.52 kg/m²      Medications:    Current Outpatient Medications:   •  atorvastatin (LIPITOR) 40 MG tablet, TAKE 1 TABLET BY MOUTH EVERY NIGHT, Disp: 90 tablet, Rfl: 1  •  BIOTIN PO, Take 1 tablet by mouth daily., Disp: , Rfl:   •  Cholecalciferol (VITAMIN D3) 5000 UNITS tablet, Take 1 tablet by mouth daily., Disp: , Rfl:   •  denosumab (PROLIA) 60 MG/ML solution prefilled syringe syringe, Inject 1 mL under the skin into the appropriate area as directed 1 (One) Time for 1 dose., Disp: 1 mL, Rfl: 0  •  losartan (COZAAR) 50 MG tablet, TAKE 1 TABLET BY MOUTH DAILY, Disp: 90 tablet, Rfl: 1  •  Multiple Vitamin (MULTIVITAMIN) capsule, Take 1 capsule by mouth daily., Disp: , Rfl:   •  Multiple Vitamins-Minerals (PRESERVISION AREDS 2 PO), Take 2 tablets by mouth 2 (two) " "times a day., Disp: , Rfl:   •  Multiple Vitamins-Minerals (ZINC PO), Take  by mouth., Disp: , Rfl:   •  sertraline (ZOLOFT) 100 MG tablet, Take 1 tablet by mouth Daily., Disp: 90 tablet, Rfl: 1  •  vitamin B-12 (CYANOCOBALAMIN) 1000 MCG tablet, Take 1,000 mcg by mouth Daily., Disp: , Rfl:      Allergies:  No Known Allergies    Physical Examination:  /80 (BP Location: Right arm)   Pulse 81   Ht 162.6 cm (64\")   Wt 78 kg (172 lb)   LMP  (LMP Unknown)   SpO2 97%   BMI 29.52 kg/m²      I reviewed physical exam, no changes noted    General Appearance:  Patient is in no distress.  She is well kept and has an obese build.   Psychiatric:  Patient with appropriate mood and affect. Alert and oriented to self, time, and place.    Breast, RIGHT:  large sized, ptotic, symmetric with the contralateral side.  Breast skin is without erythema, edema, rashes.  There are no visible abnormalities upon inspection during the arm-raising maneuver or with hands on hips in the sitting position. There is no nipple retraction, discharge or nipple/areolar skin changes.There are no masses palpable in the sitting or supine positions.    Breast, LEFT:  large sized, ptotic, symmetric with the contralateral side.  Breast skin is without erythema, edema, rashes.  There are no visible abnormalities upon inspection during the arm-raising maneuver or with hands on hips in the sitting position. There is no nipple retraction, discharge or nipple/areolar skin changes.There are no masses palpable in the sitting or supine positions.  Well-healed radial biopsy   for radial scar.     Lymphatic:  There is no axillary, cervical, infraclavicular, or supraclavicular adenopathy bilaterally.  Specifically left axilla there is no palpable adenopathy.  There is a well-healed dermatotomy with no erythema warmth or drainage from her lymph node core biopsy.  Musculoskeletal:  Good strength in all 4 extremities.   There is good range of motion in both " shoulders.    Skin:  No new skin lesions or rashes on the skin excluding the breast (see breast exam above).        Imagin2021: left diagnostic mammogram with tomosynthesis and left axillary US at [facility]  MAMMO DIAGNOSTIC DIGITAL TOMOSYNTHESIS LEFT W CAD-   CLINICAL INDICATION: 80 years old woman presents for short-term follow-up imaging of the left breast. She underwent benign left axillary lymph node biopsy in September.    COMPARISON: Prior examinations dating back to 7/10/2012   FINDINGS:  MAMMOGRAM: Left CC, MLO, and MLO spot compression 2-D and Tomosynthesis views were obtained.     There are scattered areas of fibroglandular density.     There is a 1.0 cm low left axillary lymph node containing a hydromark coil clip, which is not significantly changed in size from 2020 and corresponds to the biopsy-proven benign lymph node. An additional smaller lymph node measuring approximately 0.9 cm is located more inferiorly and was not clearly included in the field-of-view on prior mammograms.   Otherwise, there are no new suspicious masses, calcifications, or areas of architectural distortion in the left breast.   ULTRASOUND:  Targeted left axillary ultrasound was performed for follow-up. In the superficial left axilla, there is a 0.9 x 0.5 x 0.9 cm lymph node, which is not significantly changed in size from 2020, previously 1.1 x 0.6 x 0.9 cm. A central echogenic focus is also unchanged and likely represents the fatty hilum. A deeper 0.8 x 0.7 x 1.1 cm left axillary lymph node is identified, which contains an echogenic focus within appearance of a biopsy clip, which may represent a biopsy-proven benign lymph node rather than the previously evaluated lymph node.     IMPRESSION:  A 0.9 cm superficial left axillary lymph node is not significantly changed in size from 2020. An additional 1.1 cm left axillary lymph node appears to contain an echogenic focus from a biopsy clip and represents  "the biopsy-proven benign lymph node. This lymph node was not previously assessed with ultrasound, however, the mammographic appearance of the previously biopsied lymph node is not significantly changed. Recommend clinical follow-up. Consider continued follow-up imaging.  BI-RADS Category 2: Benign    11/1/21: Screening mammogram with tomosytnehsis at [facility]  FINDINGS: Bilateral digital CC and MLO mammographic and digital Tomosynthesis images were obtained. Comparison is made to prior studies dated 4/27/2021, 8/12/2020, 7/20/2020 and 7/19/2019 .   Scattered fibroglandular densities are seen throughout both breasts in a pattern which is unchanged. I see no new or dominant masses, areas of architectural distortion or skin thickening. There is no evidence for axillary lymphadenopathy or nipple retraction.   IMPRESSION:  1. There is no evidence for malignancy or significant change in either breast. Routine followup mammography is recommended.   BI-RADS category 1: Negative.         Pathology:   8-21-18- left breast stereotactic biopsy: Upper outer quadrant, 5:00: Small radial sclerosing lesion with usual hyperplasia, separate foci of usual hyperplasia, columnar cell change without atypia: Focal stromal fibrosis, apically metaplasia, and fibroadenomatoid change.     Concordant per Dr Tariq.        Final Diagnosis   BREAST, LEFT, DESIGNATED \"UPPER OUTER QUADRANT, APPROXIMATE 5 O'CLOCK POSITION\",   STEREOTACTIC BIOPSY:               SMALL RADIAL SCLEROSING LESION WITH ASSOCIATED DUCTAL HYPERPLASIA OF THE                      USUAL TYPE.               SEPARATE FOCI OF DUCTAL HYPERPLASIA OF THE USUAL TYPE.               FOCAL COLUMNAR CELL CHANGE WITHOUT ATYPIA.               FOCAL STROMAL FIBROSIS AND ASSOCIATED CALCIFICATIONS.               FOCAL APOCRINE METAPLASIA.               FOCAL FIBROADENOMATOID CHANGE WITH ASSOCIATED CALCIFICATIONS.               NO EVIDENCE OF MALIGNANCY.     TDJ/th IHC/a/JULIA     CPT CODES:  1. 53335 "       Pathology from  10-24-18 excision radial scar returend as radial scar and no atypia.     We will let her know benign.     Final Diagnosis   LEFT BREAST LUMPECTOMY:           BENIGN BREAST TISSUE WITH DUCT HYPERPLASIA WITHOUT ATYPIA, ADENOSIS, AND RADIAL                   SCLEROSING LESION (2 MM).          SCAR AND CHANGES OF PRIOR BIOPSY.     COMMENT: I do not detect atypia. The margin is free of radial sclerosing lesion.     CMK/th  IHC/TDJ     Pathology from in office core biopsy 9-9-20 returned as :  Final Diagnosis   1. Lymph Node, Left Low Axillary Tail, Core Biopsy:               A. Core-like fragments of reactive appearing lymph node.               B. No morphologic evidence of lymphoma or carcinoma.     2. Lymph Node, Left Low Axillary Tail, Core Biopsy (Gross Diagnosis Only):               A. Core-like tissue submitted to [facility] for Flow Cytometric Analysis.                     Please see the Flow Cytometry Summary below.     [facility]       Flow cytometry from [facility] dated September 9, 2020 returned as immunophenotyping fails to reveal a monoclonal B cell or abnormal T-cell immunophenotype.  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  The significance of this is uncertain.  Correlation with tissue morphology with further studies as indicated is advised.       Procedures:  Percutaneous ultrasound-guided vacuum-assisted core breast/axillary tail lymph node biopsy 9-8-20  Indication:  ultrasound-and mammo visible rounded node with thickened cortex  Location: LEFT axillary tail, lowaxilla  Consent:  The risks, benefits, and alternatives to the procedure were discussed with the patient, who understood and wished to proceed.  The risks described included, but were not limited to, bleeding, infection, pneumothorax, and inadequate sampling requiring either repeat percutaneous or open excisional biopsy.  Description of Procedure:   After the patient  was positioned supine on the procedure table, I located the lesion using ultrasound.  I prepped and draped the breast/axilla skin in sterile fashion.  I anesthetized the breast skin at the site of anticipated mammotomy with 1% lidocaine with epinephrine.  I then anesthetized the underlying subcutaneous tissue and breast parenchyma surrounding the lesion with 1% lidocaine with epinephrine under ultrasound visualization and guidance. I then made a nicking incision with an 11blade and inserted the 14 G celero biopsy device from inferolateral to superomedial through the lesion under ultrasound guidance.   I then took 4 core samples  of the lesion under direct visualization with ultrasound.   I placed 2 of these cores in formalin and 2 of them in a cup for fresh and sent to pathology.  I withdrew the probe and placed a hydromark marker into the residual rounded node..  We held manual compression for 10 minutes, placed steri -strips at the mammotomy site, and wrapped the patient in a 6 inch super ace wrap and a cold pack.  Marker placed: hydromark  Tolerance: The patient tolerated the procedure well.  Disposition: We will see her back within a week to review her pathology.    Assessment:    1- benign left breast/ left axillary biopsy 9/2020  LEFT UOQ axillary tail/low axilla-0.1 cm rounded lymph node with thickened cortex seen on August 2020 mammogram and ultrasound BI-RADS 4.  Target for biopsy 9-9-20    Pathology from in office core biopsy 9-9-20 returned as :  Final Diagnosis   1. Lymph Node, Left Low Axillary Tail, Core Biopsy:               A. Core-like fragments of reactive appearing lymph node.               B. No morphologic evidence of lymphoma or carcinoma.     2. Lymph Node, Left Low Axillary Tail, Core Biopsy (Gross Diagnosis Only):               A. Core-like tissue submitted to [facility] for Flow Cytometric Analysis.                     Please see the Flow Cytometry Summary below.     [facility]       [facility] returned as immunophenotyping fails to reveal a monoclonal B cell or abnormal T-cell immunophenotype.  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  The significance of this is uncertain.  Correlation with tissue morphology with further studies as indicated is advised.     -Addendum: Reviewed with Dr. Feliciano and he thinks that it would be best for him to just evaluate her in the clinic.       2- left excisional biopsy: radial scar and no atypia. 10/2018  LEFt breast central RA- 1.5 cm cluster- asymptomatic- stereotactic biopsy August 21, 2018 lateral to the lower outer quadrant left breast returned as   8-21-18- left breast stereotactic biopsy:  5:00: Small radial sclerosing lesion with usual hyperplasia, separate foci of usual hyperplasia, columnar cell change without atypia: Focal stromal fibrosis, apically metaplasia, and fibroadenomatoid change.   Concordant per Dr Tariq.  Hydromark in vicinity of pre biopsy calcifications.    Pathology from  10-24-18 excision radial scar returned as radial scar and no atypia.  Fu mammogram 7-2019 BR1    3- remote family history of breast cancer  Paternal 1/2 sister in 70s, paternal cousin in 50s    Plan:  Mrs. Mckeon is doing well today.   She no longer will be in follow up with Dr Feliciano.    Imaging findings were reviewed, results are stable with routine follow up.    In view of her normal imaging and examination I will not given her a follow-up in our office but of asked her to check her self-exam and to call us in the interim with any concerns would be happy to see her back.    She will be due her screening mammogram in 12 months.       Marely Colon, SAMUEL     CC:    Mariann Fallon MD EMR Dragon/transcription disclaimer:  Dictated using Dragon dictation

## 2021-11-10 NOTE — TELEPHONE ENCOUNTER
Caller: Shu Mckeon    Relationship to patient: Self    Best call back number: [phone]    Patient is needing: PATIENT CALLED IN AND WANTED TO KNOW THE STATUS OF THE SHOTS. SHE SAID SHE HAS NOT HEARD BACK FROM ANYONE SINCE Monday AND WAS CURIOUS. PLEASE CALL PATIENT AND ADVISE.

## 2021-11-10 NOTE — TELEPHONE ENCOUNTER
Pt given the [employer] phone number to call and see about getting assistance with this medication [phone]

## 2021-11-16 ENCOUNTER — OFFICE VISIT (OUTPATIENT)
Dept: INTERNAL MEDICINE | Facility: CLINIC | Age: 81
End: 2021-11-16

## 2021-11-16 VITALS
DIASTOLIC BLOOD PRESSURE: 62 MMHG | BODY MASS INDEX: 29.82 KG/M2 | HEART RATE: 78 BPM | WEIGHT: 174.7 LBS | HEIGHT: 64 IN | SYSTOLIC BLOOD PRESSURE: 100 MMHG | OXYGEN SATURATION: 98 %

## 2021-11-16 DIAGNOSIS — N30.00 ACUTE CYSTITIS WITHOUT HEMATURIA: ICD-10-CM

## 2021-11-16 DIAGNOSIS — M81.6 LOCALIZED OSTEOPOROSIS, UNSPECIFIED PATHOLOGICAL FRACTURE PRESENCE: Primary | ICD-10-CM

## 2021-11-16 DIAGNOSIS — I10 PRIMARY HYPERTENSION: ICD-10-CM

## 2021-11-16 DIAGNOSIS — R73.09 ELEVATED GLUCOSE: ICD-10-CM

## 2021-11-16 LAB
BILIRUB BLD-MCNC: NEGATIVE MG/DL
CLARITY, POC: ABNORMAL
COLOR UR: YELLOW
EXPIRATION DATE: ABNORMAL
GLUCOSE UR STRIP-MCNC: NEGATIVE MG/DL
KETONES UR QL: NEGATIVE
LEUKOCYTE EST, POC: ABNORMAL
Lab: ABNORMAL
NITRITE UR-MCNC: NEGATIVE MG/ML
PH UR: 6.5 [PH] (ref 5–8)
PROT UR STRIP-MCNC: NEGATIVE MG/DL
RBC # UR STRIP: ABNORMAL /UL
SP GR UR: 1.02 (ref 1–1.03)
UROBILINOGEN UR QL: NORMAL

## 2021-11-16 PROCEDURE — 81003 URINALYSIS AUTO W/O SCOPE: CPT | Performed by: INTERNAL MEDICINE

## 2021-11-16 PROCEDURE — 99214 OFFICE O/P EST MOD 30 MIN: CPT | Performed by: INTERNAL MEDICINE

## 2021-11-16 RX ORDER — CIPROFLOXACIN 250 MG/1
250 TABLET, FILM COATED ORAL 2 TIMES DAILY
Qty: 10 TABLET | Refills: 0 | Status: SHIPPED | OUTPATIENT
Start: 2021-11-16 | End: 2021-12-14

## 2021-11-16 NOTE — PROGRESS NOTES
Subjective   Shu Mckeon is a 81 y.o. female here today for urinary frequency and bladder spasms x last night    History of Present Illness   Pt has been having bladder spasms for the last day  Painful no blood  +frequency  She was on fosmax for years for bone  She has been cont to have burning in feet  She has tried a cream with no benefit    The following portions of the patient's history were reviewed and updated as appropriate: allergies, current medications, past medical history, past social history and problem list.  She denies any change in diet    Review of Systems    Objective   Physical Exam  Vitals reviewed.   Constitutional:       Appearance: She is well-developed.   HENT:      Head: Normocephalic and atraumatic.      Right Ear: External ear normal.      Left Ear: External ear normal.   Eyes:      Conjunctiva/sclera: Conjunctivae normal.      Pupils: Pupils are equal, round, and reactive to light.   Neck:      Thyroid: No thyromegaly.      Trachea: No tracheal deviation.   Cardiovascular:      Rate and Rhythm: Normal rate and regular rhythm.      Heart sounds: Normal heart sounds.   Pulmonary:      Effort: Pulmonary effort is normal.      Breath sounds: Normal breath sounds.   Abdominal:      General: Bowel sounds are normal. There is no distension.      Palpations: Abdomen is soft.      Tenderness: There is no abdominal tenderness.   Musculoskeletal:         General: No deformity. Normal range of motion.      Cervical back: Normal range of motion.   Skin:     General: Skin is warm and dry.   Neurological:      Mental Status: She is alert and oriented to person, place, and time.   Psychiatric:         Behavior: Behavior normal.         Thought Content: Thought content normal.         Judgment: Judgment normal.         Vitals:    11/16/21 1416   BP: 100/62   Pulse: 78   SpO2: 98%     Body mass index is 29.99 kg/m².    Current Outpatient Medications   Medication Instructions   • atorvastatin (LIPITOR) 40  mg, Oral, Nightly   • BIOTIN PO 1 tablet, Oral, Daily   • Cholecalciferol (VITAMIN D3) 5000 UNITS tablet 1 tablet, Oral, Daily   • ciprofloxacin (CIPRO) 250 mg, Oral, 2 Times Daily   • denosumab (PROLIA) 60 mg, Subcutaneous, Once   • losartan (COZAAR) 50 mg, Oral, Daily   • Multiple Vitamin (MULTIVITAMIN) capsule 1 capsule, Oral, Daily   • Multiple Vitamins-Minerals (PRESERVISION AREDS 2 PO) 2 tablets, Oral, 2 times daily   • Multiple Vitamins-Minerals (ZINC PO) Oral   • sertraline (ZOLOFT) 100 mg, Oral, Daily   • vitamin B-12 (CYANOCOBALAMIN) 1,000 mcg, Oral, Daily          Assessment/Plan   Diagnoses and all orders for this visit:    1. Localized osteoporosis, unspecified pathological fracture presence (Primary)    2. Primary hypertension    3. Acute cystitis without hematuria    Other orders  -     ciprofloxacin (Cipro) 250 MG tablet; Take 1 tablet by mouth 2 (Two) Times a Day.  Dispense: 10 tablet; Refill: 0  -     denosumab (PROLIA) 60 MG/ML solution prefilled syringe syringe; Inject 1 mL under the skin into the appropriate area as directed 1 (One) Time for 1 dose.  Dispense: 1 mL; Refill: 0      1.  UTI- checkj culture  Treat uti  2.  Osteoporosis-    I rec the prolia   She was on fosamax or many years   3. Neuropathy  No help with creams  She does not want any other meds for this

## 2021-11-19 LAB
BACTERIA UR CULT: ABNORMAL
BACTERIA UR CULT: ABNORMAL
OTHER ANTIBIOTIC SUSC ISLT: ABNORMAL

## 2021-12-14 ENCOUNTER — HOSPITAL ENCOUNTER (OUTPATIENT)
Dept: GENERAL RADIOLOGY | Facility: HOSPITAL | Age: 81
Discharge: HOME | End: 2021-12-14
Admitting: INTERNAL MEDICINE

## 2021-12-14 ENCOUNTER — OFFICE VISIT (OUTPATIENT)
Dept: INTERNAL MEDICINE | Facility: CLINIC | Age: 81
End: 2021-12-14

## 2021-12-14 VITALS
TEMPERATURE: 97.7 F | HEIGHT: 64 IN | DIASTOLIC BLOOD PRESSURE: 88 MMHG | WEIGHT: 176.9 LBS | BODY MASS INDEX: 30.2 KG/M2 | OXYGEN SATURATION: 98 % | HEART RATE: 73 BPM | SYSTOLIC BLOOD PRESSURE: 138 MMHG

## 2021-12-14 DIAGNOSIS — M54.50 ACUTE RIGHT-SIDED LOW BACK PAIN WITHOUT SCIATICA: Primary | ICD-10-CM

## 2021-12-14 LAB
BILIRUB BLD-MCNC: NEGATIVE MG/DL
CLARITY, POC: CLEAR
COLOR UR: YELLOW
EXPIRATION DATE: ABNORMAL
GLUCOSE UR STRIP-MCNC: NEGATIVE MG/DL
KETONES UR QL: NEGATIVE
LEUKOCYTE EST, POC: NEGATIVE
Lab: ABNORMAL
NITRITE UR-MCNC: NEGATIVE MG/ML
PH UR: 7 [PH] (ref 5–8)
PROT UR STRIP-MCNC: NEGATIVE MG/DL
RBC # UR STRIP: ABNORMAL /UL
SP GR UR: 1.02 (ref 1–1.03)
UROBILINOGEN UR QL: NORMAL

## 2021-12-14 PROCEDURE — 99214 OFFICE O/P EST MOD 30 MIN: CPT | Performed by: INTERNAL MEDICINE

## 2021-12-14 PROCEDURE — 72100 X-RAY EXAM L-S SPINE 2/3 VWS: CPT

## 2021-12-14 PROCEDURE — 81003 URINALYSIS AUTO W/O SCOPE: CPT | Performed by: INTERNAL MEDICINE

## 2021-12-14 RX ORDER — TRAMADOL HYDROCHLORIDE 50 MG/1
50 TABLET ORAL EVERY 8 HOURS PRN
Qty: 30 TABLET | Refills: 1 | Status: SHIPPED | OUTPATIENT
Start: 2021-12-14 | End: 2022-02-16 | Stop reason: SDUPTHER

## 2021-12-20 ENCOUNTER — LAB (OUTPATIENT)
Dept: LAB | Facility: HOSPITAL | Age: 81
End: 2021-12-20

## 2021-12-20 ENCOUNTER — HOSPITAL ENCOUNTER (OUTPATIENT)
Dept: CT IMAGING | Facility: HOSPITAL | Age: 81
Discharge: HOME | End: 2021-12-20

## 2021-12-20 ENCOUNTER — OFFICE VISIT (OUTPATIENT)
Dept: INTERNAL MEDICINE | Facility: CLINIC | Age: 81
End: 2021-12-20

## 2021-12-20 VITALS
HEART RATE: 91 BPM | OXYGEN SATURATION: 97 % | DIASTOLIC BLOOD PRESSURE: 62 MMHG | HEIGHT: 64 IN | SYSTOLIC BLOOD PRESSURE: 114 MMHG | BODY MASS INDEX: 30.05 KG/M2 | TEMPERATURE: 97.8 F | WEIGHT: 176 LBS

## 2021-12-20 DIAGNOSIS — R10.31 RIGHT LOWER QUADRANT ABDOMINAL PAIN: ICD-10-CM

## 2021-12-20 DIAGNOSIS — R31.29 OTHER MICROSCOPIC HEMATURIA: ICD-10-CM

## 2021-12-20 DIAGNOSIS — R30.0 DYSURIA: Primary | ICD-10-CM

## 2021-12-20 LAB
ALBUMIN SERPL-MCNC: 4.3 G/DL (ref 3.5–5.2)
ALBUMIN/GLOB SERPL: 1.3 G/DL
ALP SERPL-CCNC: 110 U/L (ref 39–117)
ALT SERPL W P-5'-P-CCNC: 11 U/L (ref 1–33)
ANION GAP SERPL CALCULATED.3IONS-SCNC: 11.4 MMOL/L (ref 5–15)
AST SERPL-CCNC: 15 U/L (ref 1–32)
BASOPHILS # BLD AUTO: 0.04 10*3/MM3 (ref 0–0.2)
BASOPHILS NFR BLD AUTO: 0.4 % (ref 0–1.5)
BILIRUB BLD-MCNC: NEGATIVE MG/DL
BILIRUB SERPL-MCNC: 0.5 MG/DL (ref 0–1.2)
BUN SERPL-MCNC: 13 MG/DL (ref 8–23)
BUN/CREAT SERPL: 19.1 (ref 7–25)
CALCIUM SPEC-SCNC: 10 MG/DL (ref 8.6–10.5)
CHLORIDE SERPL-SCNC: 98 MMOL/L (ref 98–107)
CLARITY, POC: ABNORMAL
CO2 SERPL-SCNC: 25.6 MMOL/L (ref 22–29)
COLOR UR: YELLOW
CREAT SERPL-MCNC: 0.68 MG/DL (ref 0.57–1)
DEPRECATED RDW RBC AUTO: 43.8 FL (ref 37–54)
EOSINOPHIL # BLD AUTO: 0.15 10*3/MM3 (ref 0–0.4)
EOSINOPHIL NFR BLD AUTO: 1.4 % (ref 0.3–6.2)
ERYTHROCYTE [DISTWIDTH] IN BLOOD BY AUTOMATED COUNT: 13.5 % (ref 12.3–15.4)
EXPIRATION DATE: ABNORMAL
GFR SERPL CREATININE-BSD FRML MDRD: 83 ML/MIN/1.73
GLOBULIN UR ELPH-MCNC: 3.4 GM/DL
GLUCOSE SERPL-MCNC: 110 MG/DL (ref 65–99)
GLUCOSE UR STRIP-MCNC: NEGATIVE MG/DL
HCT VFR BLD AUTO: 41 % (ref 34–46.6)
HGB BLD-MCNC: 13.4 G/DL (ref 12–15.9)
IMM GRANULOCYTES # BLD AUTO: 0.03 10*3/MM3 (ref 0–0.05)
IMM GRANULOCYTES NFR BLD AUTO: 0.3 % (ref 0–0.5)
KETONES UR QL: NEGATIVE
LEUKOCYTE EST, POC: ABNORMAL
LYMPHOCYTES # BLD AUTO: 1.85 10*3/MM3 (ref 0.7–3.1)
LYMPHOCYTES NFR BLD AUTO: 17.8 % (ref 19.6–45.3)
Lab: ABNORMAL
MCH RBC QN AUTO: 28.6 PG (ref 26.6–33)
MCHC RBC AUTO-ENTMCNC: 32.7 G/DL (ref 31.5–35.7)
MCV RBC AUTO: 87.6 FL (ref 79–97)
MONOCYTES # BLD AUTO: 0.78 10*3/MM3 (ref 0.1–0.9)
MONOCYTES NFR BLD AUTO: 7.5 % (ref 5–12)
NEUTROPHILS NFR BLD AUTO: 7.56 10*3/MM3 (ref 1.7–7)
NEUTROPHILS NFR BLD AUTO: 72.6 % (ref 42.7–76)
NITRITE UR-MCNC: NEGATIVE MG/ML
NRBC BLD AUTO-RTO: 0 /100 WBC (ref 0–0.2)
PH UR: 5.5 [PH] (ref 5–8)
PLATELET # BLD AUTO: 239 10*3/MM3 (ref 140–450)
PMV BLD AUTO: 10.2 FL (ref 6–12)
POTASSIUM SERPL-SCNC: 4.2 MMOL/L (ref 3.5–5.2)
PROT SERPL-MCNC: 7.7 G/DL (ref 6–8.5)
PROT UR STRIP-MCNC: ABNORMAL MG/DL
RBC # BLD AUTO: 4.68 10*6/MM3 (ref 3.77–5.28)
RBC # UR STRIP: ABNORMAL /UL
SODIUM SERPL-SCNC: 135 MMOL/L (ref 136–145)
SP GR UR: 1.02 (ref 1–1.03)
UROBILINOGEN UR QL: NORMAL
WBC NRBC COR # BLD: 10.41 10*3/MM3 (ref 3.4–10.8)

## 2021-12-20 PROCEDURE — 99214 OFFICE O/P EST MOD 30 MIN: CPT | Performed by: NURSE PRACTITIONER

## 2021-12-20 PROCEDURE — 74176 CT ABD & PELVIS W/O CONTRAST: CPT

## 2021-12-20 PROCEDURE — 81003 URINALYSIS AUTO W/O SCOPE: CPT | Performed by: NURSE PRACTITIONER

## 2021-12-20 PROCEDURE — 85025 COMPLETE CBC W/AUTO DIFF WBC: CPT | Performed by: NURSE PRACTITIONER

## 2021-12-20 PROCEDURE — 80053 COMPREHEN METABOLIC PANEL: CPT | Performed by: NURSE PRACTITIONER

## 2021-12-20 PROCEDURE — 36415 COLL VENOUS BLD VENIPUNCTURE: CPT | Performed by: NURSE PRACTITIONER

## 2021-12-20 RX ORDER — CIPROFLOXACIN 250 MG/1
250 TABLET, FILM COATED ORAL 2 TIMES DAILY
Qty: 10 TABLET | Refills: 0 | Status: SHIPPED | OUTPATIENT
Start: 2021-12-20 | End: 2022-02-16

## 2021-12-20 NOTE — PROGRESS NOTES
Subjective   Shu Mckeon is a 81 y.o. female.     History of Present Illness    The patient is here today with c/o right low back pain now radiating all the down to her suprapubic area. She feels she has very limited urination. She has to clench her fists due to the pain. She is unsure if she is able to empty completely. Has never had a kidney stone. She is in a lot of pain.       Tramadol did not help.   The following portions of the patient's history were reviewed and updated as appropriate: allergies, current medications, past family history, past medical history, past social history, past surgical history and problem list.    Review of Systems   Constitutional: Positive for fatigue. Negative for chills and fever.   Respiratory: Negative.    Cardiovascular: Negative.    Genitourinary: Positive for decreased urine volume, dysuria, flank pain, frequency, hematuria, pelvic pain, pelvic pressure and urgency.       Objective   Physical Exam  Constitutional:       Appearance: Normal appearance. She is well-developed.   Neck:      Thyroid: No thyromegaly.   Cardiovascular:      Rate and Rhythm: Normal rate and regular rhythm.      Heart sounds: Normal heart sounds.   Pulmonary:      Effort: Pulmonary effort is normal.      Breath sounds: Normal breath sounds.   Abdominal:      Tenderness: There is abdominal tenderness in the right upper quadrant, right lower quadrant and left lower quadrant. There is right CVA tenderness. There is no left CVA tenderness.   Musculoskeletal:      Cervical back: Normal range of motion and neck supple.   Lymphadenopathy:      Cervical: No cervical adenopathy.   Skin:     General: Skin is warm and dry.   Neurological:      Mental Status: She is alert.   Psychiatric:         Behavior: Behavior normal.         Thought Content: Thought content normal.         Judgment: Judgment normal.         Vitals:    12/20/21 1520   BP: 114/62   Pulse: 91   Temp: 97.8 °F (36.6 °C)   SpO2: 97%     Body  mass index is 30.2 kg/m².      Assessment/Plan   Diagnoses and all orders for this visit:    1. Dysuria (Primary)  -     POCT urinalysis dipstick, automated  -     Urine Culture - Urine, Urine, Clean Catch  -     CT Abdomen Pelvis Stone Protocol  -     Cancel: CBC & Differential  -     Cancel: Comprehensive Metabolic Panel  -     ciprofloxacin (Cipro) 250 MG tablet; Take 1 tablet by mouth 2 (Two) Times a Day.  Dispense: 10 tablet; Refill: 0  -     CBC & Differential  -     Comprehensive Metabolic Panel    2. Other microscopic hematuria  -     CT Abdomen Pelvis Stone Protocol  -     Cancel: CBC & Differential  -     Cancel: Comprehensive Metabolic Panel  -     ciprofloxacin (Cipro) 250 MG tablet; Take 1 tablet by mouth 2 (Two) Times a Day.  Dispense: 10 tablet; Refill: 0  -     CBC & Differential  -     Comprehensive Metabolic Panel    3. Right lower quadrant abdominal pain  -     CT Abdomen Pelvis Stone Protocol  -     Cancel: CBC & Differential  -     Cancel: Comprehensive Metabolic Panel  -     ciprofloxacin (Cipro) 250 MG tablet; Take 1 tablet by mouth 2 (Two) Times a Day.  Dispense: 10 tablet; Refill: 0  -     CBC & Differential  -     Comprehensive Metabolic Panel                 1. Hematuria/abd pain- Kidney stone and or UTI, treat for UTI, check CT abd pelvis stat, if pain persists or worsens she is to go to ER, CBC and CMP today   cipro for poss UTI.

## 2021-12-21 ENCOUNTER — TELEPHONE (OUTPATIENT)
Dept: INTERNAL MEDICINE | Facility: CLINIC | Age: 81
End: 2021-12-21

## 2021-12-21 NOTE — TELEPHONE ENCOUNTER
Caller: Shu Mckeon    Relationship: Self    Best call back number: [phone]     Caller requesting test results: PATIENT     What test was performed: LABS AND CT SCAN     When was the test performed: 12/20/2021     Where was the test performed: OFFICE     Additional notes: PATIENT REQUESTING RESULTS

## 2021-12-21 NOTE — TELEPHONE ENCOUNTER
PT AWARE    ----- Message from Mariann Fallon MD sent at 12/21/2021  4:13 PM EST -----  Ok to get it  ----- Message -----  From: Araceli Bennett MA  Sent: 12/21/2021   2:50 PM EST  To: Mariann Fallon MD    Pt seen zoe and has a uti. Should she wait for the prolia? Or can she go ahead and do it. She is over a month late due to getting help with the cost

## 2021-12-22 ENCOUNTER — CLINICAL SUPPORT (OUTPATIENT)
Dept: INTERNAL MEDICINE | Facility: CLINIC | Age: 81
End: 2021-12-22

## 2021-12-22 DIAGNOSIS — M81.6 LOCALIZED OSTEOPOROSIS, UNSPECIFIED PATHOLOGICAL FRACTURE PRESENCE: Primary | ICD-10-CM

## 2021-12-22 PROCEDURE — 96372 THER/PROPH/DIAG INJ SC/IM: CPT | Performed by: INTERNAL MEDICINE

## 2021-12-24 LAB
BACTERIA UR CULT: ABNORMAL
BACTERIA UR CULT: ABNORMAL
OTHER ANTIBIOTIC SUSC ISLT: ABNORMAL

## 2022-01-11 RX ORDER — SERTRALINE HYDROCHLORIDE 100 MG/1
100 TABLET, FILM COATED ORAL DAILY
Qty: 90 TABLET | Refills: 1 | Status: SHIPPED | OUTPATIENT
Start: 2022-01-11 | End: 2022-04-04 | Stop reason: SDUPTHER

## 2022-01-21 RX ORDER — LOSARTAN POTASSIUM 50 MG/1
50 TABLET ORAL DAILY
Qty: 90 TABLET | Refills: 1 | Status: SHIPPED | OUTPATIENT
Start: 2022-01-21 | End: 2022-02-16 | Stop reason: SDUPTHER

## 2022-01-21 RX ORDER — ATORVASTATIN CALCIUM 40 MG/1
40 TABLET, FILM COATED ORAL NIGHTLY
Qty: 90 TABLET | Refills: 1 | Status: SHIPPED | OUTPATIENT
Start: 2022-01-21 | End: 2022-04-04 | Stop reason: SDUPTHER

## 2022-02-16 ENCOUNTER — OFFICE VISIT (OUTPATIENT)
Dept: INTERNAL MEDICINE | Facility: CLINIC | Age: 82
End: 2022-02-16

## 2022-02-16 VITALS
WEIGHT: 175.1 LBS | DIASTOLIC BLOOD PRESSURE: 74 MMHG | OXYGEN SATURATION: 98 % | HEART RATE: 77 BPM | TEMPERATURE: 97.7 F | SYSTOLIC BLOOD PRESSURE: 132 MMHG | BODY MASS INDEX: 29.89 KG/M2 | HEIGHT: 64 IN

## 2022-02-16 DIAGNOSIS — I10 PRIMARY HYPERTENSION: Primary | ICD-10-CM

## 2022-02-16 DIAGNOSIS — E11.9 CONTROLLED TYPE 2 DIABETES MELLITUS WITHOUT COMPLICATION, WITHOUT LONG-TERM CURRENT USE OF INSULIN: ICD-10-CM

## 2022-02-16 DIAGNOSIS — E78.5 HYPERLIPIDEMIA, UNSPECIFIED HYPERLIPIDEMIA TYPE: ICD-10-CM

## 2022-02-16 DIAGNOSIS — M54.50 ACUTE RIGHT-SIDED LOW BACK PAIN WITHOUT SCIATICA: ICD-10-CM

## 2022-02-16 PROCEDURE — 99214 OFFICE O/P EST MOD 30 MIN: CPT | Performed by: INTERNAL MEDICINE

## 2022-02-16 RX ORDER — LOSARTAN POTASSIUM 50 MG/1
50 TABLET ORAL DAILY
Qty: 90 TABLET | Refills: 1 | Status: SHIPPED | OUTPATIENT
Start: 2022-02-16 | End: 2022-08-16

## 2022-02-16 RX ORDER — TRAMADOL HYDROCHLORIDE 50 MG/1
50 TABLET ORAL EVERY 8 HOURS PRN
Qty: 30 TABLET | Refills: 1 | Status: SHIPPED | OUTPATIENT
Start: 2022-02-16 | End: 2022-05-03

## 2022-02-16 NOTE — PROGRESS NOTES
Subjective   Shu Mckeon is a 81 y.o. female here today to f/u on HTN, hyperlipidemia, DM 2  and anxiety.  Pt c/o all over pain, back, legs and neck    History of Present Illness   Pt has been taking BP meds as prescribed without any problems.  No HA  No episodes of orthostasis  Pt has been taking cholesterol meds as prescribed.  No difficulties with myalgias.   She does have chronic pain  She says pain in in the back when she stands up  No numbness or tingling or weakness in her leg    The following portions of the patient's history were reviewed and updated as appropriate: allergies, current medications, past medical history, past social history and problem list.  She has been eating too many carbs    Review of Systems    Objective   Physical Exam  Vitals reviewed.   Constitutional:       Appearance: She is well-developed.   HENT:      Head: Normocephalic and atraumatic.      Right Ear: External ear normal.      Left Ear: External ear normal.   Eyes:      Conjunctiva/sclera: Conjunctivae normal.      Pupils: Pupils are equal, round, and reactive to light.   Neck:      Thyroid: No thyromegaly.      Trachea: No tracheal deviation.   Cardiovascular:      Rate and Rhythm: Normal rate and regular rhythm.      Heart sounds: Normal heart sounds.   Pulmonary:      Effort: Pulmonary effort is normal.      Breath sounds: Normal breath sounds.   Abdominal:      General: Bowel sounds are normal. There is no distension.      Palpations: Abdomen is soft.      Tenderness: There is no abdominal tenderness.   Musculoskeletal:         General: No deformity. Normal range of motion.      Cervical back: Normal range of motion.   Skin:     General: Skin is warm and dry.   Neurological:      Mental Status: She is alert and oriented to person, place, and time.   Psychiatric:         Behavior: Behavior normal.         Thought Content: Thought content normal.         Judgment: Judgment normal.         Vitals:    02/16/22 1130   BP:  132/74   Pulse: 77   Temp: 97.7 °F (36.5 °C)   SpO2: 98%     Body mass index is 30.04 kg/m².       Results Encounter on 01/16/2022   Component Date Value Ref Range Status   • WBC 02/09/2022 6.1  3.4 - 10.8 x10E3/uL Final   • RBC 02/09/2022 4.79  3.77 - 5.28 x10E6/uL Final   • Hemoglobin 02/09/2022 13.8  11.1 - 15.9 g/dL Final   • Hematocrit 02/09/2022 42.0  34.0 - 46.6 % Final   • MCV 02/09/2022 88  79 - 97 fL Final   • MCH 02/09/2022 28.8  26.6 - 33.0 pg Final   • MCHC 02/09/2022 32.9  31.5 - 35.7 g/dL Final   • RDW 02/09/2022 12.8  11.7 - 15.4 % Final   • Platelets 02/09/2022 294  150 - 450 x10E3/uL Final   • Neutrophil Rel % 02/09/2022 66  Not Estab. % Final   • Lymphocyte Rel % 02/09/2022 23  Not Estab. % Final   • Monocyte Rel % 02/09/2022 8  Not Estab. % Final   • Eosinophil Rel % 02/09/2022 2  Not Estab. % Final   • Basophil Rel % 02/09/2022 1  Not Estab. % Final   • Neutrophils Absolute 02/09/2022 4.1  1.4 - 7.0 x10E3/uL Final   • Lymphocytes Absolute 02/09/2022 1.4  0.7 - 3.1 x10E3/uL Final   • Monocytes Absolute 02/09/2022 0.5  0.1 - 0.9 x10E3/uL Final   • Eosinophils Absolute 02/09/2022 0.1  0.0 - 0.4 x10E3/uL Final   • Basophils Absolute 02/09/2022 0.1  0.0 - 0.2 x10E3/uL Final   • Immature Granulocyte Rel % 02/09/2022 0  Not Estab. % Final   • Immature Grans Absolute 02/09/2022 0.0  0.0 - 0.1 x10E3/uL Final   • Glucose 02/09/2022 123* 65 - 99 mg/dL Final   • BUN 02/09/2022 12  8 - 27 mg/dL Final   • Creatinine 02/09/2022 0.70  0.57 - 1.00 mg/dL Final   • eGFR Non  Am 02/09/2022 80  >59 mL/min/1.73 Final   • eGFR African Am 02/09/2022 93  >59 mL/min/1.73 Final    Comment: **In accordance with recommendations from the NKF-ASN Task force,**    [facility] is in the process of updating its eGFR calculation to the    2021 CKD-EPI creatinine equation that estimates kidney function    without a race variable.     • BUN/Creatinine Ratio 02/09/2022 17  12 - 28 Final   • Sodium 02/09/2022 137  134 - 144 mmol/L  Final   • Potassium 02/09/2022 4.6  3.5 - 5.2 mmol/L Final   • Chloride 02/09/2022 100  96 - 106 mmol/L Final   • Total CO2 02/09/2022 22  20 - 29 mmol/L Final   • Calcium 02/09/2022 9.5  8.7 - 10.3 mg/dL Final   • Total Protein 02/09/2022 7.1  6.0 - 8.5 g/dL Final   • Albumin 02/09/2022 4.3  3.6 - 4.6 g/dL Final   • Globulin 02/09/2022 2.8  1.5 - 4.5 g/dL Final   • A/G Ratio 02/09/2022 1.5  1.2 - 2.2 Final   • Total Bilirubin 02/09/2022 0.4  0.0 - 1.2 mg/dL Final   • Alkaline Phosphatase 02/09/2022 87  44 - 121 IU/L Final   • AST (SGOT) 02/09/2022 18  0 - 40 IU/L Final   • ALT (SGPT) 02/09/2022 14  0 - 32 IU/L Final   • Total Cholesterol 02/09/2022 144  100 - 199 mg/dL Final   • Triglycerides 02/09/2022 74  0 - 149 mg/dL Final   • HDL Cholesterol 02/09/2022 58  >39 mg/dL Final   • VLDL Cholesterol [place] 02/09/2022 15  5 - 40 mg/dL Final   • LDL Chol Calc (NIH) 02/09/2022 71  0 - 99 mg/dL Final   • LDL/HDL RATIO 02/09/2022 1.2  0.0 - 3.2 ratio Final    Comment:                                     LDL/HDL Ratio                                              Men  Women                                1/2 Avg.Risk  1.0    1.5                                    Avg.Risk  3.6    3.2                                 2X Avg.Risk  6.2    5.0                                 3X Avg.Risk  8.0    6.1     • TSH 02/09/2022 2.350  0.450 - 4.500 uIU/mL Final   • 25 Hydroxy, Vitamin D 02/09/2022 117.0* 30.0 - 100.0 ng/mL Final    Comment: Vitamin D deficiency has been defined by the [place] of  [employer] and an Endocrine Society practice guideline as a  level of serum 25-OH vitamin D less than 20 ng/mL (1,2).  The Endocrine Society went on to further define vitamin D  insufficiency as a level between 21 and 29 ng/mL (2).  1. IOM ([place] [employer]). 2010. Dietary reference     intakes for calcium and D. [place]: The     National Academies Press.  2. Rosalie SHORE, Benjamin VINES, Dharmesh HUTSON, et al.     Evaluation, treatment,  and prevention of vitamin D     deficiency: an Endocrine Society clinical practice     guideline. JCEM. 2011 Jul; 96(7):1911-30.     • Hemoglobin A1C 02/09/2022 6.7* 4.8 - 5.6 % Final    Comment:          Prediabetes: 5.7 - 6.4           Diabetes: >6.4           Glycemic control for adults with diabetes: <7.0         Current Outpatient Medications:   •  atorvastatin (LIPITOR) 40 MG tablet, TAKE 1 TABLET BY MOUTH EVERY NIGHT, Disp: 90 tablet, Rfl: 1  •  Cholecalciferol (VITAMIN D3) 5000 UNITS tablet, Take 1 tablet by mouth daily., Disp: , Rfl:   •  losartan (COZAAR) 50 MG tablet, TAKE 1 TABLET BY MOUTH DAILY, Disp: 90 tablet, Rfl: 1  •  Multiple Vitamin (MULTIVITAMIN) capsule, Take 1 capsule by mouth daily., Disp: , Rfl:   •  Multiple Vitamins-Minerals (PRESERVISION AREDS 2 PO), Take 2 tablets by mouth 2 (two) times a day., Disp: , Rfl:   •  Multiple Vitamins-Minerals (ZINC PO), Take  by mouth., Disp: , Rfl:   •  sertraline (ZOLOFT) 100 MG tablet, TAKE 1 TABLET BY MOUTH DAILY, Disp: 90 tablet, Rfl: 1  •  vitamin B-12 (CYANOCOBALAMIN) 1000 MCG tablet, Take 1,000 mcg by mouth Daily., Disp: , Rfl:   •  BIOTIN PO, Take 1 tablet by mouth daily., Disp: , Rfl:   •  traMADol (ULTRAM) 50 MG tablet, Take 1 tablet by mouth Every 8 (Eight) Hours As Needed for Moderate Pain ., Disp: 30 tablet, Rfl: 1      Assessment/Plan   Diagnoses and all orders for this visit:    1. Primary hypertension (Primary)    2. Acute right-sided low back pain without sciatica    3. Hyperlipidemia, unspecified hyperlipidemia type    4. Controlled type 2 diabetes mellitus without complication, without long-term current use of insulin (HCC)      1.  LBP-  Right side into back   Worse when she gets up but better after moving  Try tramadol  Discussed MRI and pain and she will consider that if not getting  Better  2.  HPL-  Ok with lipitor  3. HTN_ ok with current meds  4. DM- she is going to watch carb intake and staying more active

## 2022-03-21 ENCOUNTER — TELEPHONE (OUTPATIENT)
Dept: INTERNAL MEDICINE | Facility: CLINIC | Age: 82
End: 2022-03-21

## 2022-03-21 DIAGNOSIS — M54.16 LUMBAR BACK PAIN WITH RADICULOPATHY AFFECTING LOWER EXTREMITY: Primary | ICD-10-CM

## 2022-03-21 NOTE — TELEPHONE ENCOUNTER
Caller: Shu Mckeon    Relationship: Self    Best call back number: [phone] (M)    PATIENT WOULD LIKE TO SPEAK WITH DR GIL ABOUT HER PAIN IN HER RIGHT SIDE WRAPPING AROUND TO HER BACK. SHE WOULD LIKE TO DISCUSS MAYBE GETTING SET UP FOR AN MRI.     PLEASE ADVISE

## 2022-03-25 ENCOUNTER — OFFICE VISIT (OUTPATIENT)
Dept: INTERNAL MEDICINE | Facility: CLINIC | Age: 82
End: 2022-03-25

## 2022-03-25 VITALS
BODY MASS INDEX: 30.75 KG/M2 | DIASTOLIC BLOOD PRESSURE: 70 MMHG | TEMPERATURE: 97.3 F | HEIGHT: 64 IN | OXYGEN SATURATION: 98 % | WEIGHT: 180.1 LBS | HEART RATE: 81 BPM | SYSTOLIC BLOOD PRESSURE: 134 MMHG

## 2022-03-25 DIAGNOSIS — G89.29 CHRONIC RIGHT-SIDED LOW BACK PAIN WITHOUT SCIATICA: Primary | ICD-10-CM

## 2022-03-25 DIAGNOSIS — M54.50 CHRONIC RIGHT-SIDED LOW BACK PAIN WITHOUT SCIATICA: Primary | ICD-10-CM

## 2022-03-25 PROCEDURE — 99213 OFFICE O/P EST LOW 20 MIN: CPT | Performed by: INTERNAL MEDICINE

## 2022-03-25 RX ORDER — METHYLPREDNISOLONE 4 MG/1
TABLET ORAL
Qty: 21 TABLET | Refills: 0 | Status: SHIPPED | OUTPATIENT
Start: 2022-03-25 | End: 2022-05-03

## 2022-03-25 NOTE — PROGRESS NOTES
Subjective   Shu Mckeon is a 81 y.o. female c/o LBP that radiate up to spinal and  down to buttocks.    History of Present Illness   she is having worsening back pain that radiates into the right leg  numbness and tingling in the post right thigh  She has not done PT  She says nothing helped with pain  noly relief come with laying on right side    The following portions of the patient's history were reviewed and updated as appropriate: allergies, current medications, past medical history, past social history and problem list.    Review of Systems    Objective   Physical Exam  Vitals reviewed.   Constitutional:       Appearance: She is well-developed.   HENT:      Head: Normocephalic and atraumatic.      Right Ear: External ear normal.      Left Ear: External ear normal.   Eyes:      Conjunctiva/sclera: Conjunctivae normal.      Pupils: Pupils are equal, round, and reactive to light.   Neck:      Thyroid: No thyromegaly.      Trachea: No tracheal deviation.   Cardiovascular:      Rate and Rhythm: Normal rate and regular rhythm.      Heart sounds: Normal heart sounds.   Pulmonary:      Effort: Pulmonary effort is normal.      Breath sounds: Normal breath sounds.   Abdominal:      General: Bowel sounds are normal. There is no distension.      Palpations: Abdomen is soft.      Tenderness: There is no abdominal tenderness.   Musculoskeletal:         General: No deformity. Normal range of motion.      Cervical back: Normal range of motion.   Skin:     General: Skin is warm and dry.   Neurological:      Mental Status: She is alert and oriented to person, place, and time.   Psychiatric:         Behavior: Behavior normal.         Thought Content: Thought content normal.         Judgment: Judgment normal.         Vitals:    03/25/22 1139   BP: 134/70   Pulse: 81   Temp: 97.3 °F (36.3 °C)   SpO2: 98%     Current Outpatient Medications:   •  atorvastatin (LIPITOR) 40 MG tablet, TAKE 1 TABLET BY MOUTH EVERY NIGHT, Disp: 90  tablet, Rfl: 1  •  BIOTIN PO, Take 1 tablet by mouth daily., Disp: , Rfl:   •  Cholecalciferol (VITAMIN D3) 5000 UNITS tablet, Take 1 tablet by mouth daily., Disp: , Rfl:   •  losartan (COZAAR) 50 MG tablet, Take 1 tablet by mouth Daily., Disp: 90 tablet, Rfl: 1  •  Multiple Vitamin (MULTIVITAMIN) capsule, Take 1 capsule by mouth daily., Disp: , Rfl:   •  Multiple Vitamins-Minerals (PRESERVISION AREDS 2 PO), Take 2 tablets by mouth 2 (two) times a day., Disp: , Rfl:   •  Multiple Vitamins-Minerals (ZINC PO), Take  by mouth., Disp: , Rfl:   •  sertraline (ZOLOFT) 100 MG tablet, TAKE 1 TABLET BY MOUTH DAILY, Disp: 90 tablet, Rfl: 1  •  traMADol (ULTRAM) 50 MG tablet, Take 1 tablet by mouth Every 8 (Eight) Hours As Needed for Moderate Pain ., Disp: 30 tablet, Rfl: 1  •  vitamin B-12 (CYANOCOBALAMIN) 1000 MCG tablet, Take 1,000 mcg by mouth Daily., Disp: , Rfl:   •  methylPREDNISolone (MEDROL) 4 MG dose pack, Take as directed on package instructions., Disp: 21 tablet, Rfl: 0    Body mass index is 30.89 kg/m².         Assessment/Plan   Diagnoses and all orders for this visit:    1. Chronic right-sided low back pain without sciatica (Primary)    Other orders  -     methylPREDNISolone (MEDROL) 4 MG dose pack; Take as directed on package instructions.  Dispense: 21 tablet; Refill: 0      1.  Right sided low back pain with pain and numbness into the right thigh  SHe is in pain all the time  Worse with ambulation or standing.  She is in too much pain to do PT.  She does feel like her leg is going to give out on occasion  We will try steroids as she is in so much pain   And refer to pain after we get the MRI  She has gotten no relief with tramadol

## 2022-03-28 DIAGNOSIS — M54.16 LUMBAR BACK PAIN WITH RADICULOPATHY AFFECTING LOWER EXTREMITY: ICD-10-CM

## 2022-03-29 DIAGNOSIS — M54.16 LUMBAR BACK PAIN WITH RADICULOPATHY AFFECTING LOWER EXTREMITY: Primary | ICD-10-CM

## 2022-03-29 DIAGNOSIS — G89.29 CHRONIC RIGHT-SIDED LOW BACK PAIN WITHOUT SCIATICA: ICD-10-CM

## 2022-03-29 DIAGNOSIS — M51.36 BULGING LUMBAR DISC: ICD-10-CM

## 2022-03-29 DIAGNOSIS — M54.50 CHRONIC RIGHT-SIDED LOW BACK PAIN WITHOUT SCIATICA: ICD-10-CM

## 2022-04-04 RX ORDER — SERTRALINE HYDROCHLORIDE 100 MG/1
100 TABLET, FILM COATED ORAL DAILY
Qty: 90 TABLET | Refills: 1 | Status: SHIPPED | OUTPATIENT
Start: 2022-04-04 | End: 2022-10-17

## 2022-04-04 RX ORDER — ATORVASTATIN CALCIUM 40 MG/1
40 TABLET, FILM COATED ORAL NIGHTLY
Qty: 90 TABLET | Refills: 1 | Status: SHIPPED | OUTPATIENT
Start: 2022-04-04 | End: 2022-11-04

## 2022-04-04 NOTE — TELEPHONE ENCOUNTER
Medication requested (name and dose): sertraline (ZOLOFT) 100 MG tablet    atorvastatin (LIPITOR) 40 MG tablet    Pharmacy where request should be sent: [facility] & [address] - [phone]  - [phone] FX     Additional details provided by patient: PT IS CLOSE TO BEING OUT     Best call back number: [phone]    Does the patient have less than a 3 day supply:  [x] Yes  [] No    January Hernandez  04/04/22, 13:42 EDT

## 2022-05-03 ENCOUNTER — OFFICE VISIT (OUTPATIENT)
Dept: PAIN MEDICINE | Facility: CLINIC | Age: 82
End: 2022-05-03

## 2022-05-03 ENCOUNTER — TRANSCRIBE ORDERS (OUTPATIENT)
Dept: SURGERY | Facility: SURGERY CENTER | Age: 82
End: 2022-05-03

## 2022-05-03 ENCOUNTER — PREP FOR SURGERY (OUTPATIENT)
Dept: SURGERY | Facility: SURGERY CENTER | Age: 82
End: 2022-05-03

## 2022-05-03 VITALS
WEIGHT: 178.2 LBS | HEIGHT: 64 IN | SYSTOLIC BLOOD PRESSURE: 153 MMHG | HEART RATE: 84 BPM | OXYGEN SATURATION: 95 % | RESPIRATION RATE: 18 BRPM | TEMPERATURE: 97.1 F | DIASTOLIC BLOOD PRESSURE: 81 MMHG | BODY MASS INDEX: 30.42 KG/M2

## 2022-05-03 DIAGNOSIS — M54.50 CHRONIC RIGHT-SIDED LOW BACK PAIN WITHOUT SCIATICA: ICD-10-CM

## 2022-05-03 DIAGNOSIS — Z41.9 SURGERY, ELECTIVE: Primary | ICD-10-CM

## 2022-05-03 DIAGNOSIS — G89.29 CHRONIC RIGHT-SIDED LOW BACK PAIN WITHOUT SCIATICA: ICD-10-CM

## 2022-05-03 DIAGNOSIS — M48.061 FORAMINAL STENOSIS OF LUMBAR REGION: ICD-10-CM

## 2022-05-03 DIAGNOSIS — M47.816 SPONDYLOSIS OF LUMBAR REGION WITHOUT MYELOPATHY OR RADICULOPATHY: Primary | ICD-10-CM

## 2022-05-03 DIAGNOSIS — M51.26 DISPLACEMENT OF LUMBAR INTERVERTEBRAL DISC WITHOUT MYELOPATHY: ICD-10-CM

## 2022-05-03 PROCEDURE — 99204 OFFICE O/P NEW MOD 45 MIN: CPT | Performed by: ANESTHESIOLOGY

## 2022-05-03 RX ORDER — SODIUM CHLORIDE 0.9 % (FLUSH) 0.9 %
10 SYRINGE (ML) INJECTION EVERY 12 HOURS SCHEDULED
Status: CANCELLED | OUTPATIENT
Start: 2022-05-03

## 2022-05-03 RX ORDER — DENOSUMAB 60 MG/ML
INJECTION SUBCUTANEOUS
COMMUNITY
Start: 2022-04-28 | End: 2022-05-27

## 2022-05-03 RX ORDER — SODIUM CHLORIDE 0.9 % (FLUSH) 0.9 %
10 SYRINGE (ML) INJECTION AS NEEDED
Status: CANCELLED | OUTPATIENT
Start: 2022-05-03

## 2022-05-03 RX ORDER — SENNOSIDES 8.6 MG
650 CAPSULE ORAL EVERY 8 HOURS PRN
COMMUNITY

## 2022-05-03 NOTE — PATIENT INSTRUCTIONS
"What to expect if we're setting up an injection/procedure    - I have placed the order today, we'll start speaking to your insurance for authorization (this can sometimes take a few weeks).   - You should be scheduled for your procedure before you leave the office.  If you were not, please call our office to schedule.   - A COVID test may be required for your procedure.  We will let you know if this needs to be scheduled.  - LIGHT Intravenous (IV) sedation is offered for some procedures: you will NOT be put to sleep.  If you plan to have sedation, do not eat or drink anything on the day of your injection.   - Most procedures require having someone drive you.  Please make sure you arrange a  unless told otherwise.   - If you take a blood thinner and you were not instructed whether to continue or hold it, please contact us with any questions.      Radiofrequency ablation (RFA):     - Radiofrequency ablation is a term used to describe cauterization or \"burning.\"   - In pain management, we can use this technique with a special needle to target and destroy areas that are causing your pain.   - In most cases, you must have TWO successful \"test injections\" before you are a candidate for RFA.    After your RFA:   - Because heat is used in this technique, it is common to have soreness after the procedure.  Sometimes \"neuritis\" occurs, which feels like tingling, prickly, or sunburn under the skin sensations.   - Ice packs are helpful in decreasing this soreness and preventing post-procedure \"neuritis\" pain.  Use an ice pack for 20 minutes at a time at least 3 times the day of and the day after your procedure.   - It is common to have arm/leg numbness or weakness the day of your procedure, but this should wear off by the following day.   - It may take up to 6 weeks to gain full benefit from this procedure.    "

## 2022-05-04 ENCOUNTER — TELEPHONE (OUTPATIENT)
Dept: PAIN MEDICINE | Facility: CLINIC | Age: 82
End: 2022-05-04

## 2022-05-04 NOTE — TELEPHONE ENCOUNTER
Caller: HEATHER  Relationship to Patient: SELF     Phone Number: [phone]  Reason for Call: PT CALLED STATING THAT SHE IS TO HAVE A BOOSTER 05/13/22 A WEEK PRIOR TO HER PROCEDURE. PT WOULD LIKE TO KNOW IF THIS WILL EFFECT HER PROCEDURE OR IF SHE SHOULD R/S BOOSTER. PLEASE ADVISE AT ABOVE PHONE NUMBER

## 2022-05-04 NOTE — TELEPHONE ENCOUNTER
Please see. Patients need to separate booster shots 2 weeks apart from injections, correct? Do you want me to call her to reschedule her booster?

## 2022-05-04 NOTE — TELEPHONE ENCOUNTER
We're not using steroid in her injection, so she doesn't need to worry about the spacing.  Thank you!

## 2022-05-18 NOTE — DISCHARGE INSTRUCTIONS
[facility] - Post-procedure Instructions          --  While there are no absolute restrictions, it is recommended that you do not perform strenuous activity today. In the morning, you may resume your level of activity as before your block.    --  If you have a band-aid at your injection site, please remove it later today. Observe the area for any redness, swelling, pus-like drainage, or a temperature over 101°. If any of these symptoms occur, please call your doctor at [phone]. If after office hours, leave a message and the on-call provider will return your call.    --  Ice may be applied to your injection site. It is recommended you avoid direct heat (heating pad; hot tub) for 1-2 days.    --  Call [facility] [place] [facility] at [phone] if you experience persistent headache, persistent bleeding from the injection site, or severe pain not relieved by heat or oral medication.    --  Do not make important decisions today.    --  Due to the effects of the block and/or the I.V. Sedation, DO NOT drive or operate hazardous machinery for 12 hours.  Local anesthetics may cause numbness after procedure and precautions must be taken with regards to operating equipment as well as with walking, even if ambulating with assistance of another person or with an assistive device.    --  Do not drink alcohol for 12 hours.    -- You may return to work tomorrow, or as directed by your referring doctor.    --  Occasionally you may notice a slight increase in your pain after the procedure. This should start to improve within the next 24-48 hours. Radiofrequency ablation procedure pain may last 3-4 weeks.    --  It may take as long as 3-4 days before you notice a gradual improvement in your pain and/or other symptoms.    -- You may continue to take your prescribed pain medication as needed.    --  Some normal possible side effects of steroid use could include fluid retention, increased blood sugar, dull headache,  increased sweating, increased appetite, mood swings and flushing.    --  Diabetics are recommended to watch their blood glucose level closely for 24-48 hours after the injection.    --  Must stay in PACU for 20 min upon arrival and prove no leg weakness before being discharged.    --  IN THE EVENT OF A LIFE THREATENING EMERGENCY, (CHEST PAIN, BREATHING DIFFICULTIES, PARALYSIS…) YOU SHOULD GO TO YOUR NEAREST EMERGENCY ROOM.    --  You should be contacted by our office within 2-3 days to schedule follow up or next appointment date.  If not contacted within 7 days, please call the office at [phone]    If you have even 1 hour of relief, these injections are considered successful.

## 2022-05-20 ENCOUNTER — HOSPITAL ENCOUNTER (OUTPATIENT)
Dept: GENERAL RADIOLOGY | Facility: SURGERY CENTER | Age: 82
Setting detail: HOSPITAL OUTPATIENT SURGERY
End: 2022-05-20

## 2022-05-20 ENCOUNTER — HOSPITAL ENCOUNTER (OUTPATIENT)
Facility: SURGERY CENTER | Age: 82
Setting detail: HOSPITAL OUTPATIENT SURGERY
Discharge: HOME | End: 2022-05-20
Attending: ANESTHESIOLOGY | Admitting: ANESTHESIOLOGY

## 2022-05-20 VITALS
OXYGEN SATURATION: 95 % | HEART RATE: 77 BPM | HEIGHT: 64 IN | WEIGHT: 178 LBS | TEMPERATURE: 97.2 F | RESPIRATION RATE: 20 BRPM | BODY MASS INDEX: 30.39 KG/M2 | DIASTOLIC BLOOD PRESSURE: 73 MMHG | SYSTOLIC BLOOD PRESSURE: 158 MMHG

## 2022-05-20 DIAGNOSIS — G89.29 CHRONIC RIGHT-SIDED LOW BACK PAIN WITHOUT SCIATICA: ICD-10-CM

## 2022-05-20 DIAGNOSIS — M47.816 SPONDYLOSIS OF LUMBAR REGION WITHOUT MYELOPATHY OR RADICULOPATHY: ICD-10-CM

## 2022-05-20 DIAGNOSIS — Z41.9 SURGERY, ELECTIVE: ICD-10-CM

## 2022-05-20 DIAGNOSIS — M54.50 CHRONIC RIGHT-SIDED LOW BACK PAIN WITHOUT SCIATICA: ICD-10-CM

## 2022-05-20 PROCEDURE — 77002 NEEDLE LOCALIZATION BY XRAY: CPT

## 2022-05-20 PROCEDURE — 64494 INJ PARAVERT F JNT L/S 2 LEV: CPT | Performed by: ANESTHESIOLOGY

## 2022-05-20 PROCEDURE — 64493 INJ PARAVERT F JNT L/S 1 LEV: CPT | Performed by: ANESTHESIOLOGY

## 2022-05-20 PROCEDURE — 76000 FLUOROSCOPY <1 HR PHYS/QHP: CPT

## 2022-05-20 RX ORDER — SODIUM CHLORIDE 0.9 % (FLUSH) 0.9 %
10 SYRINGE (ML) INJECTION EVERY 12 HOURS SCHEDULED
Status: DISCONTINUED | OUTPATIENT
Start: 2022-05-20 | End: 2022-05-20 | Stop reason: HOSPADM

## 2022-05-20 RX ORDER — BUPIVACAINE HYDROCHLORIDE 7.5 MG/ML
INJECTION, SOLUTION EPIDURAL; RETROBULBAR AS NEEDED
Status: DISCONTINUED | OUTPATIENT
Start: 2022-05-20 | End: 2022-05-20 | Stop reason: HOSPADM

## 2022-05-20 RX ORDER — SODIUM CHLORIDE 0.9 % (FLUSH) 0.9 %
10 SYRINGE (ML) INJECTION AS NEEDED
Status: DISCONTINUED | OUTPATIENT
Start: 2022-05-20 | End: 2022-05-20 | Stop reason: HOSPADM

## 2022-05-20 NOTE — OP NOTE
Right L4-S1 Lumbar Medial Branch Blockade  [facility]      PREOPERATIVE DIAGNOSIS:  Lumbar spondylosis without myelopathy    POSTOPERATIVE DIAGNOSIS:  Lumbar spondylosis without myelopathy    PROCEDURE:   Diagnostic Lumbar Medial Branch Nerve Blockades, with fluoroscopy:  at the L4, L5 transverse processes and the sacral alar groove)   1. 22384-TI -- Lumbar Facet blocks, 1st Level  2. 79857-IU -- Lumbar Facet blocks, 2nd  Level      PRE-PROCEDURE DISCUSSION WITH PATIENT:    Risks and complications were discussed with the patient prior to starting the procedure and informed consent was obtained.      SURGEON:  Pamela Spaulding MD    REASON FOR PROCEDURE:    The patient complains of pain that seems to have a significant axial component, Tenderness of the affected facet joints on exam under fluoroscopy and Painful area identified on exam under fluoroscopy    SEDATION:  Patient declined administration of moderate sedation    ANESTHETIC:  0.75% bupivacaine  STEROID:  None  TOTAL VOLUME OF SOLUTION:  3ml    DESCRIPTON OF PROCEDURE:  After obtaining informed consent, IV access was not obtained in the preoperative area.   The patient was taken to the operating room.  The patient was placed in the prone position with a pillow under the abdomen. All pressure points were well padded.  EKG, blood pressure, and pulse oximeter were monitored.  The patient was monitored and sedated by the RN under my direction. The lumbosacral area was prepped with Chloraprep and draped in a sterile fashion.     AP fluoroscopic image was used to visualize the junction of the right S1 superior articular process with the sacral ala.  The skin and subcutaneous tissue over the area was anesthetized with 1% lidocaine.  A 22-gauge spinal needle was then advanced percutaneously through the anesthetized skin tract under fluoroscopic guidance in a coaxial view to contact periosteum.  After negative aspiration, a volume of 1 mL of the  local anesthetic was injected without resistance.  The image was then optimized to maximize visualization of the junctions of the L4, L5 superior articular processes with the transverse processes.  The skin and subcutaneous tissue over the areas was anesthetized with 1% lidocaine.  A 22-gauge spinal needle was then advanced percutaneously through the anesthetized skin tracts under fluoroscopic guidance in a coaxial view to contact periosteum at the target sites.  After negative aspiration, a volume of 1 mL of the local anesthetic  was injected without resistance at each of the target sites.        Onset of analgesia was noted.  Vital signs remained stable throughout.      ESTIMATED BLOOD LOSS:  <5 mL  SPECIMENS:  none    COMPLICATIONS:   No complications were noted.    TOLERANCE & DISCHARGE CONDITION:    The patient tolerated the procedure well.  The patient was transported to the recovery area without difficulties.  The patient was discharged to home under the care of family in stable and satisfactory condition.    Pre-procedure pain score: 6/10 at rest, 10/10 with activity  Post-procedure pain score: 0/10    PLAN OF CARE:  1. The patient was given our standard instruction sheet.  2. We discussed that Lumbar Medial Branch Blockade is a diagnostic procedure in consideration for radiofrequency ablation if two diagnostic procedures prove to be positive for significant benefit.  An alternative plan could be therapeutic lumbar branch blockades.  3. The patient is asked to keep an account of pain relief after the procedure today.  4. The patient will  Return to clinic 1-2 wks.  5. The patient will resume all medications as per the medication reconciliation sheet.

## 2022-05-27 ENCOUNTER — OFFICE VISIT (OUTPATIENT)
Dept: PAIN MEDICINE | Facility: CLINIC | Age: 82
End: 2022-05-27

## 2022-05-27 ENCOUNTER — PREP FOR SURGERY (OUTPATIENT)
Dept: SURGERY | Facility: SURGERY CENTER | Age: 82
End: 2022-05-27

## 2022-05-27 VITALS
HEIGHT: 64 IN | OXYGEN SATURATION: 96 % | SYSTOLIC BLOOD PRESSURE: 126 MMHG | TEMPERATURE: 96.9 F | WEIGHT: 177.4 LBS | DIASTOLIC BLOOD PRESSURE: 72 MMHG | BODY MASS INDEX: 30.29 KG/M2 | RESPIRATION RATE: 16 BRPM | HEART RATE: 97 BPM

## 2022-05-27 DIAGNOSIS — G89.29 CHRONIC RIGHT-SIDED LOW BACK PAIN WITHOUT SCIATICA: Primary | ICD-10-CM

## 2022-05-27 DIAGNOSIS — M51.26 DISPLACEMENT OF LUMBAR INTERVERTEBRAL DISC WITHOUT MYELOPATHY: ICD-10-CM

## 2022-05-27 DIAGNOSIS — M47.816 LUMBAR FACET ARTHROPATHY: Primary | ICD-10-CM

## 2022-05-27 DIAGNOSIS — M54.50 CHRONIC RIGHT-SIDED LOW BACK PAIN WITHOUT SCIATICA: Primary | ICD-10-CM

## 2022-05-27 PROCEDURE — 99214 OFFICE O/P EST MOD 30 MIN: CPT | Performed by: NURSE PRACTITIONER

## 2022-05-27 RX ORDER — DENOSUMAB 60 MG/ML
INJECTION SUBCUTANEOUS
Qty: 1 EACH | Refills: 1 | Status: SHIPPED | OUTPATIENT
Start: 2022-05-27 | End: 2022-12-27 | Stop reason: SDUPTHER

## 2022-05-27 RX ORDER — CELECOXIB 100 MG/1
100 CAPSULE ORAL DAILY
Qty: 30 CAPSULE | Refills: 0 | OUTPATIENT
Start: 2022-05-27 | End: 2022-12-29

## 2022-05-27 RX ORDER — SODIUM CHLORIDE 0.9 % (FLUSH) 0.9 %
10 SYRINGE (ML) INJECTION AS NEEDED
Status: CANCELLED | OUTPATIENT
Start: 2022-05-27

## 2022-05-27 RX ORDER — SODIUM CHLORIDE 0.9 % (FLUSH) 0.9 %
10 SYRINGE (ML) INJECTION EVERY 12 HOURS SCHEDULED
Status: CANCELLED | OUTPATIENT
Start: 2022-05-27

## 2022-05-27 NOTE — PROGRESS NOTES
CHIEF COMPLAINT  Back Pain    Subjective   Shu Mckeon is a 81 y.o. female  who presents to the office for follow-up of procedure.  She completed a right L4-S1 MBB   on  5/20/2022 performed by Dr. Spaulding for management of back pain. Patient reports significant relief from the procedure, around 80% relief for 3-4 hours following the procedure.  She was able to prepare food that day with minimal discomfort which is not something that she would typically be able to tolerate.       Patient was evaluated 5/3/2022 by Dr. Pamela Spaulding.  This was the patient's initial visit at this clinic.  I reviewed her note.  Patient was referred to our clinic for evaluation and treatment of her back pain which has become progressively worse over the last year.  Conservative treatment includes physical therapy, medication (Tylenol, oral steroid, tramadol, oxycodone, hydrocodone) also reports epidurals and medial branch blocks several years ago with Dr. Cheung.    Previous Injections:   right L2, L3 LTFESI 8/15/2018 - recalls some benefit for about a week  right L2-L5 LMBB x 2 - 4/25/2018 and 6/20/2018 ---90% with first; minimal with 2nd, no RFA    Pain today 8/10 VAS. Right lower lumbar area. Has tried Tramadol and Tylenol without relief. She has not taken NSAID's, kidney function looks good.  Has old hydrocodone, not really interested in taking opioids.     Patient remained masked during entire encounter. No cough present. I donned a mask and eye protection throughout entire visit. Prior to donning mask and eye protection, hand hygiene was performed, as well as when it was doffed.  I was closer than 6 feet, but not for an extended period of time. No obvious exposure to any bodily fluids.      Back Pain  This is a chronic problem. The current episode started more than 1 year ago. The problem occurs daily. The pain is present in the lumbar spine. The pain does not radiate. The pain is at a severity of 8/10. The symptoms are aggravated by  sitting and standing (walking). Associated symptoms include weakness (legs/ankles). Pertinent negatives include no abdominal pain, chest pain, dysuria, fever, headaches or numbness. She has tried NSAIDs, analgesics, heat, ice and home exercises for the symptoms. The treatment provided moderate relief.        PEG Assessment   What number best describes your pain on average in the past week?8  What number best describes how, during the past week, pain has interfered with your enjoyment of life?8  What number best describes how, during the past week, pain has interfered with your general activity?  8    Review of Pertinent Medical Data ---  MRI 3/25/22 report reviewed. Severe facet arthropathy.     The following portions of the patient's history were reviewed and updated as appropriate: allergies, current medications, past family history, past medical history, past social history, past surgical history and problem list.    Review of Systems   Constitutional: Positive for activity change. Negative for fatigue and fever.   HENT: Negative for congestion.    Eyes: Positive for visual disturbance (macular degeneration).   Respiratory: Negative for chest tightness.    Cardiovascular: Negative for chest pain.   Gastrointestinal: Positive for diarrhea. Negative for abdominal pain and constipation.   Genitourinary: Negative for difficulty urinating and dysuria.   Musculoskeletal: Positive for back pain.   Neurological: Positive for dizziness and weakness (legs/ankles). Negative for light-headedness, numbness and headaches.   Psychiatric/Behavioral: Negative for agitation, sleep disturbance and suicidal ideas. The patient is not nervous/anxious.      I have reviewed and confirmed the accuracy of the ROS as documented by the MA/MARIANO/RN SAMUEL Juan       Vitals:    05/27/22 1522   BP: 126/72   BP Location: Right arm   Patient Position: Sitting   Pulse: 97   Resp: 16   Temp: 96.9 °F (36.1 °C)   SpO2: 96%   Weight: 80.5  "kg (177 lb 6.4 oz)   Height: 162.6 cm (64\")   PainSc:   8   PainLoc: Back         Objective   Physical Exam  Vitals and nursing note reviewed.   Constitutional:       General: She is not in acute distress.     Appearance: Normal appearance. She is not ill-appearing.   Pulmonary:      Effort: Pulmonary effort is normal. No respiratory distress.   Musculoskeletal:      Lumbar back: Tenderness and bony tenderness present.      Comments: +right lumbar facet tenderness/loading    Skin:     General: Skin is warm and dry.   Neurological:      Mental Status: She is alert and oriented to person, place, and time.   Psychiatric:         Mood and Affect: Mood normal.         Behavior: Behavior normal.             Assessment & Plan   Diagnoses and all orders for this visit:    1. Chronic right-sided low back pain without sciatica (Primary)    2. Displacement of lumbar intervertebral disc without myelopathy    Other orders  -     celecoxib (CeleBREX) 100 MG capsule; Take 1 capsule by mouth Daily.  Dispense: 30 capsule; Refill: 0        --- Right L4-S1 MBB (#2)    -------  Education about Medial Branch Blockade and RF Therapy:    This medial branch blockade (MBB) suggested is intended for diagnostic purposes, with the intent of offering the patient Radiofrequency thermal rhizotomy (RF) if the MBB is diagnostically effective.  The diagnostic blockade is necessary to determine the likelihood that RF therapy could be efficacious in providing long term relief to the patient.    Medial branches are sensory nerve branches that connect to a facet joint and transmit sensations & pain signals from that joint.  Facet is a term for the type of joints found in the spine.  Medial branches are the nerves that go to a facet, and therefore are also sometimes called \"facet joint nerves\" (FJNs).      In a medial branch blockade procedure, xray fluoroscopy is used to verify the locations of the outside of the joint lines which are being targeted.  " Under xray guidance, needles are placed to these areas.  Contrast dye is injected to confirm proper placement, with dye flowing over the joint area, and to ensure that the dye does not flow into unintended areas such as a vein.  When this is confirmed, local anesthetic is injected to block the medial branch at that joint level.      If MBBs are diagnostically successful in blocking pain, then the patient is most likely a great candidate for Radiofrequency of those facet joint nerves.  In the RF procedure, needles are placed to the joint lines in the same fashion, and after testing, the needle tips are heated to thermally treat the nerves, blocking the nerves by in essence damaging the nerves with the heat treatment(non-pulsed).       Medically, a successful RF procedure should provide a patient with 50% pain relief or more for at least 6 months.  Clinical experience suggests that successful patients receive relief more in the range of 12 months on average.  We also discussed that a fortunate minority of patients receive therapeutic success from the MBB, and may not require RF ablation.  If a patient receives more than 8 weeks of relief from MBB, then occasional repeat MBB for therapeutic purposes is a very reasonable alternative therapy.  This course of therapy is consistent with our LCDs according to our CMS  in the area, and therefore other insurance providers should follow accordingly.  We will monitor our patients to screen for these therapeutic responders and will offer RF therapy only when necessary.        We discussed that MBB & RF are not without risks.  Guidelines regarding anticoagulant use & neuraxial procedures will be respected.  Patients that are ill or otherwise may be at risk for sepsis will not have their spines accessed by neuraxial injections of any type.  This patient will not be offered these therapies if there is an increased risk.   We discussed that there is a risk of  postprocedural pain and also a risk of worsening of clinical picture with these procedures as with any neuraxial procedure.    -------          --- Trial low dose Celebrex temporarily. Would not be continued chronically.  She is not interested in opioids. Discussed medication with the patient.  Included in this discussion was the potential for side effects and adverse events.  Patient verbalized understanding and wished to proceed.  Prescription will be sent to pharmacy.    --- Follow-up for procedure          As the clinician, I personally reviewed the CINTHYA from 5/27/2022 while the patient was in the office today.      This document is intended for medical expert use only. Reading of this document by patients and/or patient's family without participating medical staff guidance may result in misinterpretation and unintended morbidity.   Any interpretation of such data is the responsibility of the patient and/or family member responsible for the patient in concert with their primary or specialist providers, not to be left for sources of online searches such as [employer], [employer] or similar queries. Relying on these approaches to knowledge may result in misinterpretation, misguided goals of care and even death should patients or family members try recommendations outside of the realm of professional medical care in a supervised way.

## 2022-06-03 ENCOUNTER — TELEPHONE (OUTPATIENT)
Dept: PAIN MEDICINE | Facility: CLINIC | Age: 82
End: 2022-06-03

## 2022-06-03 ENCOUNTER — OFFICE VISIT (OUTPATIENT)
Dept: INTERNAL MEDICINE | Facility: CLINIC | Age: 82
End: 2022-06-03

## 2022-06-03 VITALS
OXYGEN SATURATION: 97 % | BODY MASS INDEX: 30.27 KG/M2 | HEART RATE: 77 BPM | DIASTOLIC BLOOD PRESSURE: 80 MMHG | SYSTOLIC BLOOD PRESSURE: 132 MMHG | WEIGHT: 177.3 LBS | TEMPERATURE: 97.3 F | HEIGHT: 64 IN

## 2022-06-03 DIAGNOSIS — E11.9 CONTROLLED TYPE 2 DIABETES MELLITUS WITHOUT COMPLICATION, WITHOUT LONG-TERM CURRENT USE OF INSULIN: Primary | ICD-10-CM

## 2022-06-03 DIAGNOSIS — G89.29 CHRONIC RIGHT-SIDED LOW BACK PAIN WITHOUT SCIATICA: ICD-10-CM

## 2022-06-03 DIAGNOSIS — E11.43 TYPE 2 DIABETES MELLITUS WITH DIABETIC AUTONOMIC NEUROPATHY, WITHOUT LONG-TERM CURRENT USE OF INSULIN: ICD-10-CM

## 2022-06-03 DIAGNOSIS — I10 PRIMARY HYPERTENSION: ICD-10-CM

## 2022-06-03 DIAGNOSIS — M54.50 CHRONIC RIGHT-SIDED LOW BACK PAIN WITHOUT SCIATICA: ICD-10-CM

## 2022-06-03 PROCEDURE — 99214 OFFICE O/P EST MOD 30 MIN: CPT | Performed by: INTERNAL MEDICINE

## 2022-06-03 RX ORDER — GABAPENTIN 300 MG/1
300 CAPSULE ORAL 3 TIMES DAILY
Qty: 30 CAPSULE | Refills: 2 | Status: SHIPPED | OUTPATIENT
Start: 2022-06-03 | End: 2022-08-23

## 2022-06-03 NOTE — TELEPHONE ENCOUNTER
Patient stopped by stating that she was told at her last visit, that we were going to try and get her MBB scheduled with Dr. Nielsen while Dr. Spaulding is out. She stated someone was supposed to call her and she has not heard back. I did let her know that Dr. Nielsen was not filling in for Dr. Spaulding. She would like someone her call her back.

## 2022-06-03 NOTE — PROGRESS NOTES
Subjective   Shu Mckeon is a 81 y.o. female here to follow up on HTN, HPL, ANXIERY/DEP WITH LABS.    History of Present Illness   Pt has been taking BP meds as prescribed without any problems.  No HA  No episodes of orthostasis  Pt has been taking cholesterol meds as prescribed.  No difficulties with myalgias.   She does cont to have chronic pain  She says the pain is all the time and no relief with celebrex or tramadol   She the pain is during the day when standing or walking      The following portions of the patient's history were reviewed and updated as appropriate: allergies, current medications, past medical history, past social history and problem list.    Review of Systems    Objective   Physical Exam  Vitals reviewed.   Constitutional:       Appearance: She is well-developed.   HENT:      Head: Normocephalic and atraumatic.      Right Ear: External ear normal.      Left Ear: External ear normal.   Eyes:      Conjunctiva/sclera: Conjunctivae normal.      Pupils: Pupils are equal, round, and reactive to light.   Neck:      Thyroid: No thyromegaly.      Trachea: No tracheal deviation.   Cardiovascular:      Rate and Rhythm: Normal rate and regular rhythm.      Heart sounds: Normal heart sounds.   Pulmonary:      Effort: Pulmonary effort is normal.      Breath sounds: Normal breath sounds.   Abdominal:      General: Bowel sounds are normal. There is no distension.      Palpations: Abdomen is soft.      Tenderness: There is no abdominal tenderness.   Musculoskeletal:         General: No deformity. Normal range of motion.      Cervical back: Normal range of motion.   Skin:     General: Skin is warm and dry.   Neurological:      Mental Status: She is alert and oriented to person, place, and time.   Psychiatric:         Behavior: Behavior normal.         Thought Content: Thought content normal.         Judgment: Judgment normal.         Vitals:    06/03/22 1102   BP: 132/80   Pulse: 77   Temp: 97.3 °F (36.3 °C)    SpO2: 97%     Results Encounter on 05/16/2022   Component Date Value Ref Range Status   • Glucose 05/27/2022 142 (A) 65 - 99 mg/dL Final   • BUN 05/27/2022 11  8 - 27 mg/dL Final   • Creatinine 05/27/2022 0.67  0.57 - 1.00 mg/dL Final   • EGFR Result 05/27/2022 87  >59 mL/min/1.73 Final   • BUN/Creatinine Ratio 05/27/2022 16  12 - 28 Final   • Sodium 05/27/2022 139  134 - 144 mmol/L Final   • Potassium 05/27/2022 4.3  3.5 - 5.2 mmol/L Final   • Chloride 05/27/2022 101  96 - 106 mmol/L Final   • Total CO2 05/27/2022 24  20 - 29 mmol/L Final   • Calcium 05/27/2022 9.5  8.7 - 10.3 mg/dL Final   • Hemoglobin A1C 05/27/2022 6.8 (A) 4.8 - 5.6 % Final    Comment:          Prediabetes: 5.7 - 6.4           Diabetes: >6.4           Glycemic control for adults with diabetes: <7.0       Current Outpatient Medications:   •  acetaminophen (TYLENOL) 650 MG 8 hr tablet, Take 650 mg by mouth Every 8 (Eight) Hours As Needed for Mild Pain ., Disp: , Rfl:   •  atorvastatin (LIPITOR) 40 MG tablet, Take 1 tablet by mouth Every Night., Disp: 90 tablet, Rfl: 1  •  BIOTIN PO, Take 1 tablet by mouth daily., Disp: , Rfl:   •  celecoxib (CeleBREX) 100 MG capsule, Take 1 capsule by mouth Daily., Disp: 30 capsule, Rfl: 0  •  Cholecalciferol (VITAMIN D3) 5000 UNITS tablet, Take 1 tablet by mouth daily., Disp: , Rfl:   •  losartan (COZAAR) 50 MG tablet, Take 1 tablet by mouth Daily., Disp: 90 tablet, Rfl: 1  •  Multiple Vitamin (MULTIVITAMIN) capsule, Take 1 capsule by mouth daily., Disp: , Rfl:   •  Multiple Vitamins-Minerals (PRESERVISION AREDS 2 PO), Take 2 tablets by mouth 2 (two) times a day., Disp: , Rfl:   •  Multiple Vitamins-Minerals (ZINC PO), Take  by mouth., Disp: , Rfl:   •  Prolia 60 MG/ML solution prefilled syringe syringe, TO BE ADMINISTERED IN PHYSICIAN'S OFFICE. INJECT ONE SYRINGE SUBCOUSLY ONCE EVERY 6 MONTHS. REFRIGERATE. USE WITHIN 14 DAYS ONCE AT ROOM TEMPERATURE., Disp: 1 each, Rfl: 1  •  sertraline (ZOLOFT) 100 MG tablet,  Take 1 tablet by mouth Daily., Disp: 90 tablet, Rfl: 1  •  vitamin B-12 (CYANOCOBALAMIN) 1000 MCG tablet, Take 1,000 mcg by mouth Daily., Disp: , Rfl:     Body mass index is 30.43 kg/m².         Assessment & Plan   Diagnoses and all orders for this visit:    1. Controlled type 2 diabetes mellitus without complication, without long-term current use of insulin (HCC) (Primary)  -     CBC & Differential; Future  -     Comprehensive Metabolic Panel; Future  -     LP+LDL / HDL Ratio ([facility]); Future  -     Hemoglobin A1c; Future  -     TSH Rfx On Abnormal To Free T4; Future    2. Primary hypertension  -     CBC & Differential; Future  -     Comprehensive Metabolic Panel; Future  -     LP+LDL / HDL Ratio ([facility]); Future  -     Hemoglobin A1c; Future  -     TSH Rfx On Abnormal To Free T4; Future    3. Chronic right-sided low back pain without sciatica      1.  LBP-  With any ambulation-  She is looking at getting injections for this.  No meds help  2.  DM-  We will add metformin  500mg bid  3.  HTN-  Ok with current meds   4.  Neuropathy- worse at night try gabapentin

## 2022-06-14 ENCOUNTER — TELEPHONE (OUTPATIENT)
Dept: INTERNAL MEDICINE | Facility: CLINIC | Age: 82
End: 2022-06-14

## 2022-06-14 NOTE — TELEPHONE ENCOUNTER
[facility] [address] [phone] [facility] [phone] FX    Relationship: JOHN PAUL    Best call back number: [phone]*    Who are you requesting to speak with (clinical staff, provider,  specific staff member): CLINICAL    What was the call regarding: JOHN PAUL CALLING WITH [employer] STATING THAT THE PATIENT'S PROLIA IS TO ARRIVE AT THE OFFICE ON Wednesday, 6/14/22, BUT REQUIRE A CALLBACK TO CONFIRM SHIPMENT BEFORE THE PROLIA IS SHIPPED BY [employer].    Do you require a callback: YES, TO CONFIRM SHIPMENT

## 2022-06-30 ENCOUNTER — CLINICAL SUPPORT (OUTPATIENT)
Dept: INTERNAL MEDICINE | Facility: CLINIC | Age: 82
End: 2022-06-30

## 2022-06-30 DIAGNOSIS — M81.6 LOCALIZED OSTEOPOROSIS, UNSPECIFIED PATHOLOGICAL FRACTURE PRESENCE: Primary | ICD-10-CM

## 2022-06-30 PROCEDURE — 96372 THER/PROPH/DIAG INJ SC/IM: CPT | Performed by: INTERNAL MEDICINE

## 2022-07-08 ENCOUNTER — TRANSCRIBE ORDERS (OUTPATIENT)
Dept: SURGERY | Facility: SURGERY CENTER | Age: 82
End: 2022-07-08

## 2022-07-08 DIAGNOSIS — Z41.9 SURGERY, ELECTIVE: Primary | ICD-10-CM

## 2022-07-12 ENCOUNTER — TELEPHONE (OUTPATIENT)
Dept: PAIN MEDICINE | Facility: CLINIC | Age: 82
End: 2022-07-12

## 2022-07-12 NOTE — TELEPHONE ENCOUNTER
I spoke with Ms. Mckeon and she was just a little unsure about getting the ablation. I explained what the ablation was and how it was done. I also informed her that her next injection was another diagnosed injection and before she had the ablation done she would have a follow-up appt in the office first. I told her if she had any other question she could ask them then.

## 2022-07-12 NOTE — TELEPHONE ENCOUNTER
----- Message from Todd Rose sent at 7/12/2022 11:59 AM EDT -----  Regarding: second injection  Patient is wanting to know before she has her 2nd injection can she speak with you ,she is not wanting to come in the office. She stated that she is feeling better and before the ablation. She is just wanting  to make sure that she is doing the right thing

## 2022-07-18 ENCOUNTER — TELEPHONE (OUTPATIENT)
Dept: INTERNAL MEDICINE | Facility: CLINIC | Age: 82
End: 2022-07-18

## 2022-07-18 RX ORDER — CEPHALEXIN 500 MG/1
500 CAPSULE ORAL 2 TIMES DAILY
Qty: 10 CAPSULE | Refills: 0 | Status: SHIPPED | OUTPATIENT
Start: 2022-07-18 | End: 2022-08-05 | Stop reason: SDUPTHER

## 2022-07-18 NOTE — TELEPHONE ENCOUNTER
Caller: Shu Mckeon    Relationship: Self    Best call back number: [phone]    What is the best time to reach you: ANY     Who are you requesting to speak with (clinical staff, provider,  specific staff member): CLINICAL STAFF     What was the call regarding: PATIENT DROPPED OFF A URINE CULTURE ON Friday MORNING,  SHE IS WANTING TO KNOW WHAT THE RESULTS ARE, AND IF SOMETHING IS GOING TO BE CALLED IN TO HELP WITH THE SYMPTOMS   [facility] & [address] - [phone]  - [phone] FX      Do you require a callback: YES

## 2022-07-27 ENCOUNTER — TELEPHONE (OUTPATIENT)
Dept: PAIN MEDICINE | Facility: CLINIC | Age: 82
End: 2022-07-27

## 2022-07-27 ENCOUNTER — TELEPHONE (OUTPATIENT)
Dept: ORTHOPEDICS | Facility: OTHER | Age: 82
End: 2022-07-27

## 2022-07-27 NOTE — TELEPHONE ENCOUNTER
Provider: DR. MCCULLOUGH  Caller: HEATHER HERRING  Relationship to Patient: SELF  Phone Number: [phone]  Reason for Call: PATIENT CALLED TO CANCEL 07/27/22 [facility] BRANCH BLOCK. DOESN'T FEEL WELL AND WANTS TO SPEAK WITH DR. MCCULLOUGH PRIOR TO GETTING THAT DONE. SCHEDULED FOLLOW UP WITH  09/06/22 AT 2:20 PM. PLEASE ADVISE.

## 2022-08-01 ENCOUNTER — TELEPHONE (OUTPATIENT)
Dept: INTERNAL MEDICINE | Facility: CLINIC | Age: 82
End: 2022-08-01

## 2022-08-01 NOTE — TELEPHONE ENCOUNTER
PT WILL DROP A URINE OFF TOMORROW MORNING         ----- Message from Mariann Fallon MD sent at 8/1/2022  2:19 PM EDT -----  Regarding: RE: UTI  She needs another urine done as well.  ----- Message -----  From: Araceli Bennett MA  Sent: 8/1/2022   2:00 PM EDT  To: Mariann Fallon MD  Subject: FW: UTI                                          We did an outpatient urine on 7/15, positive, treated with  keflex 500 bid x 5 days. Please advise   ----- Message -----  From: Bushra Ball  Sent: 8/1/2022   1:56 PM EDT  To: Araceli Bennett MA  Subject: UTI                                              Patient thinks she may have a UTI tried to schedule appointment but she would like for you to call her because she says you know all about her. [phone] Thank you

## 2022-08-05 RX ORDER — CEPHALEXIN 500 MG/1
500 CAPSULE ORAL 2 TIMES DAILY
Qty: 10 CAPSULE | Refills: 0 | Status: SHIPPED | OUTPATIENT
Start: 2022-08-05 | End: 2022-09-26

## 2022-08-16 RX ORDER — LOSARTAN POTASSIUM 50 MG/1
50 TABLET ORAL DAILY
Qty: 90 TABLET | Refills: 1 | Status: SHIPPED | OUTPATIENT
Start: 2022-08-16 | End: 2023-02-17 | Stop reason: SDUPTHER

## 2022-08-23 DIAGNOSIS — E11.43 TYPE 2 DIABETES MELLITUS WITH DIABETIC AUTONOMIC NEUROPATHY, WITHOUT LONG-TERM CURRENT USE OF INSULIN: ICD-10-CM

## 2022-08-23 RX ORDER — GABAPENTIN 300 MG/1
300 CAPSULE ORAL 3 TIMES DAILY
Qty: 30 CAPSULE | Refills: 2 | Status: SHIPPED | OUTPATIENT
Start: 2022-08-23

## 2022-08-23 NOTE — TELEPHONE ENCOUNTER
CSA NEEDS TO BE COMPLETED  CINTHYA IN [place]  LAST OV 6/3/2022  F/U SCHEDULED FOR 9/26/22  LAST FILL DATE 6/3/2022

## 2022-09-26 ENCOUNTER — OFFICE VISIT (OUTPATIENT)
Dept: INTERNAL MEDICINE | Facility: CLINIC | Age: 82
End: 2022-09-26

## 2022-09-26 VITALS
WEIGHT: 172.1 LBS | HEIGHT: 64 IN | OXYGEN SATURATION: 98 % | DIASTOLIC BLOOD PRESSURE: 74 MMHG | SYSTOLIC BLOOD PRESSURE: 116 MMHG | BODY MASS INDEX: 29.38 KG/M2 | TEMPERATURE: 97.5 F | HEART RATE: 70 BPM

## 2022-09-26 DIAGNOSIS — Z00.00 MEDICARE ANNUAL WELLNESS VISIT, SUBSEQUENT: Primary | ICD-10-CM

## 2022-09-26 DIAGNOSIS — K80.00 CALCULUS OF GALLBLADDER WITH ACUTE CHOLECYSTITIS WITHOUT OBSTRUCTION: ICD-10-CM

## 2022-09-26 DIAGNOSIS — I10 PRIMARY HYPERTENSION: ICD-10-CM

## 2022-09-26 DIAGNOSIS — R42 DIZZINESS: ICD-10-CM

## 2022-09-26 DIAGNOSIS — E78.5 HYPERLIPIDEMIA, UNSPECIFIED HYPERLIPIDEMIA TYPE: ICD-10-CM

## 2022-09-26 DIAGNOSIS — E11.9 CONTROLLED TYPE 2 DIABETES MELLITUS WITHOUT COMPLICATION, WITHOUT LONG-TERM CURRENT USE OF INSULIN: ICD-10-CM

## 2022-09-26 PROCEDURE — 1170F FXNL STATUS ASSESSED: CPT | Performed by: INTERNAL MEDICINE

## 2022-09-26 PROCEDURE — 99213 OFFICE O/P EST LOW 20 MIN: CPT | Performed by: INTERNAL MEDICINE

## 2022-09-26 PROCEDURE — 1159F MED LIST DOCD IN RCRD: CPT | Performed by: INTERNAL MEDICINE

## 2022-09-26 PROCEDURE — 1125F AMNT PAIN NOTED PAIN PRSNT: CPT | Performed by: INTERNAL MEDICINE

## 2022-09-26 PROCEDURE — G0439 PPPS, SUBSEQ VISIT: HCPCS | Performed by: INTERNAL MEDICINE

## 2022-09-26 NOTE — PROGRESS NOTES
The ABCs of the Annual Wellness Visit  Subsequent Medicare Wellness Visit    Chief Complaint   Patient presents with   • Medicare Wellness-subsequent   • Diabetes   • Hypertension   • Hyperlipidemia      Subjective    History of Present Illness:  Shu Mckeon is a 82 y.o. female who presents for a Subsequent Medicare Wellness Visit.    The following portions of the patient's history were reviewed and   updated as appropriate: allergies, current medications, past medical history, past social history and problem list.    Compared to one year ago, the patient feels her physical   health is better.    Compared to one year ago, the patient feels her mental   health is the same.    Recent Hospitalizations:  She was not admitted to the hospital during the last year.       Current Medical Providers:  Patient Care Team:  Mariann Fallon MD as PCP - General  Mariann Fallon MD as PCP - Family Medicine  Nicolas Latif MD as Consulting Physician (Cardiology)  Shayna Blount MD as Referring Physician (Breast Surgery)  Brown Feliciano Jr., MD as Consulting Physician (Hematology and Oncology)    Outpatient Medications Prior to Visit   Medication Sig Dispense Refill   • acetaminophen (TYLENOL) 650 MG 8 hr tablet Take 650 mg by mouth Every 8 (Eight) Hours As Needed for Mild Pain .     • atorvastatin (LIPITOR) 40 MG tablet Take 1 tablet by mouth Every Night. 90 tablet 1   • BIOTIN PO Take 1 tablet by mouth daily.     • Cholecalciferol (VITAMIN D3) 5000 UNITS tablet Take 1 tablet by mouth daily.     • losartan (COZAAR) 50 MG tablet TAKE 1 TABLET BY MOUTH DAILY. 90 tablet 1   • metFORMIN (Glucophage) 500 MG tablet Take 1 tablet by mouth 2 (Two) Times a Day With Meals. Start at supper (Patient taking differently: Take 500 mg by mouth Every Night. Start at supper) 60 tablet 2   • Multiple Vitamin (MULTIVITAMIN) capsule Take 1 capsule by mouth daily.     • Multiple Vitamins-Minerals (PRESERVISION AREDS 2 PO) Take 2 tablets by mouth  2 (two) times a day.     • Multiple Vitamins-Minerals (ZINC PO) Take  by mouth.     • Prolia 60 MG/ML solution prefilled syringe syringe TO BE ADMINISTERED IN PHYSICIAN'S OFFICE. INJECT ONE SYRINGE SUBCOUSLY ONCE EVERY 6 MONTHS. REFRIGERATE. USE WITHIN 14 DAYS ONCE AT ROOM TEMPERATURE. 1 each 1   • Pyridoxine HCl (VITAMIN B-6 PO) Take  by mouth.     • sertraline (ZOLOFT) 100 MG tablet Take 1 tablet by mouth Daily. 90 tablet 1   • vitamin B-12 (CYANOCOBALAMIN) 1000 MCG tablet Take 1,000 mcg by mouth Daily.     • celecoxib (CeleBREX) 100 MG capsule Take 1 capsule by mouth Daily. 30 capsule 0   • gabapentin (NEURONTIN) 300 MG capsule TAKE 1 CAPSULE BY MOUTH 3 (THREE) TIMES A DAY. (Patient taking differently: Take 300 mg by mouth Every Night.) 30 capsule 2   • cephalexin (Keflex) 500 MG capsule Take 1 capsule by mouth 2 (Two) Times a Day. 10 capsule 0     No facility-administered medications prior to visit.       No opioid medication identified on active medication list. I have reviewed chart for other potential  high risk medication/s and harmful drug interactions in the elderly.          Aspirin is not on active medication list.  Aspirin use is not indicated based on review of current medical condition/s. Risk of harm outweighs potential benefits.  .    Patient Active Problem List   Diagnosis   • Ankle fracture   • Diabetes type 2, controlled (HCC)   • Fatigue   • HLD (hyperlipidemia)   • BP (high blood pressure)   • OP (osteoporosis)   • Closed fracture of right pelvis (HCC)   • Low back pain   • Lumbar spondylolysis   • Radial scar of breast   • Reactive lymphadenopathy   • Calculus of gallbladder with acute cholecystitis without obstruction   • Encounter for screening mammogram for breast cancer   • Spondylosis of lumbar region without myelopathy or radiculopathy     Advance Care Planning  Advance Directive is on file.  ACP discussion was held with the patient during this visit. Patient has an advance directive in  "EMR which is still valid.           Objective    Vitals:    09/26/22 1139   BP: 116/74   BP Location: Left arm   Patient Position: Sitting   Pulse: 70   Temp: 97.5 °F (36.4 °C)   TempSrc: Infrared   SpO2: 98%   Weight: 78.1 kg (172 lb 1.6 oz)   Height: 162.6 cm (64\")   PainSc:   2     Estimated body mass index is 29.54 kg/m² as calculated from the following:    Height as of this encounter: 162.6 cm (64\").    Weight as of this encounter: 78.1 kg (172 lb 1.6 oz).    BMI is >= 25 and <30. (Overweight) The following options were offered after discussion;: exercise counseling/recommendations and nutrition counseling/recommendations      Does the patient have evidence of cognitive impairment? No    Physical Exam  Lab Results   Component Value Date    CHLPL 145 09/12/2022    TRIG 79 09/12/2022    HDL 62 (H) 09/12/2022    LDL 68 09/12/2022    VLDL 15 09/12/2022    HGBA1C 6.20 (H) 09/12/2022            HEALTH RISK ASSESSMENT    Smoking Status:  Social History     Tobacco Use   Smoking Status Never Smoker   Smokeless Tobacco Never Used     Alcohol Consumption:  Social History     Substance and Sexual Activity   Alcohol Use Yes   • Alcohol/week: 7.0 standard drinks   • Types: 7 Glasses of wine per week    Comment: 1/day-socially     Fall Risk Screen:    DAVIDEADI Fall Risk Assessment was completed, and patient is at LOW risk for falls.Assessment completed on:3/25/2022    Depression Screening:  PHQ-2/PHQ-9 Depression Screening 9/26/2022   Retired PHQ-9 Total Score -   Retired Total Score -   Little Interest or Pleasure in Doing Things 1-->several days   Feeling Down, Depressed or Hopeless 1-->several days   Trouble Falling or Staying Asleep, or Sleeping Too Much 0-->not at all   Feeling Tired or Having Little Energy 1-->several days   Poor Appetite or Overeating 0-->not at all   Feeling Bad about Yourself - or that You are a Failure or Have Let Yourself or Your Family Down 1-->several days   Trouble Concentrating on Things, Such " as Reading the Newspaper or Watching Television 0-->not at all   Moving or Speaking So Slowly that Other People Could Have Noticed? Or the Opposite - Being So Fidgety 0-->not at all   Thoughts that You Would be Better Off Dead or of Hurting Yourself in Some Way 0-->not at all   PHQ-9: Brief Depression Severity Measure Score 4   If You Checked Off Any Problems, How Difficult Have These Problems Made It For You to Do Your Work, Take Care of Things at Home, or Get Along with Other People? not difficult at all       Health Habits and Functional and Cognitive Screening:  Functional & Cognitive Status 9/26/2022   Do you have difficulty preparing food and eating? No   Do you have difficulty bathing yourself, getting dressed or grooming yourself? No   Do you have difficulty using the toilet? No   Do you have difficulty moving around from place to place? No   Do you have trouble with steps or getting out of a bed or a chair? No   Current Diet Limited Junk Food   Dental Exam Up to date   Eye Exam Up to date   Exercise (times per week) 0 times per week   Current Exercises Include No Regular Exercise   Current Exercise Activities Include -   Do you need help using the phone?  No   Are you deaf or do you have serious difficulty hearing?  No   Do you need help with transportation? No   Do you need help shopping? No   Do you need help preparing meals?  No   Do you need help with housework?  No   Do you need help with laundry? No   Do you need help taking your medications? No   Do you need help managing money? No   Do you ever drive or ride in a car without wearing a seat belt? Yes   Have you felt unusual stress, anger or loneliness in the last month? Yes   Who do you live with? Spouse   If you need help, do you have trouble finding someone available to you? No   Have you been bothered in the last four weeks by sexual problems? No   Do you have difficulty concentrating, remembering or making decisions? Yes       Age-appropriate  Screening Schedule:  Refer to the list below for future screening recommendations based on patient's age, sex and/or medical conditions. Orders for these recommended tests are listed in the plan section. The patient has been provided with a written plan.    Health Maintenance   Topic Date Due   • TDAP/TD VACCINES (1 - Tdap) Never done   • ZOSTER VACCINE (2 of 2) 12/29/2022 (Originally 11/14/2017)   • INFLUENZA VACCINE  10/01/2022   • DIABETIC EYE EXAM  02/01/2023   • HEMOGLOBIN A1C  03/12/2023   • DXA SCAN  03/26/2023   • LIPID PANEL  09/12/2023   • MAMMOGRAM  11/01/2023   • URINE MICROALBUMIN  Discontinued              Assessment & Plan   CMS Preventative Services Quick Reference  Risk Factors Identified During Encounter  Immunizations Discussed/Encouraged (specific Immunizations; COVID19  The above risks/problems have been discussed with the patient.  Follow up actions/plans if indicated are seen below in the Assessment/Plan Section.  Pertinent information has been shared with the patient in the After Visit Summary.    Diagnoses and all orders for this visit:    1. Primary hypertension (Primary)    2. Dizziness    3. Hyperlipidemia, unspecified hyperlipidemia type    4. Calculus of gallbladder with acute cholecystitis without obstruction    5. Controlled type 2 diabetes mellitus without complication, without long-term current use of insulin (HCC)        Follow Up:   No follow-ups on file.     An After Visit Summary and PPPS were made available to the patient.

## 2022-09-26 NOTE — PROGRESS NOTES
Subjective   Shu Mckeon is a 82 y.o. female here today to f/u on hyperlipidemia, HTN, DM 2, osteoporosis and anxiety.  Pt c/o dizziness.      History of Present Illness   She has had chronic dizziness worse when standing  She does not drink much water  She does take her BP meds  Pt has been taking cholesterol meds as prescribed.  No difficulties with myalgias.   Pt has been taking BP meds as prescribed without any problems.  No HA  No episodes of orthostasis    The following portions of the patient's history were reviewed and updated as appropriate: allergies, current medications, past medical history, past social history and problem list.    Review of Systems    Objective   Physical Exam  Vitals reviewed.   Constitutional:       Appearance: She is well-developed.   HENT:      Head: Normocephalic and atraumatic.      Right Ear: External ear normal.      Left Ear: External ear normal.   Eyes:      Conjunctiva/sclera: Conjunctivae normal.      Pupils: Pupils are equal, round, and reactive to light.   Neck:      Thyroid: No thyromegaly.      Trachea: No tracheal deviation.   Cardiovascular:      Rate and Rhythm: Normal rate and regular rhythm.      Heart sounds: Normal heart sounds.   Pulmonary:      Effort: Pulmonary effort is normal.      Breath sounds: Normal breath sounds.   Abdominal:      General: Bowel sounds are normal. There is no distension.      Palpations: Abdomen is soft.      Tenderness: There is no abdominal tenderness.   Musculoskeletal:         General: No deformity. Normal range of motion.      Cervical back: Normal range of motion.   Skin:     General: Skin is warm and dry.   Neurological:      Mental Status: She is alert and oriented to person, place, and time.   Psychiatric:         Behavior: Behavior normal.         Thought Content: Thought content normal.         Judgment: Judgment normal.         Vitals:    09/26/22 1139   BP: 116/74   Pulse: 70   Temp: 97.5 °F (36.4 °C)   SpO2: 98%     Body  mass index is 29.54 kg/m².       Results Encounter on 09/03/2022   Component Date Value Ref Range Status   • WBC 09/12/2022 5.18  3.40 - 10.80 10*3/mm3 Final   • RBC 09/12/2022 4.49  3.77 - 5.28 10*6/mm3 Final   • Hemoglobin 09/12/2022 13.1  12.0 - 15.9 g/dL Final   • Hematocrit 09/12/2022 39.5  34.0 - 46.6 % Final   • MCV 09/12/2022 88.0  79.0 - 97.0 fL Final   • MCH 09/12/2022 29.2  26.6 - 33.0 pg Final   • MCHC 09/12/2022 33.2  31.5 - 35.7 g/dL Final   • RDW 09/12/2022 13.0  12.3 - 15.4 % Final   • Platelets 09/12/2022 263  140 - 450 10*3/mm3 Final   • Neutrophil Rel % 09/12/2022 66.6  42.7 - 76.0 % Final   • Lymphocyte Rel % 09/12/2022 22.4  19.6 - 45.3 % Final   • Monocyte Rel % 09/12/2022 7.5  5.0 - 12.0 % Final   • Eosinophil Rel % 09/12/2022 2.5  0.3 - 6.2 % Final   • Basophil Rel % 09/12/2022 0.8  0.0 - 1.5 % Final   • Neutrophils Absolute 09/12/2022 3.45  1.70 - 7.00 10*3/mm3 Final   • Lymphocytes Absolute 09/12/2022 1.16  0.70 - 3.10 10*3/mm3 Final   • Monocytes Absolute 09/12/2022 0.39  0.10 - 0.90 10*3/mm3 Final   • Eosinophils Absolute 09/12/2022 0.13  0.00 - 0.40 10*3/mm3 Final   • Basophils Absolute 09/12/2022 0.04  0.00 - 0.20 10*3/mm3 Final   • Immature Granulocyte Rel % 09/12/2022 0.2  0.0 - 0.5 % Final   • Immature Grans Absolute 09/12/2022 0.01  0.00 - 0.05 10*3/mm3 Final   • nRBC 09/12/2022 0.0  0.0 - 0.2 /100 WBC Final   • Glucose 09/12/2022 137 (A) 65 - 99 mg/dL Final   • BUN 09/12/2022 9  8 - 23 mg/dL Final   • Creatinine 09/12/2022 0.60  0.57 - 1.00 mg/dL Final   • EGFR Result 09/12/2022 88.6  >60.0 mL/min/1.73 Final    Comment: National Kidney Foundation and American Society of  Nephrology (ASN) Task Force recommended calculation based  on the Chronic Kidney Disease Epidemiology Collaboration  (CKD-EPI) equation refit without adjustment for race.  The GFR formula is only valid for adults with stable renal  function between ages 18 and 70.     • BUN/Creatinine Ratio 09/12/2022 15.0  7.0 -  25.0 Final   • Sodium 09/12/2022 141  136 - 145 mmol/L Final   • Potassium 09/12/2022 4.4  3.5 - 5.2 mmol/L Final   • Chloride 09/12/2022 105  98 - 107 mmol/L Final   • Total CO2 09/12/2022 25.0  22.0 - 29.0 mmol/L Final   • Calcium 09/12/2022 9.7  8.6 - 10.5 mg/dL Final   • Total Protein 09/12/2022 6.7  6.0 - 8.5 g/dL Final   • Albumin 09/12/2022 4.90  3.50 - 5.20 g/dL Final   • Globulin 09/12/2022 1.8  gm/dL Final   • A/G Ratio 09/12/2022 2.7  g/dL Final   • Total Bilirubin 09/12/2022 0.5  0.0 - 1.2 mg/dL Final   • Alkaline Phosphatase 09/12/2022 81  39 - 117 U/L Final   • AST (SGOT) 09/12/2022 15  1 - 32 U/L Final   • ALT (SGPT) 09/12/2022 13  1 - 33 U/L Final   • Total Cholesterol 09/12/2022 145  0 - 200 mg/dL Final    Comment: Cholesterol Reference Ranges  (U.S. Department of Health and Human Services ATP III  Classifications)  Desirable          <200 mg/dL  Borderline High    200-239 mg/dL  High Risk          >240 mg/dL  Triglyceride Reference Ranges  (U.S. Department of Health and Human Services ATP III  Classifications)  Normal           <150 mg/dL  Borderline High  150-199 mg/dL  High             200-499 mg/dL  Very High        >500 mg/dL  HDL Reference Ranges  (U.S. Department of Health and Human Services ATP III  Classifications)  Low     <40 mg/dl (major risk factor for CHD)  High    >60 mg/dl ('negative' risk factor for CHD)  LDL Reference Ranges  (U.S. Department of Health and Human Services ATP III  Classifications)  Optimal          <100 mg/dL  Near Optimal     100-129 mg/dL  Borderline High  130-159 mg/dL  High             160-189 mg/dL  Very High        >189 mg/dL     • Triglycerides 09/12/2022 79  0 - 150 mg/dL Final   • HDL Cholesterol 09/12/2022 62 (A) 40 - 60 mg/dL Final   • VLDL Cholesterol [place] 09/12/2022 15  5 - 40 mg/dL Final   • LDL Chol Calc ([facility]) 09/12/2022 68  0 - 100 mg/dL Final   • LDL/HDL RATIO 09/12/2022 1.08   Final   • Hemoglobin A1C 09/12/2022 6.20 (A) 4.80 - 5.60 % Final     Comment: Hemoglobin A1C Ranges:  Increased Risk for Diabetes  5.7% to 6.4%  Diabetes                     >= 6.5%  Diabetic Goal                < 7.0%     • TSH 09/12/2022 2.020  0.270 - 4.200 uIU/mL Final       Current Outpatient Medications:   •  acetaminophen (TYLENOL) 650 MG 8 hr tablet, Take 650 mg by mouth Every 8 (Eight) Hours As Needed for Mild Pain ., Disp: , Rfl:   •  atorvastatin (LIPITOR) 40 MG tablet, Take 1 tablet by mouth Every Night., Disp: 90 tablet, Rfl: 1  •  BIOTIN PO, Take 1 tablet by mouth daily., Disp: , Rfl:   •  Cholecalciferol (VITAMIN D3) 5000 UNITS tablet, Take 1 tablet by mouth daily., Disp: , Rfl:   •  losartan (COZAAR) 50 MG tablet, TAKE 1 TABLET BY MOUTH DAILY., Disp: 90 tablet, Rfl: 1  •  metFORMIN (Glucophage) 500 MG tablet, Take 1 tablet by mouth 2 (Two) Times a Day With Meals. Start at supper (Patient taking differently: Take 500 mg by mouth Every Night. Start at supper), Disp: 60 tablet, Rfl: 2  •  Multiple Vitamin (MULTIVITAMIN) capsule, Take 1 capsule by mouth daily., Disp: , Rfl:   •  Multiple Vitamins-Minerals (PRESERVISION AREDS 2 PO), Take 2 tablets by mouth 2 (two) times a day., Disp: , Rfl:   •  Multiple Vitamins-Minerals (ZINC PO), Take  by mouth., Disp: , Rfl:   •  Prolia 60 MG/ML solution prefilled syringe syringe, TO BE ADMINISTERED IN PHYSICIAN'S OFFICE. INJECT ONE SYRINGE SUBCOUSLY ONCE EVERY 6 MONTHS. REFRIGERATE. USE WITHIN 14 DAYS ONCE AT ROOM TEMPERATURE., Disp: 1 each, Rfl: 1  •  Pyridoxine HCl (VITAMIN B-6 PO), Take  by mouth., Disp: , Rfl:   •  sertraline (ZOLOFT) 100 MG tablet, Take 1 tablet by mouth Daily., Disp: 90 tablet, Rfl: 1  •  vitamin B-12 (CYANOCOBALAMIN) 1000 MCG tablet, Take 1,000 mcg by mouth Daily., Disp: , Rfl:   •  celecoxib (CeleBREX) 100 MG capsule, Take 1 capsule by mouth Daily., Disp: 30 capsule, Rfl: 0  •  gabapentin (NEURONTIN) 300 MG capsule, TAKE 1 CAPSULE BY MOUTH 3 (THREE) TIMES A DAY. (Patient taking differently: Take 300 mg by  mouth Every Night.), Disp: 30 capsule, Rfl: 2        Assessment & Plan   Diagnoses and all orders for this visit:    1. Primary hypertension (Primary)    2. Dizziness    3. Hyperlipidemia, unspecified hyperlipidemia type    4. Calculus of gallbladder with acute cholecystitis without obstruction    5. Controlled type 2 diabetes mellitus without complication, without long-term current use of insulin (HCC)      1.  HTN-  We will move losartan to evening and see if that helps  2.  Dizziness- not vertigo  Mostly upon standing  3. HPL- ok with current meds  4,  Decreased appetite-  She is not as hungry  5.  DM-  She is taking metformin at night and she is doing well

## 2022-10-17 RX ORDER — SERTRALINE HYDROCHLORIDE 100 MG/1
100 TABLET, FILM COATED ORAL DAILY
Qty: 90 TABLET | Refills: 1 | Status: SHIPPED | OUTPATIENT
Start: 2022-10-17 | End: 2023-03-22

## 2022-10-27 ENCOUNTER — TELEPHONE (OUTPATIENT)
Dept: INTERNAL MEDICINE | Facility: CLINIC | Age: 82
End: 2022-10-27

## 2022-10-27 NOTE — TELEPHONE ENCOUNTER
Patient has a UTI. Did you want her to come by and leave a urine or to be scheduled with someone?

## 2022-10-28 ENCOUNTER — CLINICAL SUPPORT (OUTPATIENT)
Dept: INTERNAL MEDICINE | Facility: CLINIC | Age: 82
End: 2022-10-28
Payer: MEDICARE

## 2022-10-28 DIAGNOSIS — R30.0 DYSURIA: Primary | ICD-10-CM

## 2022-10-28 LAB
BILIRUB BLD-MCNC: NEGATIVE MG/DL
CLARITY, POC: ABNORMAL
COLOR UR: YELLOW
EXPIRATION DATE: ABNORMAL
GLUCOSE UR STRIP-MCNC: NEGATIVE MG/DL
KETONES UR QL: NEGATIVE
LEUKOCYTE EST, POC: ABNORMAL
Lab: ABNORMAL
NITRITE UR-MCNC: POSITIVE MG/ML
PH UR: 5.5 [PH] (ref 5–8)
PROT UR STRIP-MCNC: NEGATIVE MG/DL
RBC # UR STRIP: ABNORMAL /UL
SP GR UR: 1.02 (ref 1–1.03)
UROBILINOGEN UR QL: ABNORMAL

## 2022-10-28 PROCEDURE — 81003 URINALYSIS AUTO W/O SCOPE: CPT | Performed by: INTERNAL MEDICINE

## 2022-10-28 RX ORDER — LEVOFLOXACIN 250 MG/1
250 TABLET ORAL DAILY
Qty: 5 TABLET | Refills: 0 | Status: SHIPPED | OUTPATIENT
Start: 2022-10-28 | End: 2022-11-04 | Stop reason: SDUPTHER

## 2022-11-01 LAB
BACTERIA UR CULT: ABNORMAL
BACTERIA UR CULT: ABNORMAL
OTHER ANTIBIOTIC SUSC ISLT: ABNORMAL

## 2022-11-04 ENCOUNTER — TELEPHONE (OUTPATIENT)
Dept: INTERNAL MEDICINE | Facility: CLINIC | Age: 82
End: 2022-11-04

## 2022-11-04 ENCOUNTER — CLINICAL SUPPORT (OUTPATIENT)
Dept: INTERNAL MEDICINE | Facility: CLINIC | Age: 82
End: 2022-11-04

## 2022-11-04 DIAGNOSIS — Z23 NEED FOR INFLUENZA VACCINATION: Primary | ICD-10-CM

## 2022-11-04 PROCEDURE — G0008 ADMIN INFLUENZA VIRUS VAC: HCPCS | Performed by: INTERNAL MEDICINE

## 2022-11-04 PROCEDURE — 90662 IIV NO PRSV INCREASED AG IM: CPT | Performed by: INTERNAL MEDICINE

## 2022-11-04 RX ORDER — ATORVASTATIN CALCIUM 40 MG/1
40 TABLET, FILM COATED ORAL NIGHTLY
Qty: 90 TABLET | Refills: 0 | Status: SHIPPED | OUTPATIENT
Start: 2022-11-04 | End: 2023-02-17 | Stop reason: SDUPTHER

## 2022-11-04 RX ORDER — LEVOFLOXACIN 250 MG/1
250 TABLET ORAL DAILY
Qty: 5 TABLET | Refills: 0 | OUTPATIENT
Start: 2022-11-04 | End: 2022-12-29

## 2022-11-04 NOTE — TELEPHONE ENCOUNTER
I will go ahead and send a refill for the Levaquin just in case.      Patient had a really bad UTI last week. Her and Gary are getting ready to go to [place] and was wanting to know if Dr Fallon could send in a precautionary antibiotic in case it happens while in [place]?

## 2022-12-14 ENCOUNTER — TELEPHONE (OUTPATIENT)
Dept: INTERNAL MEDICINE | Facility: CLINIC | Age: 82
End: 2022-12-14

## 2022-12-14 NOTE — TELEPHONE ENCOUNTER
Discussed with patient her biggest sx is a sore throat she denies any wheezing or shortness of breath.  No significant cough.  She has not had a fever since this started on Sunday night.  Try chloraseptic and and aleve or tyleonol.  She has to increase her fluid intake  We did discuss the paxlovid but it does interact with several of her medicines.  She will touch base with me in a couple of days and let me know how she is doing    PATIENT STATES SHE TESTED POSITIVE FOR COVID TODAY, 12/14/22 AND IS CURRENTLY EXPERIENCING A SORE THROAT, ITS HARD TO SWALLOW, AND BODY ACHES.     SHE IS REQUESTING A CALL BACK TO DISCUSS WITH DR. GIL ABOUT PAXLOVID.

## 2022-12-27 ENCOUNTER — TELEPHONE (OUTPATIENT)
Dept: INTERNAL MEDICINE | Facility: CLINIC | Age: 82
End: 2022-12-27

## 2022-12-27 RX ORDER — DENOSUMAB 60 MG/ML
60 INJECTION SUBCUTANEOUS
Qty: 1 EACH | Refills: 1 | Status: SHIPPED | OUTPATIENT
Start: 2022-12-27

## 2022-12-27 NOTE — TELEPHONE ENCOUNTER
Caller: Shu Mckeon    Relationship: Self    Best call back number: [phone]    What was the call regarding: PATIENT STATES THAT SHE ORDERED HER PROLIA INJECTION AND WAS INFORMED THAT AN UPDATED PRESCRIPTION WAS NEEDED. ALAN TOLD THE PATIENT THAT THEY WOULD REACH OUT TO DR. GIL TO GET UPDATE BEFORE THEY WILL SEND IT OUT. PATIENT WAS ADVISED TO CALL THE OFFICE AND LET BHARATHI KNOW THIS WAS NEEDED    ALAN'S PHONE NUMBER: [phone]    Do you require a callback: NO

## 2022-12-30 ENCOUNTER — OFFICE VISIT (OUTPATIENT)
Dept: INTERNAL MEDICINE | Facility: CLINIC | Age: 82
End: 2022-12-30

## 2022-12-30 VITALS
BODY MASS INDEX: 28.32 KG/M2 | WEIGHT: 165.9 LBS | HEIGHT: 64 IN | DIASTOLIC BLOOD PRESSURE: 78 MMHG | OXYGEN SATURATION: 99 % | HEART RATE: 88 BPM | TEMPERATURE: 97.7 F | SYSTOLIC BLOOD PRESSURE: 142 MMHG

## 2022-12-30 DIAGNOSIS — R00.2 PALPITATIONS: Primary | ICD-10-CM

## 2022-12-30 PROCEDURE — 99214 OFFICE O/P EST MOD 30 MIN: CPT | Performed by: INTERNAL MEDICINE

## 2022-12-30 RX ORDER — METOPROLOL SUCCINATE 25 MG/1
25 TABLET, EXTENDED RELEASE ORAL DAILY
Qty: 30 TABLET | Refills: 1 | Status: SHIPPED | OUTPATIENT
Start: 2022-12-30 | End: 2023-02-23

## 2022-12-30 RX ORDER — HYDROXYZINE HYDROCHLORIDE 10 MG/1
10 TABLET, FILM COATED ORAL 3 TIMES DAILY PRN
Qty: 60 TABLET | Refills: 2 | Status: SHIPPED | OUTPATIENT
Start: 2022-12-30

## 2022-12-30 NOTE — PROGRESS NOTES
Subjective   Shu Mckeon is a 82 y.o. female.   She has been having increasing BP and palpiations  No cp    History of Present Illness   She was seen in the [facility] yesterday  And ekg looked ok  cxr was ok  She has been having these int episodes of heart racing  Since she has covid several weeks ago  She does feel anxious and she thinks that may be contribuitng to the issue    The following portions of the patient's history were reviewed and updated as appropriate: allergies, current medications, past medical history, past social history and problem list.  No tob no etoh  No change in caffeine intake    Review of Systems    Objective   Physical Exam  Vitals reviewed.   Constitutional:       Appearance: She is well-developed.   HENT:      Head: Normocephalic and atraumatic.      Right Ear: External ear normal.      Left Ear: External ear normal.   Eyes:      Conjunctiva/sclera: Conjunctivae normal.      Pupils: Pupils are equal, round, and reactive to light.   Neck:      Thyroid: No thyromegaly.      Trachea: No tracheal deviation.   Cardiovascular:      Rate and Rhythm: Normal rate and regular rhythm.      Heart sounds: Normal heart sounds.   Pulmonary:      Effort: Pulmonary effort is normal.      Breath sounds: Normal breath sounds.   Abdominal:      General: Bowel sounds are normal. There is no distension.      Palpations: Abdomen is soft.      Tenderness: There is no abdominal tenderness.   Musculoskeletal:         General: No deformity. Normal range of motion.      Cervical back: Normal range of motion.   Skin:     General: Skin is warm and dry.   Neurological:      Mental Status: She is alert and oriented to person, place, and time.   Psychiatric:         Behavior: Behavior normal.         Thought Content: Thought content normal.         Judgment: Judgment normal.         Vitals:    12/30/22 1030   BP: 142/78   Pulse: 88   Temp: 97.7 °F (36.5 °C)   SpO2: 99%     Body mass index is 28.46 kg/m².         Assessment  & Plan   Diagnoses and all orders for this visit:    1. Palpitations (Primary)  -     CBC & Differential  -     Basic Metabolic Panel    Other orders  -     metoprolol succinate XL (Toprol XL) 25 MG 24 hr tablet; Take 1 tablet by mouth Daily.  Dispense: 30 tablet; Refill: 1      1.  Palpitations and htn- add toprol 25mg at [facility] nd fu asscheduled  2.  Anxiety-  She is very anxious  They are loking at independent living  She is so anxious about passing from heart disese and leaving her   I have rec tryingt hydroxyzine

## 2022-12-31 LAB
BASOPHILS # BLD AUTO: 0.04 10*3/MM3 (ref 0–0.2)
BASOPHILS NFR BLD AUTO: 0.5 % (ref 0–1.5)
BUN SERPL-MCNC: 8 MG/DL (ref 8–23)
BUN/CREAT SERPL: 12.1 (ref 7–25)
CALCIUM SERPL-MCNC: 9.9 MG/DL (ref 8.6–10.5)
CHLORIDE SERPL-SCNC: 99 MMOL/L (ref 98–107)
CO2 SERPL-SCNC: 26.3 MMOL/L (ref 22–29)
CREAT SERPL-MCNC: 0.66 MG/DL (ref 0.57–1)
EGFRCR SERPLBLD CKD-EPI 2021: 86.6 ML/MIN/1.73
EOSINOPHIL # BLD AUTO: 0.1 10*3/MM3 (ref 0–0.4)
EOSINOPHIL NFR BLD AUTO: 1.3 % (ref 0.3–6.2)
ERYTHROCYTE [DISTWIDTH] IN BLOOD BY AUTOMATED COUNT: 13.2 % (ref 12.3–15.4)
GLUCOSE SERPL-MCNC: 116 MG/DL (ref 65–99)
HCT VFR BLD AUTO: 38.6 % (ref 34–46.6)
HGB BLD-MCNC: 13.2 G/DL (ref 12–15.9)
IMM GRANULOCYTES # BLD AUTO: 0.01 10*3/MM3 (ref 0–0.05)
IMM GRANULOCYTES NFR BLD AUTO: 0.1 % (ref 0–0.5)
LYMPHOCYTES # BLD AUTO: 1.64 10*3/MM3 (ref 0.7–3.1)
LYMPHOCYTES NFR BLD AUTO: 21.4 % (ref 19.6–45.3)
MCH RBC QN AUTO: 28.6 PG (ref 26.6–33)
MCHC RBC AUTO-ENTMCNC: 34.2 G/DL (ref 31.5–35.7)
MCV RBC AUTO: 83.7 FL (ref 79–97)
MONOCYTES # BLD AUTO: 0.59 10*3/MM3 (ref 0.1–0.9)
MONOCYTES NFR BLD AUTO: 7.7 % (ref 5–12)
NEUTROPHILS # BLD AUTO: 5.29 10*3/MM3 (ref 1.7–7)
NEUTROPHILS NFR BLD AUTO: 69 % (ref 42.7–76)
NRBC BLD AUTO-RTO: 0 /100 WBC (ref 0–0.2)
PLATELET # BLD AUTO: 318 10*3/MM3 (ref 140–450)
POTASSIUM SERPL-SCNC: 4.4 MMOL/L (ref 3.5–5.2)
RBC # BLD AUTO: 4.61 10*6/MM3 (ref 3.77–5.28)
SODIUM SERPL-SCNC: 137 MMOL/L (ref 136–145)
WBC # BLD AUTO: 7.67 10*3/MM3 (ref 3.4–10.8)

## 2023-01-30 ENCOUNTER — CLINICAL SUPPORT (OUTPATIENT)
Dept: INTERNAL MEDICINE | Facility: CLINIC | Age: 83
End: 2023-01-30
Payer: MEDICARE

## 2023-01-30 DIAGNOSIS — M81.6 LOCALIZED OSTEOPOROSIS, UNSPECIFIED PATHOLOGICAL FRACTURE PRESENCE: Primary | ICD-10-CM

## 2023-01-30 PROCEDURE — 96372 THER/PROPH/DIAG INJ SC/IM: CPT | Performed by: INTERNAL MEDICINE

## 2023-02-03 ENCOUNTER — OFFICE VISIT (OUTPATIENT)
Dept: INTERNAL MEDICINE | Facility: CLINIC | Age: 83
End: 2023-02-03
Payer: MEDICARE

## 2023-02-03 VITALS
BODY MASS INDEX: 28.44 KG/M2 | OXYGEN SATURATION: 99 % | HEART RATE: 66 BPM | HEIGHT: 64 IN | SYSTOLIC BLOOD PRESSURE: 122 MMHG | DIASTOLIC BLOOD PRESSURE: 74 MMHG | TEMPERATURE: 97.3 F | WEIGHT: 166.6 LBS

## 2023-02-03 DIAGNOSIS — F41.9 ANXIETY: Primary | ICD-10-CM

## 2023-02-03 DIAGNOSIS — G89.29 CHRONIC RIGHT-SIDED LOW BACK PAIN WITHOUT SCIATICA: ICD-10-CM

## 2023-02-03 DIAGNOSIS — M54.50 CHRONIC RIGHT-SIDED LOW BACK PAIN WITHOUT SCIATICA: ICD-10-CM

## 2023-02-03 PROCEDURE — 99213 OFFICE O/P EST LOW 20 MIN: CPT | Performed by: INTERNAL MEDICINE

## 2023-02-03 NOTE — PROGRESS NOTES
Subjective   Shu Mckeon is a 82 y.o. female.   She does feel like her anxiety is better    History of Present Illness   She and her  are moving into assisted living so they have more care espicially for her   Pt has been taking BP meds as prescribed without any problems.  No HA  No episodes of orthostasis    The following portions of the patient's history were reviewed and updated as appropriate: allergies, current medications, past medical history, past social history and problem list.  She is moving and has some stress from that    Review of Systems    Objective   Physical Exam  Vitals reviewed.   Constitutional:       Appearance: She is well-developed.   HENT:      Head: Normocephalic and atraumatic.      Right Ear: External ear normal.      Left Ear: External ear normal.   Eyes:      Conjunctiva/sclera: Conjunctivae normal.      Pupils: Pupils are equal, round, and reactive to light.   Neck:      Thyroid: No thyromegaly.      Trachea: No tracheal deviation.   Cardiovascular:      Rate and Rhythm: Normal rate and regular rhythm.      Heart sounds: Normal heart sounds.   Pulmonary:      Effort: Pulmonary effort is normal.      Breath sounds: Normal breath sounds.   Abdominal:      General: Bowel sounds are normal. There is no distension.      Palpations: Abdomen is soft.      Tenderness: There is no abdominal tenderness.   Musculoskeletal:         General: No deformity. Normal range of motion.      Cervical back: Normal range of motion.   Skin:     General: Skin is warm and dry.   Neurological:      Mental Status: She is alert and oriented to person, place, and time.   Psychiatric:         Behavior: Behavior normal.         Thought Content: Thought content normal.         Judgment: Judgment normal.         Vitals:    02/03/23 1012   BP: 122/74   Pulse: 66   Temp: 97.3 °F (36.3 °C)   SpO2: 99%     Body mass index is 28.6 kg/m².         Assessment & Plan   Diagnoses and all orders for this  visit:    1. Anxiety (Primary)    2. Chronic right-sided low back pain without sciatica    1.  Anxiety- is better  2.  LBP - comes and goes-  She will have exercise equipment to use

## 2023-02-17 RX ORDER — ATORVASTATIN CALCIUM 40 MG/1
40 TABLET, FILM COATED ORAL NIGHTLY
Qty: 90 TABLET | Refills: 1 | Status: SHIPPED | OUTPATIENT
Start: 2023-02-17 | End: 2023-03-22

## 2023-02-17 RX ORDER — LOSARTAN POTASSIUM 50 MG/1
50 TABLET ORAL DAILY
Qty: 90 TABLET | Refills: 1 | Status: SHIPPED | OUTPATIENT
Start: 2023-02-17 | End: 2023-03-22

## 2023-02-17 NOTE — TELEPHONE ENCOUNTER
Caller: Bintajurgen Shu J    Relationship: Self    Best call back number: [phone]    Requested Prescriptions:   Requested Prescriptions     Pending Prescriptions Disp Refills   • atorvastatin (LIPITOR) 40 MG tablet 90 tablet 0     Sig: Take 1 tablet by mouth Every Night.   • losartan (COZAAR) 50 MG tablet 90 tablet 1     Sig: Take 1 tablet by mouth Daily.        Pharmacy where request should be sent: [facility] - [place], [place] - [address] - [phone] [facility] [phone] FX     Additional details provided by patient: WILL NEED NEW PRESCRIPTION OUT OF REFILLS    Does the patient have less than a 3 day supply:  [x] Yes  [] No    Would you like a call back once the refill request has been completed: [] Yes [x] No    If the office needs to give you a call back, can they leave a voicemail: [] Yes [x] No    Todd Álvarez Rep   02/17/23 11:22 EST

## 2023-02-23 RX ORDER — METOPROLOL SUCCINATE 25 MG/1
TABLET, EXTENDED RELEASE ORAL
Qty: 90 TABLET | Refills: 1 | Status: SHIPPED | OUTPATIENT
Start: 2023-02-23 | End: 2023-03-23

## 2023-03-07 ENCOUNTER — TRANSCRIBE ORDERS (OUTPATIENT)
Dept: ADMINISTRATIVE | Facility: HOSPITAL | Age: 83
End: 2023-03-07
Payer: MEDICARE

## 2023-03-07 DIAGNOSIS — Z12.31 SCREENING MAMMOGRAM FOR BREAST CANCER: Primary | ICD-10-CM

## 2023-03-14 ENCOUNTER — OFFICE VISIT (OUTPATIENT)
Dept: INTERNAL MEDICINE | Facility: CLINIC | Age: 83
End: 2023-03-14
Payer: MEDICARE

## 2023-03-14 VITALS
DIASTOLIC BLOOD PRESSURE: 62 MMHG | OXYGEN SATURATION: 98 % | TEMPERATURE: 97.5 F | HEART RATE: 62 BPM | SYSTOLIC BLOOD PRESSURE: 112 MMHG | BODY MASS INDEX: 27.86 KG/M2 | HEIGHT: 64 IN | WEIGHT: 163.2 LBS

## 2023-03-14 DIAGNOSIS — S01.412D: Primary | ICD-10-CM

## 2023-03-14 PROCEDURE — 1159F MED LIST DOCD IN RCRD: CPT | Performed by: INTERNAL MEDICINE

## 2023-03-14 PROCEDURE — 99212 OFFICE O/P EST SF 10 MIN: CPT | Performed by: INTERNAL MEDICINE

## 2023-03-14 PROCEDURE — 3078F DIAST BP <80 MM HG: CPT | Performed by: INTERNAL MEDICINE

## 2023-03-14 PROCEDURE — 3074F SYST BP LT 130 MM HG: CPT | Performed by: INTERNAL MEDICINE

## 2023-03-14 PROCEDURE — 1160F RVW MEDS BY RX/DR IN RCRD: CPT | Performed by: INTERNAL MEDICINE

## 2023-03-14 RX ORDER — HYDROCODONE BITARTRATE AND ACETAMINOPHEN 5; 325 MG/1; MG/1
1 TABLET ORAL EVERY 6 HOURS PRN
COMMUNITY
Start: 2023-03-06 | End: 2023-04-05

## 2023-03-14 NOTE — PROGRESS NOTES
Subjective   Shu Mckeon is a 82 y.o. female here today to f/u from the ED for fall and head injury.  Pt c/o dizziness and off balance    History of Present Illness   She stepped off the curb onto uneven ground    She hit her head on concrete    The following portions of the patient's history were reviewed and updated as appropriate: allergies, current medications, past medical history, past social history and problem list.    Review of Systems    Objective   Physical Exam  Vitals reviewed.   Constitutional:       Appearance: She is well-developed.   HENT:      Head: Normocephalic and atraumatic.      Right Ear: External ear normal.      Left Ear: External ear normal.   Eyes:      Conjunctiva/sclera: Conjunctivae normal.      Pupils: Pupils are equal, round, and reactive to light.   Neck:      Thyroid: No thyromegaly.      Trachea: No tracheal deviation.   Cardiovascular:      Rate and Rhythm: Normal rate and regular rhythm.      Heart sounds: Normal heart sounds.   Pulmonary:      Effort: Pulmonary effort is normal.      Breath sounds: Normal breath sounds.   Abdominal:      General: Bowel sounds are normal. There is no distension.      Palpations: Abdomen is soft.      Tenderness: There is no abdominal tenderness.   Musculoskeletal:         General: No deformity. Normal range of motion.      Cervical back: Normal range of motion.   Skin:     General: Skin is warm and dry.   Neurological:      Mental Status: She is alert and oriented to person, place, and time.   Psychiatric:         Behavior: Behavior normal.         Thought Content: Thought content normal.         Judgment: Judgment normal.         Vitals:    03/14/23 1313   BP: 112/62   Pulse: 62   Temp: 97.5 °F (36.4 °C)   SpO2: 98%     Body mass index is 28.01 kg/m².       Current Outpatient Medications:   •  acetaminophen (TYLENOL) 650 MG 8 hr tablet, Take 1 tablet by mouth Every 8 (Eight) Hours As Needed for Mild Pain., Disp: , Rfl:   •  atorvastatin  (LIPITOR) 40 MG tablet, Take 1 tablet by mouth Every Night., Disp: 90 tablet, Rfl: 1  •  BIOTIN PO, Take 1 tablet by mouth daily., Disp: , Rfl:   •  Cholecalciferol (VITAMIN D3) 5000 UNITS tablet, Take 1 tablet by mouth Daily., Disp: , Rfl:   •  denosumab (Prolia) 60 MG/ML solution prefilled syringe syringe, Inject 1 mL under the skin into the appropriate area as directed Every 6 (Six) Months., Disp: 1 each, Rfl: 1  •  hydrOXYzine (ATARAX) 10 MG tablet, Take 1 tablet by mouth 3 (Three) Times a Day As Needed for Anxiety., Disp: 60 tablet, Rfl: 2  •  losartan (COZAAR) 50 MG tablet, Take 1 tablet by mouth Daily., Disp: 90 tablet, Rfl: 1  •  metoprolol succinate XL (TOPROL-XL) 25 MG 24 hr tablet, TAKE ONE TABLET BY MOUTH DAILY, Disp: 90 tablet, Rfl: 1  •  Multiple Vitamin (MULTIVITAMIN) capsule, Take 1 capsule by mouth Daily., Disp: , Rfl:   •  Multiple Vitamins-Minerals (PRESERVISION AREDS 2 PO), Take 2 tablets by mouth 2 (two) times a day., Disp: , Rfl:   •  Multiple Vitamins-Minerals (ZINC PO), Take  by mouth., Disp: , Rfl:   •  Pyridoxine HCl (VITAMIN B-6 PO), Take  by mouth., Disp: , Rfl:   •  sertraline (ZOLOFT) 100 MG tablet, TAKE 1 TABLET BY MOUTH DAILY, Disp: 90 tablet, Rfl: 1  •  vitamin B-12 (CYANOCOBALAMIN) 1000 MCG tablet, Take 1 tablet by mouth Daily., Disp: , Rfl:   •  gabapentin (NEURONTIN) 300 MG capsule, TAKE 1 CAPSULE BY MOUTH 3 (THREE) TIMES A DAY. (Patient not taking: Reported on 3/14/2023), Disp: 30 capsule, Rfl: 2  •  HYDROcodone-acetaminophen (NORCO) 5-325 MG per tablet, Take 1 tablet by mouth Every 6 (Six) Hours As Needed. (Patient not taking: Reported on 3/14/2023), Disp: , Rfl:   •  metFORMIN (GLUCOPHAGE) 500 MG tablet, Take 1 tablet by mouth Every Night. Start at supper (Patient not taking: Reported on 3/14/2023), Disp: 90 tablet, Rfl: 1      Assessment & Plan   Diagnoses and all orders for this visit:    1. Laceration of left temporomandibular area without foreign body, subsequent encounter  (Primary)        1.  Head laceration-  5 sutures heqling well  No issues with HA  She hasfelt ok since then  The fall was not associated with any other sx  No LOC

## 2023-03-22 RX ORDER — SERTRALINE HYDROCHLORIDE 100 MG/1
TABLET, FILM COATED ORAL
Qty: 90 TABLET | Refills: 1 | Status: SHIPPED | OUTPATIENT
Start: 2023-03-22

## 2023-03-22 RX ORDER — LOSARTAN POTASSIUM 50 MG/1
TABLET ORAL
Qty: 90 TABLET | Refills: 1 | Status: SHIPPED | OUTPATIENT
Start: 2023-03-22

## 2023-03-22 RX ORDER — ATORVASTATIN CALCIUM 40 MG/1
TABLET, FILM COATED ORAL
Qty: 90 TABLET | Refills: 1 | Status: SHIPPED | OUTPATIENT
Start: 2023-03-22

## 2023-03-23 ENCOUNTER — HOSPITAL ENCOUNTER (OUTPATIENT)
Dept: MAMMOGRAPHY | Facility: HOSPITAL | Age: 83
Discharge: HOME | End: 2023-03-23
Admitting: INTERNAL MEDICINE
Payer: MEDICARE

## 2023-03-23 DIAGNOSIS — Z12.31 SCREENING MAMMOGRAM FOR BREAST CANCER: ICD-10-CM

## 2023-03-23 PROCEDURE — 77063 BREAST TOMOSYNTHESIS BI: CPT

## 2023-03-23 PROCEDURE — 77067 SCR MAMMO BI INCL CAD: CPT

## 2023-03-23 RX ORDER — METOPROLOL SUCCINATE 25 MG/1
TABLET, EXTENDED RELEASE ORAL
Qty: 90 TABLET | Refills: 1 | Status: SHIPPED | OUTPATIENT
Start: 2023-03-23

## 2023-03-27 DIAGNOSIS — R92.8 ABNORMAL MAMMOGRAM: Primary | ICD-10-CM

## 2023-03-31 DIAGNOSIS — E78.5 HYPERLIPIDEMIA, UNSPECIFIED HYPERLIPIDEMIA TYPE: ICD-10-CM

## 2023-03-31 DIAGNOSIS — I10 PRIMARY HYPERTENSION: Primary | ICD-10-CM

## 2023-03-31 DIAGNOSIS — E11.9 CONTROLLED TYPE 2 DIABETES MELLITUS WITHOUT COMPLICATION, WITHOUT LONG-TERM CURRENT USE OF INSULIN: ICD-10-CM

## 2023-04-04 LAB
ALBUMIN SERPL-MCNC: 4.5 G/DL (ref 3.5–5.2)
ALBUMIN/GLOB SERPL: 1.5 G/DL
ALP SERPL-CCNC: 83 U/L (ref 39–117)
ALT SERPL-CCNC: 17 U/L (ref 1–33)
AST SERPL-CCNC: 17 U/L (ref 1–32)
BASOPHILS # BLD AUTO: 0.02 10*3/MM3 (ref 0–0.2)
BASOPHILS NFR BLD AUTO: 0.3 % (ref 0–1.5)
BILIRUB SERPL-MCNC: 0.7 MG/DL (ref 0–1.2)
BUN SERPL-MCNC: 13 MG/DL (ref 8–23)
BUN/CREAT SERPL: 21.3 (ref 7–25)
CALCIUM SERPL-MCNC: 10.2 MG/DL (ref 8.6–10.5)
CHLORIDE SERPL-SCNC: 102 MMOL/L (ref 98–107)
CHOLEST SERPL-MCNC: 138 MG/DL (ref 0–200)
CO2 SERPL-SCNC: 27.8 MMOL/L (ref 22–29)
CREAT SERPL-MCNC: 0.61 MG/DL (ref 0.57–1)
EGFRCR SERPLBLD CKD-EPI 2021: 88.3 ML/MIN/1.73
EOSINOPHIL # BLD AUTO: 0.13 10*3/MM3 (ref 0–0.4)
EOSINOPHIL NFR BLD AUTO: 2.2 % (ref 0.3–6.2)
ERYTHROCYTE [DISTWIDTH] IN BLOOD BY AUTOMATED COUNT: 12.4 % (ref 12.3–15.4)
GLOBULIN SER CALC-MCNC: 3 GM/DL
GLUCOSE SERPL-MCNC: 123 MG/DL (ref 65–99)
HBA1C MFR BLD: 6.5 % (ref 4.8–5.6)
HCT VFR BLD AUTO: 38.3 % (ref 34–46.6)
HDLC SERPL-MCNC: 58 MG/DL (ref 40–60)
HGB BLD-MCNC: 12.8 G/DL (ref 12–15.9)
IMM GRANULOCYTES # BLD AUTO: 0.01 10*3/MM3 (ref 0–0.05)
IMM GRANULOCYTES NFR BLD AUTO: 0.2 % (ref 0–0.5)
LDLC SERPL CALC-MCNC: 66 MG/DL (ref 0–100)
LDLC/HDLC SERPL: 1.13 {RATIO}
LYMPHOCYTES # BLD AUTO: 1.24 10*3/MM3 (ref 0.7–3.1)
LYMPHOCYTES NFR BLD AUTO: 21 % (ref 19.6–45.3)
MCH RBC QN AUTO: 28.8 PG (ref 26.6–33)
MCHC RBC AUTO-ENTMCNC: 33.4 G/DL (ref 31.5–35.7)
MCV RBC AUTO: 86.3 FL (ref 79–97)
MONOCYTES # BLD AUTO: 0.47 10*3/MM3 (ref 0.1–0.9)
MONOCYTES NFR BLD AUTO: 8 % (ref 5–12)
NEUTROPHILS # BLD AUTO: 4.04 10*3/MM3 (ref 1.7–7)
NEUTROPHILS NFR BLD AUTO: 68.3 % (ref 42.7–76)
NRBC BLD AUTO-RTO: 0 /100 WBC (ref 0–0.2)
PLATELET # BLD AUTO: 276 10*3/MM3 (ref 140–450)
POTASSIUM SERPL-SCNC: 4.4 MMOL/L (ref 3.5–5.2)
PROT SERPL-MCNC: 7.5 G/DL (ref 6–8.5)
RBC # BLD AUTO: 4.44 10*6/MM3 (ref 3.77–5.28)
SODIUM SERPL-SCNC: 138 MMOL/L (ref 136–145)
TRIGL SERPL-MCNC: 71 MG/DL (ref 0–150)
TSH SERPL DL<=0.005 MIU/L-ACNC: 2.28 UIU/ML (ref 0.27–4.2)
VLDLC SERPL CALC-MCNC: 14 MG/DL (ref 5–40)
WBC # BLD AUTO: 5.91 10*3/MM3 (ref 3.4–10.8)

## 2023-04-11 ENCOUNTER — OFFICE VISIT (OUTPATIENT)
Dept: INTERNAL MEDICINE | Facility: CLINIC | Age: 83
End: 2023-04-11
Payer: MEDICARE

## 2023-04-11 VITALS
HEIGHT: 64 IN | DIASTOLIC BLOOD PRESSURE: 64 MMHG | BODY MASS INDEX: 28.2 KG/M2 | TEMPERATURE: 98.8 F | HEART RATE: 61 BPM | OXYGEN SATURATION: 98 % | SYSTOLIC BLOOD PRESSURE: 120 MMHG | WEIGHT: 165.2 LBS

## 2023-04-11 DIAGNOSIS — G89.29 CHRONIC RIGHT-SIDED LOW BACK PAIN WITHOUT SCIATICA: ICD-10-CM

## 2023-04-11 DIAGNOSIS — E11.9 CONTROLLED TYPE 2 DIABETES MELLITUS WITHOUT COMPLICATION, WITHOUT LONG-TERM CURRENT USE OF INSULIN: ICD-10-CM

## 2023-04-11 DIAGNOSIS — Z78.0 POST-MENOPAUSAL: ICD-10-CM

## 2023-04-11 DIAGNOSIS — M54.50 CHRONIC RIGHT-SIDED LOW BACK PAIN WITHOUT SCIATICA: ICD-10-CM

## 2023-04-11 DIAGNOSIS — E78.5 HYPERLIPIDEMIA, UNSPECIFIED HYPERLIPIDEMIA TYPE: Primary | ICD-10-CM

## 2023-04-11 DIAGNOSIS — I10 PRIMARY HYPERTENSION: ICD-10-CM

## 2023-04-11 NOTE — PROGRESS NOTES
Subjective   Shu Mckeon is a 82 y.o. female here today to f/u on HTN, DM2, hyperlipidemia and anxiety.  Pt c/o balance issues.    History of Present Illness   She has been eating more sugar and carbs as she is in assisted living and has been having ice cream in the evening  Pt has been taking BP meds as prescribed without any problems.  No HA  No episodes of orthostasis  Pt has been taking BP meds as prescribed without any problems.  No HA  No episodes of orthostasis    The following portions of the patient's history were reviewed and updated as appropriate: allergies, current medications, past medical history, past social history and problem list.  She has been eating more sugar    Review of Systems    Objective   Physical Exam  Vitals reviewed.   Constitutional:       Appearance: She is well-developed.   HENT:      Head: Normocephalic and atraumatic.      Right Ear: External ear normal.      Left Ear: External ear normal.   Eyes:      Conjunctiva/sclera: Conjunctivae normal.      Pupils: Pupils are equal, round, and reactive to light.   Neck:      Thyroid: No thyromegaly.      Trachea: No tracheal deviation.   Cardiovascular:      Rate and Rhythm: Normal rate and regular rhythm.      Heart sounds: Normal heart sounds.   Pulmonary:      Effort: Pulmonary effort is normal.      Breath sounds: Normal breath sounds.   Abdominal:      General: Bowel sounds are normal. There is no distension.      Palpations: Abdomen is soft.      Tenderness: There is no abdominal tenderness.   Musculoskeletal:         General: No deformity. Normal range of motion.      Cervical back: Normal range of motion.   Skin:     General: Skin is warm and dry.   Neurological:      Mental Status: She is alert and oriented to person, place, and time.   Psychiatric:         Behavior: Behavior normal.         Thought Content: Thought content normal.         Judgment: Judgment normal.         Vitals:    04/11/23 1158   BP: 120/64   Pulse: 61    Temp: 98.8 °F (37.1 °C)   SpO2: 98%     Body mass index is 28.36 kg/m².       Orders Only on 03/31/2023   Component Date Value Ref Range Status   • Glucose 04/04/2023 123 (H)  65 - 99 mg/dL Final   • BUN 04/04/2023 13  8 - 23 mg/dL Final   • Creatinine 04/04/2023 0.61  0.57 - 1.00 mg/dL Final   • EGFR Result 04/04/2023 88.3  >60.0 mL/min/1.73 Final    Comment: GFR Normal >60  Chronic Kidney Disease <60  Kidney Failure <15  The GFR formula is only valid for adults with stable renal  function between ages 18 and 70.     • BUN/Creatinine Ratio 04/04/2023 21.3  7.0 - 25.0 Final   • Sodium 04/04/2023 138  136 - 145 mmol/L Final   • Potassium 04/04/2023 4.4  3.5 - 5.2 mmol/L Final   • Chloride 04/04/2023 102  98 - 107 mmol/L Final   • Total CO2 04/04/2023 27.8  22.0 - 29.0 mmol/L Final   • Calcium 04/04/2023 10.2  8.6 - 10.5 mg/dL Final   • Total Protein 04/04/2023 7.5  6.0 - 8.5 g/dL Final   • Albumin 04/04/2023 4.5  3.5 - 5.2 g/dL Final   • Globulin 04/04/2023 3.0  gm/dL Final   • A/G Ratio 04/04/2023 1.5  g/dL Final   • Total Bilirubin 04/04/2023 0.7  0.0 - 1.2 mg/dL Final   • Alkaline Phosphatase 04/04/2023 83  39 - 117 U/L Final   • AST (SGOT) 04/04/2023 17  1 - 32 U/L Final   • ALT (SGPT) 04/04/2023 17  1 - 33 U/L Final   • Total Cholesterol 04/04/2023 138  0 - 200 mg/dL Final    Comment: Cholesterol Reference Ranges  (U.S. Department of Health and Human Services ATP III  Classifications)  Desirable          <200 mg/dL  Borderline High    200-239 mg/dL  High Risk          >240 mg/dL  Triglyceride Reference Ranges  (U.S. Department of Health and Human Services ATP III  Classifications)  Normal           <150 mg/dL  Borderline High  150-199 mg/dL  High             200-499 mg/dL  Very High        >500 mg/dL  HDL Reference Ranges  (U.S. Department of Health and Human Services ATP III  Classifications)  Low     <40 mg/dl (major risk factor for CHD)  High    >60 mg/dl ('negative' risk factor for CHD)  LDL Reference  Ranges  (U.S. Department of Health and Human Services ATP III  Classifications)  Optimal          <100 mg/dL  Near Optimal     100-129 mg/dL  Borderline High  130-159 mg/dL  High             160-189 mg/dL  Very High        >189 mg/dL     • Triglycerides 04/04/2023 71  0 - 150 mg/dL Final   • HDL Cholesterol 04/04/2023 58  40 - 60 mg/dL Final   • VLDL Cholesterol Froilan 04/04/2023 14  5 - 40 mg/dL Final   • LDL Chol Calc ([facility]) 04/04/2023 66  0 - 100 mg/dL Final   • LDL/HDL RATIO 04/04/2023 1.13   Final   • TSH 04/04/2023 2.280  0.270 - 4.200 uIU/mL Final   • WBC 04/04/2023 5.91  3.40 - 10.80 10*3/mm3 Final   • RBC 04/04/2023 4.44  3.77 - 5.28 10*6/mm3 Final   • Hemoglobin 04/04/2023 12.8  12.0 - 15.9 g/dL Final   • Hematocrit 04/04/2023 38.3  34.0 - 46.6 % Final   • MCV 04/04/2023 86.3  79.0 - 97.0 fL Final   • MCH 04/04/2023 28.8  26.6 - 33.0 pg Final   • MCHC 04/04/2023 33.4  31.5 - 35.7 g/dL Final   • RDW 04/04/2023 12.4  12.3 - 15.4 % Final   • Platelets 04/04/2023 276  140 - 450 10*3/mm3 Final   • Neutrophil Rel % 04/04/2023 68.3  42.7 - 76.0 % Final   • Lymphocyte Rel % 04/04/2023 21.0  19.6 - 45.3 % Final   • Monocyte Rel % 04/04/2023 8.0  5.0 - 12.0 % Final   • Eosinophil Rel % 04/04/2023 2.2  0.3 - 6.2 % Final   • Basophil Rel % 04/04/2023 0.3  0.0 - 1.5 % Final   • Neutrophils Absolute 04/04/2023 4.04  1.70 - 7.00 10*3/mm3 Final   • Lymphocytes Absolute 04/04/2023 1.24  0.70 - 3.10 10*3/mm3 Final   • Monocytes Absolute 04/04/2023 0.47  0.10 - 0.90 10*3/mm3 Final   • Eosinophils Absolute 04/04/2023 0.13  0.00 - 0.40 10*3/mm3 Final   • Basophils Absolute 04/04/2023 0.02  0.00 - 0.20 10*3/mm3 Final   • Immature Granulocyte Rel % 04/04/2023 0.2  0.0 - 0.5 % Final   • Immature Grans Absolute 04/04/2023 0.01  0.00 - 0.05 10*3/mm3 Final   • nRBC 04/04/2023 0.0  0.0 - 0.2 /100 WBC Final   • Hemoglobin A1C 04/04/2023 6.50 (H)  4.80 - 5.60 % Final    Comment: Hemoglobin A1C Ranges:  Increased Risk for Diabetes  5.7% to  6.4%  Diabetes                     >= 6.5%  Diabetic Goal                < 7.0%         Current Outpatient Medications:   •  acetaminophen (TYLENOL) 650 MG 8 hr tablet, Take 1 tablet by mouth Every 8 (Eight) Hours As Needed for Mild Pain., Disp: , Rfl:   •  atorvastatin (LIPITOR) 40 MG tablet, TAKE ONE TABLET BY MOUTH EVERY EVENING, Disp: 90 tablet, Rfl: 1  •  BIOTIN PO, Take 1 tablet by mouth daily., Disp: , Rfl:   •  Cholecalciferol (VITAMIN D3) 5000 UNITS tablet, Take 1 tablet by mouth Daily., Disp: , Rfl:   •  denosumab (Prolia) 60 MG/ML solution prefilled syringe syringe, Inject 1 mL under the skin into the appropriate area as directed Every 6 (Six) Months., Disp: 1 each, Rfl: 1  •  hydrOXYzine (ATARAX) 10 MG tablet, Take 1 tablet by mouth 3 (Three) Times a Day As Needed for Anxiety., Disp: 60 tablet, Rfl: 2  •  losartan (COZAAR) 50 MG tablet, TAKE ONE TABLET BY MOUTH DAILY, Disp: 90 tablet, Rfl: 1  •  metoprolol succinate XL (TOPROL-XL) 25 MG 24 hr tablet, TAKE ONE TABLET BY MOUTH DAILY, Disp: 90 tablet, Rfl: 1  •  Multiple Vitamin (MULTIVITAMIN) capsule, Take 1 capsule by mouth Daily., Disp: , Rfl:   •  Multiple Vitamins-Minerals (PRESERVISION AREDS 2 PO), Take 2 tablets by mouth 2 (two) times a day., Disp: , Rfl:   •  Multiple Vitamins-Minerals (ZINC PO), Take  by mouth., Disp: , Rfl:   •  Pyridoxine HCl (VITAMIN B-6 PO), Take  by mouth., Disp: , Rfl:   •  sertraline (ZOLOFT) 100 MG tablet, TAKE ONE TABLET BY MOUTH DAILY, Disp: 90 tablet, Rfl: 1  •  vitamin B-12 (CYANOCOBALAMIN) 1000 MCG tablet, Take 1 tablet by mouth Daily., Disp: , Rfl:   •  gabapentin (NEURONTIN) 300 MG capsule, TAKE 1 CAPSULE BY MOUTH 3 (THREE) TIMES A DAY. (Patient not taking: Reported on 3/14/2023), Disp: 30 capsule, Rfl: 2  •  metFORMIN (GLUCOPHAGE) 500 MG tablet, TAKE ONE TABLET BY MOUTH EVERY EVENING WITH SUPPER (Patient not taking: Reported on 4/11/2023), Disp: 90 tablet, Rfl: 1        Assessment & Plan   Diagnoses and all orders  for this visit:    1. Hyperlipidemia, unspecified hyperlipidemia type (Primary)  -     CBC & Differential; Future  -     Comprehensive Metabolic Panel; Future  -     LP+LDL / HDL Ratio ([facility]); Future  -     Thyroid Panel With TSH; Future  -     Hemoglobin A1c; Future    2. Primary hypertension  -     CBC & Differential; Future  -     Comprehensive Metabolic Panel; Future  -     LP+LDL / HDL Ratio ([facility]); Future  -     Thyroid Panel With TSH; Future  -     Hemoglobin A1c; Future    3. Controlled type 2 diabetes mellitus without complication, without long-term current use of insulin  -     CBC & Differential; Future  -     Comprehensive Metabolic Panel; Future  -     LP+LDL / HDL Ratio ([facility]); Future  -     Thyroid Panel With TSH; Future  -     Hemoglobin A1c; Future    4. Chronic right-sided low back pain without sciatica  -     CBC & Differential; Future  -     Comprehensive Metabolic Panel; Future  -     LP+LDL / HDL Ratio ([facility]); Future  -     Thyroid Panel With TSH; Future  -     Hemoglobin A1c; Future    5. Post-menopausal  -     DEXA Bone Density Axial    1.  LBP-  Doing a fitness class at her [facility]   She does well ecept whne she over does things  2.  DM-  She is a little higher eating more dessert  3.  HTN-  Ok with current meds  4.  HPL-  Ok with lipitor  5.  Osteoporosis-  She is doing well with prolia.  And rec cont reg exercise

## 2023-04-27 ENCOUNTER — HOSPITAL ENCOUNTER (OUTPATIENT)
Dept: MAMMOGRAPHY | Facility: HOSPITAL | Age: 83
Discharge: HOME | End: 2023-04-27
Payer: MEDICARE

## 2023-04-27 ENCOUNTER — HOSPITAL ENCOUNTER (OUTPATIENT)
Dept: BONE DENSITY | Facility: HOSPITAL | Age: 83
Discharge: HOME | End: 2023-04-27
Payer: MEDICARE

## 2023-04-27 ENCOUNTER — HOSPITAL ENCOUNTER (OUTPATIENT)
Dept: ULTRASOUND IMAGING | Facility: HOSPITAL | Age: 83
Discharge: HOME | End: 2023-04-27
Payer: MEDICARE

## 2023-04-27 DIAGNOSIS — R92.8 ABNORMAL MAMMOGRAM: ICD-10-CM

## 2023-04-27 PROCEDURE — G0279 TOMOSYNTHESIS, MAMMO: HCPCS

## 2023-04-27 PROCEDURE — 76642 ULTRASOUND BREAST LIMITED: CPT

## 2023-04-27 PROCEDURE — 77080 DXA BONE DENSITY AXIAL: CPT

## 2023-04-27 PROCEDURE — 77065 DX MAMMO INCL CAD UNI: CPT

## 2023-04-28 ENCOUNTER — TELEPHONE (OUTPATIENT)
Dept: INTERNAL MEDICINE | Facility: CLINIC | Age: 83
End: 2023-04-28
Payer: MEDICARE

## 2023-04-28 DIAGNOSIS — R92.8 ABNORMAL MAMMOGRAM: Primary | ICD-10-CM

## 2023-04-28 NOTE — TELEPHONE ENCOUNTER
Pt aware. Referral placed    ----- Message from Mariann Fallon MD sent at 4/27/2023  3:51 PM EDT -----  Regarding: FW: estelita palomino  What does this mean?.  Did she schedule it?  Is she going to order it?  I am assuming it says that she discussed it with her so the patient is aware  ----- Message -----  From: Deepa Sharp MD  Sent: 4/27/2023   1:01 PM EDT  To: Mariann Fallon MD  Subject: estelita palomino                                          Good morning,  Suspicious mass with architectural distortion at 8:00 in the right breast. Recommend further evaluation with ultrasound-guided core needle biopsy and clip correlation with post biopsy mammogram.  We discussed this today.  Thanks

## 2023-05-01 ENCOUNTER — TELEPHONE (OUTPATIENT)
Dept: SURGERY | Facility: CLINIC | Age: 83
End: 2023-05-01
Payer: MEDICARE

## 2023-05-03 ENCOUNTER — TELEPHONE (OUTPATIENT)
Dept: SURGERY | Facility: CLINIC | Age: 83
End: 2023-05-03
Payer: MEDICARE

## 2023-05-03 NOTE — TELEPHONE ENCOUNTER
Scheduled apt with Dr. Blount 5/18/23 at 11:00 same day biopsy. Patient will hold her supplements.

## 2023-05-03 NOTE — TELEPHONE ENCOUNTER
[facility] November 1, 2021 bilateral screening mammogram with tomosynthesis scattered fibroglandular densities.  BI-RADS 1  [facility]'s point March 23, 2023 bilateral screening mammogram with tomosynthesis.  Scattered areas of fibroglandular density.  Question architectural distortion lower outer middle right breast.  BI-RADS 0  Right diagnostic mammogram and right breast ultrasound April 27, 2023 [facility]:  Scattered areas of fibroglandular density.  Lower outer middle right breast there is a persistent area of distortion.  On ultrasound right breast 8:00, 5 cm from the nipple is a 9 x 8 mm irregular hypoechoic mass corresponding distortion which is suspicious.  BI-RADS 4  Also at 8:00, 1 cm from the nipple there is an incidental benign appearing ductal confluence.  We will get her scheduled for a same-day biopsy.

## 2023-05-18 ENCOUNTER — OFFICE VISIT (OUTPATIENT)
Dept: SURGERY | Facility: CLINIC | Age: 83
End: 2023-05-18
Payer: MEDICARE

## 2023-05-18 ENCOUNTER — HOSPITAL ENCOUNTER (OUTPATIENT)
Dept: SURGERY | Facility: HOSPITAL | Age: 83
Discharge: HOME | End: 2023-05-18
Payer: MEDICARE

## 2023-05-18 VITALS
BODY MASS INDEX: 28.68 KG/M2 | WEIGHT: 168 LBS | SYSTOLIC BLOOD PRESSURE: 126 MMHG | OXYGEN SATURATION: 96 % | HEIGHT: 64 IN | HEART RATE: 82 BPM | DIASTOLIC BLOOD PRESSURE: 82 MMHG

## 2023-05-18 DIAGNOSIS — N63.13 MASS OF LOWER OUTER QUADRANT OF RIGHT BREAST: ICD-10-CM

## 2023-05-18 DIAGNOSIS — R92.8 ABNORMAL MAMMOGRAM: Primary | ICD-10-CM

## 2023-05-18 DIAGNOSIS — N64.89 RADIAL SCAR OF BREAST: ICD-10-CM

## 2023-05-18 DIAGNOSIS — R92.1 BREAST CALCIFICATIONS ON MAMMOGRAM: ICD-10-CM

## 2023-05-18 DIAGNOSIS — R92.8 ABNORMAL ULTRASOUND OF BREAST: ICD-10-CM

## 2023-05-18 DIAGNOSIS — Z80.3 FH: BREAST CANCER: ICD-10-CM

## 2023-05-18 DIAGNOSIS — R59.9 ENLARGED LYMPH NODES: ICD-10-CM

## 2023-05-18 PROCEDURE — 88341 IMHCHEM/IMCYTCHM EA ADD ANTB: CPT | Performed by: SURGERY

## 2023-05-18 PROCEDURE — 88360 TUMOR IMMUNOHISTOCHEM/MANUAL: CPT | Performed by: SURGERY

## 2023-05-18 PROCEDURE — 88342 IMHCHEM/IMCYTCHM 1ST ANTB: CPT | Performed by: SURGERY

## 2023-05-18 PROCEDURE — 88305 TISSUE EXAM BY PATHOLOGIST: CPT | Performed by: SURGERY

## 2023-05-18 NOTE — PROGRESS NOTES
Chief Complaint: Shu Mckeon is a 82 y.o. female who was seen in consultation at the request of Mariann Fallon MD  for abnormal breast imaging    History of Present Illness:  Patient presents with abnormal breast imaging, LEFT breast. She noted no new masses, skin changes, nipple discharge, nipple changes prior to her most recent imaging.  Her most recent imaging includes the followin/14/15 [facility]  MAMMO SCREEN EVITA  FLUHR, SHU  Benign dermal calcifications. BIRADS category 2: benign.    18 [facility]  MAMMO SCREEN EVITA  FLUHR, SHU  Scattered fibroglandular densities anterior one third retroareolar left breast interval development of a cluster of calcifications. BIRADS category 0.    8/3/18  [facility]  MAMMO DIAG LEFT  FLUHR, SHU  Microcalcifications within the outer inferior aspect of the left breast. BIRADS category 4.      She has not had a breast biopsy in the past.  She has her uterus and ovaries, is postmenopausal, and takes nor hormones.  Her family history includes the following: She has one daughter, one sister, no maternal aunts, 2 paternal aunts.  She has a half sister on paternal he based who had breast cancer in her 70s and a paternal cousin who had breast cancer in her 50s.      18- left breast stereotactic biopsy: Upper outer quadrant, 5:00: Small radial sclerosing lesion with usual hyperplasia, separate foci of usual hyperplasia, columnar cell change without atypia: Focal stromal fibrosis, apically metaplasia, and fibroadenomatoid change.   Concordant per Dr Tariq.    She denies significant bruising at her biopsy site.  She denies any redness warmth or drainage from the mammotomy.    Pathology from  10-24-18 excision radial scar returned as radial scar and no atypia.     She denies any redness, warmth, drainage from her incision.    In the interim,  Shu Mckeon  has done well.  She has noted no changes in her breast exam. No new masses, skin changes, nipple changes, nipple discharge either  breast.     Her most recent imaging includes the following:  July 19, 2019 bilateral screening mammogram with 3D [facility]: Scattered fibroglandular densities.  No evidence for malignancy in either breast.  BI-RADS 1      In the interim,  Shu Mckeon  has done well.  She has noted no changes in her breast exam. No new masses, skin changes, nipple changes, nipple discharge either breast.     Her most recent imaging includes the following:  [facility] point mammography July 20, 2020 bilateral screening mammogram with tomosynthesis.  Scattered areas of fibroglandular density.  There is a 1 cm mass in the lower left axilla, axillary tail that is slightly increased in size.  BI-RADS 0  August 12, 2020 left diagnostic mammogram with left breast ultrasound [facility].  On mammogram visualization of a 1.1 cm round mass consistent with a lymph node in the axillary tail with a cortex that is thickened relative to other visualized node.  On ultrasound axillary tail and left axillary ultrasound showed on the order of 16 cm from the nipple a 1.1 cm lymph node with a thickened cortex.  Impression 1.1 cm rounded left axillary tail lymph node with a thickened cortex recommend ultrasound-guided left breast biopsy BI-RADS 4.    She has gained 11#.    Shu has done well since her core biopsy.  She denies any swelling or bruising or discomfort in her left axilla.  Denies any redness warmth or drainage from her dermatotomy    Pathology from in office core biopsy 9-9-20 returned as :  Final Diagnosis   1. Lymph Node, Left Low Axillary Tail, Core Biopsy:               A. Core-like fragments of reactive appearing lymph node.               B. No morphologic evidence of lymphoma or carcinoma.     2. Lymph Node, Left Low Axillary Tail, Core Biopsy (Gross Diagnosis Only):               A. Core-like tissue submitted to [facility] for Flow Cytometric Analysis.                     Please see the Flow  Cytometry Summary below.     [facility]       Flow cytometry from [facility] dated September 9, 2020 returned as immunophenotyping fails to reveal a monoclonal B cell or abnormal T-cell immunophenotype.  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  The significance of this is uncertain.  Correlation with tissue morphology with further studies as indicated is advised.       -Addendum: Reviewed with Dr. Feliciano and he thinks that it would be best for him to just evaluate her in the clinic.         Interval History:   In the interim,  Shu Mckeon  has done well.  She has noted no changes in her breast exam. No new masses, skin changes, nipple changes, nipple discharge either breast.   She denies headache, bone pain, belly pain, cough, changes in vision or gait.  She has had an intentional additional 6 # weight loss.    Her most recent imaging includes the following:  [facility] bilateral screening mammogram with tomosynthesis scattered fibroglandular densities.  BI-RADS 1  [facility]'s point March 23, 2023 bilateral screening mammogram with tomosynthesis.  Scattered areas of fibroglandular density.  Question architectural distortion lower outer middle right breast.  BI-RADS 0  Right diagnostic mammogram and right breast ultrasound April 27, 2023 [facility]:  Scattered areas of fibroglandular density.  Lower outer middle right breast there is a persistent area of distortion.  On ultrasound right breast 8:00, 5 cm from the nipple is a 9 x 8 mm irregular hypoechoic mass corresponding distortion which is suspicious.  BI-RADS 4  Also at 8:00, 1 cm from the nipple there is an incidental benign appearing ductal confluence.    She is here for review.          Review of Systems:  Review of Systems   Constitutional: Negative for unexpected weight change (6 lb wt loss since last visit).   All other systems reviewed and are  "negative.       Past Medical and Surgical History:  Breast Biopsy History:  Patient has not had a breast biopsy in the past.  Breast Cancer HIstory:  Patient does not have a past medical history of breast cancer.  Breast Operations, and year:  NONE   Obstetric/Gynecologic History:  Age menstrual periods began: 11  Patient is postmenopausal, entered menopause naturally at age:    Number of pregnancies: 3  Number of live births: 3  Number of abortions or miscarriages: 0  Age of delivery of first child: 20  Patient did not breast feed.  Length of time taking birth control pills: 10-12 YRS   Patient took hormone replacement during the following dates: 10 YRS   PATIENT STILL HAS UTERUS AND OVARIES.     Past Surgical History:   Procedure Laterality Date   • BREAST BIOPSY Left 10/25/2018    Procedure: left breast ultrasound-guided excision of radial scar.;  Surgeon: Shayna Blount MD;  Location[facility];  Service: General   • BREAST SURGERY Left    • CARDIAC CATHETERIZATION      not sure when or where   • CARDIAC CATHETERIZATION     • CATARACT EXTRACTION WITH INTRAOCULAR LENS IMPLANT     • CHOLECYSTECTOMY WITH INTRAOPERATIVE CHOLANGIOGRAM N/A 10/31/2020    Procedure: CHOLECYSTECTOMY LAPAROSCOPIC INTRAOPERATIVE CHOLANGIOGRAM;  Surgeon: Ruth Del Valle MD;  Location: [facility] OR;  Service: General;  Laterality: N/A;   • COLONOSCOPY     • FEMUR FRACTURE SURGERY Right    • FRACTURE SURGERY  2015    LT ANKLE   • MEDIAL BRANCH BLOCK Right 5/20/2022    Procedure: LUMBAR MEDIAL BRANCH BLOCK Right L4-S1;  Surgeon: Pamela Spaulding MD;  Location: [facility] OR;  Service: Pain Management;  Laterality: Right;   • REPLACEMENT TOTAL KNEE Right      Past Medical History:   Diagnosis Date   • Arthritis    • Chronic low back pain    • Depression     HISTORY OF BEING \"EMOTIONAL\"   • Fatigue    • GERD (gastroesophageal reflux disease)    • Groin pain    • H/O cardiovascular stress test 07/2018    NORMAL, DR ARIAS   • History " "of diverticulitis    • Hyperlipidemia    • Hypertension    • Impaired fasting glucose    • Left bundle branch block    • Macular degeneration    • Macular degeneration of right eye    • Osteoporosis    • Radial scar of breast     LEFT   • Status post ORIF of fracture of ankle    • Urinary frequency        Prior Hospitalizations, other than for surgery or childbirth, and year:  NONE     Social History     Socioeconomic History   • Marital status:      Spouse name: Isidoro   Tobacco Use   • Smoking status: Never     Passive exposure: Never   • Smokeless tobacco: Never   Vaping Use   • Vaping Use: Never used   Substance and Sexual Activity   • Alcohol use: Yes     Alcohol/week: 7.0 standard drinks     Types: 7 Glasses of wine per week     Comment: 1/day-socially   • Drug use: No   • Sexual activity: Defer     Patient is .  Patient is retired.  Patient drinks 2 servings of caffeine per day.    Family History:  Family History   Problem Relation Age of Onset   • Heart disease Father    • Heart attack Father 81   • Heart attack Paternal Uncle    • Breast cancer Sister 70   • Breast cancer Cousin 50   • Malig Hyperthermia Neg Hx        Vital Signs:  /82 (BP Location: Left arm, Patient Position: Sitting, Cuff Size: Adult)   Pulse 82   Ht 162.6 cm (64.02\")   Wt 76.2 kg (168 lb)   LMP  (LMP Unknown)   SpO2 96%   BMI 28.82 kg/m²      Medications:    Current Outpatient Medications:   •  acetaminophen (TYLENOL) 650 MG 8 hr tablet, Take 1 tablet by mouth Every 8 (Eight) Hours As Needed for Mild Pain., Disp: , Rfl:   •  atorvastatin (LIPITOR) 40 MG tablet, TAKE ONE TABLET BY MOUTH EVERY EVENING, Disp: 90 tablet, Rfl: 1  •  BIOTIN PO, Take 1 tablet by mouth daily., Disp: , Rfl:   •  Cholecalciferol (VITAMIN D3) 5000 UNITS tablet, Take 1 tablet by mouth Daily., Disp: , Rfl:   •  denosumab (Prolia) 60 MG/ML solution prefilled syringe syringe, Inject 1 mL under the skin into the appropriate area as directed Every " "6 (Six) Months., Disp: 1 each, Rfl: 1  •  hydrOXYzine (ATARAX) 10 MG tablet, Take 1 tablet by mouth 3 (Three) Times a Day As Needed for Anxiety., Disp: 60 tablet, Rfl: 2  •  losartan (COZAAR) 50 MG tablet, TAKE ONE TABLET BY MOUTH DAILY, Disp: 90 tablet, Rfl: 1  •  metoprolol succinate XL (TOPROL-XL) 25 MG 24 hr tablet, TAKE ONE TABLET BY MOUTH DAILY, Disp: 90 tablet, Rfl: 1  •  Multiple Vitamin (MULTIVITAMIN) capsule, Take 1 capsule by mouth Daily., Disp: , Rfl:   •  Multiple Vitamins-Minerals (PRESERVISION AREDS 2 PO), Take 2 tablets by mouth 2 (two) times a day., Disp: , Rfl:   •  Multiple Vitamins-Minerals (ZINC PO), Take  by mouth., Disp: , Rfl:   •  sertraline (ZOLOFT) 100 MG tablet, TAKE ONE TABLET BY MOUTH DAILY, Disp: 90 tablet, Rfl: 1  •  vitamin B-12 (CYANOCOBALAMIN) 1000 MCG tablet, Take 1 tablet by mouth Daily., Disp: , Rfl:   •  gabapentin (NEURONTIN) 300 MG capsule, TAKE 1 CAPSULE BY MOUTH 3 (THREE) TIMES A DAY. (Patient not taking: Reported on 3/14/2023), Disp: 30 capsule, Rfl: 2  •  metFORMIN (GLUCOPHAGE) 500 MG tablet, TAKE ONE TABLET BY MOUTH EVERY EVENING WITH SUPPER (Patient not taking: Reported on 4/11/2023), Disp: 90 tablet, Rfl: 1  •  Pyridoxine HCl (VITAMIN B-6 PO), Take  by mouth. (Patient not taking: Reported on 5/18/2023), Disp: , Rfl:      Allergies:  No Known Allergies    Physical Examination:  /82 (BP Location: Left arm, Patient Position: Sitting, Cuff Size: Adult)   Pulse 82   Ht 162.6 cm (64.02\")   Wt 76.2 kg (168 lb)   LMP  (LMP Unknown)   SpO2 96%   BMI 28.82 kg/m²   General Appearance:  Patient is in no distress.  She is well kept and has an obese build.   Psychiatric:  Patient with appropriate mood and affect. Alert and oriented to self, time, and place.    Breast, RIGHT:  large sized, ptotic, symmetric with the contralateral side.  Breast skin is without erythema, edema, rashes.  There are no visible abnormalities upon inspection during the arm-raising maneuver or " with hands on hips in the sitting position. There is no nipple retraction, discharge or nipple/areolar skin changes. At the RIGHT breast 8:00 5 CFN location there is a palpable mass that measures approximately 1.75 cm. No overlying skin changes, nontender.There are no other masses palpable in the sitting or supine positions.    Breast, LEFT:  large sized, ptotic, symmetric with the contralateral side.  Breast skin is without erythema, edema, rashes.  There are no visible abnormalities upon inspection during the arm-raising maneuver or with hands on hips in the sitting position. There is no nipple retraction, discharge or nipple/areolar skin changes.There are no masses palpable in the sitting or supine positions.  Well-healed radial biopsy   for radial scar.     Lymphatic:  There is no axillary, cervical, infraclavicular, or supraclavicular adenopathy bilaterally.  Specifically left axilla there is no palpable adenopathy.  There is a well-healed dermatotomy e from her lymph node core biopsy.  Eyes:  Pupils are round and reactive to light.  Cardiovascular:  Heart rate and rhythm are regular.  Respiratory:  Lungs are clear bilaterally with no crackles or wheezes in any lung field.  Gastrointestinal:  Abdomen is soft, nondistended, and nontender.     Musculoskeletal:  Good strength in all 4 extremities.   There is good range of motion in both shoulders.    Skin:  No new skin lesions or rashes on the skin excluding the breast (see breast exam above).        Imagin/14/15 Located within [facility]  MAMMO SCREEN EVITA  FLUHR, HEATHER  Benign dermal calcifications. BIRADS category 2: benign.    18 Located within [facility]  MAMMO SCREEN EVITA  FLUHR, HEATHER  Scattered fibroglandular densities anterior one third retroareolar left breast interval development of a cluster of calcifications. BIRADS category 0.    8/3/18  Located within [facility]  MAMMO DIAG LEFT  FLUHR, HEATHER  Microcalcifications within the outer inferior aspect of the left breast. BIRADS category 4.      2019 bilateral screening mammogram with 3D [facility]: Scattered fibroglandular densities.  No evidence for malignancy in either breast.  BI-RADS 1    [facility] mammography July 20, 2020 bilateral screening mammogram with tomosynthesis.  Scattered areas of fibroglandular density.  There is a 1 cm mass in the lower left axilla, axillary tail that is slightly increased in size.  BI-RADS 0  August 12, 2020 left diagnostic mammogram with left breast ultrasound [facility].  On mammogram visualization of a 1.1 cm round mass consistent with a lymph node in the axillary tail with a cortex that is thickened relative to other visualized node.  On ultrasound axillary tail and left axillary ultrasound showed on the order of 16 cm from the nipple a 1.1 cm lymph node with a thickened cortex.  Impression 1.1 cm rounded left axillary tail lymph node with a thickened cortex recommend ultrasound-guided left breast biopsy BI-RADS 4.    [facility] November 1, 2021 bilateral screening mammogram with tomosynthesis scattered fibroglandular densities.  BI-RADS 1  [facility]'s point March 23, 2023 bilateral screening mammogram with tomosynthesis.  Scattered areas of fibroglandular density.  Question architectural distortion lower outer middle right breast.  BI-RADS 0  Right diagnostic mammogram and right breast ultrasound April 27, 2023 [facility]:  Scattered areas of fibroglandular density.  Lower outer middle right breast there is a persistent area of distortion.  On ultrasound right breast 8:00, 5 cm from the nipple is a 9 x 8 mm irregular hypoechoic mass corresponding distortion which is suspicious.  BI-RADS 4  Also at 8:00, 1 cm from the nipple there is an incidental benign appearing ductal confluence.  We will get her scheduled for a same-day biopsy.        Pathology:   8-21-18- left breast stereotactic biopsy: Upper outer quadrant, 5:00: Small radial  "sclerosing lesion with usual hyperplasia, separate foci of usual hyperplasia, columnar cell change without atypia: Focal stromal fibrosis, apically metaplasia, and fibroadenomatoid change.     Concordant per Dr Tariq.        Final Diagnosis   BREAST, LEFT, DESIGNATED \"UPPER OUTER QUADRANT, APPROXIMATE 5 O'CLOCK POSITION\",   STEREOTACTIC BIOPSY:               SMALL RADIAL SCLEROSING LESION WITH ASSOCIATED DUCTAL HYPERPLASIA OF THE                      USUAL TYPE.               SEPARATE FOCI OF DUCTAL HYPERPLASIA OF THE USUAL TYPE.               FOCAL COLUMNAR CELL CHANGE WITHOUT ATYPIA.               FOCAL STROMAL FIBROSIS AND ASSOCIATED CALCIFICATIONS.               FOCAL APOCRINE METAPLASIA.               FOCAL FIBROADENOMATOID CHANGE WITH ASSOCIATED CALCIFICATIONS.               NO EVIDENCE OF MALIGNANCY.     TDJ/th IHC/a/JULIA     CPT CODES:  1. 54834       Pathology from  10-24-18 excision radial scar returend as radial scar and no atypia.          Final Diagnosis   LEFT BREAST LUMPECTOMY:           BENIGN BREAST TISSUE WITH DUCT HYPERPLASIA WITHOUT ATYPIA, ADENOSIS, AND RADIAL                   SCLEROSING LESION (2 MM).          SCAR AND CHANGES OF PRIOR BIOPSY.     COMMENT: I do not detect atypia. The margin is free of radial sclerosing lesion.     K/  IHC/TDJ     Pathology from in office core biopsy 9-9-20 returned as :  Final Diagnosis   1. Lymph Node, Left Low Axillary Tail, Core Biopsy:               A. Core-like fragments of reactive appearing lymph node.               B. No morphologic evidence of lymphoma or carcinoma.     2. Lymph Node, Left Low Axillary Tail, Core Biopsy (Gross Diagnosis Only):               A. Core-like tissue submitted to [facility] for Flow Cytometric Analysis.                     Please see the Flow Cytometry Summary below.     [facility]       Flow cytometry from [facility] dated September 9, 2020 returned as immunophenotyping fails to reveal a monoclonal B cell or abnormal " T-cell immunophenotype.  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  The significance of this is uncertain.  Correlation with tissue morphology with further studies as indicated is advised.                 Note from Dr. 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     The patient has no significant symptoms.  She has no palpable lymphadenopathy or splenomegaly.  Her white count was normal.  This certainly could be a reactive nisreen population.  We discussed potential additional evaluation with peripheral blood flow cytometry and T/Gel gene rearrangement study as well as potential CT scans.  The patient declines further evaluation at this time she is agreeable to a course of observation.  We will see her back in 3 months with a CBC and evaluate clinically.    Procedures:  Percutaneous ultrasound-guided vacuum-assisted core breast/axillary tail lymph node biopsy 9-8-20  Indication:  ultrasound-and mammo visible rounded node with thickened cortex  Location: LEFT axillary tail, lowaxilla  Consent:  The risks, benefits, and alternatives to the procedure were discussed with the patient, who understood and wished to proceed.  The risks described included, but were not limited to, bleeding, infection, pneumothorax, and inadequate sampling requiring either repeat percutaneous or open excisional biopsy.  Description of Procedure:   After the patient was positioned supine on the procedure table, I located the lesion using ultrasound.  I prepped and draped the breast/axilla skin in sterile fashion.  I anesthetized the breast skin at the site of anticipated mammotomy with 1% lidocaine with epinephrine.  I then anesthetized the underlying subcutaneous tissue and breast parenchyma surrounding the lesion with 1% lidocaine with epinephrine under ultrasound visualization and guidance. I then made a nicking  incision with an 11blade and inserted the 14 G celero biopsy device from inferolateral to superomedial through the lesion under ultrasound guidance.   I then took 4 core samples  of the lesion under direct visualization with ultrasound.   I placed 2 of these cores in formalin and 2 of them in a cup for fresh and sent to pathology.  I withdrew the probe and placed a hydromark marker into the residual rounded node..  We held manual compression for 10 minutes, placed steri -strips at the mammotomy site, and wrapped the patient in a 6 inch super ace wrap and a cold pack.  Marker placed: hydromark  Tolerance: The patient tolerated the procedure well.  Disposition: We will see her back within a week to review her pathology.      Percutaneous ultrasound-guided vacuum-assisted excisional breast biopsy 5-18-23  Indication:  ultrasound-visible breast mass with mammographic and palpable correlate, BR4  Location: RIGHT 8:00 5 CFN  Consent:  The risks, benefits, and alternatives to the procedure were discussed with the patient, who understood and wished to proceed.  The risks described included, but were not limited to, bleeding, infection, pneumothorax, and inadequate sampling requiring either repeat percutaneous or open excisional biopsy.  Description of Procedure:   After the patient was positioned supine on the procedure table, I located the lesion using ultrasound.  I prepped and draped the breast skin in sterile fashion.  I anesthetized the breast skin at the site of anticipated mammotomy with 1% lidocaine with epinephrine.  I then anesthetized the underlying subcutaneous tissue and breast parenchyma surrounding the lesion with 1% lidocaine with epinephrine under ultrasound visualization and guidance. I then made a nicking incision with an 11blade and inserted the 10G encore biopsy device from inferolateral to superomedial under the lesion under ultrasound guidance.   I then took 10 sequential core samples using the  automated sampling of the encore device, until the lesion mostly disappeared on ultrasound. I removed the probe, then placed a hydromark marker, using a stiff introducer, into the biopsy site. We held manual compression for 10 minutes, placed steri-strips at the mammotomy site, and wrapped the patient in a 6 inch super ace wrap with an ice-pack.  Marker placed: hydromark  Tolerance: The patient tolerated the procedure well.  Disposition: We will see her back within a week to review her pathology.      Assessment:   Diagnosis Plan   1. Abnormal mammogram        2. Abnormal ultrasound of breast        3. Mass of lower outer quadrant of right breast        4. Radial scar of breast        5. Breast calcifications on mammogram        6. Enlarged lymph nodes        7. FH: breast cancer        1-3  RIGHT 8:00, 5 CFN- 1.75 cm on exam, distortion on mammogram, 9x8mm on ultrasound 5-2023  Target for ultrasound guided biopsy today  BR4      4-5    LEFt breast central RA- 1.5 cm cluster- asymptomatic- stereotactic biopsy August 21, 2018 lateral to the lower outer quadrant left breast returned as   8-21-18- left breast stereotactic biopsy:  5:00: Small radial sclerosing lesion with usual hyperplasia, separate foci of usual hyperplasia, columnar cell change without atypia: Focal stromal fibrosis, apically metaplasia, and fibroadenomatoid change.   Concordant per Dr Tariq.    Hydromark in vicinity of pre biopsy calcifications.    Pathology from  10-24-18 excision radial scar returned as radial scar and no atypia.  Fu mammogram 7-2019 BR1      6-  LEFT UOQ axillary tail/low axilla-0.1 cm rounded lymph node with thickened cortex seen on August 2020 mammogram and ultrasound BI-RADS 4.  Target for biopsy 9-9-20    Pathology from in office core biopsy 9-9-20 returned as :  Final Diagnosis   1. Lymph Node, Left Low Axillary Tail, Core Biopsy:               A. Core-like fragments of reactive appearing lymph node.               B. No  morphologic evidence of lymphoma or carcinoma.     2. Lymph Node, Left Low Axillary Tail, Core Biopsy (Gross Diagnosis Only):               A. Core-like tissue submitted to [facility] for Flow Cytometric Analysis.                     Please see the Flow Cytometry Summary below.     [facility]       Flow cytometry from [facility] dated September 9, 2020 returned as immunophenotyping fails to reveal a monoclonal B cell or abnormal T-cell immunophenotype.  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  The significance of this is uncertain.  Correlation with tissue morphology with further studies as indicated is advised.     -Addendum: Reviewed with Dr. Feliciano and he thinks that it would be best for him to just evaluate her in the clinic.     7-  Paternal 1/2 sister in her 70s  Paternal cousin in her 50s    Plan:  Mrs. Mckeon is here with her daughter today.    We reviewed her most recent imaging, her history, and her exam together today.  The lesion of concern on her imaging is: RIGHT breast mass 8:00 5 CFN    We discussed that the designation of a BIRADS 4 signifies that there is a 3-89% possibility of malignancy accounting for the imaging finding. We discussed the recommendation to biopsy all BIRADS 4 lesions. We discussed a second option, and recommended only if 1- the above lesion was not technically amenable to either percutaneous or to surgical biopsy, or 2-if the patient refused biopsy. This option would be imaging surveillance in 3-6 months. She understood and wished to proceed with biopsy.  We plan to perform the following biopsy :  RIGHT breast vacuum assisted percutaneous biopsy with marker placement    I will call her with the pathology report as it returns, and we have given her after care instructions post biopsy.doing well today.       We will have a post biopsy mammogram on the RIGHT and if the pathology is malignant, will arraneg for MRI and genetic  testing.  We discussed that this could be suspicious for a small breast cancer.    3-24-24 [facility] Her next routine mammogram        Shayna Blount MD     Today I spent 40 minutes doing the following: Reviewing records, labs, outside imaging and reports in preparation for the patient visit; obtaining medical history; performing the physical exam; counseling and educating the patient and any available family or caregivers; ordering medications, tests or procedures; coordinating care with any other physicians on her care team as needed, and documenting all of the above in the medical record as well as sending communications with her other healthcare professionals.    An additional 10 minutes spent on biopsy.      Next Appointment:  Return for post biopsy visit.      EMR Dragon/transcription disclaimer:    Much of this encounter note is an electronic transcription/translocation of spoken language to printed text.  The electronic translation of spoken language may permit erroneous, or at times, nonsensical words or phrases to be inadvertently transcribed.  Although I have reviewed the note from such areas, some may still exist.

## 2023-05-20 LAB
LAB AP CASE REPORT: NORMAL
LAB AP CLINICAL INFORMATION: NORMAL
LAB AP DIAGNOSIS COMMENT: NORMAL
LAB AP SPECIAL STAINS: NORMAL
LAB AP SYNOPTIC CHECKLIST: NORMAL
PATH REPORT.FINAL DX SPEC: NORMAL
PATH REPORT.GROSS SPEC: NORMAL

## 2023-05-22 ENCOUNTER — HOSPITAL ENCOUNTER (OUTPATIENT)
Dept: MRI IMAGING | Facility: HOSPITAL | Age: 83
Discharge: HOME | End: 2023-05-22
Admitting: SURGERY
Payer: MEDICARE

## 2023-05-22 ENCOUNTER — TELEPHONE (OUTPATIENT)
Dept: SURGERY | Facility: CLINIC | Age: 83
End: 2023-05-22
Payer: MEDICARE

## 2023-05-22 DIAGNOSIS — R92.8 ABNORMAL ULTRASOUND OF BREAST: ICD-10-CM

## 2023-05-22 DIAGNOSIS — N63.13 MASS OF LOWER OUTER QUADRANT OF RIGHT BREAST: ICD-10-CM

## 2023-05-22 DIAGNOSIS — R92.8 ABNORMAL MAMMOGRAM: ICD-10-CM

## 2023-05-22 PROCEDURE — 0 GADOBENATE DIMEGLUMINE 529 MG/ML SOLUTION: Performed by: SURGERY

## 2023-05-22 PROCEDURE — A9577 INJ MULTIHANCE: HCPCS | Performed by: SURGERY

## 2023-05-22 PROCEDURE — C8937 CAD BREAST MRI: HCPCS

## 2023-05-22 PROCEDURE — C8908 MRI W/O FOL W/CONT, BREAST,: HCPCS

## 2023-05-22 PROCEDURE — 82565 ASSAY OF CREATININE: CPT

## 2023-05-22 RX ADMIN — GADOBENATE DIMEGLUMINE 15 ML: 529 INJECTION, SOLUTION INTRAVENOUS at 16:23

## 2023-05-22 NOTE — TELEPHONE ENCOUNTER
I let her know malignant biopsy as we suspected and to go ahead with the post biopsy mammogram and MRI.     We are working on getting these studies scheduled

## 2023-05-22 NOTE — TELEPHONE ENCOUNTER
Pathology from an office biopsy May 18, 2023 returned as invasive lobular carcinoma, intermediate grade, 3, 2, 1, largest focus 1.2 cm.  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  No lymphovascular invasion.  Estrogen , progesterone 21-30, HER2/tommy 1+, Ki-67 8%.  We will let her know that her biopsy was malignant and we will proceed with postbiopsy mammogram and MRI.    Will also offer her genetic testing  Final Diagnosis   1. Right Breast, 8 o'clock, 5 cm from Nipple, Ultrasound-Guided Biopsy for a Mass: INVASIVE LOBULAR CARCINOMA.               A. [place] histologic score II (tubules = 3, nuclei = 2, mitosis = 1).               B. Invasive carcinoma involves multiple tissue cores with the largest contiguous focus measuring 12 mm.               C. 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               D. No lymphovascular nor perineural invasion identified.                                swm/pkm     Electronically signed by Traci Burr MD on 5/20/2023 at 1142   Synoptic Checklist   Breast Biomarker Reporting Template  Protocol posted: 3/22/2023  BREAST: BIOMARKER REPORTING TEMPLATE - All Specimens  Test(s) Performed     Estrogen Receptor (ER) Status  Positive (greater than 10% of cells demonstrate nuclear positivity)    Percentage of Cells with Nuclear Positivity  %    Average Intensity of Staining  Strong    Test Type  Food and Drug Administration (FDA) cleared (test / vendor): ventana    Primary Antibody  SP1    Scoring System  Jayant    Proportion Score  5    Intensity Score  3    Total Jayant Score  8    Test(s) Performed     Progesterone Receptor (PgR) Status  Positive    Percentage of Cells with Nuclear Positivity  21-30%    Average Intensity of Staining  Weak    Test Type  Food and Drug Administration (FDA) cleared (test / vendor): ventana    Primary Antibody  1E2    Scoring System  Jayant    Proportion  Score  3    Intensity Score  1    Total Jayant Score  4    Test(s) Performed     HER2 by Immunohistochemistry  Negative (Score 1+)    Test Type  Food and Drug Administration (FDA) cleared (test / vendor): ventana    Primary Antibody  4B5    Test(s) Performed  Ki-67    Ki-67 Percentage of Positive Nuclei  8 %   Primary Antibody  30-9    Cold Ischemia and Fixation Times  Meet requirements specified in latest version of the ASCO / CAP Guidelines    Cold Ischemia Time (minutes)  2 min   Fixation Time (hours)  7.5 hours   Testing Performed on Block Number(s)  1A    METHODS   Fixative  Formalin    Image Analysis  Not performed    .      Comment    Representative slides from this case are reviewed internally with Dr. Pena, who concurs.

## 2023-05-23 ENCOUNTER — TELEPHONE (OUTPATIENT)
Dept: SURGERY | Facility: CLINIC | Age: 83
End: 2023-05-23
Payer: MEDICARE

## 2023-05-23 LAB — CREAT BLDA-MCNC: 0.7 MG/DL (ref 0.6–1.3)

## 2023-05-23 NOTE — TELEPHONE ENCOUNTER
Scheduled patient for post biopsy mmg Wed 5/24/23 9:00 am.     Breast cancer consult with Dr. Mine Yañez 5/25/23 11:30 am.     Confirmed with patient.

## 2023-05-24 ENCOUNTER — HOSPITAL ENCOUNTER (OUTPATIENT)
Dept: MAMMOGRAPHY | Facility: HOSPITAL | Age: 83
Discharge: HOME | End: 2023-05-24
Admitting: SURGERY
Payer: MEDICARE

## 2023-05-24 ENCOUNTER — TELEPHONE (OUTPATIENT)
Dept: SURGERY | Facility: CLINIC | Age: 83
End: 2023-05-24
Payer: MEDICARE

## 2023-05-24 DIAGNOSIS — R92.8 ABNORMAL MAMMOGRAM: ICD-10-CM

## 2023-05-24 DIAGNOSIS — N63.13 MASS OF LOWER OUTER QUADRANT OF RIGHT BREAST: ICD-10-CM

## 2023-05-24 DIAGNOSIS — R92.8 ABNORMAL ULTRASOUND OF BREAST: ICD-10-CM

## 2023-05-24 PROCEDURE — G0279 TOMOSYNTHESIS, MAMMO: HCPCS

## 2023-05-24 PROCEDURE — 77065 DX MAMMO INCL CAD UNI: CPT

## 2023-05-24 NOTE — TELEPHONE ENCOUNTER
"Reviewed Mrs. Bautista"s  MRI and postbiopsy mammogram together today with Dr. Tariq.    The lobular histology makes her MRI interpretation fairly difficult especially with the postbiopsy cavity in the center of the affected area.  When combining the 2 imaging studies there does appear to be a significant 8 cm clip displacement posteriorly.  The mammographic density prebiopsy appears to be the best representation of size estimation per Dr. Tariq, approximately 4.6 cm.  The lesion appears debulked on the postbiopsy mammogram and there are blood products and air in the center of the enhancement on the MRI however the clip is situated 8 cm posterior to this.      "

## 2023-05-25 ENCOUNTER — PREP FOR SURGERY (OUTPATIENT)
Dept: OTHER | Facility: HOSPITAL | Age: 83
End: 2023-05-25

## 2023-05-25 ENCOUNTER — TELEPHONE (OUTPATIENT)
Dept: SURGERY | Facility: CLINIC | Age: 83
End: 2023-05-25
Payer: MEDICARE

## 2023-05-25 ENCOUNTER — OFFICE VISIT (OUTPATIENT)
Dept: SURGERY | Facility: CLINIC | Age: 83
End: 2023-05-25
Payer: MEDICARE

## 2023-05-25 ENCOUNTER — TELEPHONE (OUTPATIENT)
Dept: INTERNAL MEDICINE | Facility: CLINIC | Age: 83
End: 2023-05-25
Payer: MEDICARE

## 2023-05-25 VITALS
WEIGHT: 169 LBS | BODY MASS INDEX: 28.85 KG/M2 | DIASTOLIC BLOOD PRESSURE: 72 MMHG | HEART RATE: 65 BPM | SYSTOLIC BLOOD PRESSURE: 136 MMHG | OXYGEN SATURATION: 95 % | HEIGHT: 64 IN

## 2023-05-25 DIAGNOSIS — C50.911 LOBULAR BREAST CANCER, RIGHT: Primary | ICD-10-CM

## 2023-05-25 DIAGNOSIS — R92.1 BREAST CALCIFICATIONS ON MAMMOGRAM: ICD-10-CM

## 2023-05-25 DIAGNOSIS — R59.9 ENLARGED LYMPH NODES: ICD-10-CM

## 2023-05-25 DIAGNOSIS — T14.8XXA BRUISING: ICD-10-CM

## 2023-05-25 DIAGNOSIS — Z80.3 FH: BREAST CANCER: ICD-10-CM

## 2023-05-25 DIAGNOSIS — E04.1 THYROID NODULE: Primary | ICD-10-CM

## 2023-05-25 DIAGNOSIS — Z01.818 PRE-OP EVALUATION: ICD-10-CM

## 2023-05-25 DIAGNOSIS — N64.89 RADIAL SCAR OF BREAST: ICD-10-CM

## 2023-05-25 RX ORDER — CEFAZOLIN SODIUM 2 G/100ML
2 INJECTION, SOLUTION INTRAVENOUS ONCE
Status: CANCELLED | OUTPATIENT
Start: 2023-08-02 | End: 2023-05-25

## 2023-05-25 RX ORDER — DIAZEPAM 5 MG/1
5 TABLET ORAL ONCE
Status: CANCELLED | OUTPATIENT
Start: 2023-08-02 | End: 2023-05-25

## 2023-05-25 RX ORDER — SODIUM CHLORIDE, SODIUM LACTATE, POTASSIUM CHLORIDE, CALCIUM CHLORIDE 600; 310; 30; 20 MG/100ML; MG/100ML; MG/100ML; MG/100ML
100 INJECTION, SOLUTION INTRAVENOUS CONTINUOUS
Status: CANCELLED | OUTPATIENT
Start: 2023-08-02

## 2023-05-25 NOTE — TELEPHONE ENCOUNTER
I let Alyce with Dr. Fallon's office know that there is an incidental thyroid nodule on her MRI breast for which additional workup has been recommended. Alyce sent message to Dr Fallon.

## 2023-05-25 NOTE — TELEPHONE ENCOUNTER
Caller: CARO    Relationship: Other    Best call back number: [phone]    What is the best time to reach you: ANYTIME    Who are you requesting to speak with (clinical staff, provider,  specific staff member): DR. GIL OR CLINICAL STAFF    Do you know the name of the person who called: CARO - DR. BAH'S MEDICAL ASSISTANT    What was the call regarding: CARO WITH DR. BAH'S OFFICE WANTED TO LET DR. GIL KNOW THAT THE PATIENT HAS A NEW BREAST CANCER DIAGNOSIS. DR. ROJAS'S OFFICE WILL BE TAKING CARE OF THIS. HOWEVER, THE PATIENT'S MRI ALSO PICKED UP A THYROID NODULE THAT THEY WOULD LIKE DR. GIL TO TREAT.     Do you require a callback: YES

## 2023-05-25 NOTE — PROGRESS NOTES
Chief Complaint: Shu Mckeon is a 82 y.o. female who was seen in consultation at the request of Mariann Fallon MD  for newly diagnosed breast cancer    History of Present Illness:  Patient presents with abnormal breast imaging, LEFT breast. She noted no new masses, skin changes, nipple discharge, nipple changes prior to her most recent imaging.  Her most recent imaging includes the followin/14/15 [facility]  MAMMO SCREEN EVITA  FLUHR, SHU  Benign dermal calcifications. BIRADS category 2: benign.    18 [facility]  MAMMO SCREEN EVITA  FLUHR, SHU  Scattered fibroglandular densities anterior one third retroareolar left breast interval development of a cluster of calcifications. BIRADS category 0.    8/3/18  [facility]  MAMMO DIAG LEFT  FLUHR, SHU  Microcalcifications within the outer inferior aspect of the left breast. BIRADS category 4.      She has not had a breast biopsy in the past.  She has her uterus and ovaries, is postmenopausal, and takes nor hormones.  Her family history includes the following: She has one daughter, one sister, no maternal aunts, 2 paternal aunts.  She has a half sister on paternal he based who had breast cancer in her 70s and a paternal cousin who had breast cancer in her 50s.      18- left breast stereotactic biopsy: Upper outer quadrant, 5:00: Small radial sclerosing lesion with usual hyperplasia, separate foci of usual hyperplasia, columnar cell change without atypia: Focal stromal fibrosis, apically metaplasia, and fibroadenomatoid change.   Concordant per Dr Tariq.    She denies significant bruising at her biopsy site.  She denies any redness warmth or drainage from the mammotomy.    Pathology from  10-24-18 excision radial scar returned as radial scar and no atypia.     She denies any redness, warmth, drainage from her incision.    In the interim,  Shu Mckeon  has done well.  She has noted no changes in her breast exam. No new masses, skin changes, nipple changes, nipple discharge  either breast.     Her most recent imaging includes the following:  July 19, 2019 bilateral screening mammogram with 3D : Scattered fibroglandular densities.  No evidence for malignancy in either breast.  BI-RADS 1      In the interim,  Shu Mckeon  has done well.  She has noted no changes in her breast exam. No new masses, skin changes, nipple changes, nipple discharge either breast.     Her most recent imaging includes the following:  [facility] point mammography July 20, 2020 bilateral screening mammogram with tomosynthesis.  Scattered areas of fibroglandular density.  There is a 1 cm mass in the lower left axilla, axillary tail that is slightly increased in size.  BI-RADS 0  August 12, 2020 left diagnostic mammogram with left breast ultrasound .  On mammogram visualization of a 1.1 cm round mass consistent with a lymph node in the axillary tail with a cortex that is thickened relative to other visualized node.  On ultrasound axillary tail and left axillary ultrasound showed on the order of 16 cm from the nipple a 1.1 cm lymph node with a thickened cortex.  Impression 1.1 cm rounded left axillary tail lymph node with a thickened cortex recommend ultrasound-guided left breast biopsy BI-RADS 4.    She has gained 11#.    Shu has done well since her core biopsy.  She denies any swelling or bruising or discomfort in her left axilla.  Denies any redness warmth or drainage from her dermatotomy    Pathology from in office core biopsy 9-9-20 returned as :  Final Diagnosis   1. Lymph Node, Left Low Axillary Tail, Core Biopsy:               A. Core-like fragments of reactive appearing lymph node.               B. No morphologic evidence of lymphoma or carcinoma.     2. Lymph Node, Left Low Axillary Tail, Core Biopsy (Gross Diagnosis Only):               A. Core-like tissue submitted to [facility] for Flow Cytometric Analysis.                     Please see the Flow  Cytometry Summary below.     [facility]       Flow cytometry from [facility] dated September 9, 2020 returned as immunophenotyping fails to reveal a monoclonal B cell or abnormal T-cell immunophenotype.  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  The significance of this is uncertain.  Correlation with tissue morphology with further studies as indicated is advised.       -Addendum: Reviewed with Dr. Feliciano and he thinks that it would be best for him to just evaluate her in the clinic.           In the interim,  Shu Mckeon  has done well.  She has noted no changes in her breast exam. No new masses, skin changes, nipple changes, nipple discharge either breast.   She denies headache, bone pain, belly pain, cough, changes in vision or gait.  She has had an intentional additional 6 # weight loss.    Her most recent imaging includes the following:  [facility] November 1, 2021 bilateral screening mammogram with tomosynthesis scattered fibroglandular densities.  BI-RADS 1  [facility]'s point March 23, 2023 bilateral screening mammogram with tomosynthesis.  Scattered areas of fibroglandular density.  Question architectural distortion lower outer middle right breast.  BI-RADS 0  Right diagnostic mammogram and right breast ultrasound April 27, 2023 [facility]:  Scattered areas of fibroglandular density.  Lower outer middle right breast there is a persistent area of distortion.  On ultrasound right breast 8:00, 5 cm from the nipple is a 9 x 8 mm irregular hypoechoic mass corresponding distortion which is suspicious.  BI-RADS 4  Also at 8:00, 1 cm from the nipple there is an incidental benign appearing ductal confluence.        Interval History:     Pathology from an office biopsy May 18, 2023 returned as invasive lobular carcinoma, intermediate grade, 3, 2, 1, largest focus 1.2 cm.  Associated atypical lobular hyperplasia and lobular  "carcinoma in situ.  No lymphovascular invasion.  Estrogen , progesterone 21-30, HER2/tommy 1+, Ki-67 8%.    She reports significant bruising extending onto her abdominal wall.    I then had her to get a post biopsy mammogram and MRI:  Reviewed Mrs. Mckeon\"s  MRI and postbiopsy mammogram together with Dr. Tariq.     The lobular histology makes her MRI interpretation fairly difficult especially with the postbiopsy cavity in the center of the affected area.  When combining the 2 imaging studies there does appear to be a significant 8 cm clip displacement posteriorly.  The mammographic density prebiopsy appears to be the best representation of size estimation per Dr. Tariq, approximately 4.6 cm.  The lesion appears debulked on the postbiopsy mammogram and there are blood products and air in the center of the enhancement on the MRI however the clip is situated 8 cm posterior to this.      I feel that there was clip migration due to the hematoma.    Dr Tariq felt that he could give a reasonably accurate attempt at bracketing this difficult to image lobular carcinoma.    SHe is here to review.        Review of Systems:  Review of Systems   Constitutional: Positive for unexpected weight change (1 lb wt gain since last visit).   All other systems reviewed and are negative.       Past Medical and Surgical History:  Breast Biopsy History:  Patient has not had a breast biopsy in the past.  Breast Cancer HIstory:  Patient does not have a past medical history of breast cancer.  Breast Operations, and year:  NONE   Obstetric/Gynecologic History:  Age menstrual periods began: 11  Patient is postmenopausal, entered menopause naturally at age:    Number of pregnancies: 3  Number of live births: 3  Number of abortions or miscarriages: 0  Age of delivery of first child: 20  Patient did not breast feed.  Length of time taking birth control pills: 10-12 YRS   Patient took hormone replacement during the following dates: 10 YRS " "  PATIENT STILL HAS UTERUS AND OVARIES.     Past Surgical History:   Procedure Laterality Date   • BREAST BIOPSY Left 10/25/2018    Procedure: left breast ultrasound-guided excision of radial scar.;  Surgeon: Shayna Blount MD;  [facility] [place]  Service: General   • BREAST SURGERY Left    • CARDIAC CATHETERIZATION      not sure when or where   • CARDIAC CATHETERIZATION     • CATARACT EXTRACTION WITH INTRAOCULAR LENS IMPLANT     • CHOLECYSTECTOMY WITH INTRAOPERATIVE CHOLANGIOGRAM N/A 10/31/2020    Procedure: CHOLECYSTECTOMY LAPAROSCOPIC INTRAOPERATIVE CHOLANGIOGRAM;  Surgeon: Ruth Del Valle MD;  Location[facility];  Service: General;  Laterality: N/A;   • COLONOSCOPY     • FEMUR FRACTURE SURGERY Right    • FRACTURE SURGERY  2015    LT ANKLE   • MEDIAL BRANCH BLOCK Right 5/20/2022    Procedure: LUMBAR MEDIAL BRANCH BLOCK Right L4-S1;  Surgeon: Pamela Spaulding MD;  Location: [facility] OR;  Service: Pain Management;  Laterality: Right;   • REPLACEMENT TOTAL KNEE Right      Past Medical History:   Diagnosis Date   • Arthritis    • Chronic low back pain    • Depression     HISTORY OF BEING \"EMOTIONAL\"   • Fatigue    • GERD (gastroesophageal reflux disease)    • Groin pain    • H/O cardiovascular stress test 07/2018    NORMAL, DR ARIAS   • History of diverticulitis    • Hyperlipidemia    • Hypertension    • Impaired fasting glucose    • Left bundle branch block    • Macular degeneration    • Macular degeneration of right eye    • Osteoporosis    • Radial scar of breast     LEFT   • Status post ORIF of fracture of ankle    • Urinary frequency        Prior Hospitalizations, other than for surgery or childbirth, and year:  NONE     Social History     Socioeconomic History   • Marital status:      Spouse name: Isidoro   Tobacco Use   • Smoking status: Never     Passive exposure: Never   • Smokeless tobacco: Never   Vaping Use   • Vaping Use: Never used   Substance and Sexual Activity   • Alcohol " "use: Yes     Alcohol/week: 7.0 standard drinks     Types: 7 Glasses of wine per week     Comment: 1/day-socially   • Drug use: No   • Sexual activity: Defer     Patient is .  Patient is retired.  Patient drinks 2 servings of caffeine per day.    Family History:  Family History   Problem Relation Age of Onset   • Heart disease Father    • Heart attack Father 81   • Heart attack Paternal Uncle    • Breast cancer Sister 70   • Breast cancer Cousin 50   • Malig Hyperthermia Neg Hx        Vital Signs:  /72 (BP Location: Right arm, Patient Position: Sitting, Cuff Size: Adult)   Pulse 65   Ht 162.6 cm (64.02\")   Wt 76.7 kg (169 lb)   LMP  (LMP Unknown)   SpO2 95%   BMI 28.99 kg/m²      Medications:    Current Outpatient Medications:   •  acetaminophen (TYLENOL) 650 MG 8 hr tablet, Take 1 tablet by mouth Every 8 (Eight) Hours As Needed for Mild Pain., Disp: , Rfl:   •  atorvastatin (LIPITOR) 40 MG tablet, TAKE ONE TABLET BY MOUTH EVERY EVENING, Disp: 90 tablet, Rfl: 1  •  BIOTIN PO, Take 1 tablet by mouth daily., Disp: , Rfl:   •  Cholecalciferol (VITAMIN D3) 5000 UNITS tablet, Take 1 tablet by mouth Daily., Disp: , Rfl:   •  denosumab (Prolia) 60 MG/ML solution prefilled syringe syringe, Inject 1 mL under the skin into the appropriate area as directed Every 6 (Six) Months., Disp: 1 each, Rfl: 1  •  gabapentin (NEURONTIN) 300 MG capsule, TAKE 1 CAPSULE BY MOUTH 3 (THREE) TIMES A DAY., Disp: 30 capsule, Rfl: 2  •  hydrOXYzine (ATARAX) 10 MG tablet, Take 1 tablet by mouth 3 (Three) Times a Day As Needed for Anxiety., Disp: 60 tablet, Rfl: 2  •  losartan (COZAAR) 50 MG tablet, TAKE ONE TABLET BY MOUTH DAILY, Disp: 90 tablet, Rfl: 1  •  metFORMIN (GLUCOPHAGE) 500 MG tablet, TAKE ONE TABLET BY MOUTH EVERY EVENING WITH SUPPER, Disp: 90 tablet, Rfl: 1  •  metoprolol succinate XL (TOPROL-XL) 25 MG 24 hr tablet, TAKE ONE TABLET BY MOUTH DAILY, Disp: 90 tablet, Rfl: 1  •  Multiple Vitamin (MULTIVITAMIN) capsule, " "Take 1 capsule by mouth Daily., Disp: , Rfl:   •  Multiple Vitamins-Minerals (PRESERVISION AREDS 2 PO), Take 2 tablets by mouth 2 (two) times a day., Disp: , Rfl:   •  Multiple Vitamins-Minerals (ZINC PO), Take  by mouth., Disp: , Rfl:   •  Pyridoxine HCl (VITAMIN B-6 PO), Take  by mouth., Disp: , Rfl:   •  sertraline (ZOLOFT) 100 MG tablet, TAKE ONE TABLET BY MOUTH DAILY, Disp: 90 tablet, Rfl: 1  •  vitamin B-12 (CYANOCOBALAMIN) 1000 MCG tablet, Take 1 tablet by mouth Daily., Disp: , Rfl:      Allergies:  No Known Allergies    Physical Examination:  /72 (BP Location: Right arm, Patient Position: Sitting, Cuff Size: Adult)   Pulse 65   Ht 162.6 cm (64.02\")   Wt 76.7 kg (169 lb)   LMP  (LMP Unknown)   SpO2 95%   BMI 28.99 kg/m²   General Appearance:  Patient is in no distress.  She is well kept and has an obese build.   Psychiatric:  Patient with appropriate mood and affect. Alert and oriented to self, time, and place.    Breast, RIGHT:  large sized, ptotic, symmetric with the contralateral side.  Breast skin is without erythema, edema, rashes.  There are no visible abnormalities upon inspection during the arm-raising maneuver or with hands on hips in the sitting position. There is no nipple retraction, discharge or nipple/areolar skin changes. At the RIGHT breast 8:00 5 CFN location there is a palpable mass that measures approximately 1.75 cm. No overlying skin changes, nontender.There are no other masses palpable in the sitting or supine positions. Ecchymoses are significant on the breast and anterior abdominal wall.    Breast, LEFT:  large sized, ptotic, symmetric with the contralateral side.  Breast skin is without erythema, edema, rashes.  There are no visible abnormalities upon inspection during the arm-raising maneuver or with hands on hips in the sitting position. There is no nipple retraction, discharge or nipple/areolar skin changes.There are no masses palpable in the sitting or supine " positions.  Well-healed radial biopsy   for radial scar.     Lymphatic:  There is no axillary, cervical, infraclavicular, or supraclavicular adenopathy bilaterally.  Specifically left axilla there is no palpable adenopathy.  There is a well-healed dermatotomy from her lymph node core biopsy.  Eyes:  Pupils are round and reactive to light.  Cardiovascular:  Heart rate and rhythm are regular.  Respiratory:  Lungs are clear bilaterally with no crackles or wheezes in any lung field.  Gastrointestinal:  Abdomen is soft, nondistended, and nontender.     Musculoskeletal:  Good strength in all 4 extremities.   There is good range of motion in both shoulders.    Skin:  No new skin lesions or rashes on the skin excluding the breast (see breast exam above).        Imagin/14/15 [facility]  MAMMO SCREEN EVITA  FLUHR, HEATHER  Benign dermal calcifications. BIRADS category 2: benign.    18 [facility]  MAMMO SCREEN EVITA  FLUHR, HEATHER  Scattered fibroglandular densities anterior one third retroareolar left breast interval development of a cluster of calcifications. BIRADS category 0.    8/3/18  [facility]  MAMMO DIAG LEFT  FLUHR, HEATHER  Microcalcifications within the outer inferior aspect of the left breast. BIRADS category 4.    2019 bilateral screening mammogram with 3D [facility]: Scattered fibroglandular densities.  No evidence for malignancy in either breast.  BI-RADS 1    [facility] point mammography 2020 bilateral screening mammogram with tomosynthesis.  Scattered areas of fibroglandular density.  There is a 1 cm mass in the lower left axilla, axillary tail that is slightly increased in size.  BI-RADS 0  2020 left diagnostic mammogram with left breast ultrasound [facility].  On mammogram visualization of a 1.1 cm round mass consistent with a lymph node in the axillary tail with a cortex that is thickened relative to other visualized node.  On ultrasound axillary tail  "and left axillary ultrasound showed on the order of 16 cm from the nipple a 1.1 cm lymph node with a thickened cortex.  Impression 1.1 cm rounded left axillary tail lymph node with a thickened cortex recommend ultrasound-guided left breast biopsy BI-RADS 4.    [facility] November 1, 2021 bilateral screening mammogram with tomosynthesis scattered fibroglandular densities.  BI-RADS 1  [facility]'s [place] March 23, 2023 bilateral screening mammogram with tomosynthesis.  Scattered areas of fibroglandular density.  Question architectural distortion lower outer middle right breast.  BI-RADS 0  Right diagnostic mammogram and right breast ultrasound April 27, 2023 [facility]:  Scattered areas of fibroglandular density.  Lower outer middle right breast there is a persistent area of distortion.  On ultrasound right breast 8:00, 5 cm from the nipple is a 9 x 8 mm irregular hypoechoic mass corresponding distortion which is suspicious.  BI-RADS 4  Also at 8:00, 1 cm from the nipple there is an incidental benign appearing ductal confluence.  We will get her scheduled for a same-day biopsy.    Reviewed Mrs. Bautista"s  MRI and postbiopsy mammogram together today with Dr. Tariq.  The lobular histology makes her MRI interpretation fairly difficult especially with the postbiopsy cavity in the center of the affected area.  When combining the 2 imaging studies there does appear to be a significant 8 cm clip displacement posteriorly.  The mammographic density prebiopsy appears to be the best representation of size estimation per Dr. Tariq, approximately 4.6 cm.  The lesion appears debulked on the postbiopsy mammogram and there are blood products and air in the center of the enhancement on the MRI however the clip is situated 8 cm posterior to this.    Pathology:   8-21-18- left breast stereotactic biopsy: Upper outer quadrant, 5:00: Small radial sclerosing lesion with usual hyperplasia, separate foci of " "usual hyperplasia, columnar cell change without atypia: Focal stromal fibrosis, apically metaplasia, and fibroadenomatoid change.     Concordant per Dr Tariq.        Final Diagnosis   BREAST, LEFT, DESIGNATED \"UPPER OUTER QUADRANT, APPROXIMATE 5 O'CLOCK POSITION\",   STEREOTACTIC BIOPSY:               SMALL RADIAL SCLEROSING LESION WITH ASSOCIATED DUCTAL HYPERPLASIA OF THE                      USUAL TYPE.               SEPARATE FOCI OF DUCTAL HYPERPLASIA OF THE USUAL TYPE.               FOCAL COLUMNAR CELL CHANGE WITHOUT ATYPIA.               FOCAL STROMAL FIBROSIS AND ASSOCIATED CALCIFICATIONS.               FOCAL APOCRINE METAPLASIA.               FOCAL FIBROADENOMATOID CHANGE WITH ASSOCIATED CALCIFICATIONS.               NO EVIDENCE OF MALIGNANCY.     TDJ/ IHC/a/JULIA     CPT CODES:  1. 22544       Pathology from  10-24-18 excision radial scar returend as radial scar and no atypia.          Final Diagnosis   LEFT BREAST LUMPECTOMY:           BENIGN BREAST TISSUE WITH DUCT HYPERPLASIA WITHOUT ATYPIA, ADENOSIS, AND RADIAL                   SCLEROSING LESION (2 MM).          SCAR AND CHANGES OF PRIOR BIOPSY.     COMMENT: I do not detect atypia. The margin is free of radial sclerosing lesion.     K/  IHC/TDJ     Pathology from in office core biopsy 9-9-20 returned as :  Final Diagnosis   1. Lymph Node, Left Low Axillary Tail, Core Biopsy:               A. Core-like fragments of reactive appearing lymph node.               B. No morphologic evidence of lymphoma or carcinoma.     2. Lymph Node, Left Low Axillary Tail, Core Biopsy (Gross Diagnosis Only):               A. Core-like tissue submitted to [facility] for Flow Cytometric Analysis.                     Please see the Flow Cytometry Summary below.     [facility]       Flow cytometry from [facility] dated September 9, 2020 returned as immunophenotyping fails to reveal a monoclonal B cell or abnormal T-cell immunophenotype.  A clonal T-LGL population is " detected by the beta analysis representing 7% of total events.  The significance of this is uncertain.  Correlation with tissue morphology with further studies as indicated is advised.     Pathology from an office biopsy May 18, 2023 returned as invasive lobular carcinoma, intermediate grade, 3, 2, 1, largest focus 1.2 cm.  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  No lymphovascular invasion.  Estrogen , progesterone 21-30, HER2/tommy 1+, Ki-67 8%.         Final Diagnosis    1. Right Breast, 8 o'clock, 5 cm from Nipple, Ultrasound-Guided Biopsy for a Mass: INVASIVE LOBULAR CARCINOMA.               A. Sanam histologic score II (tubules = 3, nuclei = 2, mitosis = 1).               B. Invasive carcinoma involves multiple tissue cores with the largest contiguous focus measuring 12 mm.               C. 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               D. No lymphovascular nor perineural invasion identified.                                swm/pkm      Electronically signed by Traci Burr MD on 5/20/2023 at 1142    Synoptic Checklist    Breast Biomarker Reporting Template  Protocol posted: 3/22/2023  BREAST: BIOMARKER REPORTING TEMPLATE - All Specimens  Test(s) Performed       Estrogen Receptor (ER) Status   Positive (greater than 10% of cells demonstrate nuclear positivity)    Percentage of Cells with Nuclear Positivity   %    Average Intensity of Staining   Strong    Test Type   Food and Drug Administration (FDA) cleared (test / vendor): LSAT Freedom    Primary Antibody   SP1    Scoring System   Jayant    Proportion Score   5    Intensity Score   3    Total Jayant Score   8    Test(s) Performed       Progesterone Receptor (PgR) Status   Positive    Percentage of Cells with Nuclear Positivity   21-30%    Average Intensity of Staining   Weak    Test Type   Food and Drug Administration (FDA) cleared (test / vendor):  ventana    Primary Antibody   1E2    Scoring System   Jayant    Proportion Score   3    Intensity Score   1    Total Jayant Score   4    Test(s) Performed       HER2 by Immunohistochemistry   Negative (Score 1+)    Test Type   Food and Drug Administration (FDA) cleared (test / vendor): ventana    Primary Antibody   4B5    Test(s) Performed   Ki-67    Ki-67 Percentage of Positive Nuclei   8 %   Primary Antibody   30-9    Cold Ischemia and Fixation Times   Meet requirements specified in latest version of the ASCO / CAP Guidelines    Cold Ischemia Time (minutes)   2 min   Fixation Time (hours)   7.5 hours   Testing Performed on Block Number(s)   1A    METHODS   Fixative   Formalin    Image Analysis   Not performed    .      Comment      Representative slides from this case are reviewed internally with Dr. Pena, who concurs.                       Note from Dr. 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     The patient has no significant symptoms.  She has no palpable lymphadenopathy or splenomegaly.  Her white count was normal.  This certainly could be a reactive nisreen population.  We discussed potential additional evaluation with peripheral blood flow cytometry and T/Gel gene rearrangement study as well as potential CT scans.  The patient declines further evaluation at this time she is agreeable to a course of observation.  We will see her back in 3 months with a CBC and evaluate clinically.    Procedures:  Percutaneous ultrasound-guided vacuum-assisted core breast/axillary tail lymph node biopsy 9-8-20  Indication:  ultrasound-and mammo visible rounded node with thickened cortex  Location: LEFT axillary tail, lowaxilla  Consent:  The risks, benefits, and alternatives to the procedure were discussed with the patient, who understood and wished to proceed.  The risks described included, but were not limited to, bleeding, infection,  pneumothorax, and inadequate sampling requiring either repeat percutaneous or open excisional biopsy.  Description of Procedure:   After the patient was positioned supine on the procedure table, I located the lesion using ultrasound.  I prepped and draped the breast/axilla skin in sterile fashion.  I anesthetized the breast skin at the site of anticipated mammotomy with 1% lidocaine with epinephrine.  I then anesthetized the underlying subcutaneous tissue and breast parenchyma surrounding the lesion with 1% lidocaine with epinephrine under ultrasound visualization and guidance. I then made a nicking incision with an 11blade and inserted the 14 G celero biopsy device from inferolateral to superomedial through the lesion under ultrasound guidance.   I then took 4 core samples  of the lesion under direct visualization with ultrasound.   I placed 2 of these cores in formalin and 2 of them in a cup for fresh and sent to pathology.  I withdrew the probe and placed a hydromark marker into the residual rounded node..  We held manual compression for 10 minutes, placed steri -strips at the mammotomy site, and wrapped the patient in a 6 inch super ace wrap and a cold pack.  Marker placed: hydromark  Tolerance: The patient tolerated the procedure well.  Disposition: We will see her back within a week to review her pathology.      Percutaneous ultrasound-guided vacuum-assisted excisional breast biopsy 5-18-23  Indication:  ultrasound-visible breast mass with mammographic and palpable correlate, BR4  Location: RIGHT 8:00 5 CFN  Consent:  The risks, benefits, and alternatives to the procedure were discussed with the patient, who understood and wished to proceed.  The risks described included, but were not limited to, bleeding, infection, pneumothorax, and inadequate sampling requiring either repeat percutaneous or open excisional biopsy.  Description of Procedure:   After the patient was positioned supine on the procedure table, I  "located the lesion using ultrasound.  I prepped and draped the breast skin in sterile fashion.  I anesthetized the breast skin at the site of anticipated mammotomy with 1% lidocaine with epinephrine.  I then anesthetized the underlying subcutaneous tissue and breast parenchyma surrounding the lesion with 1% lidocaine with epinephrine under ultrasound visualization and guidance. I then made a nicking incision with an 11blade and inserted the 10G encore biopsy device from inferolateral to superomedial under the lesion under ultrasound guidance.   I then took 10 sequential core samples using the automated sampling of the encore device, until the lesion mostly disappeared on ultrasound. I removed the probe, then placed a hydromark marker, using a stiff introducer, into the biopsy site. We held manual compression for 10 minutes, placed steri-strips at the mammotomy site, and wrapped the patient in a 6 inch super ace wrap with an ice-pack.  Marker placed: hydromark  Tolerance: The patient tolerated the procedure well.  Disposition: We will see her back within a week to review her pathology.      Assessment:   Diagnosis Plan   1. Lobular breast cancer, right        2. Bruising        3. Radial scar of breast        4. Breast calcifications on mammogram        5. Enlarged lymph nodes        6. FH: breast cancer        1-2  RIGHT 8:00, 5 CFN- 1.75 cm on exam, distortion on mammogram, 9x8mm on ultrasound 5-2023  Target for ultrasound guided biopsy today  BR4    Pathology from an office biopsy May 18, 2023 returned as invasive lobular carcinoma, intermediate grade, 3, 2, 1, largest focus 1.2 cm.  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  No lymphovascular invasion.  Estrogen , progesterone 21-30, HER2/tommy 1+, Ki-67 8%.    Reviewed Mrs. Mckeon\"s  MRI and postbiopsy mammogram together with Dr. Tariq.     The lobular histology makes her MRI interpretation fairly difficult especially with the " postbiopsy cavity in the center of the affected area.  When combining the 2 imaging studies there does appear to be a significant 8 cm clip displacement posteriorly.  The mammographic density prebiopsy appears to be the best representation of size estimation per Dr. Tariq, approximately 4.6 cm.  The lesion appears debulked on the postbiopsy mammogram and there are blood products and air in the center of the enhancement on the MRI however the clip is situated 8 cm posterior to this.    Clinical stage T2N0- IIA    3-4    LEFt breast central RA- 1.5 cm cluster- asymptomatic- stereotactic biopsy August 21, 2018 lateral to the lower outer quadrant left breast returned as   8-21-18- left breast stereotactic biopsy:  5:00: Small radial sclerosing lesion with usual hyperplasia, separate foci of usual hyperplasia, columnar cell change without atypia: Focal stromal fibrosis, apically metaplasia, and fibroadenomatoid change.   Concordant per Dr Tariq.    Hydromark in vicinity of pre biopsy calcifications.    Pathology from  10-24-18 excision radial scar returned as radial scar and no atypia.  Fu mammogram 7-2019 BR1      5-  LEFT UOQ axillary tail/low axilla-0.1 cm rounded lymph node with thickened cortex seen on August 2020 mammogram and ultrasound BI-RADS 4.  Target for biopsy 9-9-20    Pathology from in office core biopsy 9-9-20 returned as :  Final Diagnosis   1. Lymph Node, Left Low Axillary Tail, Core Biopsy:               A. Core-like fragments of reactive appearing lymph node.               B. No morphologic evidence of lymphoma or carcinoma.     2. Lymph Node, Left Low Axillary Tail, Core Biopsy (Gross Diagnosis Only):               A. Core-like tissue submitted to [facility] for Flow Cytometric Analysis.                     Please see the Flow Cytometry Summary below.     [facility]km       Flow cytometry from [facility] dated September 9, 2020 returned as immunophenotyping fails to reveal a monoclonal B cell or  abnormal T-cell immunophenotype.  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  The significance of this is uncertain.  Correlation with tissue morphology with further studies as indicated is advised.     -Addendum: Reviewed with Dr. Feliciano and he thinks that it would be best for him to just evaluate her in the clinic.     6-  Paternal 1/2 sister in her 70s  Paternal cousin in her 50s    Plan:  Mrs. Mckeon is here with her daughter today.    We reviewed her most recent imaging, imaging reports, pathology,  and her exam together today.  The lesion of concern on her imaging is: RIGHT breast mass 8:00 5 CFN    We discussed that on imaging the best estimate is 4.6 cm based on pre biopsy mammogram.    SHe is motivated for breast conserving treatment and knows that ultimately that mastectomy may be needed.    We reviewed the difference in systemic therapy versus locoregional. She knows that she will be seeing a medical oncologist in the adjuvant setting. We discussed that for early stage breast cancer, there are 2 options for locoregional treatment that have equivalent survival: total mastectomy versus lumpectomy and radiation, either with sentinel node biopsy.   She is interested in breast conservation.     We discussed the option of ipsilateral oncoplastic closure with contralateral  mastopexy, and she is interested in this.    We discussed the following procedure:  RIGHT breast bracketed needle localized lumpectomy, RIGHT axilla sentinel node biopsy, possible oncoplastics.      She understands the risks of lumpectomy including bleeding, infection, seroma and 25% chance of positive margins. She understands the risks of  sentinel node biopsy, including 7% chance of lymphedema, injury to nerves or vessels in the axilla, seroma. We discussed that we would review her pathology from her sentinel node procedure in consideration of a complete axillary dissection, after her  procedure.   NCCN guidelines have been followed.  We fitted her for a postoperative bra, gave her EMLA cream and tegederm for her sentinel node procedure, and had her to see our nurse navigator. She will receive a recommendation for radiation after surgery.      We discussed her FH and she and her daughter would like to proceed with genetic testing, so we will send an [facility] breast and gynecology panel on her today.    3-24-24 [facility] Her next routine mammogram        Shayna Blount MD     Today I spent 60 minutes doing the following: Reviewing records, labs, outside imaging and reports in preparation for the patient visit; obtaining medical history; performing the physical exam; counseling and educating the patient and any available family or caregivers; ordering medications, tests or procedures; coordinating care with any other physicians on her care team as needed, and documenting all of the above in the medical record as well as sending communications with her other healthcare professionals.    Next Appointment:  Return for surgery.      EMR Dragon/transcription disclaimer:    Much of this encounter note is an electronic transcription/translocation of spoken language to printed text.  The electronic translation of spoken language may permit erroneous, or at times, nonsensical words or phrases to be inadvertently transcribed.  Although I have reviewed the note from such areas, some may still exist.

## 2023-05-26 NOTE — TELEPHONE ENCOUNTER
DRUG CHANGE REQUEST FOR: ACCU-CHEK COMPACT PLUS test strip    The multiclix are being discontinued. If appropriate, would you kindly send over a new RX for lancets   LMOM WITH DETAILS. THYROID ULTRASOUND

## 2023-05-30 ENCOUNTER — TELEPHONE (OUTPATIENT)
Dept: INTERNAL MEDICINE | Facility: CLINIC | Age: 83
End: 2023-05-30

## 2023-05-30 NOTE — TELEPHONE ENCOUNTER
----- Message from Todd Georges sent at 5/26/2023  2:15 PM EDT -----  PATIENT RETURNED YOUR CALL.

## 2023-06-05 ENCOUNTER — TELEPHONE (OUTPATIENT)
Dept: SURGERY | Facility: CLINIC | Age: 83
End: 2023-06-05
Payer: MEDICARE

## 2023-06-05 DIAGNOSIS — Z80.3 FH: BREAST CANCER: Primary | ICD-10-CM

## 2023-06-05 DIAGNOSIS — C50.911 MALIGNANT NEOPLASM OF RIGHT FEMALE BREAST, UNSPECIFIED ESTROGEN RECEPTOR STATUS, UNSPECIFIED SITE OF BREAST: Primary | ICD-10-CM

## 2023-06-05 NOTE — TELEPHONE ENCOUNTER
[facility] breast and gynecology panel showed no mutations.  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  We will let her know no mutations.

## 2023-06-05 NOTE — TELEPHONE ENCOUNTER
I let her know that there were no mutations in her genetic testing.     I let her know that there were 2 variants that are of no consequence but we will have genetics call her to discuss.

## 2023-06-06 ENCOUNTER — PATIENT OUTREACH (OUTPATIENT)
Dept: OTHER | Facility: HOSPITAL | Age: 83
End: 2023-06-06
Payer: MEDICARE

## 2023-06-06 NOTE — PROGRESS NOTES
Referral received from Dr. Blount's office. I called Ms. Mckeon and introduced myself and navigational services. She stated the consult with Dr. Blount went well and she is leaning toward a lumpectomy with oncoplastic closure. She has a good understanding of her pathology and treatment options. She was able to verbalize teach back on her plan of care.      She stated she has a wonderful support system with her , family and friends. She stated she feels comfortable talking to them about needs or issues.      She stated she has some financial concerns and we discussed our financial aid application and she was interested. Will mail application with navigation folder. Otherwise, she has no transportation concerns or other resource needs at this time.      She stated she has been anxious and sad about her diagnosis an we discussed that can be normal. She was tearful on the phone today and we discussed our support options. She was interested in [facility] but wanted to wait on that for now. She will call me when ready to move forward. She was thankful for the information.      We discussed integrative therapies and other services [facility]. She will received a navigation folder with the following information in the mail:     [facility] Cancer Support Network, Cancer and Restorative Exercise (CARE), [employer] Exercise program, Guide for the Newly Diagnosed, [facility], [facility], Massage Therapy, Reiki Therapy, [employer] [facility], Cancer Nutrition, and Survivorship Clinic.     She verbalized appreciation for navigational services and she has my contact information and will call with any questions that arise.

## 2023-06-09 ENCOUNTER — HOSPITAL ENCOUNTER (OUTPATIENT)
Dept: ULTRASOUND IMAGING | Facility: HOSPITAL | Age: 83
Discharge: HOME | End: 2023-06-09
Payer: MEDICARE

## 2023-06-09 DIAGNOSIS — E04.1 THYROID NODULE: ICD-10-CM

## 2023-06-09 PROCEDURE — 76536 US EXAM OF HEAD AND NECK: CPT

## 2023-06-13 ENCOUNTER — CLINICAL SUPPORT (OUTPATIENT)
Dept: GENETICS | Facility: HOSPITAL | Age: 83
End: 2023-06-13
Payer: MEDICARE

## 2023-06-13 ENCOUNTER — TELEPHONE (OUTPATIENT)
Dept: GENETICS | Facility: HOSPITAL | Age: 83
End: 2023-06-13
Payer: MEDICARE

## 2023-06-13 DIAGNOSIS — E04.1 THYROID NODULE: Primary | ICD-10-CM

## 2023-06-13 DIAGNOSIS — Z80.3 FAMILY HISTORY OF BREAST CANCER: ICD-10-CM

## 2023-06-13 DIAGNOSIS — C50.911 MALIGNANT NEOPLASM OF RIGHT FEMALE BREAST, UNSPECIFIED ESTROGEN RECEPTOR STATUS, UNSPECIFIED SITE OF BREAST: ICD-10-CM

## 2023-06-13 DIAGNOSIS — Z13.79 GENETIC TESTING: Primary | ICD-10-CM

## 2023-06-13 RX ORDER — SERTRALINE HYDROCHLORIDE 100 MG/1
TABLET, FILM COATED ORAL
Qty: 90 TABLET | Refills: 1 | OUTPATIENT
Start: 2023-06-13

## 2023-06-13 NOTE — PROGRESS NOTES
Shu Mckeon is an 82-year-old female who was seen for genetic counseling to discuss prior genetic results. Genetic counseling was provided via telephone. Ms. Mckeon confirmed her name, date of birth, and that she was in [place] at the time of the appointment. She was diagnosed with a right-sided lobular breast cancer in May. She is planning to have a lumpectomy in August and possible radiation after her surgery. Ms. Mckeon went through menopause at age 55 and retains her uterus and ovaries. She had a colonoscopy about five years ago, and reports a history of a few polyps. She has a nodule on her thyroid that she is currently having worked up. Ms. Mckeon had genetic testing performed in Dr. Shayna Blount's office previously due to her personal and family history of breast cancer. Testing was performed vis the Breast and Gyn Cancers Panel through [employer] and analyzed 36 genes associated with an increased risk for cancer. Genetic testing was negative for known deleterious/pathogenic (harmful) mutations by sequencing and rearrangement testing for the genes on this panel. 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 VUSs are changes in DNA that may or may not affect the function of the gene. VUSs are frequently identified through multigene panel testing, given the number of genes being evaluated and the presence of genetic variation in the population. The majority (estimated to be >90%) of VUS's are eventually reclassified as benign gene changes. It is not recommended that any unaffected relatives be tested for a VUS at this time, and management should not be altered based on a VUS. Ms. Mckeon was interested in discussing the information related to this finding.       PERTINENT FAMILY HISTORY: (See attached pedigree)  Pat Half-Sister: Breast cancer, 70  Pat Cousin:  Breast cancer, 60s    Records were not able to be  obtained to confirm these cancer diagnoses.      RISK ASSESSMENT:  At this time, Ms. Mckeon's lifetime risk for other cancers, such as ovarian or colon, should be based on her personal health and family history.     GENETIC COUNSELING (30 minutes): We reviewed the family history information in detail. Cases of breast cancer follow three general patterns: sporadic, familial, and hereditary. While most cancer is sporadic, some cases appear to occur in family clusters. These cases are said to be familial and account for 10-20% of breast cancer cases. Familial cases may be due to a combination of shared genes and environmental factors among family members. In even fewer cases (5-10%), the risk for cancer is inherited, and the genes responsible for the increased cancer risk are known.       Family histories typical of hereditary cancer syndromes usually include multiple first- and second-degree relatives diagnosed with cancer types that define a syndrome. These cases tend to be diagnosed at younger-than-expected ages and can be bilateral or multifocal. The cancer in these families follows an autosomal dominant inheritance pattern, which indicates the likely presence of a mutation in a cancer susceptibility gene. Children and siblings of an individual believed to carry this mutation have a 50% chance of inheriting that mutation, thereby inheriting the increased risk to develop cancer. These mutations can be passed down from the maternal or the paternal lineage.     Hereditary breast cancer accounts for 5-10% of all cases of breast cancer. A significant proportion of hereditary breast cancer can be attributed to mutations in the BRCA1 and BRCA2 genes. Mutations in these genes confer an increased risk for breast cancer, ovarian cancer, male breast cancer, prostate cancer and pancreatic cancer. There are other genes that are known to be associated with an increased risk for breast cancer and other cancers. In order to get as  much information as possible regarding Ms. Mckeon personal risks and potential risks for her family, testing was pursued through a multigene panel that would look at several other genes known to increase the risk for breast cancer and other cancers.     GENETIC TESTING:  The risks, benefits and limitations of genetic testing and implications for clinical management following testing were reviewed. DNA test results can influence decisions regarding screening and prevention. Genetic testing can have significant psychological implications for both individuals and families.     We reviewed the possible results of genetic testing and the implications of both positive and negative findings. We discussed the possibility of identifying a variant of uncertain significance (VUS) via testing as well.     TEST RESULTS: Genetic testing was negative for known deleterious mutations by sequencing and rearrangement testing of the 36 genes with testing at [facility]. This negative result greatly lowers the risk of a hereditary cancer syndrome for Ms. Mckeon.    Two variants of uncertain significance were identified in the BARD1 gene and the STK11 gene. A VUS is a difference within a gene that may or may not impact the function of the gene. VUSs are not clinically actionable findings, and therefore this result does not impact management in any way. The majority of VUSs are ultimately reclassified to benign variants. The identification of a VUS is common in multigene panel testing, given the number of genes being evaluated and the presence of genetic variation in the population. If this variant is ever reclassified, a new report will be issued by the laboratory and released directly to the ordering physician. This assessment is based on the information provided at the time of the consultation.     CANCER PREVENTION:  Despite the negative genetic test results, Ms. Mckeon's female relatives may have a somewhat increased lifetime risk for  breast cancer based on family history. Female relatives could have a risk assessment performed using a family history-based model, such as the Tyrer-Cuzick model, to determine their individual risks. Given the increased risk, options available to individuals with an elevated lifetime risk for breast cancer were briefly discussed. Surveillance for relatives found to have an elevated lifetime risk of breast cancer (>20%, versus the average risk of 12%), based on NCCN guidelines, would consist of semi-annual clinical breast exams and monthly self-breast exams starting by age 18 and annual mammography starting 10 years younger than the earliest diagnosis in a close relative, or by age 40. According to an American Cancer Society expert panel and NCCN guidelines, annual breast MRI should be offered to women whose lifetime risk of breast cancer is 20-25 percent or more, also starting 10 years prior to the earliest diagnosis in the family, or by age 40.    PLAN: Genetic counseling remains available to Ms. Mckeon and her family. If she has any questions or concerns in the future, she is welcome to contact our genetics team at [phone].     Brooke Clark MS, [facility] [place], [facility]  Licensed Certified Genetic Counselor      Cc: Shu Blount MD

## 2023-06-16 RX ORDER — DENOSUMAB 60 MG/ML
INJECTION SUBCUTANEOUS
Qty: 1 EACH | Refills: 1 | Status: SHIPPED | OUTPATIENT
Start: 2023-06-16

## 2023-06-26 ENCOUNTER — TELEPHONE (OUTPATIENT)
Dept: INTERNAL MEDICINE | Facility: CLINIC | Age: 83
End: 2023-06-26

## 2023-06-26 NOTE — TELEPHONE ENCOUNTER
Caller: Shu Mckeon    Relationship: Self    Best call back number: [phone]    What orders are you requesting (i.e. lab or imaging): LABS FOR 6 M FOLLOW UP     In what timeframe would the patient need to come in: FIRST WEEK OF OCTOBER     Where will you receive your lab/imaging services:     Additional notes:

## 2023-07-18 PROBLEM — R19.4 CHANGE IN BOWEL HABITS: Status: ACTIVE | Noted: 2023-07-18

## 2023-07-18 PROBLEM — Z86.0100 PERSONAL HISTORY OF COLONIC POLYPS: Status: ACTIVE | Noted: 2023-07-18

## 2023-07-18 PROBLEM — R19.7 DIARRHEA: Status: ACTIVE | Noted: 2023-07-18

## 2023-07-18 PROBLEM — Z86.010 PERSONAL HISTORY OF COLONIC POLYPS: Status: ACTIVE | Noted: 2023-07-18

## 2023-07-26 ENCOUNTER — TELEPHONE (OUTPATIENT)
Dept: SURGERY | Facility: CLINIC | Age: 83
End: 2023-07-26
Payer: MEDICARE

## 2023-07-26 PROBLEM — C50.911 LOBULAR BREAST CANCER, RIGHT: Status: ACTIVE | Noted: 2023-07-26

## 2023-07-26 NOTE — TELEPHONE ENCOUNTER
Pt notified of the following appts:    PAT is scheduled on 07/28/2023 at 1:00    Surgery is scheduled at [facility] on 08/02/2023 arrive at 6:30    Post op appt is scheduled on 08/17/2023 at 12:30    Pt verbalized understanding of all appt dates, times and locations.     CMA

## 2023-07-28 ENCOUNTER — HOSPITAL ENCOUNTER (OUTPATIENT)
Dept: GENERAL RADIOLOGY | Facility: HOSPITAL | Age: 83
Discharge: HOME | End: 2023-07-28
Payer: MEDICARE

## 2023-07-28 ENCOUNTER — PRE-ADMISSION TESTING (OUTPATIENT)
Dept: PREADMISSION TESTING | Facility: HOSPITAL | Age: 83
End: 2023-07-28
Payer: MEDICARE

## 2023-07-28 DIAGNOSIS — C50.911 LOBULAR BREAST CANCER, RIGHT: ICD-10-CM

## 2023-07-28 DIAGNOSIS — Z01.818 PRE-OP EVALUATION: ICD-10-CM

## 2023-07-28 LAB
ALBUMIN SERPL-MCNC: 3.8 G/DL (ref 3.5–5.2)
ALBUMIN/GLOB SERPL: 1.3 G/DL
ALP SERPL-CCNC: 87 U/L (ref 39–117)
ALT SERPL W P-5'-P-CCNC: 14 U/L (ref 1–33)
ANION GAP SERPL CALCULATED.3IONS-SCNC: 9.6 MMOL/L (ref 5–15)
AST SERPL-CCNC: 18 U/L (ref 1–32)
BASOPHILS # BLD AUTO: 0.04 10*3/MM3 (ref 0–0.2)
BASOPHILS NFR BLD AUTO: 0.6 % (ref 0–1.5)
BILIRUB SERPL-MCNC: 0.4 MG/DL (ref 0–1.2)
BUN SERPL-MCNC: 13 MG/DL (ref 8–23)
BUN/CREAT SERPL: 21 (ref 7–25)
CALCIUM SPEC-SCNC: 9.4 MG/DL (ref 8.6–10.5)
CHLORIDE SERPL-SCNC: 102 MMOL/L (ref 98–107)
CO2 SERPL-SCNC: 25.4 MMOL/L (ref 22–29)
CREAT SERPL-MCNC: 0.62 MG/DL (ref 0.57–1)
DEPRECATED RDW RBC AUTO: 42.4 FL (ref 37–54)
EGFRCR SERPLBLD CKD-EPI 2021: 87.9 ML/MIN/1.73
EOSINOPHIL # BLD AUTO: 0.19 10*3/MM3 (ref 0–0.4)
EOSINOPHIL NFR BLD AUTO: 2.8 % (ref 0.3–6.2)
ERYTHROCYTE [DISTWIDTH] IN BLOOD BY AUTOMATED COUNT: 13.2 % (ref 12.3–15.4)
GLOBULIN UR ELPH-MCNC: 2.9 GM/DL
GLUCOSE SERPL-MCNC: 88 MG/DL (ref 65–99)
HCT VFR BLD AUTO: 38.2 % (ref 34–46.6)
HGB BLD-MCNC: 12.5 G/DL (ref 12–15.9)
IMM GRANULOCYTES # BLD AUTO: 0.01 10*3/MM3 (ref 0–0.05)
IMM GRANULOCYTES NFR BLD AUTO: 0.1 % (ref 0–0.5)
LYMPHOCYTES # BLD AUTO: 2.1 10*3/MM3 (ref 0.7–3.1)
LYMPHOCYTES NFR BLD AUTO: 30.7 % (ref 19.6–45.3)
MCH RBC QN AUTO: 28.7 PG (ref 26.6–33)
MCHC RBC AUTO-ENTMCNC: 32.7 G/DL (ref 31.5–35.7)
MCV RBC AUTO: 87.8 FL (ref 79–97)
MONOCYTES # BLD AUTO: 0.61 10*3/MM3 (ref 0.1–0.9)
MONOCYTES NFR BLD AUTO: 8.9 % (ref 5–12)
NEUTROPHILS NFR BLD AUTO: 3.9 10*3/MM3 (ref 1.7–7)
NEUTROPHILS NFR BLD AUTO: 56.9 % (ref 42.7–76)
NRBC BLD AUTO-RTO: 0 /100 WBC (ref 0–0.2)
PLATELET # BLD AUTO: 223 10*3/MM3 (ref 140–450)
PMV BLD AUTO: 10.2 FL (ref 6–12)
POTASSIUM SERPL-SCNC: 4.2 MMOL/L (ref 3.5–5.2)
PROT SERPL-MCNC: 6.7 G/DL (ref 6–8.5)
QT INTERVAL: 409 MS
RBC # BLD AUTO: 4.35 10*6/MM3 (ref 3.77–5.28)
SODIUM SERPL-SCNC: 137 MMOL/L (ref 136–145)
WBC NRBC COR # BLD: 6.85 10*3/MM3 (ref 3.4–10.8)

## 2023-07-28 PROCEDURE — 85025 COMPLETE CBC W/AUTO DIFF WBC: CPT

## 2023-07-28 PROCEDURE — 36415 COLL VENOUS BLD VENIPUNCTURE: CPT

## 2023-07-28 PROCEDURE — 71046 X-RAY EXAM CHEST 2 VIEWS: CPT

## 2023-07-28 PROCEDURE — 93005 ELECTROCARDIOGRAM TRACING: CPT

## 2023-07-28 PROCEDURE — 80053 COMPREHEN METABOLIC PANEL: CPT

## 2023-07-28 PROCEDURE — 93010 ELECTROCARDIOGRAM REPORT: CPT | Performed by: INTERNAL MEDICINE

## 2023-07-28 NOTE — DISCHARGE INSTRUCTIONS
Take the following medications the morning of surgery:    NO MEDS AM OF SURGERY    ARRIVE AT 6:30    If you are on prescription narcotic pain medication to control your pain you may also take that medication the morning of surgery.    General Instructions:  Do not eat solid food after midnight the night before surgery.  You may drink clear liquids day of surgery but must stop at least one hour before your hospital arrival time.  It is beneficial for you to have a clear drink that contains carbohydrates the day of surgery.  We suggest a 12 to 20 ounce bottle of Gatorade or Powerade for non-diabetic patients or a 12 to 20 ounce bottle of G2 or Powerade Zero for diabetic patients. (Pediatric patients, are not advised to drink a 12 to 20 ounce carbohydrate drink)    Clear liquids are liquids you can see through.  Nothing red in color.     Plain water                               Sports drinks  Sodas                                   Gelatin (Jell-O)  Fruit juices without pulp such as white grape juice and apple juice  Popsicles that contain no fruit or yogurt  Tea or coffee (no cream or milk added)  Gatorade / Powerade  G2 / Powerade Zero    Infants may have breast milk up to four hours before surgery.  Infants drinking formula may drink formula up to six hours before surgery.   Patients who avoid smoking, chewing tobacco and alcohol for 4 weeks prior to surgery have a reduced risk of post-operative complications.  Quit smoking as many days before surgery as you can.  Do not smoke, use chewing tobacco or drink alcohol the day of surgery.   If applicable bring your C-PAP/ BI-PAP machine in with you to preop day of surgery.  Bring any papers given to you in the doctor’s office.  Wear clean comfortable clothes.  Do not wear contact lenses, false eyelashes or make-up.  Bring a case for your glasses.   Bring crutches or walker if applicable.  Remove all piercings.  Leave jewelry and any other valuables at home.  Hair  extensions with metal clips must be removed prior to surgery.  The Pre-Admission Testing nurse will instruct you to bring medications if unable to obtain an accurate list in Pre-Admission Testing.            Preventing a Surgical Site Infection:  For 2 to 3 days before surgery, avoid shaving with a razor because the razor can irritate skin and make it easier to develop an infection.    Any areas of open skin can increase the risk of a post-operative wound infection by allowing bacteria to enter and travel throughout the body.  Notify your surgeon if you have any skin wounds / rashes even if it is not near the expected surgical site.  The area will need assessed to determine if surgery should be delayed until it is healed.  The night prior to surgery shower using a fresh bar of anti-bacterial soap (such as Dial) and clean washcloth.  Sleep in a clean bed with clean clothing.  Do not allow pets to sleep with you.  Shower on the morning of surgery using a fresh bar of anti-bacterial soap (such as Dial) and clean washcloth.  Dry with a clean towel and dress in clean clothing.  Ask your surgeon if you will be receiving antibiotics prior to surgery.  Make sure you, your family, and all healthcare providers clean their hands with soap and water or an alcohol based hand  before caring for you or your wound.    Day of surgery:  Your arrival time is approximately two hours before your scheduled surgery time.  Upon arrival, a Pre-op nurse and Anesthesiologist will review your health history, obtain vital signs, and answer questions you may have.  The only belongings needed at this time will be a list of your home medications and if applicable your C-PAP/BI-PAP machine.  A Pre-op nurse will start an IV and you may receive medication in preparation for surgery, including something to help you relax.     Please be aware that surgery does come with discomfort.  We want to make every effort to control your discomfort so  please discuss any uncontrolled symptoms with your nurse.   Your doctor will most likely have prescribed pain medications.      If you are going home after surgery you will receive individualized written care instructions before being discharged.  A responsible adult must drive you to and from the hospital on the day of your surgery and stay with you for 24 hours.  Discharge prescriptions can be filled by the hospital pharmacy during regular pharmacy hours.  If you are having surgery late in the day/evening your prescription may be e-prescribed to your pharmacy.  Please verify your pharmacy hours or chose a 24 hour pharmacy to avoid not having access to your prescription because your pharmacy has closed for the day.    If you are staying overnight following surgery, you will be transported to your hospital room following the recovery period.  [facility] has all private rooms.    If you have any questions please call Pre-Admission Testing at [phone].  Deductibles and co-payments are collected on the day of service. Please be prepared to pay the required co-pay, deductible or deposit on the day of service as defined by your plan.    Call your surgeon immediately if you experience any of the following symptoms:  Sore Throat  Shortness of Breath or difficulty breathing  Cough  Chills  Body soreness or muscle pain  Headache  Fever  New loss of taste or smell  Do not arrive for your surgery ill.  Your procedure will need to be rescheduled to another time.  You will need to call your physician before the day of surgery to avoid any unnecessary exposure to hospital staff as well as other patients.  CHLORHEXIDINE CLOTH INSTRUCTIONS  The morning of surgery follow these instructions using the Chlorhexidine cloths you've been given.  These steps reduce bacteria on the body.  Do not use the cloths near your eyes, ears mouth, genitalia or on open wounds.  Throw the cloths away after use but do not try to  flush them down a toilet.      Open and remove one cloth at a time from the package.    Leave the cloth unfolded and begin the bathing.  Massage the skin with the cloths using gentle pressure to remove bacteria.  Do not scrub harshly.   Follow the steps below with one 2% CHG cloth per area (6 total cloths).  One cloth for neck, shoulders and chest.  One cloth for both arms, hands, fingers and underarms (do underarms last).  One cloth for the abdomen followed by groin.  One cloth for right leg and foot including between the toes.  One cloth for left leg and foot including between the toes.  The last cloth is to be used for the back of the neck, back and buttocks.    Allow the CHG to air dry 3 minutes on the skin which will give it time to work and decrease the chance of irritation.  The skin may feel sticky until it is dry.  Do not rinse with water or any other liquid or you will lose the beneficial effects of the CHG.  If mild skin irritation occurs, do rinse the skin to remove the CHG.  Report this to the nurse at time of admission.  Do not apply lotions, creams, ointments, deodorants or perfumes after using the clothes. Dress in clean clothes before coming to the hospital.

## 2023-08-02 ENCOUNTER — HOSPITAL ENCOUNTER (OUTPATIENT)
Dept: NUCLEAR MEDICINE | Facility: HOSPITAL | Age: 83
Discharge: HOME | End: 2023-08-02
Payer: MEDICARE

## 2023-08-02 ENCOUNTER — ANESTHESIA (OUTPATIENT)
Dept: PERIOP | Facility: HOSPITAL | Age: 83
End: 2023-08-02
Payer: MEDICARE

## 2023-08-02 ENCOUNTER — ANCILLARY PROCEDURE (OUTPATIENT)
Dept: LAB | Facility: HOSPITAL | Age: 83
End: 2023-08-02
Payer: MEDICARE

## 2023-08-02 ENCOUNTER — HOSPITAL ENCOUNTER (OUTPATIENT)
Dept: MAMMOGRAPHY | Facility: HOSPITAL | Age: 83
Discharge: HOME | End: 2023-08-02
Payer: MEDICARE

## 2023-08-02 ENCOUNTER — TRANSCRIBE ORDERS (OUTPATIENT)
Dept: LAB | Facility: HOSPITAL | Age: 83
End: 2023-08-02
Payer: MEDICARE

## 2023-08-02 ENCOUNTER — HOSPITAL ENCOUNTER (OUTPATIENT)
Facility: HOSPITAL | Age: 83
Setting detail: HOSPITAL OUTPATIENT SURGERY
Discharge: HOME | End: 2023-08-02
Attending: SURGERY | Admitting: SURGERY
Payer: MEDICARE

## 2023-08-02 ENCOUNTER — ANESTHESIA EVENT (OUTPATIENT)
Dept: PERIOP | Facility: HOSPITAL | Age: 83
End: 2023-08-02
Payer: MEDICARE

## 2023-08-02 ENCOUNTER — APPOINTMENT (OUTPATIENT)
Dept: GENERAL RADIOLOGY | Facility: HOSPITAL | Age: 83
End: 2023-08-02
Payer: MEDICARE

## 2023-08-02 ENCOUNTER — APPOINTMENT (OUTPATIENT)
Dept: MAMMOGRAPHY | Facility: HOSPITAL | Age: 83
End: 2023-08-02
Payer: MEDICARE

## 2023-08-02 VITALS
BODY MASS INDEX: 29.53 KG/M2 | TEMPERATURE: 97.6 F | RESPIRATION RATE: 16 BRPM | HEART RATE: 71 BPM | WEIGHT: 173 LBS | SYSTOLIC BLOOD PRESSURE: 126 MMHG | HEIGHT: 64 IN | OXYGEN SATURATION: 97 % | DIASTOLIC BLOOD PRESSURE: 68 MMHG

## 2023-08-02 DIAGNOSIS — C50.911 LOBULAR BREAST CANCER, RIGHT: ICD-10-CM

## 2023-08-02 DIAGNOSIS — C50.919 INVASIVE LOBULAR CARCINOMA OF BREAST IN FEMALE: ICD-10-CM

## 2023-08-02 DIAGNOSIS — C50.919 INVASIVE LOBULAR CARCINOMA OF BREAST IN FEMALE: Primary | ICD-10-CM

## 2023-08-02 LAB
GLUCOSE BLDC GLUCOMTR-MCNC: 140 MG/DL (ref 70–130)
GLUCOSE BLDC GLUCOMTR-MCNC: 141 MG/DL (ref 70–130)

## 2023-08-02 PROCEDURE — 25010000002 FENTANYL CITRATE (PF) 50 MCG/ML SOLUTION: Performed by: NURSE ANESTHETIST, CERTIFIED REGISTERED

## 2023-08-02 PROCEDURE — 25010000002 MIDAZOLAM PER 1 MG: Performed by: ANESTHESIOLOGY

## 2023-08-02 PROCEDURE — A9520 TC99 TILMANOCEPT DIAG 0.5MCI: HCPCS | Performed by: SURGERY

## 2023-08-02 PROCEDURE — 0 TECHETIUM TC99M TILMANOCEPT: Performed by: SURGERY

## 2023-08-02 PROCEDURE — 76098 X-RAY EXAM SURGICAL SPECIMEN: CPT

## 2023-08-02 PROCEDURE — C1819 TISSUE LOCALIZATION-EXCISION: HCPCS

## 2023-08-02 PROCEDURE — S0260 H&P FOR SURGERY: HCPCS | Performed by: SURGERY

## 2023-08-02 PROCEDURE — 25010000002 GENTAMICIN PER 80 MG: Performed by: PLASTIC SURGERY

## 2023-08-02 PROCEDURE — 38525 BIOPSY/REMOVAL LYMPH NODES: CPT | Performed by: SURGERY

## 2023-08-02 PROCEDURE — 25010000002 ONDANSETRON PER 1 MG: Performed by: NURSE ANESTHETIST, CERTIFIED REGISTERED

## 2023-08-02 PROCEDURE — 25010000002 BUPIVACAINE (PF) 0.25 % SOLUTION: Performed by: PLASTIC SURGERY

## 2023-08-02 PROCEDURE — 19301 PARTIAL MASTECTOMY: CPT | Performed by: SURGERY

## 2023-08-02 PROCEDURE — 82948 REAGENT STRIP/BLOOD GLUCOSE: CPT

## 2023-08-02 PROCEDURE — 25010000002 DEXAMETHASONE SODIUM PHOSPHATE 20 MG/5ML SOLUTION: Performed by: NURSE ANESTHETIST, CERTIFIED REGISTERED

## 2023-08-02 PROCEDURE — 25010000002 PROPOFOL 10 MG/ML EMULSION: Performed by: NURSE ANESTHETIST, CERTIFIED REGISTERED

## 2023-08-02 PROCEDURE — 88307 TISSUE EXAM BY PATHOLOGIST: CPT | Performed by: SURGERY

## 2023-08-02 PROCEDURE — 78195 LYMPH SYSTEM IMAGING: CPT | Performed by: SURGERY

## 2023-08-02 PROCEDURE — 88341 IMHCHEM/IMCYTCHM EA ADD ANTB: CPT | Performed by: SURGERY

## 2023-08-02 PROCEDURE — 88305 TISSUE EXAM BY PATHOLOGIST: CPT | Performed by: SURGERY

## 2023-08-02 PROCEDURE — 38792 RA TRACER ID OF SENTINL NODE: CPT

## 2023-08-02 PROCEDURE — 0 LIDOCAINE 1 % SOLUTION: Performed by: SURGERY

## 2023-08-02 PROCEDURE — 25010000002 CEFAZOLIN IN DEXTROSE 2000 MG/ 100 ML SOLUTION: Performed by: SURGERY

## 2023-08-02 PROCEDURE — 25010000002 HYDROMORPHONE 1 MG/ML SOLUTION: Performed by: NURSE ANESTHETIST, CERTIFIED REGISTERED

## 2023-08-02 PROCEDURE — 25010000002 SUGAMMADEX 200 MG/2ML SOLUTION: Performed by: NURSE ANESTHETIST, CERTIFIED REGISTERED

## 2023-08-02 PROCEDURE — 25010000002 HYDROMORPHONE PER 4 MG: Performed by: NURSE ANESTHETIST, CERTIFIED REGISTERED

## 2023-08-02 PROCEDURE — 88342 IMHCHEM/IMCYTCHM 1ST ANTB: CPT | Performed by: SURGERY

## 2023-08-02 PROCEDURE — 25010000002 CEFAZOLIN IN DEXTROSE 2-4 GM/100ML-% SOLUTION: Performed by: SURGERY

## 2023-08-02 DEVICE — CLIP LIGAT VASC HORIZON TI MD BLU 6CT: Type: IMPLANTABLE DEVICE | Site: BREAST | Status: FUNCTIONAL

## 2023-08-02 DEVICE — KNOTLESS TISSUE CONTROL DEVICE, UNDYED UNIDIRECTIONAL (ANTIBACTERIAL) SYNTHETIC ABSORBABLE DEVICE
Type: IMPLANTABLE DEVICE | Site: BREAST | Status: FUNCTIONAL
Brand: STRATAFIX

## 2023-08-02 DEVICE — CLIP LIGAT VASC HORIZON TI SM/WD RD 6CT: Type: IMPLANTABLE DEVICE | Site: BREAST | Status: FUNCTIONAL

## 2023-08-02 RX ORDER — PROPOFOL 10 MG/ML
VIAL (ML) INTRAVENOUS AS NEEDED
Status: DISCONTINUED | OUTPATIENT
Start: 2023-08-02 | End: 2023-08-02 | Stop reason: SURG

## 2023-08-02 RX ORDER — ONDANSETRON 2 MG/ML
4 INJECTION INTRAMUSCULAR; INTRAVENOUS ONCE AS NEEDED
Status: COMPLETED | OUTPATIENT
Start: 2023-08-02 | End: 2023-08-02

## 2023-08-02 RX ORDER — PROMETHAZINE HYDROCHLORIDE 25 MG/1
25 TABLET ORAL ONCE AS NEEDED
Status: DISCONTINUED | OUTPATIENT
Start: 2023-08-02 | End: 2023-08-02 | Stop reason: HOSPADM

## 2023-08-02 RX ORDER — FENTANYL CITRATE 50 UG/ML
INJECTION, SOLUTION INTRAMUSCULAR; INTRAVENOUS AS NEEDED
Status: DISCONTINUED | OUTPATIENT
Start: 2023-08-02 | End: 2023-08-02 | Stop reason: SURG

## 2023-08-02 RX ORDER — FLUMAZENIL 0.1 MG/ML
0.2 INJECTION INTRAVENOUS AS NEEDED
Status: DISCONTINUED | OUTPATIENT
Start: 2023-08-02 | End: 2023-08-02 | Stop reason: HOSPADM

## 2023-08-02 RX ORDER — NALOXONE HCL 0.4 MG/ML
0.2 VIAL (ML) INJECTION AS NEEDED
Status: DISCONTINUED | OUTPATIENT
Start: 2023-08-02 | End: 2023-08-02 | Stop reason: HOSPADM

## 2023-08-02 RX ORDER — DIPHENHYDRAMINE HYDROCHLORIDE 50 MG/ML
12.5 INJECTION INTRAMUSCULAR; INTRAVENOUS
Status: DISCONTINUED | OUTPATIENT
Start: 2023-08-02 | End: 2023-08-02 | Stop reason: HOSPADM

## 2023-08-02 RX ORDER — SODIUM CHLORIDE, SODIUM LACTATE, POTASSIUM CHLORIDE, CALCIUM CHLORIDE 600; 310; 30; 20 MG/100ML; MG/100ML; MG/100ML; MG/100ML
100 INJECTION, SOLUTION INTRAVENOUS CONTINUOUS
Status: DISCONTINUED | OUTPATIENT
Start: 2023-08-02 | End: 2023-08-02 | Stop reason: HOSPADM

## 2023-08-02 RX ORDER — SODIUM CHLORIDE 0.9 % (FLUSH) 0.9 %
3 SYRINGE (ML) INJECTION EVERY 12 HOURS SCHEDULED
Status: DISCONTINUED | OUTPATIENT
Start: 2023-08-02 | End: 2023-08-02 | Stop reason: HOSPADM

## 2023-08-02 RX ORDER — EPHEDRINE SULFATE 50 MG/ML
5 INJECTION, SOLUTION INTRAVENOUS ONCE AS NEEDED
Status: DISCONTINUED | OUTPATIENT
Start: 2023-08-02 | End: 2023-08-02 | Stop reason: HOSPADM

## 2023-08-02 RX ORDER — EPHEDRINE SULFATE 50 MG/ML
INJECTION INTRAVENOUS AS NEEDED
Status: DISCONTINUED | OUTPATIENT
Start: 2023-08-02 | End: 2023-08-02 | Stop reason: SURG

## 2023-08-02 RX ORDER — FENTANYL CITRATE 50 UG/ML
50 INJECTION, SOLUTION INTRAMUSCULAR; INTRAVENOUS ONCE AS NEEDED
Status: DISCONTINUED | OUTPATIENT
Start: 2023-08-02 | End: 2023-08-02 | Stop reason: HOSPADM

## 2023-08-02 RX ORDER — SODIUM CHLORIDE 0.9 % (FLUSH) 0.9 %
3-10 SYRINGE (ML) INJECTION AS NEEDED
Status: DISCONTINUED | OUTPATIENT
Start: 2023-08-02 | End: 2023-08-02 | Stop reason: HOSPADM

## 2023-08-02 RX ORDER — SODIUM CHLORIDE, SODIUM LACTATE, POTASSIUM CHLORIDE, CALCIUM CHLORIDE 600; 310; 30; 20 MG/100ML; MG/100ML; MG/100ML; MG/100ML
INJECTION, SOLUTION INTRAVENOUS CONTINUOUS PRN
Status: DISCONTINUED | OUTPATIENT
Start: 2023-08-02 | End: 2023-08-02 | Stop reason: SURG

## 2023-08-02 RX ORDER — MIDAZOLAM HYDROCHLORIDE 1 MG/ML
0.5 INJECTION INTRAMUSCULAR; INTRAVENOUS
Status: DISCONTINUED | OUTPATIENT
Start: 2023-08-02 | End: 2023-08-02 | Stop reason: HOSPADM

## 2023-08-02 RX ORDER — HYDROCODONE BITARTRATE AND ACETAMINOPHEN 7.5; 325 MG/1; MG/1
1 TABLET ORAL EVERY 4 HOURS PRN
Status: DISCONTINUED | OUTPATIENT
Start: 2023-08-02 | End: 2023-08-02 | Stop reason: HOSPADM

## 2023-08-02 RX ORDER — PROMETHAZINE HYDROCHLORIDE 25 MG/1
25 SUPPOSITORY RECTAL ONCE AS NEEDED
Status: DISCONTINUED | OUTPATIENT
Start: 2023-08-02 | End: 2023-08-02 | Stop reason: HOSPADM

## 2023-08-02 RX ORDER — CEFAZOLIN SODIUM 2 G/100ML
2 INJECTION, SOLUTION INTRAVENOUS ONCE
Status: COMPLETED | OUTPATIENT
Start: 2023-08-02 | End: 2023-08-02

## 2023-08-02 RX ORDER — SODIUM CHLORIDE, SODIUM LACTATE, POTASSIUM CHLORIDE, CALCIUM CHLORIDE 600; 310; 30; 20 MG/100ML; MG/100ML; MG/100ML; MG/100ML
9 INJECTION, SOLUTION INTRAVENOUS CONTINUOUS
Status: DISCONTINUED | OUTPATIENT
Start: 2023-08-02 | End: 2023-08-02 | Stop reason: HOSPADM

## 2023-08-02 RX ORDER — FAMOTIDINE 10 MG/ML
20 INJECTION, SOLUTION INTRAVENOUS ONCE
Status: COMPLETED | OUTPATIENT
Start: 2023-08-02 | End: 2023-08-02

## 2023-08-02 RX ORDER — BUPIVACAINE HYDROCHLORIDE 2.5 MG/ML
INJECTION, SOLUTION EPIDURAL; INFILTRATION; INTRACAUDAL AS NEEDED
Status: DISCONTINUED | OUTPATIENT
Start: 2023-08-02 | End: 2023-08-02 | Stop reason: HOSPADM

## 2023-08-02 RX ORDER — LIDOCAINE HYDROCHLORIDE 10 MG/ML
9 INJECTION, SOLUTION INFILTRATION; PERINEURAL ONCE
Status: COMPLETED | OUTPATIENT
Start: 2023-08-02 | End: 2023-08-02

## 2023-08-02 RX ORDER — LABETALOL HYDROCHLORIDE 5 MG/ML
5 INJECTION, SOLUTION INTRAVENOUS
Status: DISCONTINUED | OUTPATIENT
Start: 2023-08-02 | End: 2023-08-02 | Stop reason: HOSPADM

## 2023-08-02 RX ORDER — DROPERIDOL 2.5 MG/ML
0.62 INJECTION, SOLUTION INTRAMUSCULAR; INTRAVENOUS
Status: DISCONTINUED | OUTPATIENT
Start: 2023-08-02 | End: 2023-08-02 | Stop reason: HOSPADM

## 2023-08-02 RX ORDER — LIDOCAINE HYDROCHLORIDE 20 MG/ML
INJECTION, SOLUTION INFILTRATION; PERINEURAL AS NEEDED
Status: DISCONTINUED | OUTPATIENT
Start: 2023-08-02 | End: 2023-08-02 | Stop reason: SURG

## 2023-08-02 RX ORDER — DEXAMETHASONE SODIUM PHOSPHATE 4 MG/ML
INJECTION, SOLUTION INTRA-ARTICULAR; INTRALESIONAL; INTRAMUSCULAR; INTRAVENOUS; SOFT TISSUE AS NEEDED
Status: DISCONTINUED | OUTPATIENT
Start: 2023-08-02 | End: 2023-08-02 | Stop reason: SURG

## 2023-08-02 RX ORDER — HYDROMORPHONE HYDROCHLORIDE 1 MG/ML
0.25 INJECTION, SOLUTION INTRAMUSCULAR; INTRAVENOUS; SUBCUTANEOUS
Status: DISCONTINUED | OUTPATIENT
Start: 2023-08-02 | End: 2023-08-02 | Stop reason: HOSPADM

## 2023-08-02 RX ORDER — MULTIPLE VITAMINS W/ MINERALS TAB 9MG-400MCG
1 TAB ORAL DAILY
COMMUNITY

## 2023-08-02 RX ORDER — FENTANYL CITRATE 50 UG/ML
25 INJECTION, SOLUTION INTRAMUSCULAR; INTRAVENOUS
Status: DISCONTINUED | OUTPATIENT
Start: 2023-08-02 | End: 2023-08-02 | Stop reason: HOSPADM

## 2023-08-02 RX ORDER — HYDRALAZINE HYDROCHLORIDE 20 MG/ML
5 INJECTION INTRAMUSCULAR; INTRAVENOUS
Status: DISCONTINUED | OUTPATIENT
Start: 2023-08-02 | End: 2023-08-02 | Stop reason: HOSPADM

## 2023-08-02 RX ORDER — DIAZEPAM 5 MG/1
5 TABLET ORAL ONCE
Status: COMPLETED | OUTPATIENT
Start: 2023-08-02 | End: 2023-08-02

## 2023-08-02 RX ORDER — ROCURONIUM BROMIDE 10 MG/ML
INJECTION, SOLUTION INTRAVENOUS AS NEEDED
Status: DISCONTINUED | OUTPATIENT
Start: 2023-08-02 | End: 2023-08-02 | Stop reason: SURG

## 2023-08-02 RX ORDER — SCOLOPAMINE TRANSDERMAL SYSTEM 1 MG/1
1 PATCH, EXTENDED RELEASE TRANSDERMAL ONCE
Status: DISCONTINUED | OUTPATIENT
Start: 2023-08-02 | End: 2023-08-02 | Stop reason: HOSPADM

## 2023-08-02 RX ORDER — IPRATROPIUM BROMIDE AND ALBUTEROL SULFATE 2.5; .5 MG/3ML; MG/3ML
3 SOLUTION RESPIRATORY (INHALATION) ONCE AS NEEDED
Status: DISCONTINUED | OUTPATIENT
Start: 2023-08-02 | End: 2023-08-02 | Stop reason: HOSPADM

## 2023-08-02 RX ORDER — ONDANSETRON 2 MG/ML
INJECTION INTRAMUSCULAR; INTRAVENOUS AS NEEDED
Status: DISCONTINUED | OUTPATIENT
Start: 2023-08-02 | End: 2023-08-02 | Stop reason: SURG

## 2023-08-02 RX ORDER — HYDROCODONE BITARTRATE AND ACETAMINOPHEN 5; 325 MG/1; MG/1
1 TABLET ORAL ONCE AS NEEDED
Status: DISCONTINUED | OUTPATIENT
Start: 2023-08-02 | End: 2023-08-02 | Stop reason: HOSPADM

## 2023-08-02 RX ADMIN — DIAZEPAM 5 MG: 5 TABLET ORAL at 07:56

## 2023-08-02 RX ADMIN — LIDOCAINE HYDROCHLORIDE 9 ML: 10 INJECTION, SOLUTION INFILTRATION; PERINEURAL at 08:52

## 2023-08-02 RX ADMIN — HYDROCODONE BITARTRATE AND ACETAMINOPHEN 1 TABLET: 5; 325 TABLET ORAL at 18:17

## 2023-08-02 RX ADMIN — EPHEDRINE SULFATE 10 MG: 50 INJECTION INTRAVENOUS at 14:40

## 2023-08-02 RX ADMIN — SCOPALAMINE 1 PATCH: 1 PATCH, EXTENDED RELEASE TRANSDERMAL at 11:07

## 2023-08-02 RX ADMIN — ONDANSETRON 4 MG: 2 INJECTION INTRAMUSCULAR; INTRAVENOUS at 16:08

## 2023-08-02 RX ADMIN — CEFAZOLIN SODIUM 2 G: 2 INJECTION, SOLUTION INTRAVENOUS at 11:09

## 2023-08-02 RX ADMIN — HYDROMORPHONE HYDROCHLORIDE 0.5 MG: 1 INJECTION, SOLUTION INTRAMUSCULAR; INTRAVENOUS; SUBCUTANEOUS at 14:02

## 2023-08-02 RX ADMIN — ONDANSETRON 4 MG: 2 INJECTION INTRAMUSCULAR; INTRAVENOUS at 11:21

## 2023-08-02 RX ADMIN — PROPOFOL 150 MG: 10 INJECTION, EMULSION INTRAVENOUS at 11:21

## 2023-08-02 RX ADMIN — MIDAZOLAM 0.5 MG: 1 INJECTION INTRAMUSCULAR; INTRAVENOUS at 11:02

## 2023-08-02 RX ADMIN — FENTANYL CITRATE 25 MCG: 50 INJECTION, SOLUTION INTRAMUSCULAR; INTRAVENOUS at 15:43

## 2023-08-02 RX ADMIN — ROCURONIUM BROMIDE 10 MG: 10 INJECTION, SOLUTION INTRAVENOUS at 13:00

## 2023-08-02 RX ADMIN — CEFAZOLIN SODIUM 2 G: 2 INJECTION, SOLUTION INTRAVENOUS at 15:20

## 2023-08-02 RX ADMIN — SODIUM CHLORIDE, POTASSIUM CHLORIDE, SODIUM LACTATE AND CALCIUM CHLORIDE: 600; 310; 30; 20 INJECTION, SOLUTION INTRAVENOUS at 13:15

## 2023-08-02 RX ADMIN — EPHEDRINE SULFATE 10 MG: 50 INJECTION INTRAVENOUS at 12:20

## 2023-08-02 RX ADMIN — FAMOTIDINE 20 MG: 10 INJECTION INTRAVENOUS at 11:09

## 2023-08-02 RX ADMIN — FENTANYL CITRATE 25 MCG: 50 INJECTION, SOLUTION INTRAMUSCULAR; INTRAVENOUS at 15:48

## 2023-08-02 RX ADMIN — HYDROMORPHONE HYDROCHLORIDE 0.25 MG: 1 INJECTION, SOLUTION INTRAMUSCULAR; INTRAVENOUS; SUBCUTANEOUS at 15:57

## 2023-08-02 RX ADMIN — ROCURONIUM BROMIDE 50 MG: 10 INJECTION, SOLUTION INTRAVENOUS at 11:21

## 2023-08-02 RX ADMIN — TILMANOCEPT 1 DOSE: KIT at 09:53

## 2023-08-02 RX ADMIN — SODIUM CHLORIDE, POTASSIUM CHLORIDE, SODIUM LACTATE AND CALCIUM CHLORIDE 100 ML/HR: 600; 310; 30; 20 INJECTION, SOLUTION INTRAVENOUS at 11:09

## 2023-08-02 RX ADMIN — SODIUM CHLORIDE, POTASSIUM CHLORIDE, SODIUM LACTATE AND CALCIUM CHLORIDE 9 ML/HR: 600; 310; 30; 20 INJECTION, SOLUTION INTRAVENOUS at 11:09

## 2023-08-02 RX ADMIN — LIDOCAINE HYDROCHLORIDE 100 MG: 20 INJECTION, SOLUTION INFILTRATION; PERINEURAL at 11:21

## 2023-08-02 RX ADMIN — DEXAMETHASONE SODIUM PHOSPHATE 8 MG: 4 INJECTION, SOLUTION INTRAMUSCULAR; INTRAVENOUS at 11:21

## 2023-08-02 RX ADMIN — HYDROMORPHONE HYDROCHLORIDE 0.25 MG: 1 INJECTION, SOLUTION INTRAMUSCULAR; INTRAVENOUS; SUBCUTANEOUS at 15:43

## 2023-08-02 RX ADMIN — PROPOFOL 50 MG: 10 INJECTION, EMULSION INTRAVENOUS at 12:09

## 2023-08-02 RX ADMIN — SUGAMMADEX 200 MG: 100 INJECTION, SOLUTION INTRAVENOUS at 15:12

## 2023-08-02 RX ADMIN — FENTANYL CITRATE 50 MCG: 50 INJECTION, SOLUTION INTRAMUSCULAR; INTRAVENOUS at 12:09

## 2023-08-02 RX ADMIN — FENTANYL CITRATE 50 MCG: 50 INJECTION, SOLUTION INTRAMUSCULAR; INTRAVENOUS at 11:21

## 2023-08-02 RX ADMIN — FENTANYL CITRATE 25 MCG: 50 INJECTION, SOLUTION INTRAMUSCULAR; INTRAVENOUS at 15:57

## 2023-08-02 RX ADMIN — EPHEDRINE SULFATE 10 MG: 50 INJECTION INTRAVENOUS at 11:41

## 2023-08-02 RX ADMIN — HYDROMORPHONE HYDROCHLORIDE 0.25 MG: 1 INJECTION, SOLUTION INTRAMUSCULAR; INTRAVENOUS; SUBCUTANEOUS at 15:48

## 2023-08-02 RX ADMIN — CEFAZOLIN SODIUM 2 G: 2 INJECTION, SOLUTION INTRAVENOUS at 11:29

## 2023-08-02 RX ADMIN — SODIUM CHLORIDE, POTASSIUM CHLORIDE, SODIUM LACTATE AND CALCIUM CHLORIDE: 600; 310; 30; 20 INJECTION, SOLUTION INTRAVENOUS at 11:14

## 2023-08-02 NOTE — DISCHARGE INSTRUCTIONS
Scopolamine Patch  This patch has been applied to the skin behind one of your ears.  It may stay in place up to 24 hours. You may remove it at any time after your surgery; however, it should be removed after you are up and walking around the next day.  This medicine reduces stomach upset. Side effects may include: dry mouth, dizziness, sleepiness, constipation, or upset stomach.  An allergy would show up as: a rash, itching, wheezing or shortness of breath.  Follow these instructions:  Do not drink alcohol, drive or operate machinery while taking this medicine.  Wear only 1 patch at a time. You can leave the patch on for up to 24 hours.  When you remove the patch, fold it in half with the sticky sides together and throw it away. Wash your hands and the area under the patch.  Do not touch your eye with your hand if it has touched the patch.  Wash your hands well before and after touching the patch.  Sit or stand slowly to avoid dizziness.  Call your doctor if you have:  Any sign of allergy  No relief  Trouble passing urine  Any new or severe symptoms                                                                                                                                                         Dr. Shayna Blount  [address] [place] [address]  [phone]    Postoperative Discharge Instructions        Strip PERLITA drains twice a day and as needed to keep drains working well. Record output every 4-8 hours as needed until your visit to the clinic. Calculate daily totals for each 24 hour period. Record the drain output on the drain record sheet that has been given to you.   Keep 6 inch ace wrap in place and dressings dry postoperatively for a total of 72 hours. May then remove dressings and ACE wrap. Can shower and get affected area wet after dressings are removed. Prefer no soaking in the tub for one week. Leave steri-strips in place.   A responsible adult should accompany the patient on discharge and for 24 hours following  surgery.   No heavy lifting (>5 lbs) or strenuous upper arm activity, and no raising of arms over the head until followup appointment.   For 24 hours postoperatively, or while taking pain or nausea medications - Do not drive, operate machinery or drink alcohol.       Call the office for any of the following symptoms:    Persistent bleeding   Excessive bruising or swelling   Excessive sleepiness or trouble breathing   Severe pain   Persistent nausea or vomiting   Fever greater than 101.   Patient should keep the postoperative visit that was scheduled for him/her in [facility]. If the patient has forgotten this date, please call the clinic for a reminder of the appointment time. If it is after hours, the patient can call the clinic the next business day for this reminder. [facility] phone number is [phone].

## 2023-08-02 NOTE — H&P
There are no changes in the history or physical exam, aside from any that are listed as an addendum at the bottom of this document.   Today, patient will go for the surgery listed in the plan below.     No mutation on genetics.    Chief Complaint: Shu Mckeon is a 82 y.o. female who was seen in consultation at the request of Mariann Fallon MD  for newly diagnosed breast cancer    History of Present Illness:  Patient presents with abnormal breast imaging, LEFT breast. She noted no new masses, skin changes, nipple discharge, nipple changes prior to her most recent imaging.  Her most recent imaging includes the followin/14/15 [facility]  MAMMO SCREEN EVITA  FLUHR, SHU  Benign dermal calcifications. BIRADS category 2: benign.    18 [facility]  MAMMO SCREEN EVITA  FLUHR, SHU  Scattered fibroglandular densities anterior one third retroareolar left breast interval development of a cluster of calcifications. BIRADS category 0.    8/3/18  [facility]  MAMMO DIAG LEFT  FLUHR, SHU  Microcalcifications within the outer inferior aspect of the left breast. BIRADS category 4.      She has not had a breast biopsy in the past.  She has her uterus and ovaries, is postmenopausal, and takes nor hormones.  Her family history includes the following: She has one daughter, one sister, no maternal aunts, 2 paternal aunts.  She has a half sister on paternal he based who had breast cancer in her 70s and a paternal cousin who had breast cancer in her 50s.      18- left breast stereotactic biopsy: Upper outer quadrant, 5:00: Small radial sclerosing lesion with usual hyperplasia, separate foci of usual hyperplasia, columnar cell change without atypia: Focal stromal fibrosis, apically metaplasia, and fibroadenomatoid change.   Concordant per Dr Tariq.    She denies significant bruising at her biopsy site.  She denies any redness warmth or drainage from the mammotomy.    Pathology from  10-24-18 excision radial scar returned as radial scar and no  atypia.     She denies any redness, warmth, drainage from her incision.    In the interim,  Shu Mckeon  has done well.  She has noted no changes in her breast exam. No new masses, skin changes, nipple changes, nipple discharge either breast.     Her most recent imaging includes the following:  July 19, 2019 bilateral screening mammogram with 3D [facility]: Scattered fibroglandular densities.  No evidence for malignancy in either breast.  BI-RADS 1      In the interim,  Shu Mckeon  has done well.  She has noted no changes in her breast exam. No new masses, skin changes, nipple changes, nipple discharge either breast.     Her most recent imaging includes the following:  [facility] point mammography July 20, 2020 bilateral screening mammogram with tomosynthesis.  Scattered areas of fibroglandular density.  There is a 1 cm mass in the lower left axilla, axillary tail that is slightly increased in size.  BI-RADS 0  August 12, 2020 left diagnostic mammogram with left breast ultrasound [facility].  On mammogram visualization of a 1.1 cm round mass consistent with a lymph node in the axillary tail with a cortex that is thickened relative to other visualized node.  On ultrasound axillary tail and left axillary ultrasound showed on the order of 16 cm from the nipple a 1.1 cm lymph node with a thickened cortex.  Impression 1.1 cm rounded left axillary tail lymph node with a thickened cortex recommend ultrasound-guided left breast biopsy BI-RADS 4.    She has gained 11#.    Shu has done well since her core biopsy.  She denies any swelling or bruising or discomfort in her left axilla.  Denies any redness warmth or drainage from her dermatotomy    Pathology from in office core biopsy 9-9-20 returned as :  Final Diagnosis   1. Lymph Node, Left Low Axillary Tail, Core Biopsy:               A. Core-like fragments of reactive appearing lymph node.               B. No morphologic evidence  of lymphoma or carcinoma.     2. Lymph Node, Left Low Axillary Tail, Core Biopsy (Gross Diagnosis Only):               A. Core-like tissue submitted to [facility] for Flow Cytometric Analysis.                     Please see the Flow Cytometry Summary below.     [facility]       Flow cytometry from [facility] dated September 9, 2020 returned as immunophenotyping fails to reveal a monoclonal B cell or abnormal T-cell immunophenotype.  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  The significance of this is uncertain.  Correlation with tissue morphology with further studies as indicated is advised.       -Addendum: Reviewed with Dr. Feliciano and he thinks that it would be best for him to just evaluate her in the clinic.           In the interim,  Shu Mckeon  has done well.  She has noted no changes in her breast exam. No new masses, skin changes, nipple changes, nipple discharge either breast.   She denies headache, bone pain, belly pain, cough, changes in vision or gait.  She has had an intentional additional 6 # weight loss.    Her most recent imaging includes the following:  [facility] November 1, 2021 bilateral screening mammogram with tomosynthesis scattered fibroglandular densities.  BI-RADS 1  [facility]'s point March 23, 2023 bilateral screening mammogram with tomosynthesis.  Scattered areas of fibroglandular density.  Question architectural distortion lower outer middle right breast.  BI-RADS 0  Right diagnostic mammogram and right breast ultrasound April 27, 2023 [facility]:  Scattered areas of fibroglandular density.  Lower outer middle right breast there is a persistent area of distortion.  On ultrasound right breast 8:00, 5 cm from the nipple is a 9 x 8 mm irregular hypoechoic mass corresponding distortion which is suspicious.  BI-RADS 4  Also at 8:00, 1 cm from the nipple there is an incidental benign appearing ductal  "confluence.        Interval History:       Pathology from an office biopsy May 18, 2023 returned as invasive lobular carcinoma, intermediate grade, 3, 2, 1, largest focus 1.2 cm.  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  No lymphovascular invasion.  Estrogen , progesterone 21-30, HER2/tommy 1+, Ki-67 8%.    She reports significant bruising extending onto her abdominal wall.    I then had her to get a post biopsy mammogram and MRI:  Reviewed Mrs. Mckeon\"s  MRI and postbiopsy mammogram together with Dr. Tariq.     The lobular histology makes her MRI interpretation fairly difficult especially with the postbiopsy cavity in the center of the affected area.  When combining the 2 imaging studies there does appear to be a significant 8 cm clip displacement posteriorly.  The mammographic density prebiopsy appears to be the best representation of size estimation per Dr. Tariq, approximately 4.6 cm.  The lesion appears debulked on the postbiopsy mammogram and there are blood products and air in the center of the enhancement on the MRI however the clip is situated 8 cm posterior to this.      I feel that there was clip migration due to the hematoma.    Dr Tariq felt that he could give a reasonably accurate attempt at bracketing this difficult to image lobular carcinoma.    SHe is here to review.        Review of Systems:  Review of Systems   Constitutional: Positive for unexpected weight change (1 lb wt gain since last visit).   All other systems reviewed and are negative.       Past Medical and Surgical History:  Breast Biopsy History:  Patient has not had a breast biopsy in the past.  Breast Cancer HIstory:  Patient does not have a past medical history of breast cancer.  Breast Operations, and year:  NONE   Obstetric/Gynecologic History:  Age menstrual periods began: 11  Patient is postmenopausal, entered menopause naturally at age:    Number of pregnancies: 3  Number of live births: " "3  Number of abortions or miscarriages: 0  Age of delivery of first child: 20  Patient did not breast feed.  Length of time taking birth control pills: 10-12 YRS   Patient took hormone replacement during the following dates: 10 YRS   PATIENT STILL HAS UTERUS AND OVARIES.     Past Surgical History:   Procedure Laterality Date    BREAST BIOPSY Left 10/25/2018    Procedure: left breast ultrasound-guided excision of radial scar.;  Surgeon: Shayna Blount MD;  Location:  [facility];  Service: General    BREAST SURGERY Left     CARDIAC CATHETERIZATION      not sure when or where    CARDIAC CATHETERIZATION      CATARACT EXTRACTION WITH INTRAOCULAR LENS IMPLANT      CHOLECYSTECTOMY WITH INTRAOPERATIVE CHOLANGIOGRAM N/A 10/31/2020    Procedure: CHOLECYSTECTOMY LAPAROSCOPIC INTRAOPERATIVE CHOLANGIOGRAM;  Surgeon: Ruth Del Valle MD;  Location: [facility] OR;  Service: General;  Laterality: N/A;    COLONOSCOPY      FEMUR FRACTURE SURGERY Right     FRACTURE SURGERY  2015    LT ANKLE    MEDIAL BRANCH BLOCK Right 5/20/2022    Procedure: LUMBAR MEDIAL BRANCH BLOCK Right L4-S1;  Surgeon: Pamela Spaulding MD;  Location: [facility] OR;  Service: Pain Management;  Laterality: Right;    REPLACEMENT TOTAL KNEE Right      Past Medical History:   Diagnosis Date    Arthritis     Chronic low back pain     Depression     HISTORY OF BEING \"EMOTIONAL\"    Fatigue     GERD (gastroesophageal reflux disease)     Groin pain     H/O cardiovascular stress test 07/2018    NORMAL, DR ARIAS    History of diverticulitis     Hyperlipidemia     Hypertension     Impaired fasting glucose     Left bundle branch block     Macular degeneration     Macular degeneration of right eye     Osteoporosis     Radial scar of breast     LEFT    Status post ORIF of fracture of ankle     Urinary frequency        Prior Hospitalizations, other than for surgery or childbirth, and year:  NONE     Social History     Socioeconomic History    Marital status:      " "Spouse name: Isidoro   Tobacco Use    Smoking status: Never     Passive exposure: Never    Smokeless tobacco: Never   Vaping Use    Vaping Use: Never used   Substance and Sexual Activity    Alcohol use: Yes     Alcohol/week: 7.0 standard drinks     Types: 7 Glasses of wine per week     Comment: 1/day-socially    Drug use: No    Sexual activity: Defer     Patient is .  Patient is retired.  Patient drinks 2 servings of caffeine per day.    Family History:  Family History   Problem Relation Age of Onset    Heart disease Father     Heart attack Father 81    Heart attack Paternal Uncle     Breast cancer Sister 70    Breast cancer Cousin 50    Malig Hyperthermia Neg Hx        Vital Signs:  /72 (BP Location: Right arm, Patient Position: Sitting, Cuff Size: Adult)   Pulse 65   Ht 162.6 cm (64.02\")   Wt 76.7 kg (169 lb)   LMP  (LMP Unknown)   SpO2 95%   BMI 28.99 kg/mý      Medications:    Current Outpatient Medications:     acetaminophen (TYLENOL) 650 MG 8 hr tablet, Take 1 tablet by mouth Every 8 (Eight) Hours As Needed for Mild Pain., Disp: , Rfl:     atorvastatin (LIPITOR) 40 MG tablet, TAKE ONE TABLET BY MOUTH EVERY EVENING, Disp: 90 tablet, Rfl: 1    BIOTIN PO, Take 1 tablet by mouth daily., Disp: , Rfl:     Cholecalciferol (VITAMIN D3) 5000 UNITS tablet, Take 1 tablet by mouth Daily., Disp: , Rfl:     denosumab (Prolia) 60 MG/ML solution prefilled syringe syringe, Inject 1 mL under the skin into the appropriate area as directed Every 6 (Six) Months., Disp: 1 each, Rfl: 1    gabapentin (NEURONTIN) 300 MG capsule, TAKE 1 CAPSULE BY MOUTH 3 (THREE) TIMES A DAY., Disp: 30 capsule, Rfl: 2    hydrOXYzine (ATARAX) 10 MG tablet, Take 1 tablet by mouth 3 (Three) Times a Day As Needed for Anxiety., Disp: 60 tablet, Rfl: 2    losartan (COZAAR) 50 MG tablet, TAKE ONE TABLET BY MOUTH DAILY, Disp: 90 tablet, Rfl: 1    metFORMIN (GLUCOPHAGE) 500 MG tablet, TAKE ONE TABLET BY MOUTH EVERY EVENING WITH SUPPER, Disp: 90 " "tablet, Rfl: 1    metoprolol succinate XL (TOPROL-XL) 25 MG 24 hr tablet, TAKE ONE TABLET BY MOUTH DAILY, Disp: 90 tablet, Rfl: 1    Multiple Vitamin (MULTIVITAMIN) capsule, Take 1 capsule by mouth Daily., Disp: , Rfl:     Multiple Vitamins-Minerals (PRESERVISION AREDS 2 PO), Take 2 tablets by mouth 2 (two) times a day., Disp: , Rfl:     Multiple Vitamins-Minerals (ZINC PO), Take  by mouth., Disp: , Rfl:     Pyridoxine HCl (VITAMIN B-6 PO), Take  by mouth., Disp: , Rfl:     sertraline (ZOLOFT) 100 MG tablet, TAKE ONE TABLET BY MOUTH DAILY, Disp: 90 tablet, Rfl: 1    vitamin B-12 (CYANOCOBALAMIN) 1000 MCG tablet, Take 1 tablet by mouth Daily., Disp: , Rfl:      Allergies:  No Known Allergies    Physical Examination:  /72 (BP Location: Right arm, Patient Position: Sitting, Cuff Size: Adult)   Pulse 65   Ht 162.6 cm (64.02\")   Wt 76.7 kg (169 lb)   LMP  (LMP Unknown)   SpO2 95%   BMI 28.99 kg/mý   General Appearance:  Patient is in no distress.  She is well kept and has an obese build.   Psychiatric:  Patient with appropriate mood and affect. Alert and oriented to self, time, and place.    Breast, RIGHT:  large sized, ptotic, symmetric with the contralateral side.  Breast skin is without erythema, edema, rashes.  There are no visible abnormalities upon inspection during the arm-raising maneuver or with hands on hips in the sitting position. There is no nipple retraction, discharge or nipple/areolar skin changes. At the RIGHT breast 8:00 5 CFN location there is a palpable mass that measures approximately 1.75 cm. No overlying skin changes, nontender.There are no other masses palpable in the sitting or supine positions. Ecchymoses are significant on the breast and anterior abdominal wall.    Breast, LEFT:  large sized, ptotic, symmetric with the contralateral side.  Breast skin is without erythema, edema, rashes.  There are no visible abnormalities upon inspection during the arm-raising maneuver or with hands " on hips in the sitting position. There is no nipple retraction, discharge or nipple/areolar skin changes.There are no masses palpable in the sitting or supine positions.  Well-healed radial biopsy   for radial scar.     Lymphatic:  There is no axillary, cervical, infraclavicular, or supraclavicular adenopathy bilaterally.  Specifically left axilla there is no palpable adenopathy.  There is a well-healed dermatotomy from her lymph node core biopsy.  Eyes:  Pupils are round and reactive to light.  Cardiovascular:  Heart rate and rhythm are regular.  Respiratory:  Lungs are clear bilaterally with no crackles or wheezes in any lung field.  Gastrointestinal:  Abdomen is soft, nondistended, and nontender.     Musculoskeletal:  Good strength in all 4 extremities.   There is good range of motion in both shoulders.    Skin:  No new skin lesions or rashes on the skin excluding the breast (see breast exam above).        Imagin/14/15 [facility]  MAMMO SCREEN EVITA  FLUHR, HEATHER  Benign dermal calcifications. BIRADS category 2: benign.    18 [facility]  MAMMO SCREEN EVITA  FLUHR, HEATHER  Scattered fibroglandular densities anterior one third retroareolar left breast interval development of a cluster of calcifications. BIRADS category 0.    8/3/18  [facility]  MAMMO DIAG LEFT  FLUHR, HEATHER  Microcalcifications within the outer inferior aspect of the left breast. BIRADS category 4.    2019 bilateral screening mammogram with 3D [facility]: Scattered fibroglandular densities.  No evidence for malignancy in either breast.  BI-RADS 1    [facility] mammography 2020 bilateral screening mammogram with tomosynthesis.  Scattered areas of fibroglandular density.  There is a 1 cm mass in the lower left axilla, axillary tail that is slightly increased in size.  BI-RADS 0  2020 left diagnostic mammogram with left breast ultrasound [facility].  On mammogram visualization of a 1.1  "cm round mass consistent with a lymph node in the axillary tail with a cortex that is thickened relative to other visualized node.  On ultrasound axillary tail and left axillary ultrasound showed on the order of 16 cm from the nipple a 1.1 cm lymph node with a thickened cortex.  Impression 1.1 cm rounded left axillary tail lymph node with a thickened cortex recommend ultrasound-guided left breast biopsy BI-RADS 4.    [facility] November 1, 2021 bilateral screening mammogram with tomosynthesis scattered fibroglandular densities.  BI-RADS 1  [facility] March 23, 2023 bilateral screening mammogram with tomosynthesis.  Scattered areas of fibroglandular density.  Question architectural distortion lower outer middle right breast.  BI-RADS 0  Right diagnostic mammogram and right breast ultrasound April 27, 2023 [facility]:  Scattered areas of fibroglandular density.  Lower outer middle right breast there is a persistent area of distortion.  On ultrasound right breast 8:00, 5 cm from the nipple is a 9 x 8 mm irregular hypoechoic mass corresponding distortion which is suspicious.  BI-RADS 4  Also at 8:00, 1 cm from the nipple there is an incidental benign appearing ductal confluence.  We will get her scheduled for a same-day biopsy.    Reviewed Mrs. Mckeon\"s  MRI and postbiopsy mammogram together today with Dr. Tariq.  The lobular histology makes her MRI interpretation fairly difficult especially with the postbiopsy cavity in the center of the affected area.  When combining the 2 imaging studies there does appear to be a significant 8 cm clip displacement posteriorly.  The mammographic density prebiopsy appears to be the best representation of size estimation per Dr. Tariq, approximately 4.6 cm.  The lesion appears debulked on the postbiopsy mammogram and there are blood products and air in the center of the enhancement on the MRI however the clip is situated 8 cm posterior to " "this.    Pathology:   8-21-18- left breast stereotactic biopsy: Upper outer quadrant, 5:00: Small radial sclerosing lesion with usual hyperplasia, separate foci of usual hyperplasia, columnar cell change without atypia: Focal stromal fibrosis, apically metaplasia, and fibroadenomatoid change.     Concordant per Dr Tariq.        Final Diagnosis   BREAST, LEFT, DESIGNATED \"UPPER OUTER QUADRANT, APPROXIMATE 5 O'CLOCK POSITION\",   STEREOTACTIC BIOPSY:               SMALL RADIAL SCLEROSING LESION WITH ASSOCIATED DUCTAL HYPERPLASIA OF THE                      USUAL TYPE.               SEPARATE FOCI OF DUCTAL HYPERPLASIA OF THE USUAL TYPE.               FOCAL COLUMNAR CELL CHANGE WITHOUT ATYPIA.               FOCAL STROMAL FIBROSIS AND ASSOCIATED CALCIFICATIONS.               FOCAL APOCRINE METAPLASIA.               FOCAL FIBROADENOMATOID CHANGE WITH ASSOCIATED CALCIFICATIONS.               NO EVIDENCE OF MALIGNANCY.     TDJ/ IHC/a/JULIA     CPT CODES:  1. 43018       Pathology from  10-24-18 excision radial scar returend as radial scar and no atypia.          Final Diagnosis   LEFT BREAST LUMPECTOMY:           BENIGN BREAST TISSUE WITH DUCT HYPERPLASIA WITHOUT ATYPIA, ADENOSIS, AND RADIAL                   SCLEROSING LESION (2 MM).          SCAR AND CHANGES OF PRIOR BIOPSY.     COMMENT: I do not detect atypia. The margin is free of radial sclerosing lesion.     K/  IHC/TDJ     Pathology from in office core biopsy 9-9-20 returned as :  Final Diagnosis   1. Lymph Node, Left Low Axillary Tail, Core Biopsy:               A. Core-like fragments of reactive appearing lymph node.               B. No morphologic evidence of lymphoma or carcinoma.     2. Lymph Node, Left Low Axillary Tail, Core Biopsy (Gross Diagnosis Only):               A. Core-like tissue submitted to [facility] for Flow Cytometric Analysis.                     Please see the Flow Cytometry Summary below.     [facility]       Flow cytometry from [facility] " dated September 9, 2020 returned as immunophenotyping fails to reveal a monoclonal B cell or abnormal T-cell immunophenotype.  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  The significance of this is uncertain.  Correlation with tissue morphology with further studies as indicated is advised.     Pathology from an office biopsy May 18, 2023 returned as invasive lobular carcinoma, intermediate grade, 3, 2, 1, largest focus 1.2 cm.  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  No lymphovascular invasion.  Estrogen , progesterone 21-30, HER2/tommy 1+, Ki-67 8%.         Final Diagnosis    1. Right Breast, 8 o'clock, 5 cm from Nipple, Ultrasound-Guided Biopsy for a Mass: INVASIVE LOBULAR CARCINOMA.               A. Sanam histologic score II (tubules = 3, nuclei = 2, mitosis = 1).               B. Invasive carcinoma involves multiple tissue cores with the largest contiguous focus measuring 12 mm.               C. 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               D. No lymphovascular nor perineural invasion identified.                                swm/theodoram      Electronically signed by Traci Burr MD on 5/20/2023 at 1142    Synoptic Checklist    Breast Biomarker Reporting Template  Protocol posted: 3/22/2023  BREAST: BIOMARKER REPORTING TEMPLATE - All Specimens  Test(s) Performed       Estrogen Receptor (ER) Status   Positive (greater than 10% of cells demonstrate nuclear positivity)    Percentage of Cells with Nuclear Positivity   %    Average Intensity of Staining   Strong    Test Type   Food and Drug Administration (FDA) cleared (test / vendor): ventana    Primary Antibody   SP1    Scoring System   Jayant    Proportion Score   5    Intensity Score   3    Total Jayant Score   8    Test(s) Performed       Progesterone Receptor (PgR) Status   Positive    Percentage of  Cells with Nuclear Positivity   21-30%    Average Intensity of Staining   Weak    Test Type   Food and Drug Administration (FDA) cleared (test / vendor): ventana    Primary Antibody   1E2    Scoring System   Jayant    Proportion Score   3    Intensity Score   1    Total Jayant Score   4    Test(s) Performed       HER2 by Immunohistochemistry   Negative (Score 1+)    Test Type   Food and Drug Administration (FDA) cleared (test / vendor): ventana    Primary Antibody   4B5    Test(s) Performed   Ki-67    Ki-67 Percentage of Positive Nuclei   8 %   Primary Antibody   30-9    Cold Ischemia and Fixation Times   Meet requirements specified in latest version of the ASCO / CAP Guidelines    Cold Ischemia Time (minutes)   2 min   Fixation Time (hours)   7.5 hours   Testing Performed on Block Number(s)   1A    METHODS   Fixative   Formalin    Image Analysis   Not performed    .      Comment      Representative slides from this case are reviewed internally with Dr. Pena, who concurs.                       Note from Dr. 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     The patient has no significant symptoms.  She has no palpable lymphadenopathy or splenomegaly.  Her white count was normal.  This certainly could be a reactive nisreen population.  We discussed potential additional evaluation with peripheral blood flow cytometry and T/Gel gene rearrangement study as well as potential CT scans.  The patient declines further evaluation at this time she is agreeable to a course of observation.  We will see her back in 3 months with a CBC and evaluate clinically.    Procedures:  Percutaneous ultrasound-guided vacuum-assisted core breast/axillary tail lymph node biopsy 9-8-20  Indication:  ultrasound-and mammo visible rounded node with thickened cortex  Location: LEFT axillary tail, lowaxilla  Consent:  The risks, benefits, and alternatives to the procedure were  discussed with the patient, who understood and wished to proceed.  The risks described included, but were not limited to, bleeding, infection, pneumothorax, and inadequate sampling requiring either repeat percutaneous or open excisional biopsy.  Description of Procedure:   After the patient was positioned supine on the procedure table, I located the lesion using ultrasound.  I prepped and draped the breast/axilla skin in sterile fashion.  I anesthetized the breast skin at the site of anticipated mammotomy with 1% lidocaine with epinephrine.  I then anesthetized the underlying subcutaneous tissue and breast parenchyma surrounding the lesion with 1% lidocaine with epinephrine under ultrasound visualization and guidance. I then made a nicking incision with an 11blade and inserted the 14 G celero biopsy device from inferolateral to superomedial through the lesion under ultrasound guidance.   I then took 4 core samples  of the lesion under direct visualization with ultrasound.   I placed 2 of these cores in formalin and 2 of them in a cup for fresh and sent to pathology.  I withdrew the probe and placed a hydromark marker into the residual rounded node..  We held manual compression for 10 minutes, placed steri -strips at the mammotomy site, and wrapped the patient in a 6 inch super ace wrap and a cold pack.  Marker placed: hydromark  Tolerance: The patient tolerated the procedure well.  Disposition: We will see her back within a week to review her pathology.      Percutaneous ultrasound-guided vacuum-assisted excisional breast biopsy 5-18-23  Indication:  ultrasound-visible breast mass with mammographic and palpable correlate, BR4  Location: RIGHT 8:00 5 CFN  Consent:  The risks, benefits, and alternatives to the procedure were discussed with the patient, who understood and wished to proceed.  The risks described included, but were not limited to, bleeding, infection, pneumothorax, and inadequate sampling requiring  "either repeat percutaneous or open excisional biopsy.  Description of Procedure:   After the patient was positioned supine on the procedure table, I located the lesion using ultrasound.  I prepped and draped the breast skin in sterile fashion.  I anesthetized the breast skin at the site of anticipated mammotomy with 1% lidocaine with epinephrine.  I then anesthetized the underlying subcutaneous tissue and breast parenchyma surrounding the lesion with 1% lidocaine with epinephrine under ultrasound visualization and guidance. I then made a nicking incision with an 11blade and inserted the 10G encore biopsy device from inferolateral to superomedial under the lesion under ultrasound guidance.   I then took 10 sequential core samples using the automated sampling of the encore device, until the lesion mostly disappeared on ultrasound. I removed the probe, then placed a hydromark marker, using a stiff introducer, into the biopsy site. We held manual compression for 10 minutes, placed steri-strips at the mammotomy site, and wrapped the patient in a 6 inch super ace wrap with an ice-pack.  Marker placed: hydromark  Tolerance: The patient tolerated the procedure well.  Disposition: We will see her back within a week to review her pathology.      Assessment:   Diagnosis Plan   1. Lobular breast cancer, right        2. Bruising        3. Radial scar of breast        4. Breast calcifications on mammogram        5. Enlarged lymph nodes        6. FH: breast cancer        1-2  RIGHT 8:00, 5 CFN- 1.75 cm on exam, distortion on mammogram, 9x8mm on ultrasound 5-2023  Target for ultrasound guided biopsy today  BR4    Pathology from an office biopsy May 18, 2023 returned as invasive lobular carcinoma, intermediate grade, 3, 2, 1, largest focus 1.2 cm.  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  No lymphovascular invasion.  Estrogen , progesterone 21-30, HER2/tommy 1+, Ki-67 8%.    Reviewed Mrs. Mckeon\"s  " MRI and postbiopsy mammogram together with Dr. Tariq.     The lobular histology makes her MRI interpretation fairly difficult especially with the postbiopsy cavity in the center of the affected area.  When combining the 2 imaging studies there does appear to be a significant 8 cm clip displacement posteriorly.  The mammographic density prebiopsy appears to be the best representation of size estimation per Dr. Tariq, approximately 4.6 cm.  The lesion appears debulked on the postbiopsy mammogram and there are blood products and air in the center of the enhancement on the MRI however the clip is situated 8 cm posterior to this.    Clinical stage T2N0- IIA    3-4    LEFt breast central RA- 1.5 cm cluster- asymptomatic- stereotactic biopsy August 21, 2018 lateral to the lower outer quadrant left breast returned as   8-21-18- left breast stereotactic biopsy:  5:00: Small radial sclerosing lesion with usual hyperplasia, separate foci of usual hyperplasia, columnar cell change without atypia: Focal stromal fibrosis, apically metaplasia, and fibroadenomatoid change.   Concordant per Dr Tariq.    Hydromark in vicinity of pre biopsy calcifications.    Pathology from  10-24-18 excision radial scar returned as radial scar and no atypia.  Fu mammogram 7-2019 BR1      5-  LEFT UOQ axillary tail/low axilla-0.1 cm rounded lymph node with thickened cortex seen on August 2020 mammogram and ultrasound BI-RADS 4.  Target for biopsy 9-9-20    Pathology from in office core biopsy 9-9-20 returned as :  Final Diagnosis   1. Lymph Node, Left Low Axillary Tail, Core Biopsy:               A. Core-like fragments of reactive appearing lymph node.               B. No morphologic evidence of lymphoma or carcinoma.     2. Lymph Node, Left Low Axillary Tail, Core Biopsy (Gross Diagnosis Only):               A. Core-like tissue submitted to [facility] for Flow Cytometric Analysis.                     Please see the Flow Cytometry Summary below.      [facility]       Flow cytometry from [facility] dated September 9, 2020 returned as immunophenotyping fails to reveal a monoclonal B cell or abnormal T-cell immunophenotype.  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  The significance of this is uncertain.  Correlation with tissue morphology with further studies as indicated is advised.     -Addendum: Reviewed with Dr. Feliciano and he thinks that it would be best for him to just evaluate her in the clinic.     6-  Paternal 1/2 sister in her 70s  Paternal cousin in her 50s    Plan:  Mrs. Mckeon is here with her daughter today.    We reviewed her most recent imaging, imaging reports, pathology,  and her exam together today.  The lesion of concern on her imaging is: RIGHT breast mass 8:00 5 CFN    We discussed that on imaging the best estimate is 4.6 cm based on pre biopsy mammogram.    SHe is motivated for breast conserving treatment and knows that ultimately that mastectomy may be needed.    We reviewed the difference in systemic therapy versus locoregional. She knows that she will be seeing a medical oncologist in the adjuvant setting. We discussed that for early stage breast cancer, there are 2 options for locoregional treatment that have equivalent survival: total mastectomy versus lumpectomy and radiation, either with sentinel node biopsy.   She is interested in breast conservation.     We discussed the option of ipsilateral oncoplastic closure with contralateral  mastopexy, and she is interested in this.    We discussed the following procedure:  RIGHT breast bracketed needle localized lumpectomy, RIGHT axilla sentinel node biopsy, possible oncoplastics.      She understands the risks of lumpectomy including bleeding, infection, seroma and 25% chance of positive margins. She understands the risks of  sentinel node biopsy, including 7% chance of lymphedema, injury to nerves or vessels in the axilla, seroma. We  discussed that we would review her pathology from her sentinel node procedure in consideration of a complete axillary dissection, after her procedure.   NCCN guidelines have been followed.  We fitted her for a postoperative bra, gave her EMLA cream and tegederm for her sentinel node procedure, and had her to see our nurse navigator. She will receive a recommendation for radiation after surgery.      We discussed her FH and she and her daughter would like to proceed with genetic testing, so we will send an invitae breast and gynecology panel on her today.    3-24-24 [facility] Her next routine mammogram        Shayna Blount MD     Today I spent 60 minutes doing the following: Reviewing records, labs, outside imaging and reports in preparation for the patient visit; obtaining medical history; performing the physical exam; counseling and educating the patient and any available family or caregivers; ordering medications, tests or procedures; coordinating care with any other physicians on her care team as needed, and documenting all of the above in the medical record as well as sending communications with her other healthcare professionals.    Next Appointment:  Return for surgery.      EMR Dragon/transcription disclaimer:    Much of this encounter note is an electronic transcription/translocation of spoken language to printed text.  The electronic translation of spoken language may permit erroneous, or at times, nonsensical words or phrases to be inadvertently transcribed.  Although I have reviewed the note from such areas, some may still exist.

## 2023-08-02 NOTE — BRIEF OP NOTE
BREAST ONCOPLASTY AND BREAST REDUCTION FOR IMMEDIATE RECONSTRUCTION  Progress Note    Shu Mckeon  8/2/2023    Pre-op Diagnosis:   Lobular breast cancer, right [C50.911]       Post-Op Diagnosis Codes:     * Lobular breast cancer, right [C50.911]    Procedure/CPTr Codes:        Procedure(s):  RIGHT breast bracketed needle localized lumpectomy AND RIGHT axilla sentinel node biopsy  RIGHT BREAST ONCOPLASTIC CLOSURE AND LEFT BREAST REDUCTION FOR SYMMETRY              Surgeon(s):  Shayna Blount MD Palazzo, Michelle D, MD    Anesthesia: General    Staff:   Circulator: Viki Thakur, MELISSA; Wendy Morataya RN  Scrub Person: Radha Bazzi; Gary Krueger; Dian Guillory         Estimated Blood Loss:  50 cc    Urine Voided: 260 mL    Specimens:                Specimens       ID Source Type Tests Collected By Collected At Frozen?    A Breast, Right Tissue TISSUE PATHOLOGY EXAM   Shayna Blount MD 8/2/23 1138 No    Description: right breast lumpectomy short stitch superior,long stitch lateral, double stitch deep,fresh for permanent    Comment: Fresh for permanent, 133 grams    B [place] Lymph Node Tissue TISSUE PATHOLOGY EXAM   Shayna Blount MD 8/2/23 1143 No    Description: RIGHT BREAST SENTINEL LYMPH NODE 1, COUNT 2500    C [place] Lymph Node Tissue TISSUE PATHOLOGY EXAM   Shayna Blount MD 8/2/23 1143 No    Description: RIGHT BREAST SENTINEL LYMPH NODE #2, COUNT 1700    D [place] Lymph Node Tissue TISSUE PATHOLOGY EXAM   Shayna Blount MD 8/2/23 1145 No    Description: RIGHT BREAST SENTINEL LYMPH NODE #3, COUNT 512    E Breast, Right Tissue TISSUE PATHOLOGY EXAM   Shayna Blount MD 8/2/23 1150 No    Description: RIGHT BREAST NEW ANTERIOR MARGIN, STITCH MARKS TRUE MARGIN    F Breast, Right Tissue TISSUE PATHOLOGY EXAM   Shayna Blount MD 8/2/23 1151 No    Description: RIGHT BREAST NEW SUPERIOR MARGIN, STITCH MARKS TRUE MARGIN    G Breast, Right Tissue TISSUE PATHOLOGY EXAM    Shayna Blount MD 8/2/23 1152 No    Description: RIGHT BREAST NEW LATERAL MARGIN, STITCH DAVIES TRUE MARGIN    H Breast, Right Tissue TISSUE PATHOLOGY EXAM   Shayna Blount MD 8/2/23 1152 No    Description: RIGHT BREAST NEW INFERIOR MARGIN, STITCH MARKS TRUE MARGIN    I Breast, Right Tissue TISSUE PATHOLOGY EXAM   Shayna Blount MD 8/2/23 1152 No    Description: RIGHT BREAST NEW MEDIAL MARGIN, STITCH DAVIES TRUE MARGIN    J Breast, Right Tissue TISSUE PATHOLOGY EXAM   Shayna Blount MD 8/2/23 1152 No    Description: RIGHT BREAST NEW POSTERIOR MARGIN, STITCH MARKS TRUE MARGIN    K Breast, Left Tissue TISSUE PATHOLOGY EXAM   Shayna Blount MD 8/2/23 1302 No    Description: Left breast tissue    Comment: FRESH, 211GRAMS    L Breast, Right Tissue TISSUE PATHOLOGY EXAM   Shayna Blount MD 8/2/23 1416 No    Description: ADDITIONAL RIGHT BREAST TISSUE    Comment: FRESH, 209.6 GRAMS                  Drains:   Closed/Suction Drain 1 Left Breast Bulb 15 Fr. (Active)   Site Description Unable to view 08/02/23 1528   Dressing Status Clean;Dry;Intact 08/02/23 1528   Drainage Appearance None 08/02/23 1528   Status To bulb suction 08/02/23 1528       Closed/Suction Drain 2 Right Breast Bulb 15 Fr. (Active)   Site Description Unable to view 08/02/23 1528   Dressing Status Clean;Dry;Intact 08/02/23 1528   Drainage Appearance None 08/02/23 1528   Status To bulb suction 08/02/23 1528       [REMOVED] Urethral Catheter Double-lumen;Non-latex;Silicone 16 Fr. (Removed)       Findings:   R lumpectomy 133g + 27 g margins + additional tissue = 369 g (larger breast)  L breast tissue 211g  Bilateral superior medial pedicle technique      Complications: none          Greta Romero MD     Date: 8/2/2023  Time: 15:38 EDT

## 2023-08-02 NOTE — OP NOTE
Operative note    Preoperative Diagnosis: Breast cancer    Postoperative Diagnosis: Breast cancer    Procedure Performed: right breast bracketed (x3) needle localized lumpectomy, right DEEP axillary sentinel node biopsy, oncoplastic closure to follow,  followed by a contralateral mastopexy..    Dictating physician and surgeon: Shayna Blount MD    Plastic Surgeon: Greta Romeor MD    EBL:20cc    [place] Node Biopsy for Breast Cancer - Right  Operation performed with curative intent. Yes   Tracer(s) used to identify sentinel nodes in the upfront surgery (non-neoadjuvant) setting (select all that apply). Radioactive tracer   Tracer(s) used to identify sentinel nodes in the neoadjuvant setting (select all that apply). N/A   All nodes (colored or non-colored) present at the end of a dye-filled lymphatic channel were removed. N/A   All significantly radioactive nodes were removed. Yes   All palpably suspicious nodes were removed. N/A   Biopsy-proven positive nodes marked with clips prior to chemotherapy were identified and removed. N/A        FINDINGS AND DESCRIPTION OF PROCEDURE:     The patient was brought to the operating room and placed on the table in the supine position. After adequate general ETT anesthesia was obtained, we sterilely prepped and draped the breast and axilla. We did a time out to identify correct patient and correct operative site.  She did receive IV antibiotic within an hour of and prior to incision.     Dr Romero marked the patient in the holding room and  2 needles fell within the skin paddle inferiorly. Therefore, I took skin overlyingthese 2 needles (5 and 7.5cm).   I marked the intended area for resection and planned my incision based on the mammographic localization imaging. The  third localization needle was 10 cm and marked the hydromark that migrated posteriorly.     I included an ellipse of skin overlying the tumor for margins and orientation.    I used a 15 blade to incise the  skin. I then dissected using Bovie electrocautery superiorly, inferiorly, medially and laterally around the lesion.  I examined the specimen using the intraoperative faxitron to document the presence of the lesion within the specimen.I then took the following additional margins - superior, inferior, medial, lateral, anterior, posterior.     I then passed the specimens and labeled as follows: For the lumpectomy,  long stitch lateral short stitch superior, double stitch deep. For the margins, stitch marks true margin.    I then irrigated, assured hemostasis.     Next, I turned my attention to the axilla. I used the Neoprobe at the start of the case to ensure that the radiotracer had migrated to the axilla, which it had.  I then made a curvilinear incision at the inferior margin of the hairbearing area using a 15 blade.  I then used Bovie for dissection, spring retractors for exposure and the gamma probe to assist in finding the nodes with radiopharmaceutical uptake.     There were 3 sentinel nodes. All were grossly normal. These were sent for permanent pathology. COunts 2500, 1700, 512.      At this time, plastic surgery began the oncoplastic portion of the case.  This will be dictated separately.    She tolerated the procedure well, there were no immediate complications, and all counts were correct at the end of my portion of the case.

## 2023-08-04 ENCOUNTER — TELEPHONE (OUTPATIENT)
Dept: SURGERY | Facility: CLINIC | Age: 83
End: 2023-08-04
Payer: MEDICARE

## 2023-08-04 DIAGNOSIS — C50.911 LOBULAR BREAST CANCER, RIGHT: Primary | ICD-10-CM

## 2023-08-07 ENCOUNTER — PATIENT OUTREACH (OUTPATIENT)
Dept: OTHER | Facility: HOSPITAL | Age: 83
End: 2023-08-07
Payer: MEDICARE

## 2023-08-07 NOTE — PROGRESS NOTES
Called MsVidhi Mike to see how she was doing. Left a message with my contact information and asked her to call back at her convenience.

## 2023-08-14 ENCOUNTER — TELEPHONE (OUTPATIENT)
Dept: ONCOLOGY | Facility: CLINIC | Age: 83
End: 2023-08-14

## 2023-08-14 NOTE — TELEPHONE ENCOUNTER
Caller: Shu Mckeon    Relationship: Self    Best call back number: [phone]    What is the best time to reach you: ANYTIME    Who are you requesting to speak with (clinical staff, provider,  specific staff member): SCHEDULING    What was the call regarding: PT RECEIVED A CALL SAYING THERE WAS A CANCELLATION TOMORROW, SHE CAN'T MOVED HER APPOINTMENTS AND WANTS TO KEEP THEM SCHEDULED FOR 8-22 WITH DR RANDALL    Is it okay if the provider responds through MyChart: N/A

## 2023-08-21 ENCOUNTER — TELEPHONE (OUTPATIENT)
Dept: ONCOLOGY | Facility: CLINIC | Age: 83
End: 2023-08-21
Payer: MEDICARE

## 2023-08-21 ENCOUNTER — TELEPHONE (OUTPATIENT)
Dept: SURGERY | Facility: CLINIC | Age: 83
End: 2023-08-21

## 2023-08-21 ENCOUNTER — OFFICE VISIT (OUTPATIENT)
Dept: SURGERY | Facility: CLINIC | Age: 83
End: 2023-08-21
Payer: MEDICARE

## 2023-08-21 VITALS
HEIGHT: 64 IN | WEIGHT: 172 LBS | HEART RATE: 66 BPM | BODY MASS INDEX: 29.37 KG/M2 | DIASTOLIC BLOOD PRESSURE: 78 MMHG | SYSTOLIC BLOOD PRESSURE: 134 MMHG | OXYGEN SATURATION: 98 %

## 2023-08-21 DIAGNOSIS — Z12.31 ENCOUNTER FOR SCREENING MAMMOGRAM FOR MALIGNANT NEOPLASM OF BREAST: ICD-10-CM

## 2023-08-21 DIAGNOSIS — N64.89 RADIAL SCAR OF BREAST: ICD-10-CM

## 2023-08-21 DIAGNOSIS — C50.911 LOBULAR BREAST CANCER, RIGHT: Primary | ICD-10-CM

## 2023-08-21 DIAGNOSIS — Z80.3 FH: BREAST CANCER: ICD-10-CM

## 2023-08-21 DIAGNOSIS — R59.9 ENLARGED LYMPH NODES: ICD-10-CM

## 2023-08-21 DIAGNOSIS — R92.1 BREAST CALCIFICATIONS ON MAMMOGRAM: ICD-10-CM

## 2023-08-21 PROCEDURE — 1160F RVW MEDS BY RX/DR IN RCRD: CPT | Performed by: SURGERY

## 2023-08-21 PROCEDURE — 1159F MED LIST DOCD IN RCRD: CPT | Performed by: SURGERY

## 2023-08-21 PROCEDURE — 99024 POSTOP FOLLOW-UP VISIT: CPT | Performed by: SURGERY

## 2023-08-21 PROCEDURE — 3075F SYST BP GE 130 - 139MM HG: CPT | Performed by: SURGERY

## 2023-08-21 PROCEDURE — 3078F DIAST BP <80 MM HG: CPT | Performed by: SURGERY

## 2023-08-21 NOTE — PROGRESS NOTES
Chief Complaint: Shu Mckeon is a 83 y.o. female who was seen in consultation at the request of Mariann Fallon MD  for newly diagnosed breast cancer and a postoperative visit    History of Present Illness:  Patient presents with abnormal breast imaging, LEFT breast. She noted no new masses, skin changes, nipple discharge, nipple changes prior to her most recent imaging.  Her most recent imaging includes the followin/14/15 [facility]  MAMMO SCREEN EVITA  FLUHR, SHU  Benign dermal calcifications. BIRADS category 2: benign.    18 [facility]  MAMMO SCREEN EVITA  FLUHR, SHU  Scattered fibroglandular densities anterior one third retroareolar left breast interval development of a cluster of calcifications. BIRADS category 0.    8/3/18  [facility]  MAMMO DIAG LEFT  FLUHR, SHU  Microcalcifications within the outer inferior aspect of the left breast. BIRADS category 4.      She has not had a breast biopsy in the past.  She has her uterus and ovaries, is postmenopausal, and takes nor hormones.  Her family history includes the following: She has one daughter, one sister, no maternal aunts, 2 paternal aunts.  She has a half sister on paternal he based who had breast cancer in her 70s and a paternal cousin who had breast cancer in her 50s.      18- left breast stereotactic biopsy: Upper outer quadrant, 5:00: Small radial sclerosing lesion with usual hyperplasia, separate foci of usual hyperplasia, columnar cell change without atypia: Focal stromal fibrosis, apically metaplasia, and fibroadenomatoid change.   Concordant per Dr Tariq.    She denies significant bruising at her biopsy site.  She denies any redness warmth or drainage from the mammotomy.    Pathology from  10-24-18 excision radial scar returned as radial scar and no atypia.     She denies any redness, warmth, drainage from her incision.    In the interim,  Shu Mckeon  has done well.  She has noted no changes in her breast exam. No new masses, skin changes, nipple  changes, nipple discharge either breast.     Her most recent imaging includes the following:  July 19, 2019 bilateral screening mammogram with 3D [facility]: Scattered fibroglandular densities.  No evidence for malignancy in either breast.  BI-RADS 1      In the interim,  Shu Mckeon  has done well.  She has noted no changes in her breast exam. No new masses, skin changes, nipple changes, nipple discharge either breast.     Her most recent imaging includes the following:  [facility] point mammography July 20, 2020 bilateral screening mammogram with tomosynthesis.  Scattered areas of fibroglandular density.  There is a 1 cm mass in the lower left axilla, axillary tail that is slightly increased in size.  BI-RADS 0  August 12, 2020 left diagnostic mammogram with left breast ultrasound [facility].  On mammogram visualization of a 1.1 cm round mass consistent with a lymph node in the axillary tail with a cortex that is thickened relative to other visualized node.  On ultrasound axillary tail and left axillary ultrasound showed on the order of 16 cm from the nipple a 1.1 cm lymph node with a thickened cortex.  Impression 1.1 cm rounded left axillary tail lymph node with a thickened cortex recommend ultrasound-guided left breast biopsy BI-RADS 4.    She has gained 11#.    Shu has done well since her core biopsy.  She denies any swelling or bruising or discomfort in her left axilla.  Denies any redness warmth or drainage from her dermatotomy    Pathology from in office core biopsy 9-9-20 returned as :  Final Diagnosis   1. Lymph Node, Left Low Axillary Tail, Core Biopsy:               A. Core-like fragments of reactive appearing lymph node.               B. No morphologic evidence of lymphoma or carcinoma.     2. Lymph Node, Left Low Axillary Tail, Core Biopsy (Gross Diagnosis Only):               A. Core-like tissue submitted to [facility] for Flow Cytometric Analysis.                      Please see the Flow Cytometry Summary below.     [facility]       Flow cytometry from [facility] dated September 9, 2020 returned as immunophenotyping fails to reveal a monoclonal B cell or abnormal T-cell immunophenotype.  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  The significance of this is uncertain.  Correlation with tissue morphology with further studies as indicated is advised.       -Addendum: Reviewed with Dr. Feliciano and he thinks that it would be best for him to just evaluate her in the clinic.           In the interim,  Shu Mckeon  has done well.  She has noted no changes in her breast exam. No new masses, skin changes, nipple changes, nipple discharge either breast.   She denies headache, bone pain, belly pain, cough, changes in vision or gait.  She has had an intentional additional 6 # weight loss.    Her most recent imaging includes the following:  [facility] November 1, 2021 bilateral screening mammogram with tomosynthesis scattered fibroglandular densities.  BI-RADS 1  [facility]'s point March 23, 2023 bilateral screening mammogram with tomosynthesis.  Scattered areas of fibroglandular density.  Question architectural distortion lower outer middle right breast.  BI-RADS 0  Right diagnostic mammogram and right breast ultrasound April 27, 2023 [facility]:  Scattered areas of fibroglandular density.  Lower outer middle right breast there is a persistent area of distortion.  On ultrasound right breast 8:00, 5 cm from the nipple is a 9 x 8 mm irregular hypoechoic mass corresponding distortion which is suspicious.  BI-RADS 4  Also at 8:00, 1 cm from the nipple there is an incidental benign appearing ductal confluence.              Pathology from an office biopsy May 18, 2023 returned as invasive lobular carcinoma, intermediate grade, 3, 2, 1, largest focus 1.2 cm.  Associated atypical lobular hyperplasia and  "lobular carcinoma in situ.  No lymphovascular invasion.  Estrogen , progesterone 21-30, HER2/tommy 1+, Ki-67 8%.    She reports significant bruising extending onto her abdominal wall.    I then had her to get a post biopsy mammogram and MRI:  Reviewed Mrs. Mckeon\"s  MRI and postbiopsy mammogram together with Dr. Tariq.     The lobular histology makes her MRI interpretation fairly difficult especially with the postbiopsy cavity in the center of the affected area.  When combining the 2 imaging studies there does appear to be a significant 8 cm clip displacement posteriorly.  The mammographic density prebiopsy appears to be the best representation of size estimation per Dr. Tariq, approximately 4.6 cm.  The lesion appears debulked on the postbiopsy mammogram and there are blood products and air in the center of the enhancement on the MRI however the clip is situated 8 cm posterior to this.      I feel that there was clip migration due to the hematoma.    Dr Tariq felt that he could give a reasonably accurate attempt at bracketing this difficult to image lobular carcinoma.      Interval History:  8-2-23 Pathology from bracketed needle localized lumpectomy with oncoplastic reduction by Dr Romero returned as : 9mm invasive lobular carcinoma, int grade, 3,2,1, LCIS and ALH present, no LVI, margins widely clear. 0/3 nodes.   Path stage T1bN0- IA     She denies any redness warmth or drainage from her incisions.  New she has seen Dr. Romero.  She is here for review        Review of Systems:  Review of Systems   Constitutional:  Positive for unexpected weight change (3 lb wt gain).   All other systems reviewed and are negative.     Past Medical and Surgical History:  Breast Biopsy History:  Patient has not had a breast biopsy in the past.  Breast Cancer HIstory:  Patient does not have a past medical history of breast cancer.  Breast Operations, and year:  NONE   Obstetric/Gynecologic History:  Age menstrual periods began: " 11  Patient is postmenopausal, entered menopause naturally at age:    Number of pregnancies: 3  Number of live births: 3  Number of abortions or miscarriages: 0  Age of delivery of first child: 20  Patient did not breast feed.  Length of time taking birth control pills: 10-12 YRS   Patient took hormone replacement during the following dates: 10 YRS   PATIENT STILL HAS UTERUS AND OVARIES.     Past Surgical History:   Procedure Laterality Date    BREAST BIOPSY Left 10/25/2018    Procedure: left breast ultrasound-guided excision of radial scar.;  Surgeon: Shayna Blount MD;  Location: [facility];  Service: General    BREAST LUMPECTOMY WITH SENTINEL NODE BIOPSY Right 8/2/2023    Procedure: RIGHT breast bracketed needle localized lumpectomy AND RIGHT axilla sentinel node biopsy;  Surgeon: Shayna Blount MD;  [facility];  Service: General;  Laterality: Right;    BREAST SURGERY Left     BREAST SURGERY Bilateral 8/2/2023    Procedure: RIGHT BREAST ONCOPLASTIC REDUCTION AND LEFT BREAST REDUCTION FOR SYMMETRY;  Surgeon: Greta Romero MD;  Location[facility];  Service: Plastics;  Laterality: Bilateral;    CARDIAC CATHETERIZATION      not sure when or where    CARDIAC CATHETERIZATION      CATARACT EXTRACTION WITH INTRAOCULAR LENS IMPLANT      CHOLECYSTECTOMY WITH INTRAOPERATIVE CHOLANGIOGRAM N/A 10/31/2020    Procedure: CHOLECYSTECTOMY LAPAROSCOPIC INTRAOPERATIVE CHOLANGIOGRAM;  Surgeon: Ruth Del Valle MD;  Location[facility];  Service: General;  Laterality: N/A;    COLONOSCOPY      FEMUR FRACTURE SURGERY Right     FEMUR    FRACTURE SURGERY  2015    LT ANKLE    MEDIAL BRANCH BLOCK Right 05/20/2022    Procedure: LUMBAR MEDIAL BRANCH BLOCK Right L4-S1;  Surgeon: Pamela Spaulding MD;  Location: [facility] OR;  Service: Pain Management;  Laterality: Right;    REPLACEMENT TOTAL KNEE Right      Past Medical History:   Diagnosis Date    Arthritis     Breast cancer     right breast     "Chronic low back pain     Depression     HISTORY OF BEING \"EMOTIONAL\"    Fatigue     GERD (gastroesophageal reflux disease)     H/O cardiovascular stress test 07/2018    NORMAL, DR ARIAS    History of diverticulitis     Hyperlipidemia     Hypertension     Impaired fasting glucose     Left bundle branch block     Macular degeneration     EVITA    Osteoporosis     Radial scar of breast     LEFT    Status post ORIF of fracture of ankle     Thyroid nodule     Urinary frequency        Prior Hospitalizations, other than for surgery or childbirth, and year:  NONE     Social History     Socioeconomic History    Marital status:      Spouse name: Isidoro   Tobacco Use    Smoking status: Never     Passive exposure: Never    Smokeless tobacco: Never   Vaping Use    Vaping Use: Never used   Substance and Sexual Activity    Alcohol use: Yes     Alcohol/week: 7.0 standard drinks     Types: 7 Glasses of wine per week     Comment: 1/day-socially    Drug use: No    Sexual activity: Defer     Patient is .  Patient is retired.  Patient drinks 2 servings of caffeine per day.    Family History:  Family History   Problem Relation Age of Onset    Heart disease Father     Heart attack Father 81    Breast cancer Sister 70    Heart attack Paternal Uncle     Breast cancer Cousin 50    Malig Hyperthermia Neg Hx     Colon cancer Neg Hx     Colon polyps Neg Hx     Crohn's disease Neg Hx     Irritable bowel syndrome Neg Hx     Ulcerative colitis Neg Hx        Vital Signs:  /78 (BP Location: Left arm, Patient Position: Sitting, Cuff Size: Adult)   Pulse 66   Ht 162.6 cm (64.02\")   Wt 78 kg (172 lb)   LMP  (LMP Unknown)   SpO2 98%   BMI 29.51 kg/mý      Medications:    Current Outpatient Medications:     acetaminophen (TYLENOL) 650 MG 8 hr tablet, Take 1 tablet by mouth Every 8 (Eight) Hours As Needed for Mild Pain., Disp: , Rfl:     amitriptyline (ELAVIL) 10 MG tablet, Take 1 tablet by mouth Every Night., Disp: 30 tablet, Rfl: " "2    atorvastatin (LIPITOR) 40 MG tablet, TAKE ONE TABLET BY MOUTH EVERY EVENING (Patient taking differently: Take 1 tablet by mouth Every Night.), Disp: 90 tablet, Rfl: 1    Cholecalciferol (VITAMIN D3) 5000 UNITS tablet, Take 1 tablet by mouth Daily., Disp: , Rfl:     denosumab (Prolia) 60 MG/ML solution prefilled syringe syringe, TO BE ADMINISTERED IN PHYSICIAN'S OFFICE. INJECT ONE SYRINGE SUBCUTANEOUSLY ONCE EVERY 6 MONTHS. REFRIGERATE. USE WITHIN 14 DAYS ONCE AT ROOM TEMPERATURE. (Patient taking differently: Every 6 (Six) Months. LAST DOSE MAY 2023), Disp: 1 each, Rfl: 1    losartan (COZAAR) 50 MG tablet, TAKE ONE TABLET BY MOUTH DAILY (Patient taking differently: Take 1 tablet by mouth Daily.), Disp: 90 tablet, Rfl: 1    metoprolol succinate XL (TOPROL-XL) 25 MG 24 hr tablet, TAKE ONE TABLET BY MOUTH DAILY (Patient taking differently: Take 1 tablet by mouth Every Night.), Disp: 90 tablet, Rfl: 1    Multiple Vitamin (MULTIVITAMIN) capsule, Take 1 capsule by mouth Daily. PT HOLDING FOR SURGERY, Disp: , Rfl:     multivitamin with minerals tablet tablet, Take 1 tablet by mouth Daily., Disp: , Rfl:     sertraline (ZOLOFT) 100 MG tablet, TAKE ONE TABLET BY MOUTH DAILY (Patient taking differently: Take 1 tablet by mouth Daily.), Disp: 90 tablet, Rfl: 1    lidocaine-prilocaine (EMLA) 2.5-2.5 % cream, Take As Directed. (Patient not taking: Reported on 8/21/2023), Disp: , Rfl:     loperamide (IMODIUM A-D) 2 MG tablet, Take 1/2 tablet daily x10 days then increase to 1/2 tablet twice daily if still having diarrhea.  Decrease dose if constipation occurs (Patient not taking: Reported on 8/21/2023), Disp: 30 tablet, Rfl: 1     Allergies:  No Known Allergies    Physical Examination:  /78 (BP Location: Left arm, Patient Position: Sitting, Cuff Size: Adult)   Pulse 66   Ht 162.6 cm (64.02\")   Wt 78 kg (172 lb)   LMP  (LMP Unknown)   SpO2 98%   BMI 29.51 kg/mý   General Appearance:  Patient is in no distress.  She is " well kept and has an obese build.   Psychiatric:  Patient with appropriate mood and affect. Alert and oriented to self, time, and place.    Breast, RIGHT:  medium sized, ptotic, symmetric with the contralateral side. S/p oncoplastic reduction incisions by Dr Romero after a RIGHT bracketed lumpectomy in 8-2023, with no erythema, warmth drainage.  Breast skin is without erythema, edema, rashes.  There are no visible abnormalities upon inspection during the arm-raising maneuver or with hands on hips in the sitting position. There is no nipple retraction, discharge or nipple/areolar skin changes. .There are no masses palpable in the sitting or supine positions.       Breast, LEFT: medium sized, ptotic, symmetric with the contralateral side. S/p oncoplastic reduction incisions by Dr Romero after a RIGHT bracketed lumpectomy in 8-2023, with no erythema, warmth drainage.  Breast skin is without erythema, edema, rashes.  There are no visible abnormalities upon inspection during the arm-raising maneuver or with hands on hips in the sitting position. There is no nipple retraction, discharge or nipple/areolar skin changes. .There are no masses palpable in the sitting or supine positions.   Well-healed radial biopsy   for radial scar.     Lymphatic:  There is no axillary, cervical, infraclavicular, or supraclavicular adenopathy bilaterally.  Specifically left axilla there is no palpable adenopathy.  There is a well-healed dermatotomy from her remote  lymph node core biopsy.  Eyes:  Pupils are round and reactive to light.  Cardiovascular:  Heart rate and rhythm are regular.  Respiratory:  Lungs are clear bilaterally with no crackles or wheezes in any lung field.  Gastrointestinal:  Abdomen is soft, nondistended, and nontender.     Musculoskeletal:  Good strength in all 4 extremities.   There is good range of motion in both shoulders.    Skin:  No new skin lesions or rashes on the skin excluding the breast (see breast  exam above).        Imagin/14/15 [facility]  MAMMO SCREEN EVITA  FLUHR, HEATHER  Benign dermal calcifications. BIRADS category 2: benign.    18 [facility]  MAMMO SCREEN EVITA  FLUHR, HEATHER  Scattered fibroglandular densities anterior one third retroareolar left breast interval development of a cluster of calcifications. BIRADS category 0.    8/3/18  [facility]  MAMMO DIAG LEFT  FLUHR, HEATHER  Microcalcifications within the outer inferior aspect of the left breast. BIRADS category 4.    2019 bilateral screening mammogram with 3D [facility]: Scattered fibroglandular densities.  No evidence for malignancy in either breast.  BI-RADS 1    [facility] mammography 2020 bilateral screening mammogram with tomosynthesis.  Scattered areas of fibroglandular density.  There is a 1 cm mass in the lower left axilla, axillary tail that is slightly increased in size.  BI-RADS 0  2020 left diagnostic mammogram with left breast ultrasound [facility].  On mammogram visualization of a 1.1 cm round mass consistent with a lymph node in the axillary tail with a cortex that is thickened relative to other visualized node.  On ultrasound axillary tail and left axillary ultrasound showed on the order of 16 cm from the nipple a 1.1 cm lymph node with a thickened cortex.  Impression 1.1 cm rounded left axillary tail lymph node with a thickened cortex recommend ultrasound-guided left breast biopsy BI-RADS 4.    [facility] 2021 bilateral screening mammogram with tomosynthesis scattered fibroglandular densities.  BI-RADS 1  [facility]'s point 2023 bilateral screening mammogram with tomosynthesis.  Scattered areas of fibroglandular density.  Question architectural distortion lower outer middle right breast.  BI-RADS 0  Right diagnostic mammogram and right breast ultrasound 2023 [facility]:  Scattered areas of fibroglandular  "density.  Lower outer middle right breast there is a persistent area of distortion.  On ultrasound right breast 8:00, 5 cm from the nipple is a 9 x 8 mm irregular hypoechoic mass corresponding distortion which is suspicious.  BI-RADS 4  Also at 8:00, 1 cm from the nipple there is an incidental benign appearing ductal confluence.  We will get her scheduled for a same-day biopsy.    Reviewed Mrs. Mckeon\"s  MRI and postbiopsy mammogram together today with Dr. Tariq.  The lobular histology makes her MRI interpretation fairly difficult especially with the postbiopsy cavity in the center of the affected area.  When combining the 2 imaging studies there does appear to be a significant 8 cm clip displacement posteriorly.  The mammographic density prebiopsy appears to be the best representation of size estimation per Dr. Tariq, approximately 4.6 cm.  The lesion appears debulked on the postbiopsy mammogram and there are blood products and air in the center of the enhancement on the MRI however the clip is situated 8 cm posterior to this.    Pathology:   8-21-18- left breast stereotactic biopsy: Upper outer quadrant, 5:00: Small radial sclerosing lesion with usual hyperplasia, separate foci of usual hyperplasia, columnar cell change without atypia: Focal stromal fibrosis, apically metaplasia, and fibroadenomatoid change.     Concordant per Dr Tariq.        Final Diagnosis   BREAST, LEFT, DESIGNATED \"UPPER OUTER QUADRANT, APPROXIMATE 5 O'CLOCK POSITION\",   STEREOTACTIC BIOPSY:               SMALL RADIAL SCLEROSING LESION WITH ASSOCIATED DUCTAL HYPERPLASIA OF THE                      USUAL TYPE.               SEPARATE FOCI OF DUCTAL HYPERPLASIA OF THE USUAL TYPE.               FOCAL COLUMNAR CELL CHANGE WITHOUT ATYPIA.               FOCAL STROMAL FIBROSIS AND ASSOCIATED CALCIFICATIONS.               FOCAL APOCRINE METAPLASIA.               FOCAL FIBROADENOMATOID CHANGE WITH ASSOCIATED CALCIFICATIONS.               NO EVIDENCE OF " MALIGNANCY.     TDJ/ IHC/a/JULIA     CPT CODES:  1. 96084       Pathology from  10-24-18 excision radial scar returend as radial scar and no atypia.          Final Diagnosis   LEFT BREAST LUMPECTOMY:           BENIGN BREAST TISSUE WITH DUCT HYPERPLASIA WITHOUT ATYPIA, ADENOSIS, AND RADIAL                   SCLEROSING LESION (2 MM).          SCAR AND CHANGES OF PRIOR BIOPSY.     COMMENT: I do not detect atypia. The margin is free of radial sclerosing lesion.     K/  IHC/TDJ     Pathology from in office core biopsy 9-9-20 returned as :  Final Diagnosis   1. Lymph Node, Left Low Axillary Tail, Core Biopsy:               A. Core-like fragments of reactive appearing lymph node.               B. No morphologic evidence of lymphoma or carcinoma.     2. Lymph Node, Left Low Axillary Tail, Core Biopsy (Gross Diagnosis Only):               A. Core-like tissue submitted to [facility] for Flow Cytometric Analysis.                     Please see the Flow Cytometry Summary below.     [facility]       [facility] returned as immunophenotyping fails to reveal a monoclonal B cell or abnormal T-cell immunophenotype.  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  The significance of this is uncertain.  Correlation with tissue morphology with further studies as indicated is advised.     Pathology from an office biopsy May 18, 2023 returned as invasive lobular carcinoma, intermediate grade, 3, 2, 1, largest focus 1.2 cm.  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  No lymphovascular invasion.  Estrogen , progesterone 21-30, HER2/tommy 1+, Ki-67 8%.         Final Diagnosis    1. Right Breast, 8 o'clock, 5 cm from Nipple, Ultrasound-Guided Biopsy for a Mass: INVASIVE LOBULAR CARCINOMA.               A. Killbuck histologic score II (tubules = 3, nuclei = 2, mitosis = 1).               B. Invasive carcinoma  involves multiple tissue cores with the largest contiguous focus measuring 12 mm.               C. 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               D. No lymphovascular nor perineural invasion identified.                                swm/pkm      Electronically signed by Traci Burr MD on 5/20/2023 at 1142    Synoptic Checklist    Breast Biomarker Reporting Template  Protocol posted: 3/22/2023  BREAST: BIOMARKER REPORTING TEMPLATE - All Specimens  Test(s) Performed       Estrogen Receptor (ER) Status   Positive (greater than 10% of cells demonstrate nuclear positivity)    Percentage of Cells with Nuclear Positivity   %    Average Intensity of Staining   Strong    Test Type   Food and Drug Administration (FDA) cleared (test / vendor): ventana    Primary Antibody   SP1    Scoring System   Jayant    Proportion Score   5    Intensity Score   3    Total Jayant Score   8    Test(s) Performed       Progesterone Receptor (PgR) Status   Positive    Percentage of Cells with Nuclear Positivity   21-30%    Average Intensity of Staining   Weak    Test Type   Food and Drug Administration (FDA) cleared (test / vendor): ventana    Primary Antibody   1E2    Scoring System   Jayant    Proportion Score   3    Intensity Score   1    Total Jayant Score   4    Test(s) Performed       HER2 by Immunohistochemistry   Negative (Score 1+)    Test Type   Food and Drug Administration (FDA) cleared (test / vendor): ventana    Primary Antibody   4B5    Test(s) Performed   Ki-67    Ki-67 Percentage of Positive Nuclei   8 %   Primary Antibody   30-9    Cold Ischemia and Fixation Times   Meet requirements specified in latest version of the ASCO / CAP Guidelines    Cold Ischemia Time (minutes)   2 min   Fixation Time (hours)   7.5 hours   Testing Performed on Block Number(s)   1A    METHODS   Fixative   Formalin    Image Analysis   Not performed    .      Comment       Representative slides from this case are reviewed internally with Dr. Pena, who concurs.               8-2-23 Pathology from bracketed needle localized lumpectomy with oncoplastic reduction returned as : 9mm invasive lobular carcinoma, int grade, 3,2,1, LCIS and ALH present, no LVI, margins widely clear. 0/3 nodes.   Path stage T1bN0- IA                 Final Diagnosis   1. Right Breast, Oriented Needle Localization Lumpectomy (129 grams): INVASIVE LOBULAR CARCINOMA.  A. Tumor site: Lower outer quadrant.               B. Histologic grade: Sanam histologic score II (tubules = 3, nuclei = 2, mitosis = 1).               C. Tumor size: 9 x 9 x 7 mm.               D. No ductal carcinoma in-situ identified.               E. 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               F.  No lymphovascular invasion identified.               G. Margins: Uninvolved by invasive carcinoma.                            1. Invasive carcinoma is present 20 mm from the original anterior margin, 30 mm from the medial, lateral,        posterior and superior margins and 60 mm from the inferior margin of excision in specimen 1 (see        synoptic template for final margin status).               H. Lymph Nodes: Three negative sentinel lymph nodes (see specimen 8 - 10).  I. Hormone receptor status: ER % positive, NE 21-30% positive, HER2 1+ negative, Ki-67 8%        (performed on prior biopsy [id number], slides reviewed).  J. Pathologic stage: pT1b, N(sn)0.     2. Right Breast, Additional Anterior Margin, Oriented Excision:               A. Benign breast tissue with usual ductal hyperplasia.               B. Final anterior margin free by an additional 10 mm.     3. Right Breast, Additional Superior Margin, Oriented Excision:               A. Benign breast tissue with adenosis.               B. Final superior margin free by an additional 15 mm.     4. Right Breast, Additional Lateral  Margin, Oriented Excision:               A. Benign unremarkable breast tissue.               B. Final lateral margin free by an additional 13 mm.     5. Right Breast, Additional Inferior Margin, Oriented Excision:               A. Benign unremarkable breast tissue.               B. Final inferior margin free by an additional 20 mm.     6. Right Breast, Additional Medial Margin, Oriented Excision:               A. Benign unremarkable breast tissue.               B. Final medial margin free by an additional 14 mm.     7.   Right Breast, Additional Posterior Margin, Oriented Excision:               A. Benign unremarkable breast tissue.               B. Final posterior margin free by an additional 10 mm.     8. [place] Lymph Node #1, Right Axilla, Excision:               A. One lymph node, negative for metastatic carcinoma (0/1).     9. [place] Lymph Node #2, Right Axilla, Excision:               A. One lymph node, negative for metastatic carcinoma (0/1).     10. [place] Lymph Node #2, Right Axilla, Excision:               A. One lymph node, negative for metastatic carcinoma (0/1).     11. Left Breast, Reduction Mammoplasty (211 grams):               A. Benign skin and breast tissue with sclerosing adenosis.               B. No atypical hyperplasia, in-situ nor invasive carcinoma identified.     12. Right Breast, Reduction Mammoplasty (209.6 grams):               A. Benign unremarkable skin and breast tissue.                 swm/pkm     Electronically signed by Traci Burr MD on 8/4/2023 at 1205   Synoptic Checklist   INVASIVE CARCINOMA OF THE BREAST: Resection   8th Edition - Protocol posted: 3/22/2023INVASIVE CARCINOMA OF THE BREAST: COMPLETE EXCISION - All Specimens         SPECIMEN   Procedure   Excision (less than total mastectomy)   Specimen Laterality   Right   TUMOR   Tumor Site   Lower outer quadrant   Histologic Type   Invasive lobular carcinoma   Histologic Grade (Sanam Histologic Score)        Glandular (Acinar) / Tubular Differentiation   Score 3   Nuclear Pleomorphism   Score 2   Mitotic Rate   Score 1   Overall Grade   Grade 2 (scores of 6 or 7)   Tumor Size   Greatest dimension of largest invasive focus (Millimeters): 9 mm   Additional Dimension (Millimeters)   9 mm       7 mm   Tumor Focality   Single focus of invasive carcinoma   Ductal Carcinoma In Situ (DCIS)   Not identified   Lobular Carcinoma In Situ (LCIS)   Not identified   Lymphatic and / or Vascular Invasion   Not identified   Dermal Lymphatic and / or Vascular Invasion   Not identified   Microcalcifications   Present in non-neoplastic tissue   Treatment Effect in the Breast   No known presurgical therapy   MARGINS   Distance from Invasive Carcinoma to Anterior Margin   30 mm   Distance from Invasive Carcinoma to Posterior Margin   40 mm   Distance from Invasive Carcinoma to Superior Margin   45 mm   Distance from Invasive Carcinoma to Inferior Margin   80 mm   Distance from Invasive Carcinoma to Medial Margin   43 mm   Distance from Invasive Carcinoma to Lateral Margin   43 mm   REGIONAL LYMPH NODES   Regional Lymph Node Status   All regional lymph nodes negative for tumor   Total Number of Lymph Nodes Examined (sentinel and non-sentinel)   3   Number of [place] Nodes Examined   3   pTNM CLASSIFICATION (AJCC 8th Edition)   Reporting of pT, pN, and (when applicable) pM categories is based on information available to the pathologist at the time the report is issued. As per the AJCC (Chapter 1, 8th Ed.) it is the managing physician's responsibility to establish the final pathologic stage based upon all pertinent information, including but potentially not limited to this pathology report.   pT Category   pT1b   pN Category   pN0   N Suffix   (sn)   SPECIAL STUDIES           Estrogen Receptor (ER) Status   Positive (greater than 10% of cells demonstrate nuclear positivity)   Percentage of Cells with Nuclear Positivity   %            Progesterone Receptor (PgR) Status   Positive   Percentage of Cells with Nuclear Positivity   21-30%           HER2 (by immunohistochemistry)   Negative (Score 1+)           Ki-67 Percentage of Positive Nuclei   8 %   Testing Performed on Case Number   [id number]   .      Comment     A representative slide from part 1 of this case is reviewed internally with Dr. Pena, who concurs.                    Note from Dr. 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     The patient has no significant symptoms.  She has no palpable lymphadenopathy or splenomegaly.  Her white count was normal.  This certainly could be a reactive nisreen population.  We discussed potential additional evaluation with peripheral blood flow cytometry and T/Gel gene rearrangement study as well as potential CT scans.  The patient declines further evaluation at this time she is agreeable to a course of observation.  We will see her back in 3 months with a CBC and evaluate clinically.    [facility] breast and gynecology panel showed no mutations.  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  We will let her know no mutations.        Procedures:  Percutaneous ultrasound-guided vacuum-assisted core breast/axillary tail lymph node biopsy 9-8-20  Indication:  ultrasound-and mammo visible rounded node with thickened cortex  Location: LEFT axillary tail, lowaxilla  Consent:  The risks, benefits, and alternatives to the procedure were discussed with the patient, who understood and wished to proceed.  The risks described included, but were not limited to, bleeding, infection, pneumothorax, and inadequate sampling requiring either repeat percutaneous or open excisional biopsy.  Description of Procedure:   After the patient was positioned supine on the procedure table, I located the lesion using ultrasound.  I  prepped and draped the breast/axilla skin in sterile fashion.  I anesthetized the breast skin at the site of anticipated mammotomy with 1% lidocaine with epinephrine.  I then anesthetized the underlying subcutaneous tissue and breast parenchyma surrounding the lesion with 1% lidocaine with epinephrine under ultrasound visualization and guidance. I then made a nicking incision with an 11blade and inserted the 14 G celero biopsy device from inferolateral to superomedial through the lesion under ultrasound guidance.   I then took 4 core samples  of the lesion under direct visualization with ultrasound.   I placed 2 of these cores in formalin and 2 of them in a cup for fresh and sent to pathology.  I withdrew the probe and placed a hydromark marker into the residual rounded node..  We held manual compression for 10 minutes, placed steri -strips at the mammotomy site, and wrapped the patient in a 6 inch super ace wrap and a cold pack.  Marker placed: hydromark  Tolerance: The patient tolerated the procedure well.  Disposition: We will see her back within a week to review her pathology.      Percutaneous ultrasound-guided vacuum-assisted excisional breast biopsy 5-18-23  Indication:  ultrasound-visible breast mass with mammographic and palpable correlate, BR4  Location: RIGHT 8:00 5 CFN  Consent:  The risks, benefits, and alternatives to the procedure were discussed with the patient, who understood and wished to proceed.  The risks described included, but were not limited to, bleeding, infection, pneumothorax, and inadequate sampling requiring either repeat percutaneous or open excisional biopsy.  Description of Procedure:   After the patient was positioned supine on the procedure table, I located the lesion using ultrasound.  I prepped and draped the breast skin in sterile fashion.  I anesthetized the breast skin at the site of anticipated mammotomy with 1% lidocaine with epinephrine.  I then anesthetized the underlying  "subcutaneous tissue and breast parenchyma surrounding the lesion with 1% lidocaine with epinephrine under ultrasound visualization and guidance. I then made a nicking incision with an 11blade and inserted the 10G encore biopsy device from inferolateral to superomedial under the lesion under ultrasound guidance.   I then took 10 sequential core samples using the automated sampling of the encore device, until the lesion mostly disappeared on ultrasound. I removed the probe, then placed a hydromark marker, using a stiff introducer, into the biopsy site. We held manual compression for 10 minutes, placed steri-strips at the mammotomy site, and wrapped the patient in a 6 inch super ace wrap with an ice-pack.  Marker placed: hydromark  Tolerance: The patient tolerated the procedure well.  Disposition: We will see her back within a week to review her pathology.      Assessment:   Diagnosis Plan   1. Lobular breast cancer, right  Mammo Screening Digital Tomosynthesis Bilateral With CAD    Ambulatory Referral to Physical Therapy Lymphedema, Bioimpedance      2. Radial scar of breast        3. Breast calcifications on mammogram        4. Enlarged lymph nodes        5. FH: breast cancer        6. Encounter for screening mammogram for malignant neoplasm of breast  Mammo Screening Digital Tomosynthesis Bilateral With CAD        1-2  RIGHT 8:00, 5 CFN- 1.75 cm on exam, distortion on mammogram, 9x8mm on ultrasound 5-2023  Target for ultrasound guided biopsy today  BR4    Pathology from an office biopsy May 18, 2023 returned as invasive lobular carcinoma, intermediate grade, 3, 2, 1, largest focus 1.2 cm.  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  No lymphovascular invasion.  Estrogen , progesterone 21-30, HER2/tommy 1+, Ki-67 8%.    Reviewed Mrs. Mckeon\"s  MRI and postbiopsy mammogram together with Dr. Tariq.     The lobular histology makes her MRI interpretation fairly difficult especially with the " postbiopsy cavity in the center of the affected area.  When combining the 2 imaging studies there does appear to be a significant 8 cm clip displacement posteriorly.  The mammographic density prebiopsy appears to be the best representation of size estimation per Dr. Tariq, approximately 4.6 cm.  The lesion appears debulked on the postbiopsy mammogram and there are blood products and air in the center of the enhancement on the MRI however the clip is situated 8 cm posterior to this.    Clinical stage T2N0- IIA    8-2-23 Pathology from bracketed needle localized lumpectomy with oncoplastic reduction returned as : 9mm invasive lobular carcinoma, int grade, 3,2,1, LCIS and ALH present, no LVI, margins widely clear. 0/3 nodes.   Path stage T1bN0- IA    3-4    LEFt breast central RA- 1.5 cm cluster- asymptomatic- stereotactic biopsy August 21, 2018 lateral to the lower outer quadrant left breast returned as   8-21-18- left breast stereotactic biopsy:  5:00: Small radial sclerosing lesion with usual hyperplasia, separate foci of usual hyperplasia, columnar cell change without atypia: Focal stromal fibrosis, apically metaplasia, and fibroadenomatoid change.   Concordant per Dr Tariq.    Hydromark in vicinity of pre biopsy calcifications.    Pathology from  10-24-18 excision radial scar returned as radial scar and no atypia.  Fu mammogram 7-2019 BR1      5-  LEFT UOQ axillary tail/low axilla-0.1 cm rounded lymph node with thickened cortex seen on August 2020 mammogram and ultrasound BI-RADS 4.  Target for biopsy 9-9-20    Pathology from in office core biopsy 9-9-20 returned as :  Final Diagnosis   1. Lymph Node, Left Low Axillary Tail, Core Biopsy:               A. Core-like fragments of reactive appearing lymph node.               B. No morphologic evidence of lymphoma or carcinoma.     2. Lymph Node, Left Low Axillary Tail, Core Biopsy (Gross Diagnosis Only):               A. Core-like tissue submitted to [facility] for Flow  Cytometric Analysis.                     Please see the Flow Cytometry Summary below.     [facility]       Flow cytometry from [facility] dated September 9, 2020 returned as immunophenotyping fails to reveal a monoclonal B cell or abnormal T-cell immunophenotype.  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  The significance of this is uncertain.  Correlation with tissue morphology with further studies as indicated is advised.     -Addendum: Reviewed with Dr. Feliciano and he thinks that it would be best for him to just evaluate her in the clinic.     6-  Paternal 1/2 sister in her 70s  Paternal cousin in her 50s      [facility] breast and gynecology panel showed no mutations.  There were 2 VUS, (BARD1  c.586G>A And STK11 c.631 C>T)  Has spoken with genetics about the VUS's    Plan:  Mrs. Mckeon is here with her daughter today.    We reviewed her pathology and exam together today. She is healing nicely.     3-24-24 [facility] Her next routine mammogram will be due.  I will arrange this and plan to see her back thereafter.  I will arrange for her to see physical therapy for a bioimpedance measurement of her arms.  She has an appoint with Dr. Feliciano already scheduled and with Dr. Ornelas as well.    Have asked her to call us in the interim otherwise we will see her back in April 2024 after imaging.     Shayna Blount MD     Next Appointment:  Return for Next scheduled follow up, after imaging.      EMR Dragon/transcription disclaimer:    Much of this encounter note is an electronic transcription/translocation of spoken language to printed text.  The electronic translation of spoken language may permit erroneous, or at times, nonsensical words or phrases to be inadvertently transcribed.  Although I have reviewed the note from such areas, some may still exist.

## 2023-08-21 NOTE — PROGRESS NOTES
Chief Complaint  Anatomic stage IA/pathologic prognostic stage IA right breast cancer ([id number]), ER/SC positive and HER2/tommy negative.  Left axillary lymph node biopsy with reactive changes and clonal T LGL population by flow cytometry    Subjective        History of Present Illness    Patient is here today after being seen previously 1/4/2021 regarding pathologic changes in left axillary lymph node that were felt to be benign.  She is here today regarding recently diagnosed right breast cancer to discuss potential recommendations for adjuvant therapy.    Patient underwent previous left breast biopsy on 8/21/2018 that showed small radial sclerosing lesion with associated ductal hyperplasia of the usual type, focal columnar cell change without atypia, apocrine metaplasia, fibroadenomatoid change with associated calcifications, no malignancy.  She underwent left lumpectomy on 10/25/2018 with benign breast tissue with ductal hyperplasia without atypia, adenosis, radial sclerosing lesion (2 mm), scar, prior biopsy change.  Margins were negative for radial sclerosing lesion.    Screening mammogram on 3/23/2023 showed right BI-RADS 0 result.  Diagnostic right mammogram and ultrasound on 4/27/2023 showed a 0.9 cm mass in the 8 o'clock position of the right breast, BI-RADS 4.  Biopsy was performed right breast 8 o'clock position 5/18/2023 with invasive lobular carcinoma, grade 2, 1.2 cm with associated atypical lobular hyperplasia and LCIS, no evidence of lymphovascular or perineural invasion, ER positive (%), SC positive (21-30%), HER2/tommy negative (1+ IHC), Ki-67 8%.  Patient with family history of paternal half sister with breast cancer in her 70s and paternal cousin with breast cancer in her 50s.  Patient underwent genetic testing with [facility] panel test on 5/25/2023 which showed VUS x2 (BARD1 c.586G>A and STK11 c.631C>T).  Bilateral breast MRI on 5/22/2023 showed 1.5 cm enhancement in the 7 o'clock position  "right breast with associated postbiopsy change, recommended repeat mammogram for preoperative planning to assess clip placement.  There was an incompletely assessed 2.1 cm right thyroid nodule.  Diagnostic right mammogram on 5/20/2023 showed possibility of clip migration or that a different lesion was biopsied.  Thyroid ultrasound on 6/9/2023 showed a 1.9 cm right thyroid nodule that was TI-RADS 3, recommended 1 year interval follow-up ultrasound.  The patient underwent surgery on 8/2/2023 with right lumpectomy with oncoplastic closure and left breast mammoplasty for symmetry.  Pathology showed invasive lobular carcinoma, grade 2, 9 mm, atypical lobular hyperplasia, LCIS, negative margins, 3 negative sentinel lymph nodes.  Pathology from left breast tissue reduction mammoplasty was negative for malignancy.    Patient today reports that she has recovered very well from her surgery, has not experienced any complications.  She does have some chronic issues with low back pain which has been intermittent in nature and followed by pain management.  She does have difficulty with her balance, notes that she also has difficulty with her vision due to macular degeneration.  She was experiencing some weight loss earlier this year however weight has stabilized and increased recently.  She did have difficulty with diarrhea, was seen by GI and received a short course of Imodium with improvement in symptoms, very infrequent episodes of diarrhea currently.  She is scheduled for colonoscopy in the future on 10/5/2023.      Objective   Vital Signs:   /66   Pulse 73   Temp 96.9 øF (36.1 øC) (Temporal)   Resp 16   Ht 160 cm (62.99\") Comment: new ht  Wt 76.9 kg (169 lb 9.6 oz)   SpO2 95%   BMI 30.05 kg/mý     Physical Exam  Constitutional:       Appearance: She is well-developed.   Eyes:      Conjunctiva/sclera: Conjunctivae normal.   Neck:      Thyroid: No thyromegaly.   Cardiovascular:      Rate and Rhythm: Normal rate " and regular rhythm.      Heart sounds: No murmur heard.    No friction rub. No gallop.   Pulmonary:      Effort: No respiratory distress.      Breath sounds: Normal breath sounds.      Comments: Breast exam was not performed today  Abdominal:      General: Bowel sounds are normal. There is no distension.      Palpations: Abdomen is soft.      Tenderness: There is no abdominal tenderness.   Lymphadenopathy:      Head:      Right side of head: No submandibular adenopathy.      Cervical: No cervical adenopathy.      Upper Body:      Right upper body: No supraclavicular adenopathy.      Left upper body: No supraclavicular adenopathy.   Skin:     General: Skin is warm and dry.      Findings: No rash.   Neurological:      Mental Status: She is alert and oriented to person, place, and time.      Cranial Nerves: No cranial nerve deficit.      Motor: No abnormal muscle tone.      Deep Tendon Reflexes: Reflexes normal.   Psychiatric:         Behavior: Behavior normal.      Result Review : Reviewed CBC, CMP from today.  Reviewed multiple radiographic and pathologic results as well as breast surgery records as outlined above and below.       Assessment and Plan     *Anatomic stage IA/pathologic prognostic stage IA right breast cancer ([id number]), ER/KS positive and HER2/tommy negative  Patient underwent previous left breast biopsy on 8/21/2018 that showed small radial sclerosing lesion with associated ductal hyperplasia of the usual type, focal columnar cell change without atypia, apocrine metaplasia, fibroadenomatoid change with associated calcifications, no malignancy.    She underwent left lumpectomy on 10/25/2018 with benign breast tissue with ductal hyperplasia without atypia, adenosis, radial sclerosing lesion (2 mm), scar, prior biopsy change.  Margins were negative for radial sclerosing lesion.  Screening mammogram on 3/23/2023 showed right BI-RADS 0 result.    Diagnostic right mammogram and ultrasound on 4/27/2023 showed  a 0.9 cm mass in the 8 o'clock position of the right breast, BI-RADS 4.    Biopsy was performed right breast 8 o'clock position 5/18/2023 with invasive lobular carcinoma, grade 2, 1.2 cm with associated atypical lobular hyperplasia and LCIS, no evidence of lymphovascular or perineural invasion, ER positive (%), WA positive (21-30%), HER2/tommy negative (1+ IHC), Ki-67 8%.    Patient with family history of paternal half sister with breast cancer in her 70s and paternal cousin with breast cancer in her 50s.    [facility] panel test on 5/25/2023 which showed VUS x2 (BARD1 c.586G>A and STK11 c.631C>T).    Bilateral breast MRI on 5/22/2023 showed 1.5 cm enhancement in the 7 o'clock position right breast with associated postbiopsy change, recommended repeat mammogram for preoperative planning to assess clip placement.  There was an incompletely assessed 2.1 cm right thyroid nodule.    Diagnostic right mammogram on 5/20/2023 showed possibility of clip migration or that a different lesion was biopsied.    Thyroid ultrasound on 6/9/2023 showed a 1.9 cm right thyroid nodule that was TI-RADS 3, recommended 1 year interval follow-up ultrasound.    The patient underwent surgery on 8/2/2023 with right lumpectomy with oncoplastic closure and left breast mammoplasty for symmetry.  Pathology showed invasive lobular carcinoma, grade 2, 9 mm, atypical lobular hyperplasia, LCIS, negative margins, 3 negative sentinel lymph nodes.  Pathology from left breast tissue reduction mammoplasty was negative for malignancy.  The patient is here today to review pathology from her recent biopsy and right lumpectomy and discuss recommendations for adjuvant therapy.  Patient is doing well after surgery.  Prior biopsy on 5/18/2023, she had a pT1c primary lesion with 1.2 cm invasive lobular carcinoma, grade 2 that is ER positive (91-1 1%), weakly WA positive (21-30%), and HER2/tommy negative (IHC 1+) with Ki-67 8%.  On right lumpectomy specimen  8/2/2023, there was 9 mm residual grade 2 invasive lobular carcinoma, negative margins, 3 benign sentinel lymph nodes.  Oncotype was sent which returned in the low risk range at 15 indicating less than 1% benefit from adjuvant chemotherapy and 4% risk of distant recurrence at 5 years with endocrine therapy.  I reviewed all of this at length with the patient and her daughter.  She is seeing radiation oncology tomorrow to discuss potential adjuvant radiation therapy.  Adjuvant chemotherapy is not recommended given the patient's age, low risk disease, Oncotype Dx score of 15.  We discussed potential pursuit of adjuvant endocrine therapy.  She does have underlying osteoporosis continuing on Prolia with recent DEXA scan that showed maximal T score -3.1 in the left hip.  I would not recommend use of adjuvant aromatase inhibitor therapy in this situation with potential to worsen the patient's bone density further.  We discussed the potential use of tamoxifen.  She has no personal or family history of thrombosis.  We discussed risk of thrombotic complications and endometrial cancer although risks are low for each.  We discussed the overall the excellent prognosis from her malignancy given the small size, favorable prognostic features, node-negative status.  The patient and her daughter at this point are inclined to forego the risks of adjuvant endocrine therapy and to pursue a course of observation/surveillance following surgery.  I believe that patient is making an informed decision.  We discussed follow-up every 6 months for 5 years after recent surgery to monitor her clinically for evidence of recurrent disease and she is willing to pursue follow-up.  We will therefore arrange follow-up appointment here in 6 months.  She will notify us in the interval if she changes her mind regarding adjuvant endocrine therapy.  Patient and her daughter had multiple questions which were answered to their satisfaction.    *Left axillary  lymph node biopsy with reactive changes and clonal T LGL population by flow cytometry, no findings to suggest malignancy:  The patient underwent previous left breast biopsy 8/21/2018 with benign findings including ductal hyperplasia of the usual type, focal stromal fibrosis, focal columnar cell change without atypia, a progression metaplasia, fibroadenomatoid change.    The patient is followed by Dr. Blount and underwent routine screening mammogram on 7/20/2020 with finding of increased size of the left axillary/axillary tail mass that may represent a lymph node, BI-RADS 0.    She underwent a diagnostic left mammogram and ultrasound on 8/12/2020 which showed a left axillary tail lymph node 1.1 cm with thickened cortex relative to other lymph nodes and biopsy was recommended.    On 9/9/2020, biopsy was performed showing a reactive lymph node.  Flow cytometry was sent showing an increased CD4 to CD8 ratio, no aberrant T-cell population or B-cell population however there was a clonal T LGL population detected by V beta analysis comprising 7% of total events (TCR alpha/beta positive, CD20 6+).  There was no clonal CD4 positive T-cell population.  Results were not diagnostic for malignancy.   The patient was seen in initial consultation on 9/28/2020.  She had no palpable lymphadenopathy or splenomegaly.  Her counts were reviewed showing normal WBC at 6.96 with normal differential, no evidence of anemia nor thrombocytopenia.  The clonal population seen on flow cytometry from lymph node biopsy certainly could be a reactive population.  There was no evidence to suggest LGL leukemia.  We did discuss potential additional evaluation with peripheral blood flow cytometry and T-cell gene rearrangement study as well as potential CT scans of the chest abdomen and pelvis to evaluate for additional lymphadenopathy and/or hepatosplenomegaly.  The patient however declined further evaluation but was agreeable to a course of  observation.    The patient developed abdominal pain and underwent CT abdomen pelvis on 10/27/2020 which showed cholelithiasis, no evidence of adenopathy or splenomegaly.  She ultimately underwent laparoscopic cholecystectomy with pathology showing mild chronic calculus cholecystitis.  Patient was seen in follow-up on 1/4/2021.  There was no clinical evidence of underlying LGL leukemia (normal WBC and differential, no evidence of anemia nor thrombocytopenia) and patient did not wish to pursue any further evaluation.   Patient today with normal WBC and differential    *Osteoporosis  Patient has been receiving Prolia for a number of years  DEXA scan on 4/27/2023 with T score lumbar spine -0.5, left hip -3.1, left forearm -2.8  As above, patient is not a good candidate for aromatase inhibitor therapy regarding her breast cancer with potential to worsen her bone density.    *Chronic intermittent diarrhea  Patient reports chronic mild intermittent diarrhea, improved after short course of Imodium in July  Patient is scheduled for colonoscopy on 10/5/2023    *Chronic low back pain  Patient continues follow-up with pain management     Plan:  Patient is not a good candidate for aromatase inhibitor therapy due to ongoing difficulties with osteoporosis  Patient currently declines use of adjuvant tamoxifen due to potential risk of thrombosis and endometrial cancer in the setting of low risk of breast cancer recurrence at the age of 83.  Patient is scheduled to see radiation oncology tomorrow to discuss potentially adjuvant radiation to the right breast  Patient will be due for annual bilateral mammogram in March 2024 which is being scheduled by Dr. Blount  Return in 6 months for clinical follow-up with CBC, CMP      I did spend 50 minutes in total time caring for the patient today, time spent in review of records, face-to-face consultation, coordination of care, placement of orders, completion of documentation.

## 2023-08-22 ENCOUNTER — LAB (OUTPATIENT)
Dept: LAB | Facility: HOSPITAL | Age: 83
End: 2023-08-22
Payer: MEDICARE

## 2023-08-22 ENCOUNTER — OFFICE VISIT (OUTPATIENT)
Dept: ONCOLOGY | Facility: CLINIC | Age: 83
End: 2023-08-22
Payer: MEDICARE

## 2023-08-22 VITALS
WEIGHT: 169.6 LBS | DIASTOLIC BLOOD PRESSURE: 66 MMHG | OXYGEN SATURATION: 95 % | RESPIRATION RATE: 16 BRPM | BODY MASS INDEX: 30.05 KG/M2 | SYSTOLIC BLOOD PRESSURE: 108 MMHG | TEMPERATURE: 96.9 F | HEART RATE: 73 BPM | HEIGHT: 63 IN

## 2023-08-22 DIAGNOSIS — C50.911 LOBULAR BREAST CANCER, RIGHT: ICD-10-CM

## 2023-08-22 DIAGNOSIS — C50.911 LOBULAR BREAST CANCER, RIGHT: Primary | ICD-10-CM

## 2023-08-22 LAB
ALBUMIN SERPL-MCNC: 4 G/DL (ref 3.5–5.2)
ALBUMIN/GLOB SERPL: 1.3 G/DL
ALP SERPL-CCNC: 102 U/L (ref 39–117)
ALT SERPL W P-5'-P-CCNC: 12 U/L (ref 1–33)
ANION GAP SERPL CALCULATED.3IONS-SCNC: 11.3 MMOL/L (ref 5–15)
AST SERPL-CCNC: 21 U/L (ref 1–32)
BASOPHILS # BLD AUTO: 0.05 10*3/MM3 (ref 0–0.2)
BASOPHILS NFR BLD AUTO: 0.6 % (ref 0–1.5)
BILIRUB SERPL-MCNC: 0.4 MG/DL (ref 0–1.2)
BUN SERPL-MCNC: 13 MG/DL (ref 8–23)
BUN/CREAT SERPL: 19.4 (ref 7–25)
CALCIUM SPEC-SCNC: 9.9 MG/DL (ref 8.6–10.5)
CHLORIDE SERPL-SCNC: 100 MMOL/L (ref 98–107)
CO2 SERPL-SCNC: 25.7 MMOL/L (ref 22–29)
CREAT SERPL-MCNC: 0.67 MG/DL (ref 0.6–1.1)
DEPRECATED RDW RBC AUTO: 46.4 FL (ref 37–54)
EGFRCR SERPLBLD CKD-EPI 2021: 85.8 ML/MIN/1.73
EOSINOPHIL # BLD AUTO: 0.2 10*3/MM3 (ref 0–0.4)
EOSINOPHIL NFR BLD AUTO: 2.4 % (ref 0.3–6.2)
ERYTHROCYTE [DISTWIDTH] IN BLOOD BY AUTOMATED COUNT: 14 % (ref 12.3–15.4)
GLOBULIN UR ELPH-MCNC: 3.1 GM/DL
GLUCOSE SERPL-MCNC: 145 MG/DL (ref 65–99)
HCT VFR BLD AUTO: 38.9 % (ref 34–46.6)
HGB BLD-MCNC: 12.4 G/DL (ref 12–15.9)
IMM GRANULOCYTES # BLD AUTO: 0.04 10*3/MM3 (ref 0–0.05)
IMM GRANULOCYTES NFR BLD AUTO: 0.5 % (ref 0–0.5)
LYMPHOCYTES # BLD AUTO: 1.62 10*3/MM3 (ref 0.7–3.1)
LYMPHOCYTES NFR BLD AUTO: 19.1 % (ref 19.6–45.3)
MCH RBC QN AUTO: 28.8 PG (ref 26.6–33)
MCHC RBC AUTO-ENTMCNC: 31.9 G/DL (ref 31.5–35.7)
MCV RBC AUTO: 90.3 FL (ref 79–97)
MONOCYTES # BLD AUTO: 0.73 10*3/MM3 (ref 0.1–0.9)
MONOCYTES NFR BLD AUTO: 8.6 % (ref 5–12)
NEUTROPHILS NFR BLD AUTO: 5.85 10*3/MM3 (ref 1.7–7)
NEUTROPHILS NFR BLD AUTO: 68.8 % (ref 42.7–76)
NRBC BLD AUTO-RTO: 0 /100 WBC (ref 0–0.2)
PLATELET # BLD AUTO: 304 10*3/MM3 (ref 140–450)
PMV BLD AUTO: 9.9 FL (ref 6–12)
POTASSIUM SERPL-SCNC: 4 MMOL/L (ref 3.5–5.2)
PROT SERPL-MCNC: 7.1 G/DL (ref 6–8.5)
RBC # BLD AUTO: 4.31 10*6/MM3 (ref 3.77–5.28)
SODIUM SERPL-SCNC: 137 MMOL/L (ref 136–145)
WBC NRBC COR # BLD: 8.49 10*3/MM3 (ref 3.4–10.8)

## 2023-08-22 PROCEDURE — 80053 COMPREHEN METABOLIC PANEL: CPT

## 2023-08-22 PROCEDURE — 85025 COMPLETE CBC W/AUTO DIFF WBC: CPT

## 2023-08-22 PROCEDURE — 36415 COLL VENOUS BLD VENIPUNCTURE: CPT

## 2023-08-22 NOTE — LETTER
August 22, 2023     Mariann Fallon MD  [address]  [address]  [facility] [address]    Patient: Shu Mckeon   YOB: 1940   Date of Visit: 8/22/2023     Dear Mariann Fallon MD:       Thank you for referring Shu Mckeon to me for evaluation. Below are the relevant portions of my assessment and plan of care.    If you have questions, please do not hesitate to call me. I look forward to following Shu along with you.         Sincerely,        Brown Feliciano MD        CC: MD Braden Lucas John L Jr., MD  08/22/23 1953  Sign when Signing Visit  Chief Complaint  Anatomic stage IA/pathologic prognostic stage IA right breast cancer ([id number]), ER/CO positive and HER2/tommy negative.  Left axillary lymph node biopsy with reactive changes and clonal T LGL population by flow cytometry    Subjective        History of Present Illness    Patient is here today after being seen previously 1/4/2021 regarding pathologic changes in left axillary lymph node that were felt to be benign.  She is here today regarding recently diagnosed right breast cancer to discuss potential recommendations for adjuvant therapy.    Patient underwent previous left breast biopsy on 8/21/2018 that showed small radial sclerosing lesion with associated ductal hyperplasia of the usual type, focal columnar cell change without atypia, apocrine metaplasia, fibroadenomatoid change with associated calcifications, no malignancy.  She underwent left lumpectomy on 10/25/2018 with benign breast tissue with ductal hyperplasia without atypia, adenosis, radial sclerosing lesion (2 mm), scar, prior biopsy change.  Margins were negative for radial sclerosing lesion.    Screening mammogram on 3/23/2023 showed right BI-RADS 0 result.  Diagnostic right mammogram and ultrasound on 4/27/2023 showed a 0.9 cm mass in the 8 o'clock position of the right breast, BI-RADS 4.  Biopsy was performed right breast 8 o'clock position 5/18/2023 with invasive  lobular carcinoma, grade 2, 1.2 cm with associated atypical lobular hyperplasia and LCIS, no evidence of lymphovascular or perineural invasion, ER positive (%), NC positive (21-30%), HER2/tommy negative (1+ IHC), Ki-67 8%.  Patient with family history of paternal half sister with breast cancer in her 70s and paternal cousin with breast cancer in her 50s.  Patient underwent genetic testing with [employer] panel test on 5/25/2023 which showed VUS x2 (BARD1 c.586G>A and STK11 c.631C>T).  Bilateral breast MRI on 5/22/2023 showed 1.5 cm enhancement in the 7 o'clock position right breast with associated postbiopsy change, recommended repeat mammogram for preoperative planning to assess clip placement.  There was an incompletely assessed 2.1 cm right thyroid nodule.  Diagnostic right mammogram on 5/20/2023 showed possibility of clip migration or that a different lesion was biopsied.  Thyroid ultrasound on 6/9/2023 showed a 1.9 cm right thyroid nodule that was TI-RADS 3, recommended 1 year interval follow-up ultrasound.  The patient underwent surgery on 8/2/2023 with right lumpectomy with oncoplastic closure and left breast mammoplasty for symmetry.  Pathology showed invasive lobular carcinoma, grade 2, 9 mm, atypical lobular hyperplasia, LCIS, negative margins, 3 negative sentinel lymph nodes.  Pathology from left breast tissue reduction mammoplasty was negative for malignancy.    Patient today reports that she has recovered very well from her surgery, has not experienced any complications.  She does have some chronic issues with low back pain which has been intermittent in nature and followed by pain management.  She does have difficulty with her balance, notes that she also has difficulty with her vision due to macular degeneration.  She was experiencing some weight loss earlier this year however weight has stabilized and increased recently.  She did have difficulty with diarrhea, was seen by GI and received a short  "course of Imodium with improvement in symptoms, very infrequent episodes of diarrhea currently.  She is scheduled for colonoscopy in the future on 10/5/2023.      Objective   Vital Signs:   /66   Pulse 73   Temp 96.9 øF (36.1 øC) (Temporal)   Resp 16   Ht 160 cm (62.99\") Comment: new ht  Wt 76.9 kg (169 lb 9.6 oz)   SpO2 95%   BMI 30.05 kg/mý     Physical Exam  Constitutional:       Appearance: She is well-developed.   Eyes:      Conjunctiva/sclera: Conjunctivae normal.   Neck:      Thyroid: No thyromegaly.   Cardiovascular:      Rate and Rhythm: Normal rate and regular rhythm.      Heart sounds: No murmur heard.    No friction rub. No gallop.   Pulmonary:      Effort: No respiratory distress.      Breath sounds: Normal breath sounds.      Comments: Breast exam was not performed today  Abdominal:      General: Bowel sounds are normal. There is no distension.      Palpations: Abdomen is soft.      Tenderness: There is no abdominal tenderness.   Lymphadenopathy:      Head:      Right side of head: No submandibular adenopathy.      Cervical: No cervical adenopathy.      Upper Body:      Right upper body: No supraclavicular adenopathy.      Left upper body: No supraclavicular adenopathy.   Skin:     General: Skin is warm and dry.      Findings: No rash.   Neurological:      Mental Status: She is alert and oriented to person, place, and time.      Cranial Nerves: No cranial nerve deficit.      Motor: No abnormal muscle tone.      Deep Tendon Reflexes: Reflexes normal.   Psychiatric:         Behavior: Behavior normal.      Result Review : Reviewed CBC, CMP from today.  Reviewed multiple radiographic and pathologic results as well as breast surgery records as outlined above and below.       Assessment and Plan     *Anatomic stage IA/pathologic prognostic stage IA right breast cancer ([id number]), ER/ID positive and HER2/tommy negative  Patient underwent previous left breast biopsy on 8/21/2018 that showed small " radial sclerosing lesion with associated ductal hyperplasia of the usual type, focal columnar cell change without atypia, apocrine metaplasia, fibroadenomatoid change with associated calcifications, no malignancy.    She underwent left lumpectomy on 10/25/2018 with benign breast tissue with ductal hyperplasia without atypia, adenosis, radial sclerosing lesion (2 mm), scar, prior biopsy change.  Margins were negative for radial sclerosing lesion.  Screening mammogram on 3/23/2023 showed right BI-RADS 0 result.    Diagnostic right mammogram and ultrasound on 4/27/2023 showed a 0.9 cm mass in the 8 o'clock position of the right breast, BI-RADS 4.    Biopsy was performed right breast 8 o'clock position 5/18/2023 with invasive lobular carcinoma, grade 2, 1.2 cm with associated atypical lobular hyperplasia and LCIS, no evidence of lymphovascular or perineural invasion, ER positive (%), MI positive (21-30%), HER2/tommy negative (1+ IHC), Ki-67 8%.    Patient with family history of paternal half sister with breast cancer in her 70s and paternal cousin with breast cancer in her 50s.    [facility] panel test on 5/25/2023 which showed VUS x2 (BARD1 c.586G>A and STK11 c.631C>T).    Bilateral breast MRI on 5/22/2023 showed 1.5 cm enhancement in the 7 o'clock position right breast with associated postbiopsy change, recommended repeat mammogram for preoperative planning to assess clip placement.  There was an incompletely assessed 2.1 cm right thyroid nodule.    Diagnostic right mammogram on 5/20/2023 showed possibility of clip migration or that a different lesion was biopsied.    Thyroid ultrasound on 6/9/2023 showed a 1.9 cm right thyroid nodule that was TI-RADS 3, recommended 1 year interval follow-up ultrasound.    The patient underwent surgery on 8/2/2023 with right lumpectomy with oncoplastic closure and left breast mammoplasty for symmetry.  Pathology showed invasive lobular carcinoma, grade 2, 9 mm, atypical lobular  hyperplasia, LCIS, negative margins, 3 negative sentinel lymph nodes.  Pathology from left breast tissue reduction mammoplasty was negative for malignancy.  The patient is here today to review pathology from her recent biopsy and right lumpectomy and discuss recommendations for adjuvant therapy.  Patient is doing well after surgery.  Prior biopsy on 5/18/2023, she had a pT1c primary lesion with 1.2 cm invasive lobular carcinoma, grade 2 that is ER positive (91-1 1%), weakly TX positive (21-30%), and HER2/tommy negative (IHC 1+) with Ki-67 8%.  On right lumpectomy specimen 8/2/2023, there was 9 mm residual grade 2 invasive lobular carcinoma, negative margins, 3 benign sentinel lymph nodes.  Oncotype was sent which returned in the low risk range at 15 indicating less than 1% benefit from adjuvant chemotherapy and 4% risk of distant recurrence at 5 years with endocrine therapy.  I reviewed all of this at length with the patient and her daughter.  She is seeing radiation oncology tomorrow to discuss potential adjuvant radiation therapy.  Adjuvant chemotherapy is not recommended given the patient's age, low risk disease, Oncotype Dx score of 15.  We discussed potential pursuit of adjuvant endocrine therapy.  She does have underlying osteoporosis continuing on Prolia with recent DEXA scan that showed maximal T score -3.1 in the left hip.  I would not recommend use of adjuvant aromatase inhibitor therapy in this situation with potential to worsen the patient's bone density further.  We discussed the potential use of tamoxifen.  She has no personal or family history of thrombosis.  We discussed risk of thrombotic complications and endometrial cancer although risks are low for each.  We discussed the overall the excellent prognosis from her malignancy given the small size, favorable prognostic features, node-negative status.  The patient and her daughter at this point are inclined to forego the risks of adjuvant endocrine  therapy and to pursue a course of observation/surveillance following surgery.  I believe that patient is making an informed decision.  We discussed follow-up every 6 months for 5 years after recent surgery to monitor her clinically for evidence of recurrent disease and she is willing to pursue follow-up.  We will therefore arrange follow-up appointment here in 6 months.  She will notify us in the interval if she changes her mind regarding adjuvant endocrine therapy.  Patient and her daughter had multiple questions which were answered to their satisfaction.    *Left axillary lymph node biopsy with reactive changes and clonal T LGL population by flow cytometry, no findings to suggest malignancy:  The patient underwent previous left breast biopsy 8/21/2018 with benign findings including ductal hyperplasia of the usual type, focal stromal fibrosis, focal columnar cell change without atypia, a progression metaplasia, fibroadenomatoid change.    The patient is followed by Dr. Blount and underwent routine screening mammogram on 7/20/2020 with finding of increased size of the left axillary/axillary tail mass that may represent a lymph node, BI-RADS 0.    She underwent a diagnostic left mammogram and ultrasound on 8/12/2020 which showed a left axillary tail lymph node 1.1 cm with thickened cortex relative to other lymph nodes and biopsy was recommended.    On 9/9/2020, biopsy was performed showing a reactive lymph node.  Flow cytometry was sent showing an increased CD4 to CD8 ratio, no aberrant T-cell population or B-cell population however there was a clonal T LGL population detected by V beta analysis comprising 7% of total events (TCR alpha/beta positive, CD20 6+).  There was no clonal CD4 positive T-cell population.  Results were not diagnostic for malignancy.   The patient was seen in initial consultation on 9/28/2020.  She had no palpable lymphadenopathy or splenomegaly.  Her counts were reviewed showing normal WBC  at 6.96 with normal differential, no evidence of anemia nor thrombocytopenia.  The clonal population seen on flow cytometry from lymph node biopsy certainly could be a reactive population.  There was no evidence to suggest LGL leukemia.  We did discuss potential additional evaluation with peripheral blood flow cytometry and T-cell gene rearrangement study as well as potential CT scans of the chest abdomen and pelvis to evaluate for additional lymphadenopathy and/or hepatosplenomegaly.  The patient however declined further evaluation but was agreeable to a course of observation.    The patient developed abdominal pain and underwent CT abdomen pelvis on 10/27/2020 which showed cholelithiasis, no evidence of adenopathy or splenomegaly.  She ultimately underwent laparoscopic cholecystectomy with pathology showing mild chronic calculus cholecystitis.  Patient was seen in follow-up on 1/4/2021.  There was no clinical evidence of underlying LGL leukemia (normal WBC and differential, no evidence of anemia nor thrombocytopenia) and patient did not wish to pursue any further evaluation.   Patient today with normal WBC and differential    *Osteoporosis  Patient has been receiving Prolia for a number of years  DEXA scan on 4/27/2023 with T score lumbar spine -0.5, left hip -3.1, left forearm -2.8  As above, patient is not a good candidate for aromatase inhibitor therapy regarding her breast cancer with potential to worsen her bone density.    *Chronic intermittent diarrhea  Patient reports chronic mild intermittent diarrhea, improved after short course of Imodium in July  Patient is scheduled for colonoscopy on 10/5/2023    *Chronic low back pain  Patient continues follow-up with pain management     Plan:  Patient is not a good candidate for aromatase inhibitor therapy due to ongoing difficulties with osteoporosis  Patient currently declines use of adjuvant tamoxifen due to potential risk of thrombosis and endometrial cancer in  the setting of low risk of breast cancer recurrence at the age of 83.  Patient is scheduled to see radiation oncology tomorrow to discuss potentially adjuvant radiation to the right breast  Patient will be due for annual bilateral mammogram in March 2024 which is being scheduled by Dr. Blount  Return in 6 months for clinical follow-up with CBC, CMP      I did spend 50 minutes in total time caring for the patient today, time spent in review of records, face-to-face consultation, coordination of care, placement of orders, completion of documentation.

## 2023-08-23 ENCOUNTER — CONSULT (OUTPATIENT)
Dept: RADIATION ONCOLOGY | Facility: HOSPITAL | Age: 83
End: 2023-08-23
Payer: MEDICARE

## 2023-08-23 VITALS
DIASTOLIC BLOOD PRESSURE: 70 MMHG | BODY MASS INDEX: 29.87 KG/M2 | HEART RATE: 65 BPM | WEIGHT: 168.6 LBS | OXYGEN SATURATION: 94 % | SYSTOLIC BLOOD PRESSURE: 134 MMHG

## 2023-08-23 DIAGNOSIS — C50.911 LOBULAR BREAST CANCER, RIGHT: Primary | ICD-10-CM

## 2023-08-23 PROCEDURE — G0463 HOSPITAL OUTPT CLINIC VISIT: HCPCS | Performed by: RADIOLOGY

## 2023-08-23 NOTE — PROGRESS NOTES
Subjective     Shayna Blount MD    Cancer Staging   CC: right breast cancer LOQ, pT1bN0, ER TN pos Her 2 negative                                Dear Shayna Blount MD    I had the pleasure of seeing Shu Mckeon  [facility].   The patient is a 83 y.o. female with recently diagnosed right breast cancer.  She had a screening mammogram on 3/23/23 which showed architectural distortion lower outer middle right breast.  BI-RADS 0.  She then had a diagnostic right mammogram and right breast ultrasound April 27, 2023 [facility] which showed in the lower outer middle right breast there is a persistent area of distortion.  Ultrasound right breast  showed at 8:00, 5 cm from the nipple is a 9 x 8 mm irregular hypoechoic mass corresponding distortion which is suspicious.  BI-RADS 4.     She had an us guided biopsy of the right breast on 5/18/23 which showed invasive lobular carcinoma, grade II, involving multiple cores with the largest focus measuring 12mm with associated atypical lobular hyperplasia, ER % TN 21-30% Her 2 negative and ki67 8%.    She had a breast mri on 5/22/23 which showed a focus of susceptibility at 7:00 in the right breast with very faint surrounding 1.5 cm nonmass enhancement likely marks the site of biopsy-proven malignancy and is in the region of the abnormality on mammogram. Surgical management is recommended. Ill-defined post biopsy changes are visualized anterolateral to the suspected clip with corresponding approximately 1.4 cm nonmass enhancement along the anterolateral margin, which could be postbiopsy in nature. However, given the ill-defined features on MRI and post biopsy changes predominantly lateral to the suspected biopsy clip, recommend diagnostic post biopsy mammogram for preoperative planning purposes to assess clip placement and exclude migration.  Incompletely assessed 2.1 cm suspected right thyroid nodule.  Recommend dedicated  thyroid ultrasound for further characterization.    She had a post biopsy right mammogram on 23 which showed the coil-shaped metallic clip placed during the recent biopsy which returned as malignant is on the order of 8 cm posterior to the area of distortion and irregular increased density seen mmmographically in the middle third lower outer quadrant of the right breast that was recommended to undergo biopsy. The area appears less dense and different in configuration than seen previously but still measures on the order of 2 cm in greatest dimension on the current examination. It is possible that this area underwent prior biopsy and clip migration occurred. However, I cannot completely exclude that a different lesion was biopsied under sonographic guidance than the lesion seen mammographically.    She had an us of the thyroid which showed  Follow-up sonogram in one year is recommended to ensure stability of  the 1.9 cm right thyroid nodule.  Mildly heterogenous thyroid echotexture suggestive of diffuse thyroid disease in the appropriate clinical context and correlation with patient history and laboratory values is recommended to establish the most appropriate etiology as sonographic findings are nonspecific.    She underwent a right breast lumpectomy and sentinel node biopsy on 23 with pathology revealing a 9mm grade II invasive lobular carcinoma with dcis, with no lvsi, located 30mm from the closest anterior margin with 3 negative sentinel lymph nodes, pT1bN0.  Her Oncotype returned with a score of 15 with less than 1% benefit to chemotherapy.    She is  with menarche age 11 and first childbirth age 20.  She took birth control pills for 10 years, and hormone replacement therapy for 10 years.  She still has her uterus and ovaries.  She has a family hx of breast cancer in a half sister and paternal cousin in her 50s.  She had [facility] genetic testing which revealed 2 VUS, (BARD1  c.586G>A And STK11 c.631  "C>T) .    She is recovering well from her surgery and referred today for evaluation for adjuvant radiation. She met with Dr. Feliciano yesterday and is not a candidate for an AI and declined tamoxifen.      Review of Systems   Constitutional: Negative.    HENT: Negative.     Eyes:  Positive for visual disturbance.   Respiratory: Negative.     Cardiovascular: Negative.    Gastrointestinal: Negative.    Genitourinary: Negative.    Musculoskeletal:  Positive for arthralgias, back pain and neck stiffness.   Neurological:  Positive for dizziness.   Psychiatric/Behavioral:  The patient is nervous/anxious.        Past Medical History:   Diagnosis Date    Arthritis     Breast cancer     right breast    Chronic low back pain     Depression     HISTORY OF BEING \"EMOTIONAL\"    Fatigue     GERD (gastroesophageal reflux disease)     H/O cardiovascular stress test 07/2018    NORMAL, DR ARIAS    History of diverticulitis     Hyperlipidemia     Hypertension     Impaired fasting glucose     Left bundle branch block     Macular degeneration     EIVTA    Osteoporosis     Radial scar of breast     LEFT    Status post ORIF of fracture of ankle     Thyroid nodule     Urinary frequency          Past Surgical History:   Procedure Laterality Date    BREAST BIOPSY Left 10/25/2018    Procedure: left breast ultrasound-guided excision of radial scar.;  Surgeon: Shayna Blount MD;  Location: [facility];  Service: General    BREAST LUMPECTOMY WITH SENTINEL NODE BIOPSY Right 8/2/2023    Procedure: RIGHT breast bracketed needle localized lumpectomy AND RIGHT axilla sentinel node biopsy;  Surgeon: Shayna Blount MD;  Location: [facility];  Service: General;  Laterality: Right;    BREAST SURGERY Left     BREAST SURGERY Bilateral 8/2/2023    Procedure: RIGHT BREAST ONCOPLASTIC REDUCTION AND LEFT BREAST REDUCTION FOR SYMMETRY;  Surgeon: Greta Romero MD;  Location: [facility];  Service: Plastics;  Laterality: Bilateral;    " CARDIAC CATHETERIZATION      not sure when or where    CARDIAC CATHETERIZATION      CATARACT EXTRACTION WITH INTRAOCULAR LENS IMPLANT      CHOLECYSTECTOMY WITH INTRAOPERATIVE CHOLANGIOGRAM N/A 10/31/2020    Procedure: CHOLECYSTECTOMY LAPAROSCOPIC INTRAOPERATIVE CHOLANGIOGRAM;  Surgeon: Ruth Del Valle MD;  Location:  [facility];  Service: General;  Laterality: N/A;    COLONOSCOPY      FEMUR FRACTURE SURGERY Right     FEMUR    FRACTURE SURGERY  2015    LT ANKLE    MEDIAL BRANCH BLOCK Right 05/20/2022    Procedure: LUMBAR MEDIAL BRANCH BLOCK Right L4-S1;  Surgeon: Pamela Spaulding MD;  Location: [facility];  Service: Pain Management;  Laterality: Right;    REPLACEMENT TOTAL KNEE Right          Social History     Socioeconomic History    Marital status:      Spouse name: Isidoro   Tobacco Use    Smoking status: Never     Passive exposure: Never    Smokeless tobacco: Never   Vaping Use    Vaping Use: Never used   Substance and Sexual Activity    Alcohol use: Yes     Alcohol/week: 7.0 standard drinks     Types: 7 Glasses of wine per week     Comment: 1/day-socially    Drug use: No    Sexual activity: Defer         Family History   Problem Relation Age of Onset    Heart disease Father     Heart attack Father 81    Breast cancer Sister 70    Heart attack Paternal Uncle     Breast cancer Cousin 50    Malig Hyperthermia Neg Hx     Colon cancer Neg Hx     Colon polyps Neg Hx     Crohn's disease Neg Hx     Irritable bowel syndrome Neg Hx     Ulcerative colitis Neg Hx           Objective    Physical Exam  Constitutional:       Appearance: Normal appearance.   HENT:      Head: Atraumatic.   Eyes:      Extraocular Movements: Extraocular movements intact.   Pulmonary:      Effort: Pulmonary effort is normal.   Chest:          Comments: Incisions healing well, no suspicious palpable masses or adenopathy  Abdominal:      General: Abdomen is flat.   Musculoskeletal:         General: Normal range of motion.   Neurological:       General: No focal deficit present.      Mental Status: She is alert.   Psychiatric:         Mood and Affect: Mood normal.         Current Outpatient Medications on File Prior to Visit   Medication Sig Dispense Refill    acetaminophen (TYLENOL) 650 MG 8 hr tablet Take 1 tablet by mouth Every 8 (Eight) Hours As Needed for Mild Pain.      amitriptyline (ELAVIL) 10 MG tablet Take 1 tablet by mouth Every Night. (Patient taking differently: Take 1 tablet by mouth At Night As Needed.) 30 tablet 2    atorvastatin (LIPITOR) 40 MG tablet TAKE ONE TABLET BY MOUTH EVERY EVENING (Patient taking differently: Take 1 tablet by mouth At Night As Needed.) 90 tablet 1    Cholecalciferol (VITAMIN D3) 5000 UNITS tablet Take 1 tablet by mouth Daily.      denosumab (Prolia) 60 MG/ML solution prefilled syringe syringe TO BE ADMINISTERED IN PHYSICIAN'S OFFICE. INJECT ONE SYRINGE SUBCUTANEOUSLY ONCE EVERY 6 MONTHS. REFRIGERATE. USE WITHIN 14 DAYS ONCE AT ROOM TEMPERATURE. (Patient taking differently: Every 6 (Six) Months. LAST DOSE MAY 2023) 1 each 1    loperamide (IMODIUM A-D) 2 MG tablet Take 1/2 tablet daily x10 days then increase to 1/2 tablet twice daily if still having diarrhea.  Decrease dose if constipation occurs (Patient not taking: Reported on 8/21/2023) 30 tablet 1    losartan (COZAAR) 50 MG tablet TAKE ONE TABLET BY MOUTH DAILY (Patient taking differently: Take 1 tablet by mouth Daily.) 90 tablet 1    metoprolol succinate XL (TOPROL-XL) 25 MG 24 hr tablet TAKE ONE TABLET BY MOUTH DAILY (Patient taking differently: Take 1 tablet by mouth Every Night.) 90 tablet 1    Multiple Vitamin (MULTIVITAMIN) capsule Take 1 capsule by mouth Daily. PT HOLDING FOR SURGERY      sertraline (ZOLOFT) 100 MG tablet TAKE ONE TABLET BY MOUTH DAILY (Patient taking differently: Take 1 tablet by mouth Daily.) 90 tablet 1     No current facility-administered medications on file prior to visit.       ALLERGIES:  No Known Allergies    LMP  (LMP Unknown)       (0) Fully active, able to carry on all predisease performance without restriction      8/22/2023     2:49 PM   Current Status   ECOG score 0         Assessment & Plan     82 yo female with pT1bN0 invasive lobular carcinoma of the right breast, loq, ER IL pos Her 2 negative s/p lumpectomy and sln biopsy. We discussed the long-term results of the CALGB 9343 randomized clinical trial assessing the benefits of radiation therapy and women with stage I, ER+ breast cancer in addition to hormonal therapy.  At a 10 year time point 98% of women receiving hormonal therapy + radiation were free from local and regional recurrence compared to 90% of those receiving hormonal therapy alone.  There were no differences in time to mastectomy, time to distant metastases, breast cancer specific survival, or overall survival. She had declined adjuvant hormonal blockade, but even without this, I think at the age of 83 with such a small tumor and widely negative margins, her risk of local recurrence is still extremely low.      She and her daughter voiced understanding and were given an opportunity to ask questions which I believe were answered to her satisfaction.  At the end of our discussion she has elected to not pursue radiation which I think is reasonable.  I have not scheduled a follow up with me but I asked her to call with any questions or concerns in the future.     I personally spent greater than 60 minutes today assessing, managing, discussing and documenting my visit with the patient. That time includes review of records, imaging and pathology reports, obtaining my own history, performing a medically appropriate evaluation, counseling and educating the patient, discussing goals, logistics, alternatives and risks of my recommendations, surveillance options and potential outcomes. It also includes the time documenting the clinical information in the EMR and communicating my recommendations to the other involved physicians.               Thank you very much for allowing me to participate in the care of this very pleasant patient.    Sincerely,      Sary Ware MD

## 2023-08-25 ENCOUNTER — TELEPHONE (OUTPATIENT)
Dept: SURGERY | Facility: CLINIC | Age: 83
End: 2023-08-25
Payer: MEDICARE

## 2023-08-25 NOTE — TELEPHONE ENCOUNTER
Note from Dr. Feliciano August 22, 2023.  Patient is not a good candidate for aromatase inhibitor due to osteoporosis.  Patient declines tamoxifen due to risk of clot and endometrial cancer.  Patient is scheduled to see radiation oncology tomorrow.

## 2023-08-29 LAB
LAB AP CASE REPORT: NORMAL
LAB AP CLINICAL INFORMATION: NORMAL
LAB AP DIAGNOSIS COMMENT: NORMAL
LAB AP SPECIAL STAINS: NORMAL
LAB AP SYNOPTIC CHECKLIST: NORMAL
Lab: NORMAL
PATH REPORT.ADDENDUM SPEC: NORMAL
PATH REPORT.FINAL DX SPEC: NORMAL
PATH REPORT.GROSS SPEC: NORMAL

## 2023-08-31 ENCOUNTER — TELEPHONE (OUTPATIENT)
Dept: SURGERY | Facility: CLINIC | Age: 83
End: 2023-08-31
Payer: MEDICARE

## 2023-08-31 NOTE — TELEPHONE ENCOUNTER
Scheduled mmg [facility] Mon 3/25/24 12:30 pm.   Scheduled follow up with Dr. Blount Mon 4/1/24 10:30 am. Confirmed with patient.

## 2023-09-05 RX ORDER — SERTRALINE HYDROCHLORIDE 100 MG/1
100 TABLET, FILM COATED ORAL DAILY
Qty: 90 TABLET | Refills: 1 | Status: SHIPPED | OUTPATIENT
Start: 2023-09-05

## 2023-09-05 RX ORDER — LOSARTAN POTASSIUM 50 MG/1
50 TABLET ORAL DAILY
Qty: 90 TABLET | Refills: 1 | Status: SHIPPED | OUTPATIENT
Start: 2023-09-05

## 2023-09-05 RX ORDER — ATORVASTATIN CALCIUM 40 MG/1
40 TABLET, FILM COATED ORAL NIGHTLY
Qty: 90 TABLET | Refills: 1 | Status: SHIPPED | OUTPATIENT
Start: 2023-09-05

## 2023-09-05 RX ORDER — METOPROLOL SUCCINATE 25 MG/1
25 TABLET, EXTENDED RELEASE ORAL NIGHTLY
Qty: 90 TABLET | Refills: 1 | Status: SHIPPED | OUTPATIENT
Start: 2023-09-05

## 2023-09-12 ENCOUNTER — HOSPITAL ENCOUNTER (OUTPATIENT)
Dept: PHYSICAL THERAPY | Facility: HOSPITAL | Age: 83
Setting detail: THERAPIES SERIES
Discharge: HOME | End: 2023-09-12
Payer: MEDICARE

## 2023-09-12 DIAGNOSIS — Z98.890 S/P LUMPECTOMY, RIGHT BREAST: ICD-10-CM

## 2023-09-12 DIAGNOSIS — Z91.89 AT RISK FOR LYMPHEDEMA: Primary | ICD-10-CM

## 2023-09-12 DIAGNOSIS — C50.911 LOBULAR BREAST CANCER, RIGHT: ICD-10-CM

## 2023-09-12 PROCEDURE — 97161 PT EVAL LOW COMPLEX 20 MIN: CPT

## 2023-09-12 PROCEDURE — 93702 BIS XTRACELL FLUID ANALYSIS: CPT

## 2023-09-12 PROCEDURE — 97535 SELF CARE MNGMENT TRAINING: CPT

## 2023-09-12 NOTE — THERAPY EVALUATION
"  Physical Therapy Lymphedema Initial Evaluation  [facility]     Patient Name: Shu Mckeon  : 1940  MRN: [id number]  Today's Date: 2023      Visit Date: 2023    Visit Dx:    ICD-10-CM ICD-9-CM   1. At risk for lymphedema  Z91.89 V49.89   2. S/P lumpectomy, right breast  Z98.890 V45.89   3. Lobular breast cancer, right  C50.911 174.9       Patient Active Problem List   Diagnosis    Ankle fracture    Diabetes type 2, controlled    Fatigue    HLD (hyperlipidemia)    BP (high blood pressure)    OP (osteoporosis)    Closed fracture of right pelvis    Low back pain    Lumbar spondylolysis    Radial scar of breast    Reactive lymphadenopathy    Calculus of gallbladder with acute cholecystitis without obstruction    Encounter for screening mammogram for breast cancer    Spondylosis of lumbar region without myelopathy or radiculopathy    Personal history of colonic polyps    Diarrhea    Change in bowel habits    Lobular breast cancer, right        Past Medical History:   Diagnosis Date    Arthritis     Breast cancer     right breast    Chronic low back pain     Depression     HISTORY OF BEING \"EMOTIONAL\"    Fatigue     GERD (gastroesophageal reflux disease)     H/O cardiovascular stress test 2018    NORMAL, DR ARIAS    History of diverticulitis     Hyperlipidemia     Hypertension     Impaired fasting glucose     Left bundle branch block     Macular degeneration     EVITA    Osteoporosis     Radial scar of breast     LEFT    Status post ORIF of fracture of ankle     Thyroid nodule     Urinary frequency         Past Surgical History:   Procedure Laterality Date    BREAST BIOPSY Left 10/25/2018    Procedure: left breast ultrasound-guided excision of radial scar.;  Surgeon: Shayna Blount MD;  [place] [facility] [place] [facility] [place]  Service: General    BREAST LUMPECTOMY WITH SENTINEL NODE BIOPSY Right 2023    Procedure: RIGHT breast bracketed needle localized lumpectomy AND RIGHT axilla sentinel node " biopsy;  Surgeon: Shayna Blount MD;  Location: [facility] MAIN OR;  Service: General;  Laterality: Right;    BREAST SURGERY Left     BREAST SURGERY Bilateral 8/2/2023    Procedure: RIGHT BREAST ONCOPLASTIC REDUCTION AND LEFT BREAST REDUCTION FOR SYMMETRY;  Surgeon: Greta Romero MD;  Location: [facility] MAIN OR;  Service: Plastics;  Laterality: Bilateral;    CARDIAC CATHETERIZATION      not sure when or where    CARDIAC CATHETERIZATION      CATARACT EXTRACTION WITH INTRAOCULAR LENS IMPLANT      CHOLECYSTECTOMY WITH INTRAOPERATIVE CHOLANGIOGRAM N/A 10/31/2020    Procedure: CHOLECYSTECTOMY LAPAROSCOPIC INTRAOPERATIVE CHOLANGIOGRAM;  Surgeon: Ruth Del Valle MD;  Location: [facility] OR;  Service: General;  Laterality: N/A;    COLONOSCOPY      FEMUR FRACTURE SURGERY Right     FEMUR    FRACTURE SURGERY  2015    LT ANKLE    MEDIAL BRANCH BLOCK Right 05/20/2022    Procedure: LUMBAR MEDIAL BRANCH BLOCK Right L4-S1;  Surgeon: Pamela Spaulding MD;  Location: [facility] [place];  Service: Pain Management;  Laterality: Right;    REPLACEMENT TOTAL KNEE Right        Visit Dx:    ICD-10-CM ICD-9-CM   1. At risk for lymphedema  Z91.89 V49.89   2. S/P lumpectomy, right breast  Z98.890 V45.89   3. Lobular breast cancer, right  C50.911 174.9        Patient History       Row Name 09/12/23 1200             History    Chief Complaint Pain  -LB      Type of Pain Other pain  -LB      Other Type of Pain breast R intermittent  -LB      Date Current Problem(s) Began 08/02/23  -LB      Brief Description of Current Complaint Pt s/p R lumpectomy, SLNB, oncoplastic closure, L reduction for symmetry 8/2/23. She had 0/3 LNs removed. She does not require chemotherapy and will not have radiation. She is R handed and lives with her spouse in independent living community. She states she has had intermittent R breast pain since surgery. She also reports drainage from drain sites B that is clear/occasionally bloody.  -LB      Hand Dominance  right-handed  -LB      Occupation/sports/leisure activities retired  -LB      Patient seeing anyone else for problem(s)? yes  -LB      History of Previous Related Injuries none  -LB         Pain     Pain Location Breast  -LB      Pain at Present 2  -LB      Pain at Best 0  -LB      Pain at Worst 6  -LB      Pain Frequency Intermittent  -LB      Pain Description Stabbing;Sharp;Sore  -LB      What Performance Factors Make the Current Problem(s) WORSE? RUE movement  -LB      What Performance Factors Make the Current Problem(s) BETTER? rest  -LB      Is your sleep disturbed? No  -LB      Difficulties at work? Pt is retired.  -LB      Difficulties with ADL's? Able to perform.  -LB      Difficulties with recreational activities? Pt denies recreational activities.  -LB         Fall Risk Assessment    Any falls in the past year: No  -LB         Services    Prior Rehab/Home Health Experiences No  -LB      Are you currently receiving Home Health services No  -LB      Do you plan to receive Home Health services in the near future No  -LB         Daily Activities    Primary Language English  -LB      Pt Participated in POC and Goals Yes  -LB         Safety    Are you being hurt, hit, or frightened by anyone at home or in your life? No  -LB      Are you being neglected by a caregiver No  -LB      Have you had any of the following issues with N/A  -LB                User Key  (r) = Recorded By, (t) = Taken By, (c) = Cosigned By      Initials Name Provider Type    Mary Jo Ellison PT Physical Therapist                     Lymphedema       Row Name 09/12/23 1200             Subjective Pain    Able to rate subjective pain? yes  -LB      Pre-Treatment Pain Level 0  -LB         Subjective    Subjective Comments I am doing pretty well overall. I am still sore and my incisions feel thick. My R breast hurts occasionally.  -LB         Lymphedema Assessment    Lymphedema Classification RUE:;at risk/stage 0  -LB      Lymphedema Cancer  Related Sx right;lumpectomy;sentinel node biopsy  -LB      Lymphedema Surgery Comments 8/2/23  -LB      Lymph Nodes Removed # 3  -LB      Positive Lymph Nodes # 0  -LB      Chemo Received no  -LB      Radiation Therapy Received no  -LB      Infections or Cellulitis? no  -LB         Posture/Observations    Posture/Observations Comments forward head, rounded shoulders  -LB         General ROM    GENERAL ROM COMMENTS BUE WFL; R breast pain with end range RUE AROM  -LB         MMT (Manual Muscle Testing)    General MMT Comments BUE WFL  -LB         Lymphedema Edema Assessment    Edema Assessment Comment no obvious edema  -LB         Skin Changes/Observations    Skin Observations Comment B incisions slightly pink throughout but also irritated from band of bra, tiny open area at B drain sites, minimal clear to twinge of bloody drainage  -LB         Lymphedema Sensation    Lymphedema Sensation Comments dec B breasts  -LB         Lymphedema Pulses/Capillary Refill    Lymph Pulses Capillary Refill Comments normal  -LB         L-Dex Bioimpedence Screening    L-Dex Measurement Extremity RUE  -LB      L-Dex Patient Position Standing  -LB      L-Dex UE Dominate Side Right  -LB      L-Dex UE At Risk Side Right  -LB      L-Dex UE Pre Surgical Value No  -LB      L-Dex UE Score 4  -LB      L-Dex UE Baseline Score 4  -LB      L-Dex UE Value Change 0  -LB      L-Dex UE Comment WNL  -LB      $ L-Dex Charge yes  -LB                User Key  (r) = Recorded By, (t) = Taken By, (c) = Cosigned By      Initials Name Provider Type    Mary Jo Ellison, PT Physical Therapist                                    Therapy Education  Education Details: discussed lymphedema precautions, s/s, etiology, steps to prevention, bioimpedance results and interpretation, PT expectations, scar massage after few more weeks of healing and full closure  Given: Symptoms/condition management, Edema management, Mobility training  Program: New  How Provided: Verbal,  Demonstration, Written  Provided to: Patient  Level of Understanding: Teach back education performed, Verbalized, Demonstrated  74886 - PT Self Care/Mgmt Minutes: 15       OP Exercises       Row Name 09/12/23 1200             Subjective    Subjective Comments I am doing pretty well overall. I am still sore and my incisions feel thick. My R breast hurts occasionally.  -LB         Subjective Pain    Able to rate subjective pain? yes  -LB      Pre-Treatment Pain Level 0  -LB                User Key  (r) = Recorded By, (t) = Taken By, (c) = Cosigned By      Initials Name Provider Type    Mary Jo Ellison, PT Physical Therapist                                 PT OP Goals       Row Name 09/12/23 1200          Long Term Goals    LTG Date to Achieve 10/12/23  -LB     LTG 1 Pt will maintain LDex bioimpedance score WNL.  -LB     LTG 1 Progress New  -LB     LTG 2 Pt will verbalize s/s of lymphedema and steps to prevention.  -LB     LTG 2 Progress New  -LB     LTG 3 Pt will verbalize improvement in R breast pain to minimal.  -LB     LTG 3 Progress New  -LB        Time Calculation    PT Goal Re-Cert Due Date 12/11/23  -LB               User Key  (r) = Recorded By, (t) = Taken By, (c) = Cosigned By      Initials Name Provider Type    Mary Jo Ellison, PT Physical Therapist                     PT Assessment/Plan       Row Name 09/12/23 1239          PT Assessment    Functional Limitations Limitations in functional capacity and performance;Other (comment)  at risk for lymphedema  -LB     Impairments Impaired flexibility;Impaired lymphatic circulation;Pain;Range of motion;Sensation  -LB     Assessment Comments Shu SULAIMAN Mckeon is a 83 y.o. female recently diagnosed with Right Breast Cancer, presents to therapy in evolving condition, s/p Right Lumpectomywith [place] Node Biopsy with immediate reconstruction with Oncoplastic Closure with contralateral Reduction for Symmetry performed on 8/2/23 by surgeons Dr. Blount. Shu Mckeon is  at increased risk of post Lumpectomy lymphedema syndrome Right Upper Extremity due to Breast surgery Lymph Node Removal . She presents with post-operative decreased range of motion, flexibility, strength, function and increased pain. Currently, negative s/s of lymphedema, infection, seroma, or axillary cording. We did complete a baseline post operative bioimpedance assessment, with current L-Dex score of 4.0 , which is WNL. At this time, functional limitations can be affected by but not limited to R breast pain, scar tissue. Shu Mckeon will benefit from skilled formal Breast Care Physical Therapy at this time to address listed dysfunctions. Please refer to Plan.  -LB     Rehab Potential Good  -LB     Patient/caregiver participated in establishment of treatment plan and goals Yes  -LB     Patient would benefit from skilled therapy intervention Yes  -LB        PT Plan    PT Frequency Other (comment)  -LB     Predicted Duration of Therapy Intervention (PT) repeat bioimpedance in 3 months, return sooner if needed  -LB     Planned CPT's? PT EVAL LOW COMPLEXITY: 96610;PT RE-EVAL: 62410;PT THER PROC EA 15 MIN: 70613;PT THER ACT EA 15 MIN: 80089;PT MANUAL THERAPY EA 15 MIN: 65231;PT NEUROMUSC RE-EDUCATION EA 15 MIN: 86896;PT SELF CARE/HOME MGMT/TRAIN EA 15: 59565;PT BIS XTRACELL FLUID ANALYSIS: 92305  -LB     PT Plan Comments repeat bioimpedance, scar massage, R breast pain?  -LB               User Key  (r) = Recorded By, (t) = Taken By, (c) = Cosigned By      Initials Name Provider Type    Mary Jo Ellison, PT Physical Therapist                       Outcome Measure Options: Quick DASH  Quick DASH  Open a tight or new jar.: No Difficulty  Do heavy household chores (e.g., wash walls, wash floors): No Difficulty  Carry a shopping bag or briefcase: Mild Difficulty  Wash your back: No Difficulty  Use a knife to cut food: No Difficulty  Recreational activities in which you take some force or impact through your arm, should  or hand (e.g. golf, hammering, tennis, etc.): No Difficulty  During the past week, to what extent has your arm, shoulder, or hand problem interfered with your normal social activites with family, friends, neighbors or groups?: Not at all  During the past week, were you limited in your work or other regular daily activities as a result of your arm, shoulder or hand problem?: Not limited at all  Arm, Shoulder, or hand pain: None  Tingling (pins and needles) in your arm, shoulder, or hand: None  During the past week, how much difficulty have you had sleeping because of the pain in your arm, shoulder or hand?: Moderate Difficiculty  Number of Questions Answered: 11  Quick DASH Score: 6.82         Time Calculation:   Start Time: 1130  Stop Time: 1215  Time Calculation (min): 45 min  Total Timed Code Minutes- PT: 15 minute(s)  Timed Charges  00504 - PT Self Care/Mgmt Minutes: 15  Total Minutes  Timed Charges Total Minutes: 15   Total Minutes: 15   Therapy Charges for Today       Code Description Service Date Service Provider Modifiers Qty    97008983096 HC PT BIS XTRACELL FLUID ANALYSIS 9/12/2023 Mary Jo Lopez, PT  1    44019530430 HC PT SELF CARE/MGMT/TRAIN EA 15 MIN 9/12/2023 Mary Jo Lopez, PT GP 1    65894852760 HC PT EVAL LOW COMPLEXITY 2 9/12/2023 Mary Jo Lopez, PT GP 1            PT G-Codes  Outcome Measure Options: Quick DASH  Quick DASH Score: 6.82         Mary Jo Lopez PT  9/12/2023

## 2023-09-16 ENCOUNTER — PATIENT OUTREACH (OUTPATIENT)
Dept: OTHER | Facility: HOSPITAL | Age: 83
End: 2023-09-16
Payer: MEDICARE

## 2023-09-16 NOTE — PROGRESS NOTES
Called Ms. Mckeon to see how she was doing. She stated she is recovering well with mild pain off and on. We discussed that can be normal and should get better with time. Otherwise, she is doing well and has no needs at this time. We discussed our survivorship program and she was interested in more information. That has been mailed. She was thankful for the call and will reach out if any questions or needs arise.

## 2023-10-04 DIAGNOSIS — E78.5 HYPERLIPIDEMIA, UNSPECIFIED HYPERLIPIDEMIA TYPE: ICD-10-CM

## 2023-10-04 DIAGNOSIS — G89.29 CHRONIC RIGHT-SIDED LOW BACK PAIN WITHOUT SCIATICA: ICD-10-CM

## 2023-10-04 DIAGNOSIS — M54.50 CHRONIC RIGHT-SIDED LOW BACK PAIN WITHOUT SCIATICA: ICD-10-CM

## 2023-10-04 DIAGNOSIS — I10 PRIMARY HYPERTENSION: ICD-10-CM

## 2023-10-04 DIAGNOSIS — E11.9 CONTROLLED TYPE 2 DIABETES MELLITUS WITHOUT COMPLICATION, WITHOUT LONG-TERM CURRENT USE OF INSULIN: ICD-10-CM

## 2023-10-05 ENCOUNTER — ANESTHESIA (OUTPATIENT)
Dept: GASTROENTEROLOGY | Facility: HOSPITAL | Age: 83
End: 2023-10-05
Payer: MEDICARE

## 2023-10-05 ENCOUNTER — HOSPITAL ENCOUNTER (OUTPATIENT)
Facility: HOSPITAL | Age: 83
Setting detail: HOSPITAL OUTPATIENT SURGERY
Discharge: HOME | End: 2023-10-05
Attending: INTERNAL MEDICINE | Admitting: INTERNAL MEDICINE
Payer: MEDICARE

## 2023-10-05 ENCOUNTER — ANESTHESIA EVENT (OUTPATIENT)
Dept: GASTROENTEROLOGY | Facility: HOSPITAL | Age: 83
End: 2023-10-05
Payer: MEDICARE

## 2023-10-05 VITALS
HEART RATE: 61 BPM | TEMPERATURE: 98 F | RESPIRATION RATE: 14 BRPM | HEIGHT: 64 IN | WEIGHT: 167 LBS | SYSTOLIC BLOOD PRESSURE: 144 MMHG | DIASTOLIC BLOOD PRESSURE: 69 MMHG | OXYGEN SATURATION: 96 % | BODY MASS INDEX: 28.51 KG/M2

## 2023-10-05 DIAGNOSIS — R19.7 DIARRHEA, UNSPECIFIED TYPE: ICD-10-CM

## 2023-10-05 DIAGNOSIS — Z12.11 SCREENING FOR MALIGNANT NEOPLASM OF COLON: ICD-10-CM

## 2023-10-05 DIAGNOSIS — Z86.010 PERSONAL HISTORY OF COLONIC POLYPS: ICD-10-CM

## 2023-10-05 DIAGNOSIS — R19.4 CHANGE IN BOWEL HABITS: ICD-10-CM

## 2023-10-05 PROCEDURE — 25810000003 LACTATED RINGERS PER 1000 ML: Performed by: INTERNAL MEDICINE

## 2023-10-05 PROCEDURE — 88305 TISSUE EXAM BY PATHOLOGIST: CPT | Performed by: INTERNAL MEDICINE

## 2023-10-05 PROCEDURE — 45380 COLONOSCOPY AND BIOPSY: CPT | Performed by: INTERNAL MEDICINE

## 2023-10-05 PROCEDURE — 25010000002 PROPOFOL 10 MG/ML EMULSION: Performed by: NURSE ANESTHETIST, CERTIFIED REGISTERED

## 2023-10-05 RX ORDER — SODIUM CHLORIDE, SODIUM LACTATE, POTASSIUM CHLORIDE, CALCIUM CHLORIDE 600; 310; 30; 20 MG/100ML; MG/100ML; MG/100ML; MG/100ML
30 INJECTION, SOLUTION INTRAVENOUS CONTINUOUS PRN
Status: DISCONTINUED | OUTPATIENT
Start: 2023-10-05 | End: 2023-10-05 | Stop reason: HOSPADM

## 2023-10-05 RX ORDER — PROPOFOL 10 MG/ML
VIAL (ML) INTRAVENOUS CONTINUOUS PRN
Status: DISCONTINUED | OUTPATIENT
Start: 2023-10-05 | End: 2023-10-05 | Stop reason: SURG

## 2023-10-05 RX ORDER — PROPOFOL 10 MG/ML
VIAL (ML) INTRAVENOUS AS NEEDED
Status: DISCONTINUED | OUTPATIENT
Start: 2023-10-05 | End: 2023-10-05 | Stop reason: SURG

## 2023-10-05 RX ORDER — LIDOCAINE HYDROCHLORIDE 20 MG/ML
INJECTION, SOLUTION INFILTRATION; PERINEURAL AS NEEDED
Status: DISCONTINUED | OUTPATIENT
Start: 2023-10-05 | End: 2023-10-05 | Stop reason: SURG

## 2023-10-05 RX ADMIN — LIDOCAINE HYDROCHLORIDE 80 MG: 20 INJECTION, SOLUTION INFILTRATION; PERINEURAL at 08:59

## 2023-10-05 RX ADMIN — Medication 120 MCG/KG/MIN: at 09:01

## 2023-10-05 RX ADMIN — PROPOFOL 70 MG: 10 INJECTION, EMULSION INTRAVENOUS at 08:59

## 2023-10-05 RX ADMIN — PROPOFOL 30 MG: 10 INJECTION, EMULSION INTRAVENOUS at 09:05

## 2023-10-05 RX ADMIN — SODIUM CHLORIDE, POTASSIUM CHLORIDE, SODIUM LACTATE AND CALCIUM CHLORIDE 30 ML/HR: 600; 310; 30; 20 INJECTION, SOLUTION INTRAVENOUS at 08:21

## 2023-10-05 NOTE — ANESTHESIA POSTPROCEDURE EVALUATION
Patient: Shu Mckeon    Procedure Summary       Date: 10/05/23 Room / [facility]    Anesthesia Start: 0855 Anesthesia Stop: 0920    Procedure: COLONOSCOPY  INTO TERMINAL ILEUM WITH BX AND POLYPECTOMY Diagnosis:       Personal history of colonic polyps      Screening for malignant neoplasm of colon      Diarrhea, unspecified type      Change in bowel habits      (Personal history of colonic polyps [Z86.010])      (Screening for malignant neoplasm of colon [Z12.11])      (Diarrhea, unspecified type [R19.7])      (Change in bowel habits [R19.4])    Surgeons: James Townsend MD Provider: Marciano Olivier MD    Anesthesia Type: MAC ASA Status: 3            Anesthesia Type: MAC    Vitals  Vitals Value Taken Time   /69 10/05/23 0938   Temp     Pulse 61 10/05/23 0938   Resp 14 10/05/23 0938   SpO2 96 % 10/05/23 0938           Post Anesthesia Care and Evaluation    Patient location during evaluation: PHASE II  Patient participation: complete - patient participated  Level of consciousness: awake and alert  Pain management: adequate    Airway patency: patent  Anesthetic complications: No anesthetic complications  PONV Status: none  Cardiovascular status: acceptable and hemodynamically stable  Respiratory status: acceptable, nonlabored ventilation and spontaneous ventilation  Hydration status: acceptable

## 2023-10-05 NOTE — ANESTHESIA PREPROCEDURE EVALUATION
Anesthesia Evaluation     Patient summary reviewed and Nursing notes reviewed   NPO Solid Status: > 8 hours  NPO Liquid Status: > 4 hours           Airway   Mallampati: II  TM distance: >3 FB  Neck ROM: full  No difficulty expected  Dental - normal exam     Pulmonary - normal exam   Cardiovascular - normal exam    ECG reviewed    (+) hypertension 2 medications or greater, dysrhythmias, hyperlipidemia    ROS comment: LBBB/EF 47%, no clinical evidence of ischemia by stress test 7/18    Neuro/Psych  (+) psychiatric history Depression  GI/Hepatic/Renal/Endo    (+) GERD, diabetes mellitus type 2, thyroid problem thyroid nodules    Musculoskeletal     (+) back pain  Abdominal  - normal exam   Substance History      OB/GYN          Other   arthritis,   history of cancer      Other Comment: Hx right breast CA                  Anesthesia Plan    ASA 3     MAC     intravenous induction     Anesthetic plan, risks, benefits, and alternatives have been provided, discussed and informed consent has been obtained with: patient.      CODE STATUS:

## 2023-10-05 NOTE — H&P
"No chief complaint on file.      HPI  Diarrhea  H/o polyps         Problem List:    Patient Active Problem List   Diagnosis    Ankle fracture    Diabetes type 2, controlled    Fatigue    HLD (hyperlipidemia)    BP (high blood pressure)    OP (osteoporosis)    Closed fracture of right pelvis    Low back pain    Lumbar spondylolysis    Radial scar of breast    Reactive lymphadenopathy    Calculus of gallbladder with acute cholecystitis without obstruction    Encounter for screening mammogram for breast cancer    Spondylosis of lumbar region without myelopathy or radiculopathy    Personal history of colonic polyps    Diarrhea    Change in bowel habits    Lobular breast cancer, right       Medical History:    Past Medical History:   Diagnosis Date    Arthritis     Breast cancer     right breast    Chronic low back pain     Depression     HISTORY OF BEING \"EMOTIONAL\"    Fatigue     GERD (gastroesophageal reflux disease)     H/O cardiovascular stress test 07/2018    NORMAL, DR ARIAS    History of diverticulitis     Hyperlipidemia     Hypertension     Impaired fasting glucose     Left bundle branch block     Macular degeneration     EVITA    Osteoporosis     Radial scar of breast     LEFT    Status post ORIF of fracture of ankle     Thyroid nodule     Urinary frequency         Social History:    Social History     Socioeconomic History    Marital status:      Spouse name: Isidoro   Tobacco Use    Smoking status: Never     Passive exposure: Never    Smokeless tobacco: Never   Vaping Use    Vaping Use: Never used   Substance and Sexual Activity    Alcohol use: Yes     Alcohol/week: 7.0 standard drinks     Types: 7 Glasses of wine per week     Comment: 1/day-socially    Drug use: No    Sexual activity: Defer       Family History:   Family History   Problem Relation Age of Onset    Heart disease Father     Heart attack Father 81    Breast cancer Sister 70    Heart attack Paternal Uncle     Breast cancer Cousin 50    Vj " Hyperthermia Neg Hx     Colon cancer Neg Hx     Colon polyps Neg Hx     Crohn's disease Neg Hx     Irritable bowel syndrome Neg Hx     Ulcerative colitis Neg Hx        Surgical History:   Past Surgical History:   Procedure Laterality Date    BREAST BIOPSY Left 10/25/2018    Procedure: left breast ultrasound-guided excision of radial scar.;  Surgeon: Shayna Blount MD;  Location: [facility];  Service: General    BREAST LUMPECTOMY WITH SENTINEL NODE BIOPSY Right 8/2/2023    Procedure: RIGHT breast bracketed needle localized lumpectomy AND RIGHT axilla sentinel node biopsy;  Surgeon: Shayna Blount MD;  Location: [facility] OR;  Service: General;  Laterality: Right;    BREAST SURGERY Left     BREAST SURGERY Bilateral 8/2/2023    Procedure: RIGHT BREAST ONCOPLASTIC REDUCTION AND LEFT BREAST REDUCTION FOR SYMMETRY;  Surgeon: Greta Romero MD;  Location: [facility];  Service: Plastics;  Laterality: Bilateral;    CARDIAC CATHETERIZATION      not sure when or where    CARDIAC CATHETERIZATION      CATARACT EXTRACTION WITH INTRAOCULAR LENS IMPLANT      CHOLECYSTECTOMY WITH INTRAOPERATIVE CHOLANGIOGRAM N/A 10/31/2020    Procedure: CHOLECYSTECTOMY LAPAROSCOPIC INTRAOPERATIVE CHOLANGIOGRAM;  Surgeon: Ruth Del Valle MD;  Location: [facility];  Service: General;  Laterality: N/A;    COLONOSCOPY      FEMUR FRACTURE SURGERY Right     FEMUR    FRACTURE SURGERY  2015    LT ANKLE    MEDIAL BRANCH BLOCK Right 05/20/2022    Procedure: LUMBAR MEDIAL BRANCH BLOCK Right L4-S1;  Surgeon: Pamela Spaulding MD;  Location: [facility] OR;  Service: Pain Management;  Laterality: Right;    REPLACEMENT TOTAL KNEE Right        No current facility-administered medications for this encounter.    Current Outpatient Medications:     acetaminophen (TYLENOL) 650 MG 8 hr tablet, Take 1 tablet by mouth Every 8 (Eight) Hours As Needed for Mild Pain., Disp: , Rfl:     amitriptyline (ELAVIL) 10 MG tablet, Take 1 tablet by mouth  Every Night. (Patient taking differently: Take 1 tablet by mouth At Night As Needed.), Disp: 30 tablet, Rfl: 2    atorvastatin (LIPITOR) 40 MG tablet, Take 1 tablet by mouth Every Night., Disp: 90 tablet, Rfl: 1    Cholecalciferol (VITAMIN D3) 5000 UNITS tablet, Take 1 tablet by mouth Daily., Disp: , Rfl:     denosumab (Prolia) 60 MG/ML solution prefilled syringe syringe, TO BE ADMINISTERED IN PHYSICIAN'S OFFICE. INJECT ONE SYRINGE SUBCUTANEOUSLY ONCE EVERY 6 MONTHS. REFRIGERATE. USE WITHIN 14 DAYS ONCE AT ROOM TEMPERATURE. (Patient taking differently: Every 6 (Six) Months. LAST DOSE MAY 2023), Disp: 1 each, Rfl: 1    loperamide (IMODIUM A-D) 2 MG tablet, Take 1/2 tablet daily x10 days then increase to 1/2 tablet twice daily if still having diarrhea.  Decrease dose if constipation occurs (Patient not taking: Reported on 8/21/2023), Disp: 30 tablet, Rfl: 1    losartan (COZAAR) 50 MG tablet, Take 1 tablet by mouth Daily., Disp: 90 tablet, Rfl: 1    metoprolol succinate XL (TOPROL-XL) 25 MG 24 hr tablet, Take 1 tablet by mouth Every Night., Disp: 90 tablet, Rfl: 1    Multiple Vitamin (MULTIVITAMIN) capsule, Take 1 capsule by mouth Daily. PT HOLDING FOR SURGERY, Disp: , Rfl:     sertraline (ZOLOFT) 100 MG tablet, Take 1 tablet by mouth Daily., Disp: 90 tablet, Rfl: 1    Allergies: No Known Allergies     The following portions of the patient's history were reviewed by me and updated as appropriate: review of systems, allergies, current medications, past family history, past medical history, past social history, past surgical history and problem list.    There were no vitals filed for this visit.    PHYSICAL EXAM:    CONSTITUTIONAL:  today's vital signs reviewed by me  GASTROINTESTINAL: abdomen is soft nontender nondistended with normal active bowel sounds, no masses are appreciated    Assessment/ Plan  Diarrhea  H/o polyps    colonoscopy    Risks and benefits as well as alternatives to endoscopic evaluation were  explained to the patient and they voiced understanding and wish to proceed.  These risks include but are not limited to the risk of bleeding, perforation, adverse reaction to sedation, and missed lesions.  The patient was given the opportunity to ask questions prior to the endoscopic procedure.

## 2023-10-06 LAB
LAB AP CASE REPORT: NORMAL
PATH REPORT.FINAL DX SPEC: NORMAL
PATH REPORT.GROSS SPEC: NORMAL

## 2023-10-09 PROBLEM — M47.816 LUMBAR FACET ARTHROPATHY: Status: ACTIVE | Noted: 2022-05-27

## 2023-10-10 LAB
ALBUMIN SERPL-MCNC: 4.6 G/DL (ref 3.5–5.2)
ALBUMIN/GLOB SERPL: 1.8 G/DL
ALP SERPL-CCNC: 130 U/L (ref 39–117)
ALT SERPL-CCNC: 13 U/L (ref 1–33)
AST SERPL-CCNC: 18 U/L (ref 1–32)
BASOPHILS # BLD AUTO: 0.04 10*3/MM3 (ref 0–0.2)
BASOPHILS NFR BLD AUTO: 0.8 % (ref 0–1.5)
BILIRUB SERPL-MCNC: 0.4 MG/DL (ref 0–1.2)
BUN SERPL-MCNC: 11 MG/DL (ref 8–23)
BUN/CREAT SERPL: 16.7 (ref 7–25)
CALCIUM SERPL-MCNC: 9.9 MG/DL (ref 8.6–10.5)
CHLORIDE SERPL-SCNC: 104 MMOL/L (ref 98–107)
CHOLEST SERPL-MCNC: 144 MG/DL (ref 0–200)
CO2 SERPL-SCNC: 27.4 MMOL/L (ref 22–29)
CREAT SERPL-MCNC: 0.66 MG/DL (ref 0.57–1)
EGFRCR SERPLBLD CKD-EPI 2021: 86.1 ML/MIN/1.73
EOSINOPHIL # BLD AUTO: 0.19 10*3/MM3 (ref 0–0.4)
EOSINOPHIL NFR BLD AUTO: 3.6 % (ref 0.3–6.2)
ERYTHROCYTE [DISTWIDTH] IN BLOOD BY AUTOMATED COUNT: 13.1 % (ref 12.3–15.4)
FT4I SERPL CALC-MCNC: 1.7 (ref 1.2–4.9)
GLOBULIN SER CALC-MCNC: 2.5 GM/DL
GLUCOSE SERPL-MCNC: 126 MG/DL (ref 65–99)
HBA1C MFR BLD: 6.8 % (ref 4.8–5.6)
HCT VFR BLD AUTO: 38.2 % (ref 34–46.6)
HDLC SERPL-MCNC: 59 MG/DL (ref 40–60)
HGB BLD-MCNC: 12.8 G/DL (ref 12–15.9)
IMM GRANULOCYTES # BLD AUTO: 0.01 10*3/MM3 (ref 0–0.05)
IMM GRANULOCYTES NFR BLD AUTO: 0.2 % (ref 0–0.5)
LDLC SERPL CALC-MCNC: 71 MG/DL (ref 0–100)
LDLC/HDLC SERPL: 1.21 {RATIO}
LYMPHOCYTES # BLD AUTO: 1.31 10*3/MM3 (ref 0.7–3.1)
LYMPHOCYTES NFR BLD AUTO: 24.8 % (ref 19.6–45.3)
MCH RBC QN AUTO: 29.3 PG (ref 26.6–33)
MCHC RBC AUTO-ENTMCNC: 33.5 G/DL (ref 31.5–35.7)
MCV RBC AUTO: 87.4 FL (ref 79–97)
MONOCYTES # BLD AUTO: 0.44 10*3/MM3 (ref 0.1–0.9)
MONOCYTES NFR BLD AUTO: 8.3 % (ref 5–12)
NEUTROPHILS # BLD AUTO: 3.3 10*3/MM3 (ref 1.7–7)
NEUTROPHILS NFR BLD AUTO: 62.3 % (ref 42.7–76)
NRBC BLD AUTO-RTO: 0 /100 WBC (ref 0–0.2)
PLATELET # BLD AUTO: 246 10*3/MM3 (ref 140–450)
POTASSIUM SERPL-SCNC: 4.1 MMOL/L (ref 3.5–5.2)
PROT SERPL-MCNC: 7.1 G/DL (ref 6–8.5)
RBC # BLD AUTO: 4.37 10*6/MM3 (ref 3.77–5.28)
SODIUM SERPL-SCNC: 142 MMOL/L (ref 136–145)
T3RU NFR SERPL: 28 % (ref 24–39)
T4 SERPL-MCNC: 6.1 UG/DL (ref 4.5–12)
TRIGL SERPL-MCNC: 69 MG/DL (ref 0–150)
TSH SERPL DL<=0.005 MIU/L-ACNC: 2.23 UIU/ML (ref 0.45–4.5)
VLDLC SERPL CALC-MCNC: 14 MG/DL (ref 5–40)
WBC # BLD AUTO: 5.29 10*3/MM3 (ref 3.4–10.8)

## 2023-10-16 ENCOUNTER — OFFICE VISIT (OUTPATIENT)
Dept: INTERNAL MEDICINE | Facility: CLINIC | Age: 83
End: 2023-10-16
Payer: MEDICARE

## 2023-10-16 VITALS
TEMPERATURE: 98.1 F | HEIGHT: 64 IN | OXYGEN SATURATION: 96 % | BODY MASS INDEX: 29.06 KG/M2 | HEART RATE: 63 BPM | SYSTOLIC BLOOD PRESSURE: 112 MMHG | WEIGHT: 170.2 LBS | DIASTOLIC BLOOD PRESSURE: 66 MMHG

## 2023-10-16 DIAGNOSIS — E78.5 HYPERLIPIDEMIA, UNSPECIFIED HYPERLIPIDEMIA TYPE: ICD-10-CM

## 2023-10-16 DIAGNOSIS — E11.43 TYPE 2 DIABETES MELLITUS WITH AUTONOMIC NEUROPATHY, UNSPECIFIED WHETHER LONG TERM INSULIN USE: ICD-10-CM

## 2023-10-16 DIAGNOSIS — I10 PRIMARY HYPERTENSION: ICD-10-CM

## 2023-10-16 DIAGNOSIS — E11.610 CONTROLLED TYPE 2 DIABETES MELLITUS WITH DIABETIC NEUROPATHIC ARTHROPATHY, WITHOUT LONG-TERM CURRENT USE OF INSULIN: Primary | ICD-10-CM

## 2023-10-16 DIAGNOSIS — R19.7 DIARRHEA, UNSPECIFIED TYPE: ICD-10-CM

## 2023-10-16 PROCEDURE — 99214 OFFICE O/P EST MOD 30 MIN: CPT | Performed by: INTERNAL MEDICINE

## 2023-10-16 PROCEDURE — 3078F DIAST BP <80 MM HG: CPT | Performed by: INTERNAL MEDICINE

## 2023-10-16 PROCEDURE — 3074F SYST BP LT 130 MM HG: CPT | Performed by: INTERNAL MEDICINE

## 2023-10-16 PROCEDURE — 1159F MED LIST DOCD IN RCRD: CPT | Performed by: INTERNAL MEDICINE

## 2023-10-16 PROCEDURE — 1160F RVW MEDS BY RX/DR IN RCRD: CPT | Performed by: INTERNAL MEDICINE

## 2023-10-16 NOTE — PROGRESS NOTES
Subjective   Shu Mckeon is a 83 y.o. female here to follow up on HTN, HPL, anxiety depression.  Pt c/o lower legs and foot pain.    History of Present Illness   Pt has been taking cholesterol meds as prescribed.  No difficulties with myalgias.   Pt has been taking BP meds as prescribed without any problems.  No HA  No episodes of orthostasis  She has had some elevated gluc-  she does have some numbness in her feet that bothers her at night    The following portions of the patient's history were reviewed and updated as appropriate: allergies, current medications, past family history, past medical history, past social history, past surgical history, and problem list.    Review of Systems    Objective   Physical Exam  Vitals reviewed.   Constitutional:       Appearance: She is well-developed.   HENT:      Head: Normocephalic and atraumatic.      Right Ear: External ear normal.      Left Ear: External ear normal.   Eyes:      Conjunctiva/sclera: Conjunctivae normal.      Pupils: Pupils are equal, round, and reactive to light.   Neck:      Thyroid: No thyromegaly.      Trachea: No tracheal deviation.   Cardiovascular:      Rate and Rhythm: Normal rate and regular rhythm.      Heart sounds: Normal heart sounds.   Pulmonary:      Effort: Pulmonary effort is normal.      Breath sounds: Normal breath sounds.   Abdominal:      General: Bowel sounds are normal. There is no distension.      Palpations: Abdomen is soft.      Tenderness: There is no abdominal tenderness.   Musculoskeletal:         General: No deformity. Normal range of motion.      Cervical back: Normal range of motion.   Skin:     General: Skin is warm and dry.   Neurological:      Mental Status: She is alert and oriented to person, place, and time.   Psychiatric:         Behavior: Behavior normal.         Thought Content: Thought content normal.         Judgment: Judgment normal.         Vitals:    10/16/23 1109   BP: 112/66   Pulse: 63   Temp: 98.1 °F (36.7  °C)   SpO2: 96%     Admission on 10/05/2023, Discharged on 10/05/2023   Component Date Value Ref Range Status    Case Report 10/05/2023    Final                    Value:Surgical Pathology Report                         Case: [id number]                                  Authorizing Provider:  James Townsend MD      Collected:           10/05/2023 09:11 AM          Ordering Location:     [facility]  Received:            10/05/2023 09:47 AM                                 ENDO SUITES                                                                  Pathologist:           Traci Burr MD                                                    Specimens:   1) - Small Intestine, BX TERMINAL ILEUM                                                             2) - Large Intestine, RANDOM COLON BX, RIGHT AND LEFT COLON                                         3) - Large Intestine, Left / Descending Colon, POLYP @ DESCENDING COLON                    Final Diagnosis 10/05/2023    Final                    Value:This result contains rich text formatting which cannot be displayed here.    Gross Description 10/05/2023    Final                    Value:This result contains rich text formatting which cannot be displayed here.       Body mass index is 29.21 kg/m².         Assessment & Plan   Diagnoses and all orders for this visit:    1. Controlled type 2 diabetes mellitus with diabetic neuropathic arthropathy, without long-term current use of insulin (Primary)  -     CBC & Differential; Future  -     Comprehensive Metabolic Panel; Future  -     Hemoglobin A1c; Future    2. Primary hypertension  -     CBC & Differential; Future  -     Comprehensive Metabolic Panel; Future  -     Hemoglobin A1c; Future    3. Hyperlipidemia, unspecified hyperlipidemia type  -     CBC & Differential; Future  -     Comprehensive Metabolic Panel; Future  -     Hemoglobin A1c; Future    4. Type 2 diabetes mellitus with autonomic  neuropathy, unspecified whether long term insulin use       DM- with neuropathy- try neuropathy cream at night and see if that helps  She really needs to try to walk every evening or use the recumbient bike  HTN- ok with current meds  Hpl- OK with current meds  Osteoporosis-  ok with prolia  due  will fu on this  5.  Diarrhea- episodes cont on the immodium  she is s/p colon

## 2023-10-17 ENCOUNTER — TELEPHONE (OUTPATIENT)
Dept: GASTROENTEROLOGY | Facility: CLINIC | Age: 83
End: 2023-10-17
Payer: MEDICARE

## 2023-10-17 NOTE — TELEPHONE ENCOUNTER
[facility] staff attempted to follow warm transfer process and was unsuccessful     Caller: Shu Mckeon    Relationship to patient: Self    Best call back number: [phone]    Patient is needing: PATIENT HAS BEEN EXPERIENCING DIARRHEA AFTER COLONOSCOPY. PATIENT STATES THAT SHE WAS GIVEN IMODIUM, WHICH HELPED BUT SHE HAS SINCE CONTINUED TO HAVE DIARRHEA. PATIENT SCHEDULED FOLLOW UP APPT. WITH [facility] ON 11- BUT WOULD LIKE TO SPEAK TO SOMEONE REGARDING THIS ISSUE AS PATIENT HAS NO HEARD BACK REGARDING THE RESULTS OF HER COLONOSCOPY THAT WAS PERFORMED ON 10-5-2023.    [facility] OFFERED TO CONTACT CLINICAL TEAM FOR PATIENT. PATIENT STATED THAT SHE WAS BUSY AND REQUESTED THAT OFFICE CALL HER BACK ON 10- AROUND 9AM.

## 2023-10-17 NOTE — TELEPHONE ENCOUNTER
Discussed with patient, she can take Imodium as needed.  She c/o diarrhea x 2 yesterday and semi-solid stool x 1 today.  She has f/u appt with Pat on 11/28.  Discussed path report with patient.  Informed patient to call us back if the sx return or persist. pk

## 2023-10-19 ENCOUNTER — TELEPHONE (OUTPATIENT)
Dept: INTERNAL MEDICINE | Facility: CLINIC | Age: 83
End: 2023-10-19
Payer: MEDICARE

## 2023-10-19 RX ORDER — DENOSUMAB 60 MG/ML
60 INJECTION SUBCUTANEOUS
Qty: 1 ML | Refills: 1 | Status: SHIPPED | OUTPATIENT
Start: 2023-10-19

## 2023-10-20 ENCOUNTER — TELEPHONE (OUTPATIENT)
Dept: INTERNAL MEDICINE | Facility: CLINIC | Age: 83
End: 2023-10-20
Payer: MEDICARE

## 2023-10-20 NOTE — TELEPHONE ENCOUNTER
Caller: Shu Mckeon    Relationship: Self    Best call back number: [phone]     What is the best time to reach you: ANYTIME    Who are you requesting to speak with (clinical staff, provider,  specific staff member): JOE    What was the call regarding: PATIENT STATED THE MEDICATION SHE WAS DISCUSSING WITH DR GIL AT HER RECENT VISIT IS NAMED COLESTIPAL.    PATIENT IS REQUESTING TO SPEAK WITH JOE REGARDING ORDERING HER PROLIA    PLEASE CALL TO DISCUSS

## 2023-10-26 RX ORDER — MONTELUKAST SODIUM 4 MG/1
1 TABLET, CHEWABLE ORAL 2 TIMES DAILY
Qty: 60 TABLET | Refills: 2 | Status: SHIPPED | OUTPATIENT
Start: 2023-10-26

## 2023-10-26 RX ORDER — MONTELUKAST SODIUM 4 MG/1
1 TABLET, CHEWABLE ORAL 2 TIMES DAILY
Qty: 60 TABLET | Refills: 2 | Status: CANCELLED | OUTPATIENT
Start: 2023-10-26

## 2023-11-06 ENCOUNTER — CLINICAL SUPPORT (OUTPATIENT)
Dept: INTERNAL MEDICINE | Facility: CLINIC | Age: 83
End: 2023-11-06
Payer: MEDICARE

## 2023-11-06 DIAGNOSIS — M81.6 LOCALIZED OSTEOPOROSIS, UNSPECIFIED PATHOLOGICAL FRACTURE PRESENCE: Primary | ICD-10-CM

## 2023-11-21 ENCOUNTER — OFFICE VISIT (OUTPATIENT)
Dept: INTERNAL MEDICINE | Facility: CLINIC | Age: 83
End: 2023-11-21
Payer: MEDICARE

## 2023-11-21 ENCOUNTER — HOSPITAL ENCOUNTER (OUTPATIENT)
Dept: GENERAL RADIOLOGY | Facility: HOSPITAL | Age: 83
Discharge: HOME | End: 2023-11-21
Admitting: INTERNAL MEDICINE
Payer: MEDICARE

## 2023-11-21 VITALS
WEIGHT: 167.9 LBS | HEIGHT: 64 IN | DIASTOLIC BLOOD PRESSURE: 76 MMHG | SYSTOLIC BLOOD PRESSURE: 130 MMHG | TEMPERATURE: 98 F | OXYGEN SATURATION: 98 % | HEART RATE: 65 BPM | BODY MASS INDEX: 28.66 KG/M2

## 2023-11-21 DIAGNOSIS — M54.50 ACUTE MIDLINE LOW BACK PAIN WITHOUT SCIATICA: Primary | ICD-10-CM

## 2023-11-21 DIAGNOSIS — B37.9 YEAST INFECTION: ICD-10-CM

## 2023-11-21 LAB
BILIRUB BLD-MCNC: NEGATIVE MG/DL
CLARITY, POC: CLEAR
COLOR UR: YELLOW
EXPIRATION DATE: ABNORMAL
GLUCOSE UR STRIP-MCNC: NEGATIVE MG/DL
KETONES UR QL: NEGATIVE
LEUKOCYTE EST, POC: NEGATIVE
Lab: ABNORMAL
NITRITE UR-MCNC: NEGATIVE MG/ML
PH UR: 6.5 [PH] (ref 5–8)
PROT UR STRIP-MCNC: NEGATIVE MG/DL
RBC # UR STRIP: ABNORMAL /UL
SP GR UR: 1.02 (ref 1–1.03)
UROBILINOGEN UR QL: NORMAL

## 2023-11-21 PROCEDURE — 99214 OFFICE O/P EST MOD 30 MIN: CPT | Performed by: INTERNAL MEDICINE

## 2023-11-21 PROCEDURE — 3075F SYST BP GE 130 - 139MM HG: CPT | Performed by: INTERNAL MEDICINE

## 2023-11-21 PROCEDURE — 3078F DIAST BP <80 MM HG: CPT | Performed by: INTERNAL MEDICINE

## 2023-11-21 PROCEDURE — 72100 X-RAY EXAM L-S SPINE 2/3 VWS: CPT

## 2023-11-21 RX ORDER — NYSTATIN 100000 [USP'U]/G
POWDER TOPICAL
COMMUNITY
Start: 2023-11-04 | End: 2023-11-21

## 2023-11-21 RX ORDER — METHYLPREDNISOLONE 4 MG/1
TABLET ORAL
Qty: 21 TABLET | Refills: 0 | Status: SHIPPED | OUTPATIENT
Start: 2023-11-21

## 2023-11-21 RX ORDER — NYSTATIN 100000 U/G
1 CREAM TOPICAL 2 TIMES DAILY
Qty: 30 G | Refills: 0 | Status: SHIPPED | OUTPATIENT
Start: 2023-11-21

## 2023-11-21 NOTE — PROGRESS NOTES
Subjective   Shu Mckeon is a 83 y.o. female.   Left flank pain for 1 weeks    Back Pain    Breast Problem     No dysuria no hematuria  She says it is always present for the past week  She says it feel ikethe pain she had in the past on the other side  pain also in the center of the back  Also recently was placed on    The following portions of the patient's history were reviewed and updated as appropriate: allergies, current medications, past family history, past medical history, past social history, past surgical history, and problem list.    Review of Systems   Musculoskeletal:  Positive for back pain.       Objective   Physical Exam  Vitals reviewed.   Constitutional:       Appearance: She is well-developed.   HENT:      Head: Normocephalic and atraumatic.      Right Ear: External ear normal.      Left Ear: External ear normal.   Eyes:      Conjunctiva/sclera: Conjunctivae normal.      Pupils: Pupils are equal, round, and reactive to light.   Neck:      Thyroid: No thyromegaly.      Trachea: No tracheal deviation.   Cardiovascular:      Rate and Rhythm: Normal rate and regular rhythm.      Heart sounds: Normal heart sounds.   Pulmonary:      Effort: Pulmonary effort is normal.      Breath sounds: Normal breath sounds.   Abdominal:      General: Bowel sounds are normal. There is no distension.      Palpations: Abdomen is soft.      Tenderness: There is no abdominal tenderness.   Musculoskeletal:         General: No deformity. Normal range of motion.      Cervical back: Normal range of motion.   Skin:     General: Skin is warm and dry.      Comments: Erythema under both breasts   Neurological:      Mental Status: She is alert and oriented to person, place, and time.   Psychiatric:         Behavior: Behavior normal.         Thought Content: Thought content normal.         Judgment: Judgment normal.       Vitals:    11/21/23 0821   BP: 130/76   Pulse: 65   Temp: 98 °F (36.7 °C)   SpO2: 98%     Body mass index is  28.81 kg/m².         Assessment & Plan   Diagnoses and all orders for this visit:    1. Acute midline low back pain without sciatica (Primary)  -     XR Spine Lumbar 2 or 3 View    2. Yeast infection    Other orders  -     methylPREDNISolone (MEDROL) 4 MG dose pack; Take as directed on package instructions.  Dispense: 21 tablet; Refill: 0  -     nystatin (MYCOSTATIN) 024075 UNIT/GM cream; Apply 1 application  topically to the appropriate area as directed 2 (Two) Times a Day.  Dispense: 30 g; Refill: 0      Low back pain: Similar to pain she had on the right side in the past.  It is radiating some into her glutes so I wonder if she could have some sciatica.  We will go ahead and get an x-ray and do a Medrol Dosepak.  I have advised him not to take ibuprofen with this but she can take Tylenol and she can use Lidoderm patch  Yeast under both breast: She has been using a nystatin powder given to her by the breast surgeon but the powder seems to be adhering in the creases.  The redness has decreased in the creases but remains surrounding that area.  I really think she needs something more adherent like a cream.  She is going to try that twice a day.  I did advise her that the prednisone might actually make it look better but she needs to make sure to continue using the topical

## 2023-11-28 ENCOUNTER — OFFICE VISIT (OUTPATIENT)
Dept: GASTROENTEROLOGY | Facility: CLINIC | Age: 83
End: 2023-11-28
Payer: MEDICARE

## 2023-11-28 VITALS
WEIGHT: 166.8 LBS | TEMPERATURE: 97.9 F | DIASTOLIC BLOOD PRESSURE: 74 MMHG | HEIGHT: 64 IN | OXYGEN SATURATION: 97 % | BODY MASS INDEX: 28.48 KG/M2 | SYSTOLIC BLOOD PRESSURE: 130 MMHG | HEART RATE: 69 BPM

## 2023-11-28 DIAGNOSIS — Z98.890 DIARRHEA FOLLOWING GASTROINTESTINAL SURGERY: Primary | ICD-10-CM

## 2023-11-28 DIAGNOSIS — Z90.49 HISTORY OF CHOLECYSTECTOMY: ICD-10-CM

## 2023-11-28 DIAGNOSIS — R19.7 DIARRHEA FOLLOWING GASTROINTESTINAL SURGERY: Primary | ICD-10-CM

## 2023-11-28 PROCEDURE — 1159F MED LIST DOCD IN RCRD: CPT | Performed by: NURSE PRACTITIONER

## 2023-11-28 PROCEDURE — 3075F SYST BP GE 130 - 139MM HG: CPT | Performed by: NURSE PRACTITIONER

## 2023-11-28 PROCEDURE — 1160F RVW MEDS BY RX/DR IN RCRD: CPT | Performed by: NURSE PRACTITIONER

## 2023-11-28 PROCEDURE — 99214 OFFICE O/P EST MOD 30 MIN: CPT | Performed by: NURSE PRACTITIONER

## 2023-11-28 PROCEDURE — 3078F DIAST BP <80 MM HG: CPT | Performed by: NURSE PRACTITIONER

## 2023-11-28 RX ORDER — MONTELUKAST SODIUM 4 MG/1
1 TABLET, CHEWABLE ORAL 2 TIMES DAILY
Qty: 60 TABLET | Refills: 5 | Status: SHIPPED | OUTPATIENT
Start: 2023-11-28

## 2023-11-28 NOTE — PROGRESS NOTES
07/11/22 1500   Group Therapy Session   Time Session Began 1320   Time Session Ended 1400   Total Time patient participated (minutes) 40   Total # Attendees 5   Group Type expressive therapy   Group Topic Covered balanced lifestyle;cognitive activities;structured socialization   Group Session Detail Current Music Bingo   Patient Response/Contribution cooperative with task   Patient Response Detail Group selected together which decade of Music Bingo they wanted to do. Goals of session were focusing, memory recall, positive distraction, emotional containment and social cohesion.  Pt response was engaged and cooperative, responding well to the music.         "Chief Complaint   Patient presents with    Follow-up     Review recent colonoscopy, diarrhea           History of Present Illness  83-year-old female presents today for follow-up after colonoscopy October 5, 2023.  She has a history of colon polyps and cholecystectomy.  Last colonoscopy October 5, 2023.    She also has a new diagnosis of breast cancer status post lumpectomy and sentinel node biopsy August 2023.    Last visit July 18, 2023 for fecal urgency and incontinence that started a couple of years ago but has been worsening over the past several months.    At her last office It was recommended that she start Imodium regularly to help bulk stools and keep track of bowel movements, dosage of Imodium and response on blank calendar.    She has started colestipol one pill twice daily October 2023 after she had discussed her symptoms with a couple of her friends who recommended the medication as it worked well for them.  She started taking 1 pill twice daily, she is no longer having a bowel movement daily since starting the medication, she can go 2 to 4 days without having a bowel movement she will then have urgency, diarrhea and incontinence.  She has been taking 1 pill of colestipol at times but is still experiencing episodes of diarrhea.  She will also take Imodium as she is fearful of having symptoms in public.       Result Review :         Tissue Pathology Exam (10/05/2023 09:11)  COLONOSCOPY (10/05/2023 08:52)  Vital Signs:   /74   Pulse 69   Temp 97.9 °F (36.6 °C)   Ht 162.6 cm (64\")   Wt 75.7 kg (166 lb 12.8 oz)   SpO2 97%   BMI 28.63 kg/m²     Body mass index is 28.63 kg/m².     Physical Exam      Assessment and Plan    Diagnoses and all orders for this visit:    1. Diarrhea following gastrointestinal surgery (Primary)  -     colestipol (COLESTID) 1 g tablet; Take 1 tablet by mouth 2 (Two) Times a Day.  Dispense: 60 tablet; Refill: 5    2. History of cholecystectomy  -     colestipol (COLESTID) " 1 g tablet; Take 1 tablet by mouth 2 (Two) Times a Day.  Dispense: 60 tablet; Refill: 5       Patient is status postcholecystectomy October 2020.  She has been started on colestipol 1 pill twice daily which has resolved daily diarrhea, fecal soiling but has caused bowel movements to change, she is currently having a bowel movement every 2 to 4 days with episodes of diarrhea, incontinence and urgency on the days that she does have a bowel movement.  Bile acid sequestrant is a great medication for the patient however we need to find the correct dose, support and reassurance was provided to the patient.    Patient was given a blank calendar, patient has been instructed to hold dose of colestipol until she has a bowel movement, once she has a bowel movement, patient was instructed to restart colestipol 1/2 pill daily and track response on blank calendar, would like to promote more regular bowel movements avoiding constipation and then overflow diarrhea.    I have sent refill of colestipol to pharmacy for 1 tablet by mouth 2 times daily, this did provide improvement in daily loose and diarrhea bowel movements but has resulted in constipation and fecal urgency and overflow diarrhea.    Patient was instructed to write down the dose of colestipol she is taking and her response including number of bowel movements, consistency and urgency as well as any use of Imodium on blank calendar so that we can get a better understanding of her needs and help control and prevent episodes of fecal urgency and incontinence.    She does not have difficulty with incontinence if bowel movements are formed.    Patient is agreeable to contact the office in 3 weeks with an update and we will review her response to colestipol and make additional recommendations.        Patient verbalized agreement and understanding with the above plan.    Patient will need follow-up visit yearly for refills unless they are provided by her primary care provider  and we will determine need for follow-up based on her response and tracking of bowel movements after she provides an update December 2023.      I spent 32 minutes caring for Shu on this date of service. This time includes time spent by me in the following activities:preparing for the visit, reviewing tests, obtaining and/or reviewing a separately obtained history, counseling and educating the patient/family/caregiver, and documenting information in the medical record    EMR Dragon/Transcription Disclaimer:  This document has been Dictated utilizing Dragon dictation.

## 2023-11-29 RX ORDER — SERTRALINE HYDROCHLORIDE 100 MG/1
100 TABLET, FILM COATED ORAL DAILY
Qty: 90 TABLET | Refills: 1 | OUTPATIENT
Start: 2023-11-29

## 2023-12-06 ENCOUNTER — TELEPHONE (OUTPATIENT)
Dept: INTERNAL MEDICINE | Facility: CLINIC | Age: 83
End: 2023-12-06

## 2023-12-06 NOTE — TELEPHONE ENCOUNTER
Caller: Shu Mckeon    Relationship: Self    Best call back number: [phone]     What medication are you requesting: SOMETHING TO HELP ALLEVIATE HER SYMPTOMS     What are your current symptoms: COUGH, RUNNY NOSE, COUGHING YELLOW PHLEGM     How long have you been experiencing symptoms: 1 WEEK     Have you had these symptoms before:    [x] Yes  [] No    Have you been treated for these symptoms before:   [x] Yes  [] No    If a prescription is needed, what is your preferred pharmacy and phone number: [facility] [place] - [address] [place] [address] - [phone]  - [phone] FX

## 2023-12-06 NOTE — TELEPHONE ENCOUNTER
SPOKE TO PATIENT, UNFORTUNATELY WE DID NOT HAVE ANY SAMEDAYS AVAILABLE. PATIENT WILL TRY TO CALL AND SCHEDULE SAME DAY TOMORROW MORNING.

## 2023-12-07 ENCOUNTER — OFFICE VISIT (OUTPATIENT)
Dept: INTERNAL MEDICINE | Facility: CLINIC | Age: 83
End: 2023-12-07
Payer: MEDICARE

## 2023-12-07 VITALS
SYSTOLIC BLOOD PRESSURE: 136 MMHG | BODY MASS INDEX: 28.31 KG/M2 | DIASTOLIC BLOOD PRESSURE: 74 MMHG | TEMPERATURE: 98.5 F | HEART RATE: 69 BPM | HEIGHT: 64 IN | OXYGEN SATURATION: 98 % | WEIGHT: 165.8 LBS

## 2023-12-07 DIAGNOSIS — J06.9 ACUTE URI: Primary | ICD-10-CM

## 2023-12-07 PROCEDURE — 3075F SYST BP GE 130 - 139MM HG: CPT | Performed by: INTERNAL MEDICINE

## 2023-12-07 PROCEDURE — 3078F DIAST BP <80 MM HG: CPT | Performed by: INTERNAL MEDICINE

## 2023-12-07 PROCEDURE — 99214 OFFICE O/P EST MOD 30 MIN: CPT | Performed by: INTERNAL MEDICINE

## 2023-12-07 RX ORDER — AZITHROMYCIN 250 MG/1
TABLET, FILM COATED ORAL
Qty: 6 TABLET | Refills: 0 | Status: SHIPPED | OUTPATIENT
Start: 2023-12-07

## 2023-12-07 NOTE — PROGRESS NOTES
Subjective   Shu Mckeon is a 83 y.o. female.   uri  History of Present Illness   She has had a uri with cough for the past 2 weeks  she says it has been more productive    The following portions of the patient's history were reviewed and updated as appropriate: allergies, current medications, past family history, past medical history, past social history, past surgical history, and problem list.    Review of Systems    Objective   Physical Exam  Vitals reviewed.   Constitutional:       Appearance: She is well-developed.   HENT:      Head: Normocephalic and atraumatic.      Right Ear: External ear normal.      Left Ear: External ear normal.   Eyes:      Conjunctiva/sclera: Conjunctivae normal.      Pupils: Pupils are equal, round, and reactive to light.   Neck:      Thyroid: No thyromegaly.      Trachea: No tracheal deviation.   Cardiovascular:      Rate and Rhythm: Normal rate and regular rhythm.      Heart sounds: Normal heart sounds.   Pulmonary:      Effort: Pulmonary effort is normal.      Breath sounds: Normal breath sounds.   Abdominal:      General: Bowel sounds are normal. There is no distension.      Palpations: Abdomen is soft.      Tenderness: There is no abdominal tenderness.   Musculoskeletal:         General: No deformity. Normal range of motion.      Cervical back: Normal range of motion.   Skin:     General: Skin is warm and dry.   Neurological:      Mental Status: She is alert and oriented to person, place, and time.   Psychiatric:         Behavior: Behavior normal.         Thought Content: Thought content normal.         Judgment: Judgment normal.       Vitals:    12/07/23 1148   BP: 136/74   Pulse: 69   Temp: 98.5 °F (36.9 °C)   SpO2: 98%     Body mass index is 28.46 kg/m².         Assessment & Plan   Diagnoses and all orders for this visit:    1. Acute URI (Primary)    Other orders  -     azithromycin (Zithromax Z-Dylan) 250 MG tablet; Take 2 tablets by mouth on day 1, then 1 tablet daily on  days 2-5  Dispense: 6 tablet; Refill: 0     URI-  lung clear on exam.  We did discuss getting a chest x-ray but her lungs were really clear and she will let me know if she does not get better and we will check a chest x-ray.  Since her symptoms have lasted 2 weeks and have gradually gotten worse over the last few days I am going to go ahead and treat her with an antibiotic.  I have also advise she drink some hot tea.  She will let me know if her symptoms worsen or fail to improve

## 2023-12-13 ENCOUNTER — TELEPHONE (OUTPATIENT)
Dept: GASTROENTEROLOGY | Facility: CLINIC | Age: 83
End: 2023-12-13
Payer: MEDICARE

## 2023-12-13 NOTE — TELEPHONE ENCOUNTER
RN received call from patient who is providing an update to provider, SAMUEL Hayes. Pt reports that she began taking one half tablet of the colestipol on 11/29/23. Pt reports that her bowel movements that day were very loose and runny. Pt had same experience on 11/30. Pt reports normal bowel movement 12/1/23. From 12/2 to present patient reports very loose stools and urgency. On 12/11 patient took an imodium with colestipol due to needing to leave house for appointment. Pt reports that yesterday she took a whole tablet because she was suppose to have company at house. Pt reports that her anal area is very raw and irritated from going to bathroom so frequently. Please advise. EL

## 2023-12-14 NOTE — TELEPHONE ENCOUNTER
Spoke with patient via telephone, she has had some fecal soiling and incontinence that she is unaware of, she has recently been on antibiotics and under significant increased stress with her .  It is not feasible at the moment for us to figure out a pattern or adjust medications as she has a fluctuating schedule based on her 's appointments and health needs at this time and encouraged her to contact the office after Sulaiman and when things calm down and we will reassess.  Encouraged use of Aquaphor or Vaseline to perianal area.    Ta

## 2023-12-27 ENCOUNTER — TELEPHONE (OUTPATIENT)
Dept: INTERNAL MEDICINE | Facility: CLINIC | Age: 83
End: 2023-12-27
Payer: MEDICARE

## 2023-12-27 ENCOUNTER — CLINICAL SUPPORT (OUTPATIENT)
Dept: INTERNAL MEDICINE | Facility: CLINIC | Age: 83
End: 2023-12-27
Payer: MEDICARE

## 2023-12-27 DIAGNOSIS — R39.198 DIFFICULTY URINATING: ICD-10-CM

## 2023-12-27 DIAGNOSIS — R35.0 URINARY FREQUENCY: Primary | ICD-10-CM

## 2023-12-27 LAB
BILIRUB BLD-MCNC: NEGATIVE MG/DL
CLARITY, POC: ABNORMAL
COLOR UR: YELLOW
EXPIRATION DATE: ABNORMAL
GLUCOSE UR STRIP-MCNC: NEGATIVE MG/DL
KETONES UR QL: NEGATIVE
LEUKOCYTE EST, POC: ABNORMAL
Lab: ABNORMAL
NITRITE UR-MCNC: NEGATIVE MG/ML
PH UR: 6 [PH] (ref 5–8)
PROT UR STRIP-MCNC: ABNORMAL MG/DL
RBC # UR STRIP: ABNORMAL /UL
SP GR UR: 1.02 (ref 1–1.03)
UROBILINOGEN UR QL: ABNORMAL

## 2023-12-27 NOTE — TELEPHONE ENCOUNTER
----- Message from Mariann Fallon MD sent at 12/27/2023 10:48 AM EST -----  Yes    ----- Message -----  From: Araceli Bennett MA  Sent: 12/27/2023  10:41 AM EST  To: Mariann Fallon MD    CAN SHE COME DROP OFF A URINE?   ----- Message -----  From: Iris Hall RegSched Rep  Sent: 12/27/2023  10:27 AM EST  To: Araceli Bennett MA    PATIENT IS STAYING AT HOSPITAL WITH . SHE BELIEVES SHE HAS A UTI WITH FREQUENCY, URGE AND BURNING WITH URINATION. SHE WOULD LIKE TO LEAVE A SAMPLE AND GET SOME MEDICINE.

## 2023-12-28 RX ORDER — CEPHALEXIN 500 MG/1
500 CAPSULE ORAL 2 TIMES DAILY
Qty: 10 CAPSULE | Refills: 0 | Status: SHIPPED | OUTPATIENT
Start: 2023-12-28

## 2023-12-30 LAB
BACTERIA UR CULT: ABNORMAL
BACTERIA UR CULT: ABNORMAL
OTHER ANTIBIOTIC SUSC ISLT: ABNORMAL

## 2024-01-12 DIAGNOSIS — I10 PRIMARY HYPERTENSION: ICD-10-CM

## 2024-01-12 DIAGNOSIS — E11.610 CONTROLLED TYPE 2 DIABETES MELLITUS WITH DIABETIC NEUROPATHIC ARTHROPATHY, WITHOUT LONG-TERM CURRENT USE OF INSULIN: ICD-10-CM

## 2024-01-12 DIAGNOSIS — E78.5 HYPERLIPIDEMIA, UNSPECIFIED HYPERLIPIDEMIA TYPE: ICD-10-CM

## 2024-01-16 LAB
ALBUMIN SERPL-MCNC: 4.4 G/DL (ref 3.5–5.2)
ALBUMIN/GLOB SERPL: 1.8 G/DL
ALP SERPL-CCNC: 85 U/L (ref 39–117)
ALT SERPL-CCNC: 11 U/L (ref 1–33)
AST SERPL-CCNC: 19 U/L (ref 1–32)
BASOPHILS # BLD AUTO: 0.05 10*3/MM3 (ref 0–0.2)
BASOPHILS NFR BLD AUTO: 0.9 % (ref 0–1.5)
BILIRUB SERPL-MCNC: 0.6 MG/DL (ref 0–1.2)
BUN SERPL-MCNC: 10 MG/DL (ref 8–23)
BUN/CREAT SERPL: 14.5 (ref 7–25)
CALCIUM SERPL-MCNC: 9.3 MG/DL (ref 8.6–10.5)
CHLORIDE SERPL-SCNC: 102 MMOL/L (ref 98–107)
CO2 SERPL-SCNC: 25.7 MMOL/L (ref 22–29)
CREAT SERPL-MCNC: 0.69 MG/DL (ref 0.57–1)
EGFRCR SERPLBLD CKD-EPI 2021: 85.2 ML/MIN/1.73
EOSINOPHIL # BLD AUTO: 0.17 10*3/MM3 (ref 0–0.4)
EOSINOPHIL NFR BLD AUTO: 2.9 % (ref 0.3–6.2)
ERYTHROCYTE [DISTWIDTH] IN BLOOD BY AUTOMATED COUNT: 13.2 % (ref 12.3–15.4)
GLOBULIN SER CALC-MCNC: 2.4 GM/DL
GLUCOSE SERPL-MCNC: 121 MG/DL (ref 65–99)
HBA1C MFR BLD: 6.5 % (ref 4.8–5.6)
HCT VFR BLD AUTO: 42.3 % (ref 34–46.6)
HGB BLD-MCNC: 13.8 G/DL (ref 12–15.9)
IMM GRANULOCYTES # BLD AUTO: 0.01 10*3/MM3 (ref 0–0.05)
IMM GRANULOCYTES NFR BLD AUTO: 0.2 % (ref 0–0.5)
LYMPHOCYTES # BLD AUTO: 1.41 10*3/MM3 (ref 0.7–3.1)
LYMPHOCYTES NFR BLD AUTO: 24.1 % (ref 19.6–45.3)
MCH RBC QN AUTO: 28.8 PG (ref 26.6–33)
MCHC RBC AUTO-ENTMCNC: 32.6 G/DL (ref 31.5–35.7)
MCV RBC AUTO: 88.1 FL (ref 79–97)
MONOCYTES # BLD AUTO: 0.49 10*3/MM3 (ref 0.1–0.9)
MONOCYTES NFR BLD AUTO: 8.4 % (ref 5–12)
NEUTROPHILS # BLD AUTO: 3.72 10*3/MM3 (ref 1.7–7)
NEUTROPHILS NFR BLD AUTO: 63.5 % (ref 42.7–76)
NRBC BLD AUTO-RTO: 0.2 /100 WBC (ref 0–0.2)
PLATELET # BLD AUTO: 271 10*3/MM3 (ref 140–450)
POTASSIUM SERPL-SCNC: 4.3 MMOL/L (ref 3.5–5.2)
PROT SERPL-MCNC: 6.8 G/DL (ref 6–8.5)
RBC # BLD AUTO: 4.8 10*6/MM3 (ref 3.77–5.28)
SODIUM SERPL-SCNC: 140 MMOL/L (ref 136–145)
WBC # BLD AUTO: 5.85 10*3/MM3 (ref 3.4–10.8)

## 2024-01-22 ENCOUNTER — OFFICE VISIT (OUTPATIENT)
Dept: INTERNAL MEDICINE | Facility: CLINIC | Age: 84
End: 2024-01-22
Payer: MEDICARE

## 2024-01-22 VITALS
TEMPERATURE: 97.8 F | WEIGHT: 164 LBS | DIASTOLIC BLOOD PRESSURE: 68 MMHG | OXYGEN SATURATION: 98 % | SYSTOLIC BLOOD PRESSURE: 124 MMHG | HEART RATE: 61 BPM | HEIGHT: 64 IN | BODY MASS INDEX: 28 KG/M2

## 2024-01-22 DIAGNOSIS — M43.06 LUMBAR SPONDYLOLYSIS: ICD-10-CM

## 2024-01-22 DIAGNOSIS — E78.5 HYPERLIPIDEMIA, UNSPECIFIED HYPERLIPIDEMIA TYPE: ICD-10-CM

## 2024-01-22 DIAGNOSIS — E11.9 CONTROLLED TYPE 2 DIABETES MELLITUS WITHOUT COMPLICATION, WITHOUT LONG-TERM CURRENT USE OF INSULIN: ICD-10-CM

## 2024-01-22 DIAGNOSIS — M79.605 LEFT LEG PAIN: ICD-10-CM

## 2024-01-22 DIAGNOSIS — I10 PRIMARY HYPERTENSION: Primary | ICD-10-CM

## 2024-01-22 RX ORDER — ITRACONAZOLE 100 MG/1
200 CAPSULE ORAL DAILY
Qty: 14 CAPSULE | Refills: 0 | Status: SHIPPED | OUTPATIENT
Start: 2024-01-22 | End: 2024-01-29

## 2024-01-22 NOTE — PATIENT INSTRUCTIONS
Medicare Wellness  Personal Prevention Plan of Service     Date of Office Visit:    Encounter Provider:  Mariann Fallon MD  Place of Service:  [facility]  Patient Name: Shu Mckeon  :  1940    As part of the Medicare Wellness portion of your visit today, we are providing you with this personalized preventive plan of services (PPPS). This plan is based upon recommendations of the [place] Preventive Services Task Force (USPSTF) and the Advisory Committee on Immunization Practices (ACIP).    This lists the preventive care services that should be considered, and provides dates of when you are due. Items listed as completed are up-to-date and do not require any further intervention.    Health Maintenance   Topic Date Due    ZOSTER VACCINE (2 of 2) 2017    COVID-19 Vaccine ( - - season) 2023    MAMMOGRAM  2024    HEMOGLOBIN A1C  07/15/2024    BMI FOLLOWUP  2024    DIABETIC EYE EXAM  2024    LIPID PANEL  10/09/2024    ANNUAL WELLNESS VISIT  2025    DXA SCAN  2025    COLORECTAL CANCER SCREENING  10/05/2028    TDAP/TD VACCINES (2 - Tdap) 2033    INFLUENZA VACCINE  Completed    Pneumococcal Vaccine 65+  Completed    URINE MICROALBUMIN  Discontinued       No orders of the defined types were placed in this encounter.      No follow-ups on file.

## 2024-01-22 NOTE — PROGRESS NOTES
The ABCs of the Annual Wellness Visit  Subsequent Medicare Wellness Visit    Subjective    Shu Mckeon is a 83 y.o. female who presents for a Subsequent Medicare Wellness Visit.    The following portions of the patient's history were reviewed and   updated as appropriate: allergies, current medications, past family history, past medical history, past social history, past surgical history, and problem list.    Compared to one year ago, the patient feels her physical   health is the same.    Compared to one year ago, the patient feels her mental   health is worse.weary from taking care of  and doing all the bills    Recent Hospitalizations:  She was not admitted to the hospital during the last year.       Current Medical Providers:  Patient Care Team:  Mariann Fallon MD as PCP - General  Mariann Fallon MD as PCP - Family Medicine  Nicolas Latif MD as Consulting Physician (Cardiology)  Shayna Blount MD as Referring Physician (Breast Surgery)  Brown Feliciano Jr., MD as Consulting Physician (Hematology and Oncology)    Outpatient Medications Prior to Visit   Medication Sig Dispense Refill   • acetaminophen (TYLENOL) 650 MG 8 hr tablet Take 1 tablet by mouth Every 8 (Eight) Hours As Needed for Mild Pain.     • amitriptyline (ELAVIL) 10 MG tablet Take 1 tablet by mouth Every Night. 30 tablet 2   • atorvastatin (LIPITOR) 40 MG tablet Take 1 tablet by mouth Every Night. 90 tablet 1   • Cholecalciferol (VITAMIN D3) 5000 UNITS tablet Take 1 tablet by mouth Daily.     • colestipol (COLESTID) 1 g tablet Take 1 tablet by mouth 2 (Two) Times a Day. (Patient taking differently: Take 0.5 tablets by mouth Daily.) 60 tablet 5   • denosumab (Prolia) 60 MG/ML solution prefilled syringe syringe Inject 1 mL under the skin into the appropriate area as directed Every 6 (Six) Months. LAST DOSE MAY 2023 1 mL 1   • loperamide (IMODIUM A-D) 2 MG tablet Take 1/2 tablet daily x10 days then increase to 1/2 tablet twice daily if  still having diarrhea.  Decrease dose if constipation occurs 30 tablet 1   • losartan (COZAAR) 50 MG tablet Take 1 tablet by mouth Daily. 90 tablet 1   • metoprolol succinate XL (TOPROL-XL) 25 MG 24 hr tablet Take 1 tablet by mouth Every Night. 90 tablet 1   • Multiple Vitamin (MULTIVITAMIN) capsule Take 1 capsule by mouth Daily. PT HOLDING FOR SURGERY     • nystatin (MYCOSTATIN) 003180 UNIT/GM cream Apply 1 application  topically to the appropriate area as directed 2 (Two) Times a Day. 30 g 0   • sertraline (ZOLOFT) 100 MG tablet Take 1 tablet by mouth Daily. 90 tablet 1   • azithromycin (Zithromax Z-Dylan) 250 MG tablet Take 2 tablets by mouth on day 1, then 1 tablet daily on days 2-5 6 tablet 0   • cephalexin (Keflex) 500 MG capsule Take 1 capsule by mouth 2 (Two) Times a Day. 10 capsule 0     No facility-administered medications prior to visit.       No opioid medication identified on active medication list. I have reviewed chart for other potential  high risk medication/s and harmful drug interactions in the elderly.        Aspirin is not on active medication list.  Aspirin use is not indicated based on review of current medical condition/s. Risk of harm outweighs potential benefits.  .    Patient Active Problem List   Diagnosis   • Ankle fracture   • Diabetes type 2, controlled   • Fatigue   • HLD (hyperlipidemia)   • BP (high blood pressure)   • OP (osteoporosis)   • Closed fracture of right pelvis   • Low back pain   • Lumbar spondylolysis   • Radial scar of breast   • Reactive lymphadenopathy   • Calculus of gallbladder with acute cholecystitis without obstruction   • Encounter for screening mammogram for breast cancer   • Spondylosis of lumbar region without myelopathy or radiculopathy   • Personal history of colonic polyps   • Diarrhea   • Change in bowel habits   • Lobular breast cancer, right   • Lumbar facet arthropathy     Advance Care Planning   Advance Care Planning     Advance Directive is on file.   "ACP discussion was held with the patient during this visit. Patient has an advance directive in EMR which is still valid.      Objective    Vitals:    24 1016   BP: 124/68   BP Location: Left arm   Patient Position: Sitting   Pulse: 61   Temp: 97.8 °F (36.6 °C)   TempSrc: Oral   SpO2: 98%   Weight: 74.4 kg (164 lb)   Height: 162.6 cm (64\")   PainSc:   2     Estimated body mass index is 28.15 kg/m² as calculated from the following:    Height as of this encounter: 162.6 cm (64\").    Weight as of this encounter: 74.4 kg (164 lb).           Does the patient have evidence of cognitive impairment? No    Lab Results   Component Value Date    HGBA1C 6.50 (H) 01/15/2024        HEALTH RISK ASSESSMENT    Smoking Status:  Social History     Tobacco Use   Smoking Status Never   • Passive exposure: Never   Smokeless Tobacco Never     Alcohol Consumption:  Social History     Substance and Sexual Activity   Alcohol Use Yes   • Alcohol/week: 7.0 standard drinks of alcohol   • Types: 7 Glasses of wine per week    Comment: 1/day-socially     Fall Risk Screen:    STEADI Fall Risk Assessment was completed, and patient is at LOW risk for falls.Assessment completed on:2024    Depression Screenin/22/2024    10:00 AM   PHQ-2/PHQ-9 Depression Screening   Little Interest or Pleasure in Doing Things 1-->several days   Feeling Down, Depressed or Hopeless 0-->not at all   PHQ-9: Brief Depression Severity Measure Score 1       Health Habits and Functional and Cognitive Screenin/22/2024    10:00 AM   Functional & Cognitive Status   Do you have difficulty preparing food and eating? No   Do you have difficulty bathing yourself, getting dressed or grooming yourself? No   Do you have difficulty using the toilet? No   Do you have difficulty moving around from place to place? No   Do you have trouble with steps or getting out of a bed or a chair? No   Current Diet Limited Junk Food   Dental Exam Up to date   Eye Exam Up to " date   Exercise (times per week) 3 times per week        Exercise Comment helps  with exercies   Do you need help using the phone?  No   Are you deaf or do you have serious difficulty hearing?  No   Do you need help to go to places out of walking distance? No   Do you need help shopping? No   Do you need help preparing meals?  No   Do you need help with housework?  No   Do you need help with laundry? No   Do you need help taking your medications? No   Do you need help managing money? No   Do you ever drive or ride in a car without wearing a seat belt? Yes   Have you felt unusual stress, anger or loneliness in the last month? Yes   Who do you live with? Spouse   If you need help, do you have trouble finding someone available to you? No   Have you been bothered in the last four weeks by sexual problems? No   Do you have difficulty concentrating, remembering or making decisions? Yes       Age-appropriate Screening Schedule:  Refer to the list below for future screening recommendations based on patient's age, sex and/or medical conditions. Orders for these recommended tests are listed in the plan section. The patient has been provided with a written plan.    Health Maintenance   Topic Date Due   • ZOSTER VACCINE (2 of 2) 11/14/2017   • COVID-19 Vaccine (4 - 2023-24 season) 09/01/2023   • ANNUAL WELLNESS VISIT  09/26/2023   • MAMMOGRAM  05/24/2024   • HEMOGLOBIN A1C  07/15/2024   • BMI FOLLOWUP  08/21/2024   • DIABETIC EYE EXAM  09/11/2024   • LIPID PANEL  10/09/2024   • DXA SCAN  04/27/2025   • COLORECTAL CANCER SCREENING  10/05/2028   • TDAP/TD VACCINES (2 - Tdap) 03/06/2033   • INFLUENZA VACCINE  Completed   • Pneumococcal Vaccine 65+  Completed   • URINE MICROALBUMIN  Discontinued                  CMS Preventative Services Quick Reference  Risk Factors Identified During Encounter  Inactivity/Sedentary: Patient was advised to exercise at least 150 minutes a week per CDC recommendations.  The above  "risks/problems have been discussed with the patient.  Pertinent information has been shared with the patient in the After Visit Summary.  An After Visit Summary and PPPS were made available to the patient.    Follow Up:   Next Medicare Wellness visit to be scheduled in 1 year.       Additional E&M Note during same encounter follows:  Patient has multiple medical problems which are significant and separately identifiable that require additional work above and beyond the Medicare Wellness Visit.      Chief Complaint  Hyperlipidemia, Hypertension, Diabetes, Anxiety, and Medicare Wellness-subsequent    Subjective        Hyperlipidemia    Hypertension  Associated symptoms include anxiety.   Diabetes    Anxiety        Shu Mckeon is also being seen today for fu on FL  Pt has been taking BP meds as prescribed without any problems.  No HA  No episodes of orthostasis  Pt has been taking cholesterol meds as prescribed.  No difficulties with myalgias.   She is under stress with caring for her   she does feel like zoloft helps  She has been having some worsening burning in the left leg.    She has been trying to limit carbs       Objective   Vital Signs:  /68 (BP Location: Left arm, Patient Position: Sitting)   Pulse 61   Temp 97.8 °F (36.6 °C) (Oral)   Ht 162.6 cm (64\")   Wt 74.4 kg (164 lb)   SpO2 98%   BMI 28.15 kg/m²     Physical Exam  Vitals reviewed.   Constitutional:       Appearance: She is well-developed.   HENT:      Head: Normocephalic and atraumatic.      Right Ear: External ear normal.      Left Ear: External ear normal.   Eyes:      Conjunctiva/sclera: Conjunctivae normal.      Pupils: Pupils are equal, round, and reactive to light.   Neck:      Thyroid: No thyromegaly.      Trachea: No tracheal deviation.   Cardiovascular:      Rate and Rhythm: Normal rate and regular rhythm.      Heart sounds: Normal heart sounds.   Pulmonary:      Effort: Pulmonary effort is normal.      Breath sounds: " Normal breath sounds.   Abdominal:      General: Bowel sounds are normal. There is no distension.      Palpations: Abdomen is soft.      Tenderness: There is no abdominal tenderness.   Musculoskeletal:         General: No deformity. Normal range of motion.      Cervical back: Normal range of motion.   Skin:     General: Skin is warm and dry.   Neurological:      Mental Status: She is alert and oriented to person, place, and time.   Psychiatric:         Behavior: Behavior normal.         Thought Content: Thought content normal.         Judgment: Judgment normal.        The following data was reviewed by: Mariann Fallon MD on 01/22/2024:  Common labs          8/22/2023    14:18 10/9/2023    09:42 1/15/2024    10:11   Common Labs   Glucose 145  126  121    BUN 13  11  10    Creatinine 0.67  0.66  0.69    Sodium 137  142  140    Potassium 4.0  4.1  4.3    Chloride 100  104  102    Calcium 9.9  9.9  9.3    Total Protein  7.1  6.8    Albumin 4.0  4.6  4.4    Total Bilirubin 0.4  0.4  0.6    Alkaline Phosphatase 102  130  85    AST (SGOT) 21  18  19    ALT (SGPT) 12  13  11    WBC 8.49  5.29  5.85    Hemoglobin 12.4  12.8  13.8    Hematocrit 38.9  38.2  42.3    Platelets 304  246  271    Total Cholesterol  144     Triglycerides  69     HDL Cholesterol  59     LDL Cholesterol   71     Hemoglobin A1C  6.80  6.50                 Assessment and Plan   There are no diagnoses linked to this encounter.         Follow Up   No follow-ups on file.  Patient was given instructions and counseling regarding her condition or for health maintenance advice. Please see specific information pulled into the AVS if appropriate.      HTN-  ok with currentr meds  HPL-  ok with lipitor  Anxiety-  stable with zoloft  in spite of all the issues with her   DM-  she is better with eating keto  Left leg pain-    feels buring  likely coming from the back  no weakness    she does use the compunded cream  she said steroids did not help much the last  time she had this.  She has tried gabapentin and tramadol in the past  she will try voltaren  she   6.  Tinea under both brerast  getting worse  tried powder and nystatin cream  we will use itraconizole 200mg qd

## 2024-02-05 ENCOUNTER — HOSPITAL ENCOUNTER (OUTPATIENT)
Dept: PHYSICAL THERAPY | Facility: HOSPITAL | Age: 84
Setting detail: THERAPIES SERIES
Discharge: HOME | End: 2024-02-05
Payer: MEDICARE

## 2024-02-05 DIAGNOSIS — Z91.89 AT RISK FOR LYMPHEDEMA: Primary | ICD-10-CM

## 2024-02-05 DIAGNOSIS — Z98.890 S/P LUMPECTOMY, RIGHT BREAST: ICD-10-CM

## 2024-02-05 DIAGNOSIS — C50.911 LOBULAR BREAST CANCER, RIGHT: ICD-10-CM

## 2024-02-05 PROCEDURE — 93702 BIS XTRACELL FLUID ANALYSIS: CPT

## 2024-02-05 PROCEDURE — 97535 SELF CARE MNGMENT TRAINING: CPT

## 2024-02-05 NOTE — THERAPY RE-EVALUATION
"  Physical Therapy Lymphedema Re-Evaluation  [facility]     Patient Name: Shu Mckeon  : 1940  MRN: [id number]  Today's Date: 2024      Visit Date: 2024    Visit Dx:    ICD-10-CM ICD-9-CM   1. At risk for lymphedema  Z91.89 V49.89   2. S/P lumpectomy, right breast  Z98.890 V45.89   3. Lobular breast cancer, right  C50.911 174.9       Patient Active Problem List   Diagnosis    Ankle fracture    Diabetes type 2, controlled    Fatigue    HLD (hyperlipidemia)    BP (high blood pressure)    OP (osteoporosis)    Closed fracture of right pelvis    Low back pain    Lumbar spondylolysis    Radial scar of breast    Reactive lymphadenopathy    Calculus of gallbladder with acute cholecystitis without obstruction    Encounter for screening mammogram for breast cancer    Spondylosis of lumbar region without myelopathy or radiculopathy    Personal history of colonic polyps    Diarrhea    Change in bowel habits    Lobular breast cancer, right    Lumbar facet arthropathy        Past Medical History:   Diagnosis Date    Arthritis     Breast cancer     right breast    Chronic low back pain     Depression     HISTORY OF BEING \"EMOTIONAL\"    Encounter for screening mammogram for breast cancer 2021    Fatigue     GERD (gastroesophageal reflux disease)     H/O cardiovascular stress test 2018    NORMAL, DR ARIAS    History of diverticulitis     Hyperlipidemia     Hypertension     Impaired fasting glucose     Left bundle branch block     Macular degeneration     EVITA    Osteoporosis     Radial scar of breast     LEFT    Status post ORIF of fracture of ankle     Thyroid nodule     Urinary frequency         Past Surgical History:   Procedure Laterality Date    BREAST BIOPSY Left 10/25/2018    Procedure: left breast ultrasound-guided excision of radial scar.;  Surgeon: Shayna Blount MD;  Location: [facility] OR [facility];  Service: General    BREAST LUMPECTOMY WITH SENTINEL NODE BIOPSY Right 2023    " Procedure: RIGHT breast bracketed needle localized lumpectomy AND RIGHT axilla sentinel node biopsy;  Surgeon: Shayna Blount MD;  Location: [facility] MAIN OR;  Service: General;  Laterality: Right;    BREAST SURGERY Bilateral 08/02/2023    Procedure: RIGHT BREAST ONCOPLASTIC REDUCTION AND LEFT BREAST REDUCTION FOR SYMMETRY;  Surgeon: Greta Romero MD;  Location: [facility] MAIN OR;  Service: Plastics;  Laterality: Bilateral;    CARDIAC CATHETERIZATION      not sure when or where    CARDIAC CATHETERIZATION      CATARACT EXTRACTION WITH INTRAOCULAR LENS IMPLANT      CHOLECYSTECTOMY WITH INTRAOPERATIVE CHOLANGIOGRAM N/A 10/31/2020    Procedure: CHOLECYSTECTOMY LAPAROSCOPIC INTRAOPERATIVE CHOLANGIOGRAM;  Surgeon: Ruth Del Valle MD;  Location: [facility] OR;  Service: General;  Laterality: N/A;    COLONOSCOPY      COLONOSCOPY N/A 10/5/2023    Procedure: COLONOSCOPY  INTO TERMINAL ILEUM WITH BX AND POLYPECTOMY;  Surgeon: James Townsend MD;  Location: [facility] ENDOSCOPY;  Service: Gastroenterology;  Laterality: N/A;  PREOP/ DIARRHEA, HX POLYPS- POSTOP/ DIVERTICULOSIS, POLYP, HEMORRHOIDS    FEMUR FRACTURE SURGERY Right     FEMUR    FRACTURE SURGERY  2015    LT ANKLE    MEDIAL BRANCH BLOCK Right 05/20/2022    Procedure: LUMBAR MEDIAL BRANCH BLOCK Right L4-S1;  Surgeon: Pamela Spaulding MD;  Location: [facility] [place];  Service: Pain Management;  Laterality: Right;    REPLACEMENT TOTAL KNEE Right        Visit Dx:    ICD-10-CM ICD-9-CM   1. At risk for lymphedema  Z91.89 V49.89   2. S/P lumpectomy, right breast  Z98.890 V45.89   3. Lobular breast cancer, right  C50.911 174.9            Lymphedema       Row Name 02/05/24 1300             Subjective Pain    Able to rate subjective pain? yes  -LB      Pre-Treatment Pain Level 0  -LB         Subjective    Subjective Comments I am doing ok. I had to cancel my last appt. bc I am caregiving for my spouse. He has fallen and has alot of pain. He couldn't walk after the last one.   -LB         Lymphedema Assessment    Lymphedema Classification RUE:;at risk/stage 0  -LB      Lymphedema Cancer Related Sx right;lumpectomy;sentinel node biopsy  -LB      Lymphedema Surgery Comments 8/2/23  -LB      Lymph Nodes Removed # 3  -LB      Positive Lymph Nodes # 0  -LB      Chemo Received no  -LB      Radiation Therapy Received no  -LB      Infections or Cellulitis? no  -LB         Posture/Observations    Posture/Observations Comments forward head, rounded shoulders  -LB         General ROM    GENERAL ROM COMMENTS BUE WFL  -LB         MMT (Manual Muscle Testing)    General MMT Comments BUE WFL  -LB         Lymphedema Edema Assessment    Edema Assessment Comment no obvious edema  -LB         Skin Changes/Observations    Skin Observations Comment redness inferior B breasts; being treated  -LB         Lymphedema Pulses/Capillary Refill    Lymph Pulses Capillary Refill Comments normal  -LB         L-Dex Bioimpedence Screening    L-Dex Measurement Extremity RUE  -LB      L-Dex Patient Position Standing  -LB      L-Dex UE Dominate Side Right  -LB      L-Dex UE At Risk Side Right  -LB      L-Dex UE Pre Surgical Value No  -LB      L-Dex UE Score 2.7  -LB      L-Dex UE Baseline Score 4  -LB      L-Dex UE Value Change -1.3  -LB      L-Dex UE Comment WNL  -LB      $ L-Dex Charge yes  -LB                User Key  (r) = Recorded By, (t) = Taken By, (c) = Cosigned By      Initials Name Provider Type    Mary Jo Ellison PT Physical Therapist                                    Therapy Education  Education Details: reviewed s/s of lymphedema, steps to prevention, bioimpedance results and interpretation, discussed skincare  Given: Symptoms/condition management, Edema management  Program: Reinforced  How Provided: Verbal  Provided to: Patient  Level of Understanding: Verbalized  12446 - PT Self Care/Mgmt Minutes: 15       OP Exercises       Row Name 02/05/24 1300             Subjective    Subjective Comments I am doing ok.  I had to cancel my last appt. bc I am caregiving for my spouse. He has fallen and has alot of pain. He couldn't walk after the last one.  -LB         Subjective Pain    Able to rate subjective pain? yes  -LB      Pre-Treatment Pain Level 0  -LB                User Key  (r) = Recorded By, (t) = Taken By, (c) = Cosigned By      Initials Name Provider Type    Mary Jo Ellison, PT Physical Therapist                                 PT OP Goals       Row Name 02/05/24 1300          Long Term Goals    LTG Date to Achieve 10/12/23  -LB     LTG 1 Pt will maintain LDex bioimpedance score WNL.  -LB     LTG 1 Progress Ongoing  -LB     LTG 1 Progress Comments WNL and stable at 2.7  -LB     LTG 2 Pt will verbalize s/s of lymphedema and steps to prevention.  -LB     LTG 2 Progress Ongoing  -LB     LTG 2 Progress Comments reviewed today  -LB     LTG 3 Pt will verbalize improvement in R breast pain to minimal.  -LB     LTG 3 Progress Met  -LB               User Key  (r) = Recorded By, (t) = Taken By, (c) = Cosigned By      Initials Name Provider Type    Mary Jo Ellison, PT Physical Therapist                     PT Assessment/Plan       Row Name 02/05/24 1306          PT Assessment    Functional Limitations Other (comment)  at risk for lymphedema  -LB     Assessment Comments Pt returns for 3 month follow up reporting improving R breast pain and no issues with RUE. She demonstrates full AROM BUE and has had inc stress due to caregiving for her spouse. LDex bioimpedance score is stable and WNL at 2.7. She denies s/s of lymphedema and no obvious edema today. We reviewed s/s of lymphedema and discussed reasons to return to clinic prior to scheduled appt. She has met 1/3 LTGs and is making progress towards remaining goals. She remains appropriate for continued skilled PT services to monitor RUE and will return to clinic for reassessment in 6 months unless symptoms present sooner.  -LB     Rehab Potential Good  -LB     Patient/caregiver  participated in establishment of treatment plan and goals Yes  -LB     Patient would benefit from skilled therapy intervention Yes  -LB        PT Plan    PT Frequency Other (comment)  -LB     Predicted Duration of Therapy Intervention (PT) repeat bioimpedance in 6 months  -LB     Planned CPT's? PT EVAL LOW COMPLEXITY: 86551;PT RE-EVAL: 81117;PT THER PROC EA 15 MIN: 74142;PT THER ACT EA 15 MIN: 37639;PT MANUAL THERAPY EA 15 MIN: 87142;PT NEUROMUSC RE-EDUCATION EA 15 MIN: 18380;PT SELF CARE/HOME MGMT/TRAIN EA 15: 01938;PT BIS XTRACELL FLUID ANALYSIS: 86564  -LB     PT Plan Comments repeat bioimpedance in 6 months  -LB               User Key  (r) = Recorded By, (t) = Taken By, (c) = Cosigned By      Initials Name Provider Type    LB Mary Jo Lopez, PT Physical Therapist                                 Time Calculation:   Start Time: 1315  Stop Time: 1345  Time Calculation (min): 30 min  Total Timed Code Minutes- PT: 15 minute(s)  Timed Charges  26895 - PT Self Care/Mgmt Minutes: 15  Total Minutes  Timed Charges Total Minutes: 15   Total Minutes: 15   Therapy Charges for Today       Code Description Service Date Service Provider Modifiers Qty    89653509942 HC PT BIS XTRACELL FLUID ANALYSIS 2/5/2024 Mary Jo Lopez, PT  1    46145731806 HC PT SELF CARE/MGMT/TRAIN EA 15 MIN 2/5/2024 Mary Jo Lopez, PT GP 1                      Mary Jo Lopez, PT  2/5/2024

## 2024-02-21 DIAGNOSIS — Z98.890 DIARRHEA FOLLOWING GASTROINTESTINAL SURGERY: ICD-10-CM

## 2024-02-21 DIAGNOSIS — Z90.49 HISTORY OF CHOLECYSTECTOMY: ICD-10-CM

## 2024-02-21 DIAGNOSIS — R19.7 DIARRHEA FOLLOWING GASTROINTESTINAL SURGERY: ICD-10-CM

## 2024-02-21 RX ORDER — ATORVASTATIN CALCIUM 40 MG/1
40 TABLET, FILM COATED ORAL
Qty: 90 TABLET | Refills: 1 | Status: SHIPPED | OUTPATIENT
Start: 2024-02-21

## 2024-02-21 RX ORDER — METOPROLOL SUCCINATE 25 MG/1
25 TABLET, EXTENDED RELEASE ORAL EVERY EVENING
Qty: 90 TABLET | Refills: 1 | Status: SHIPPED | OUTPATIENT
Start: 2024-02-21

## 2024-02-21 RX ORDER — SERTRALINE HYDROCHLORIDE 100 MG/1
100 TABLET, FILM COATED ORAL DAILY
Qty: 90 TABLET | Refills: 1 | Status: SHIPPED | OUTPATIENT
Start: 2024-02-21

## 2024-02-21 RX ORDER — MONTELUKAST SODIUM 4 MG/1
1 TABLET, CHEWABLE ORAL 2 TIMES DAILY
Qty: 60 TABLET | Refills: 5 | Status: SHIPPED | OUTPATIENT
Start: 2024-02-21

## 2024-02-21 RX ORDER — AZITHROMYCIN 250 MG/1
TABLET, FILM COATED ORAL
Qty: 6 TABLET | Refills: 0 | OUTPATIENT
Start: 2024-02-21

## 2024-02-21 RX ORDER — LOSARTAN POTASSIUM 50 MG/1
50 TABLET ORAL DAILY
Qty: 90 TABLET | Refills: 1 | Status: SHIPPED | OUTPATIENT
Start: 2024-02-21

## 2024-03-18 ENCOUNTER — TELEPHONE (OUTPATIENT)
Dept: ONCOLOGY | Facility: CLINIC | Age: 84
End: 2024-03-18
Payer: MEDICARE

## 2024-03-18 NOTE — TELEPHONE ENCOUNTER
Caller: Shu Mckeon    Relationship: Self    Best call back number: [phone]     What is the best time to reach you: ASAP    Who are you requesting to speak with (clinical staff, provider,  specific staff member): CLINICAL        What was the call regarding: PLEASE CALL TO ADVISE IF 3/25 FOLLOW UP APPT IS STILL NEEDED, SHE THOUGHT SHE DID NOT NEED TO RETURN FOR ANY FOLLOW UP APPTS.

## 2024-03-19 NOTE — TELEPHONE ENCOUNTER
Yes, we still intend to follow her even though we are not doing treatment.  We can talk about it at that visit.     Called the patient to let her know the above per Dr. Feliciano. She v/u.

## 2024-03-22 NOTE — PROGRESS NOTES
"Chief Complaint  Anatomic stage IA/pathologic prognostic stage IA right breast cancer ([id number]), ER/MO positive and HER2/tommy negative.  Left axillary lymph node biopsy with reactive changes and clonal T LGL population by flow cytometry    Subjective        History of Present Illness    Patient is here today for 6-month interval follow-up visit in regards to her recent history of breast cancer.  At our visit on 8/22/2023, she elected not to pursue adjuvant endocrine therapy.  She was not a good candidate for AI due to osteoporosis and declined tamoxifen due to potential risk of thrombosis and endometrial cancer.  This was all in the setting of low risk disease with 1.2 cm grade 2 invasive lobular carcinoma that was strongly ER +% and low MO +21-30% with Ki-67 score of 8%.  Shortly after her visit here she was seen by radiation oncology and elected not to proceed with adjuvant radiation therapy.  She has done quite well since her visit here in August.  She did undergo colonoscopy on 10/5/2024 which was negative on pathology from biopsies.  She did have a UTI in late December.  She notes that her bowels have been moving normally on colestipol, had previously been experiencing intermittent diarrhea.  She is taking care of her  at home who has had significant difficulty with postherpetic neuralgia.  She did undergo her annual mammogram earlier today, result pending.      Objective   Vital Signs:   /77   Pulse 61   Temp 97.3 °F (36.3 °C) (Temporal)   Resp 16   Ht 162 cm (63.78\")   Wt 76.7 kg (169 lb 3.2 oz)   SpO2 95%   BMI 29.24 kg/m²     Physical Exam  Constitutional:       Appearance: She is well-developed.   Eyes:      Conjunctiva/sclera: Conjunctivae normal.   Neck:      Thyroid: No thyromegaly.   Cardiovascular:      Rate and Rhythm: Normal rate and regular rhythm.      Heart sounds: No murmur heard.     No friction rub. No gallop.   Pulmonary:      Effort: No respiratory distress.      " Breath sounds: Normal breath sounds.      Comments: Breast exam was not performed today  Abdominal:      General: Bowel sounds are normal. There is no distension.      Palpations: Abdomen is soft.      Tenderness: There is no abdominal tenderness.   Lymphadenopathy:      Head:      Right side of head: No submandibular adenopathy.      Cervical: No cervical adenopathy.      Upper Body:      Right upper body: No supraclavicular adenopathy.      Left upper body: No supraclavicular adenopathy.   Skin:     General: Skin is warm and dry.      Findings: No rash.   Neurological:      Mental Status: She is alert and oriented to person, place, and time.      Cranial Nerves: No cranial nerve deficit.      Motor: No abnormal muscle tone.      Deep Tendon Reflexes: Reflexes normal.   Psychiatric:         Behavior: Behavior normal.       Patient was examined today, unchanged from above     Result Review : Reviewed CBC, CMP from today.  Reviewed colonoscopy procedure note and pathology.  Reviewed radiation oncology records.       Assessment and Plan     *Anatomic stage IA/pathologic prognostic stage IA right breast cancer ([id number]), ER/CO positive and HER2/tommy negative  Patient underwent previous left breast biopsy on 8/21/2018 that showed small radial sclerosing lesion with associated ductal hyperplasia of the usual type, focal columnar cell change without atypia, apocrine metaplasia, fibroadenomatoid change with associated calcifications, no malignancy.    She underwent left lumpectomy on 10/25/2018 with benign breast tissue with ductal hyperplasia without atypia, adenosis, radial sclerosing lesion (2 mm), scar, prior biopsy change.  Margins were negative for radial sclerosing lesion.  Screening mammogram on 3/23/2023 showed right BI-RADS 0 result.    Diagnostic right mammogram and ultrasound on 4/27/2023 showed a 0.9 cm mass in the 8 o'clock position of the right breast, BI-RADS 4.    Biopsy was performed right breast 8  o'clock position 5/18/2023 with invasive lobular carcinoma, grade 2, 1.2 cm with associated atypical lobular hyperplasia and LCIS, no evidence of lymphovascular or perineural invasion, ER positive (%), MN positive (21-30%), HER2/tommy negative (1+ IHC), Ki-67 8%.    Patient with family history of paternal half sister with breast cancer in her 70s and paternal cousin with breast cancer in her 50s.    [facility] panel test on 5/25/2023 which showed VUS x2 (BARD1 c.586G>A and STK11 c.631C>T).    Bilateral breast MRI on 5/22/2023 showed 1.5 cm enhancement in the 7 o'clock position right breast with associated postbiopsy change, recommended repeat mammogram for preoperative planning to assess clip placement.  There was an incompletely assessed 2.1 cm right thyroid nodule.    Diagnostic right mammogram on 5/20/2023 showed possibility of clip migration or that a different lesion was biopsied.    Thyroid ultrasound on 6/9/2023 showed a 1.9 cm right thyroid nodule that was TI-RADS 3, recommended 1 year interval follow-up ultrasound.    The patient underwent surgery on 8/2/2023 with right lumpectomy with oncoplastic closure and left breast mammoplasty for symmetry.  Pathology showed invasive lobular carcinoma, grade 2, 9 mm, atypical lobular hyperplasia, LCIS, negative margins, 3 negative sentinel lymph nodes.  Pathology from left breast tissue reduction mammoplasty was negative for malignancy.  Patient was seen in consultation on 8/22/2024 postoperatively.  We reviewed that she had pT1c primary lesion with 1.2 cm invasive lobular carcinoma, grade 2 that is ER positive (91-1 1%), weakly MN positive (21-30%), and HER2/tommy negative (IHC 1+) with Ki-67 8%.  On right lumpectomy specimen 8/2/2023, there was 9 mm residual grade 2 invasive lobular carcinoma, negative margins, 3 benign sentinel lymph nodes.  Oncotype was sent which returned in the low risk range at 15 indicating less than 1% benefit from adjuvant chemotherapy and 4%  risk of distant recurrence at 5 years with endocrine therapy.  I reviewed all of this at length with the patient and her daughter.  She is seeing radiation oncology tomorrow to discuss potential adjuvant radiation therapy.  Adjuvant chemotherapy was not recommended given the patient's age, low risk disease, Oncotype Dx score of 15.  Discussed potential pursuit of adjuvant endocrine therapy.  Patient with underlying osteoporosis receiving Prolia with recent DEXA scan that showed maximal T score -3.1 in the left hip.  Given the patient's age and potential to worsen the patient's bone density further, aromatase inhibitor therapy was not recommended.  Discussion regarding potential use of tamoxifen with associated risk of thrombotic complications and endometrial cancer.  We discussed the overall the excellent prognosis from her malignancy given the small size, favorable prognostic features, node-negative status.  The patient made informed decision to forego the risks of adjuvant endocrine therapy and to pursue a course of observation/surveillance following surgery.  Plan follow-up every 6 months for 5 years after surgery to monitor her clinically for evidence of recurrent disease.  Patient was seen by radiation oncology on 8/23/2024, elected to forego adjuvant radiation therapy.  Patient returns today for a 6-month interval follow-up visit.  She has continued to do quite well with no clinical evidence of recurrent disease.  She underwent mammogram earlier today with results still pending.  She has follow-up scheduled with Dr. Blount and breast surgery on 4/12/2024 and therefore we did not perform breast exam today as she will undergo exam at her upcoming visit.  I will schedule her back in 6 months for further follow-up.    *Left axillary lymph node biopsy with reactive changes and clonal T LGL population by flow cytometry, no findings to suggest malignancy:  The patient underwent previous left breast biopsy 8/21/2018  with benign findings including ductal hyperplasia of the usual type, focal stromal fibrosis, focal columnar cell change without atypia, a progression metaplasia, fibroadenomatoid change.    The patient is followed by Dr. Blount and underwent routine screening mammogram on 7/20/2020 with finding of increased size of the left axillary/axillary tail mass that may represent a lymph node, BI-RADS 0.    She underwent a diagnostic left mammogram and ultrasound on 8/12/2020 which showed a left axillary tail lymph node 1.1 cm with thickened cortex relative to other lymph nodes and biopsy was recommended.    On 9/9/2020, biopsy was performed showing a reactive lymph node.  Flow cytometry was sent showing an increased CD4 to CD8 ratio, no aberrant T-cell population or B-cell population however there was a clonal T LGL population detected by V beta analysis comprising 7% of total events (TCR alpha/beta positive, CD20 6+).  There was no clonal CD4 positive T-cell population.  Results were not diagnostic for malignancy.   The patient was seen in initial consultation on 9/28/2020.  She had no palpable lymphadenopathy or splenomegaly.  Her counts were reviewed showing normal WBC at 6.96 with normal differential, no evidence of anemia nor thrombocytopenia.  The clonal population seen on flow cytometry from lymph node biopsy certainly could be a reactive population.  There was no evidence to suggest LGL leukemia.  We did discuss potential additional evaluation with peripheral blood flow cytometry and T-cell gene rearrangement study as well as potential CT scans of the chest abdomen and pelvis to evaluate for additional lymphadenopathy and/or hepatosplenomegaly.  The patient however declined further evaluation but was agreeable to a course of observation.    The patient developed abdominal pain and underwent CT abdomen pelvis on 10/27/2020 which showed cholelithiasis, no evidence of adenopathy or splenomegaly.  She ultimately  underwent laparoscopic cholecystectomy with pathology showing mild chronic calculus cholecystitis.  Patient was seen in follow-up on 1/4/2021.  There was no clinical evidence of underlying LGL leukemia (normal WBC and differential, no evidence of anemia nor thrombocytopenia) and patient did not wish to pursue any further evaluation.   Patient today with normal WBC and differential    *Osteoporosis  Patient has been receiving Prolia for a number of years  DEXA scan on 4/27/2023 with T score lumbar spine -0.5, left hip -3.1, left forearm -2.8  As above, patient is not a good candidate for aromatase inhibitor therapy regarding her breast cancer with potential to worsen her bone density.    *Chronic intermittent diarrhea  Patient reports chronic mild intermittent diarrhea, improved after short course of Imodium in July  Colonoscopy on 10/5/2024 was negative    *Chronic low back pain  Patient continues follow-up with pain management     Plan:  Patient elected not to proceed with adjuvant endocrine therapy nor adjuvant radiation therapy  Patient underwent annual mammogram earlier today with result pending  Patient is scheduled for follow-up with Dr. Blount on 4/12/2024 with breast exam planned at that time  MD visit in 6 months for clinical follow-up with CBC, CMP

## 2024-03-25 ENCOUNTER — OFFICE VISIT (OUTPATIENT)
Dept: ONCOLOGY | Facility: CLINIC | Age: 84
End: 2024-03-25
Payer: MEDICARE

## 2024-03-25 ENCOUNTER — HOSPITAL ENCOUNTER (OUTPATIENT)
Dept: MAMMOGRAPHY | Facility: HOSPITAL | Age: 84
Discharge: HOME | End: 2024-03-25
Admitting: SURGERY
Payer: MEDICARE

## 2024-03-25 ENCOUNTER — LAB (OUTPATIENT)
Dept: OTHER | Facility: HOSPITAL | Age: 84
End: 2024-03-25
Payer: MEDICARE

## 2024-03-25 VITALS
WEIGHT: 169.2 LBS | OXYGEN SATURATION: 95 % | BODY MASS INDEX: 28.89 KG/M2 | SYSTOLIC BLOOD PRESSURE: 138 MMHG | RESPIRATION RATE: 16 BRPM | HEIGHT: 64 IN | DIASTOLIC BLOOD PRESSURE: 77 MMHG | HEART RATE: 61 BPM | TEMPERATURE: 97.3 F

## 2024-03-25 DIAGNOSIS — C50.911 LOBULAR BREAST CANCER, RIGHT: ICD-10-CM

## 2024-03-25 DIAGNOSIS — C50.911 LOBULAR BREAST CANCER, RIGHT: Primary | ICD-10-CM

## 2024-03-25 DIAGNOSIS — Z12.31 ENCOUNTER FOR SCREENING MAMMOGRAM FOR MALIGNANT NEOPLASM OF BREAST: ICD-10-CM

## 2024-03-25 LAB
ALBUMIN SERPL-MCNC: 4.4 G/DL (ref 3.5–5.2)
ALBUMIN/GLOB SERPL: 1.4 G/DL
ALP SERPL-CCNC: 80 U/L (ref 39–117)
ALT SERPL W P-5'-P-CCNC: 13 U/L (ref 1–33)
ANION GAP SERPL CALCULATED.3IONS-SCNC: 7.6 MMOL/L (ref 5–15)
AST SERPL-CCNC: 20 U/L (ref 1–32)
BASOPHILS # BLD AUTO: 0.04 10*3/MM3 (ref 0–0.2)
BASOPHILS NFR BLD AUTO: 0.6 % (ref 0–1.5)
BILIRUB SERPL-MCNC: 0.5 MG/DL (ref 0–1.2)
BUN SERPL-MCNC: 14 MG/DL (ref 8–23)
BUN/CREAT SERPL: 22.2 (ref 7–25)
CALCIUM SPEC-SCNC: 9.8 MG/DL (ref 8.6–10.5)
CHLORIDE SERPL-SCNC: 103 MMOL/L (ref 98–107)
CO2 SERPL-SCNC: 27.4 MMOL/L (ref 22–29)
CREAT SERPL-MCNC: 0.63 MG/DL (ref 0.57–1)
DEPRECATED RDW RBC AUTO: 46.8 FL (ref 37–54)
EGFRCR SERPLBLD CKD-EPI 2021: 87.1 ML/MIN/1.73
EOSINOPHIL # BLD AUTO: 0.15 10*3/MM3 (ref 0–0.4)
EOSINOPHIL NFR BLD AUTO: 2.3 % (ref 0.3–6.2)
ERYTHROCYTE [DISTWIDTH] IN BLOOD BY AUTOMATED COUNT: 14.4 % (ref 12.3–15.4)
GLOBULIN UR ELPH-MCNC: 3.2 GM/DL
GLUCOSE SERPL-MCNC: 103 MG/DL (ref 65–99)
HCT VFR BLD AUTO: 40.4 % (ref 34–46.6)
HGB BLD-MCNC: 13.1 G/DL (ref 12–15.9)
IMM GRANULOCYTES # BLD AUTO: 0.01 10*3/MM3 (ref 0–0.05)
IMM GRANULOCYTES NFR BLD AUTO: 0.2 % (ref 0–0.5)
LYMPHOCYTES # BLD AUTO: 1.92 10*3/MM3 (ref 0.7–3.1)
LYMPHOCYTES NFR BLD AUTO: 28.9 % (ref 19.6–45.3)
MCH RBC QN AUTO: 28.9 PG (ref 26.6–33)
MCHC RBC AUTO-ENTMCNC: 32.4 G/DL (ref 31.5–35.7)
MCV RBC AUTO: 89.2 FL (ref 79–97)
MONOCYTES # BLD AUTO: 0.55 10*3/MM3 (ref 0.1–0.9)
MONOCYTES NFR BLD AUTO: 8.3 % (ref 5–12)
NEUTROPHILS NFR BLD AUTO: 3.97 10*3/MM3 (ref 1.7–7)
NEUTROPHILS NFR BLD AUTO: 59.7 % (ref 42.7–76)
NRBC BLD AUTO-RTO: 0 /100 WBC (ref 0–0.2)
PLATELET # BLD AUTO: 227 10*3/MM3 (ref 140–450)
PMV BLD AUTO: 9.8 FL (ref 6–12)
POTASSIUM SERPL-SCNC: 4.2 MMOL/L (ref 3.5–5.2)
PROT SERPL-MCNC: 7.6 G/DL (ref 6–8.5)
RBC # BLD AUTO: 4.53 10*6/MM3 (ref 3.77–5.28)
SODIUM SERPL-SCNC: 138 MMOL/L (ref 136–145)
WBC NRBC COR # BLD AUTO: 6.64 10*3/MM3 (ref 3.4–10.8)

## 2024-03-25 PROCEDURE — 85025 COMPLETE CBC W/AUTO DIFF WBC: CPT | Performed by: INTERNAL MEDICINE

## 2024-03-25 PROCEDURE — 99214 OFFICE O/P EST MOD 30 MIN: CPT | Performed by: INTERNAL MEDICINE

## 2024-03-25 PROCEDURE — 3075F SYST BP GE 130 - 139MM HG: CPT | Performed by: INTERNAL MEDICINE

## 2024-03-25 PROCEDURE — 77067 SCR MAMMO BI INCL CAD: CPT

## 2024-03-25 PROCEDURE — 80053 COMPREHEN METABOLIC PANEL: CPT | Performed by: INTERNAL MEDICINE

## 2024-03-25 PROCEDURE — 77063 BREAST TOMOSYNTHESIS BI: CPT

## 2024-03-25 PROCEDURE — 3078F DIAST BP <80 MM HG: CPT | Performed by: INTERNAL MEDICINE

## 2024-03-25 PROCEDURE — 1160F RVW MEDS BY RX/DR IN RCRD: CPT | Performed by: INTERNAL MEDICINE

## 2024-03-25 PROCEDURE — 36415 COLL VENOUS BLD VENIPUNCTURE: CPT

## 2024-03-25 PROCEDURE — 1126F AMNT PAIN NOTED NONE PRSNT: CPT | Performed by: INTERNAL MEDICINE

## 2024-03-25 PROCEDURE — 1159F MED LIST DOCD IN RCRD: CPT | Performed by: INTERNAL MEDICINE

## 2024-03-27 ENCOUNTER — TELEPHONE (OUTPATIENT)
Dept: ONCOLOGY | Facility: CLINIC | Age: 84
End: 2024-03-27
Payer: MEDICARE

## 2024-03-27 NOTE — TELEPHONE ENCOUNTER
Left message for patient informing her that her mammogram was negative. Callback number provided in case of questions.

## 2024-03-28 ENCOUNTER — TELEPHONE (OUTPATIENT)
Dept: SURGERY | Facility: CLINIC | Age: 84
End: 2024-03-28
Payer: MEDICARE

## 2024-03-28 NOTE — TELEPHONE ENCOUNTER
[facility] bilateral screening mammogram with tomosynthesis March 27, 2024 scattered fibroglandular densities.  2.9 cm area of fat necrosis right breast middle third upper outer.  BI-RADS 2

## 2024-04-09 RX ORDER — DENOSUMAB 60 MG/ML
60 INJECTION SUBCUTANEOUS
Qty: 1 ML | Refills: 1 | Status: SHIPPED | OUTPATIENT
Start: 2024-04-09

## 2024-04-17 NOTE — PROGRESS NOTES
Chief Complaint: Shu Mckeon is a 83 y.o. female who was seen in consultation at the request of Mariann Fallon MD  for newly diagnosed breast cancer and a followup visit    History of Present Illness:  Patient presents with abnormal breast imaging, LEFT breast. She noted no new masses, skin changes, nipple discharge, nipple changes prior to her most recent imaging.  Her most recent imaging includes the followin/14/15 [facility]  MAMMO SCREEN EVITA  FLUHR, SHU  Benign dermal calcifications. BIRADS category 2: benign.    18 [facility]  MAMMO SCREEN EVITA  FLUHR, SHU  Scattered fibroglandular densities anterior one third retroareolar left breast interval development of a cluster of calcifications. BIRADS category 0.    8/3/18  [facility]  MAMMO DIAG LEFT  FLUHR, SHU  Microcalcifications within the outer inferior aspect of the left breast. BIRADS category 4.      She has not had a breast biopsy in the past.  She has her uterus and ovaries, is postmenopausal, and takes nor hormones.  Her family history includes the following: She has one daughter, one sister, no maternal aunts, 2 paternal aunts.  She has a half sister on paternal he based who had breast cancer in her 70s and a paternal cousin who had breast cancer in her 50s.      18- left breast stereotactic biopsy: Upper outer quadrant, 5:00: Small radial sclerosing lesion with usual hyperplasia, separate foci of usual hyperplasia, columnar cell change without atypia: Focal stromal fibrosis, apically metaplasia, and fibroadenomatoid change.   Concordant per Dr Tariq.    She denies significant bruising at her biopsy site.  She denies any redness warmth or drainage from the mammotomy.    Pathology from  10-24-18 excision radial scar returned as radial scar and no atypia.     She denies any redness, warmth, drainage from her incision.    In the interim,  Shu Mckeon  has done well.  She has noted no changes in her breast exam. No new masses, skin changes, nipple changes,  nipple discharge either breast.     Her most recent imaging includes the following:  July 19, 2019 bilateral screening mammogram with 3D [facility]: Scattered fibroglandular densities.  No evidence for malignancy in either breast.  BI-RADS 1      In the interim,  Shu Mckeon  has done well.  She has noted no changes in her breast exam. No new masses, skin changes, nipple changes, nipple discharge either breast.     Her most recent imaging includes the following:  [facility] point mammography July 20, 2020 bilateral screening mammogram with tomosynthesis.  Scattered areas of fibroglandular density.  There is a 1 cm mass in the lower left axilla, axillary tail that is slightly increased in size.  BI-RADS 0  August 12, 2020 left diagnostic mammogram with left breast ultrasound [facility].  On mammogram visualization of a 1.1 cm round mass consistent with a lymph node in the axillary tail with a cortex that is thickened relative to other visualized node.  On ultrasound axillary tail and left axillary ultrasound showed on the order of 16 cm from the nipple a 1.1 cm lymph node with a thickened cortex.  Impression 1.1 cm rounded left axillary tail lymph node with a thickened cortex recommend ultrasound-guided left breast biopsy BI-RADS 4.    She has gained 11#.    Shu has done well since her core biopsy.  She denies any swelling or bruising or discomfort in her left axilla.  Denies any redness warmth or drainage from her dermatotomy    Pathology from in office core biopsy 9-9-20 returned as :  Final Diagnosis   1. Lymph Node, Left Low Axillary Tail, Core Biopsy:               A. Core-like fragments of reactive appearing lymph node.               B. No morphologic evidence of lymphoma or carcinoma.     2. Lymph Node, Left Low Axillary Tail, Core Biopsy (Gross Diagnosis Only):               A. Core-like tissue submitted to [facility] for Flow Cytometric Analysis.                      Please see the Flow Cytometry Summary below.     [facility]       Flow cytometry from [facility] dated September 9, 2020 returned as immunophenotyping fails to reveal a monoclonal B cell or abnormal T-cell immunophenotype.  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  The significance of this is uncertain.  Correlation with tissue morphology with further studies as indicated is advised.       -Addendum: Reviewed with Dr. Feliciano and he thinks that it would be best for him to just evaluate her in the clinic.           In the interim,  Shu Mckeon  has done well.  She has noted no changes in her breast exam. No new masses, skin changes, nipple changes, nipple discharge either breast.   She denies headache, bone pain, belly pain, cough, changes in vision or gait.  She has had an intentional additional 6 # weight loss.    Her most recent imaging includes the following:  [facility] November 1, 2021 bilateral screening mammogram with tomosynthesis scattered fibroglandular densities.  BI-RADS 1  [facility]'s point March 23, 2023 bilateral screening mammogram with tomosynthesis.  Scattered areas of fibroglandular density.  Question architectural distortion lower outer middle right breast.  BI-RADS 0  Right diagnostic mammogram and right breast ultrasound April 27, 2023 [facility]:  Scattered areas of fibroglandular density.  Lower outer middle right breast there is a persistent area of distortion.  On ultrasound right breast 8:00, 5 cm from the nipple is a 9 x 8 mm irregular hypoechoic mass corresponding distortion which is suspicious.  BI-RADS 4  Also at 8:00, 1 cm from the nipple there is an incidental benign appearing ductal confluence.              Pathology from an office biopsy May 18, 2023 returned as invasive lobular carcinoma, intermediate grade, 3, 2, 1, largest focus 1.2 cm.  Associated atypical lobular hyperplasia and  "lobular carcinoma in situ.  No lymphovascular invasion.  Estrogen , progesterone 21-30, HER2/tommy 1+, Ki-67 8%.    She reports significant bruising extending onto her abdominal wall.    I then had her to get a post biopsy mammogram and MRI:  Reviewed Mrs. Mckeon\"s  MRI and postbiopsy mammogram together with Dr. Tariq.     The lobular histology makes her MRI interpretation fairly difficult especially with the postbiopsy cavity in the center of the affected area.  When combining the 2 imaging studies there does appear to be a significant 8 cm clip displacement posteriorly.  The mammographic density prebiopsy appears to be the best representation of size estimation per Dr. Tariq, approximately 4.6 cm.  The lesion appears debulked on the postbiopsy mammogram and there are blood products and air in the center of the enhancement on the MRI however the clip is situated 8 cm posterior to this.      I feel that there was clip migration due to the hematoma.    Dr Tariq felt that he could give a reasonably accurate attempt at bracketing this difficult to image lobular carcinoma.        8-2-23 Pathology from bracketed needle localized lumpectomy with oncoplastic reduction by Dr Romero returned as : 9mm invasive lobular carcinoma, int grade, 3,2,1, LCIS and ALH present, no LVI, margins widely clear. 0/3 nodes.   Path stage T1bN0- IA     She denies any redness warmth or drainage from her incisions.  New she has seen Dr. Romero.  She is here for review    Interval History:  In the interim,  Shu Mckeon  has done well.  She saw Dr. Feliciano and he felt that the risk of osteoporosis or blood clot with any of the hormone blocking medications outweighed any benefit at her age.  She saw Dr. Ornelas who did not feel that she needed radiation.  She has lost 12 pounds in the interim due to increased activity at her [facility] home.  Her most recent mammogram was in good order see below.    She has noted no changes in her breast exam. " No new masses, skin changes, nipple changes, nipple discharge either breast.   She denies headache, bone pain, belly pain, cough, changes in vision or gait.  Her most recent imaging includes the following: March 25, 2024 [facility] bilateral screening mammogram with tomosynthesis interval development of postsurgical change bilaterally.  A 2.9 cm of fat necrosis in the right breast middle third upper outer quadrant is seen.  BI-RADS 2      Review of Systems:  Review of Systems   All other systems reviewed and are negative.      Past Medical and Surgical History:  Breast Biopsy History:  Patient has not had a breast biopsy in the past.  Breast Cancer HIstory:  Patient does not have a past medical history of breast cancer.  Breast Operations, and year:  NONE   Obstetric/Gynecologic History:  Age menstrual periods began: 11  Patient is postmenopausal, entered menopause naturally at age:    Number of pregnancies: 3  Number of live births: 3  Number of abortions or miscarriages: 0  Age of delivery of first child: 20  Patient did not breast feed.  Length of time taking birth control pills: 10-12 YRS   Patient took hormone replacement during the following dates: 10 YRS   PATIENT STILL HAS UTERUS AND OVARIES.     Past Surgical History:   Procedure Laterality Date    BREAST BIOPSY Left 10/25/2018    Procedure: left breast ultrasound-guided excision of radial scar.;  Surgeon: Shayna Blount MD;  Location: [facility];  Service: General    BREAST LUMPECTOMY WITH SENTINEL NODE BIOPSY Right 08/02/2023    Procedure: RIGHT breast bracketed needle localized lumpectomy AND RIGHT axilla sentinel node biopsy;  Surgeon: Shayna Blount MD;  Location: [facility];  Service: General;  Laterality: Right;    BREAST SURGERY Bilateral 08/02/2023    Procedure: RIGHT BREAST ONCOPLASTIC REDUCTION AND LEFT BREAST REDUCTION FOR SYMMETRY;  Surgeon: Greta Romero MD;  Location: [facility];  Service: Plastics;   "Laterality: Bilateral;    CARDIAC CATHETERIZATION      not sure when or where    CARDIAC CATHETERIZATION      CATARACT EXTRACTION WITH INTRAOCULAR LENS IMPLANT      CHOLECYSTECTOMY WITH INTRAOPERATIVE CHOLANGIOGRAM N/A 10/31/2020    Procedure: CHOLECYSTECTOMY LAPAROSCOPIC INTRAOPERATIVE CHOLANGIOGRAM;  Surgeon: Ruth Del Valle MD;  Location:  [facility];  Service: General;  Laterality: N/A;    COLONOSCOPY      COLONOSCOPY N/A 10/5/2023    Procedure: COLONOSCOPY  INTO TERMINAL ILEUM WITH BX AND POLYPECTOMY;  Surgeon: James Townsend MD;  Location:  [facility];  Service: Gastroenterology;  Laterality: N/A;  PREOP/ DIARRHEA, HX POLYPS- POSTOP/ DIVERTICULOSIS, POLYP, HEMORRHOIDS    FEMUR FRACTURE SURGERY Right     FEMUR    FRACTURE SURGERY  2015    LT ANKLE    MEDIAL BRANCH BLOCK Right 05/20/2022    Procedure: LUMBAR MEDIAL BRANCH BLOCK Right L4-S1;  Surgeon: Pamela Spaulding MD;  Location[facility];  Service: Pain Management;  Laterality: Right;    REPLACEMENT TOTAL KNEE Right      Past Medical History:   Diagnosis Date    Arthritis     Breast cancer     right breast    Chronic low back pain     Depression     HISTORY OF BEING \"EMOTIONAL\"    Encounter for screening mammogram for breast cancer 05/03/2021    Fatigue     GERD (gastroesophageal reflux disease)     H/O cardiovascular stress test 07/2018    NORMAL, DR ARIAS    History of diverticulitis     Hyperlipidemia     Hypertension     Impaired fasting glucose     Left bundle branch block     Macular degeneration     EVITA    Osteoporosis     Radial scar of breast     LEFT    Status post ORIF of fracture of ankle     Thyroid nodule     Urinary frequency        Prior Hospitalizations, other than for surgery or childbirth, and year:  NONE     Social History     Socioeconomic History    Marital status:      Spouse name: Isidoro   Tobacco Use    Smoking status: Never     Passive exposure: Never    Smokeless tobacco: Never   Vaping Use    Vaping status: " Never Used   Substance and Sexual Activity    Alcohol use: Yes     Alcohol/week: 7.0 standard drinks of alcohol     Types: 7 Glasses of wine per week     Comment: 1/day-socially    Drug use: No    Sexual activity: Defer     Patient is .  Patient is retired.  Patient drinks 2 servings of caffeine per day.    Family History:  Family History   Problem Relation Age of Onset    Heart disease Father     Heart attack Father 81    Breast cancer Sister 70    Heart attack Paternal Uncle     Breast cancer Cousin 50    Malig Hyperthermia Neg Hx     Colon cancer Neg Hx     Colon polyps Neg Hx     Crohn's disease Neg Hx     Irritable bowel syndrome Neg Hx     Ulcerative colitis Neg Hx        Vital Signs:  LMP  (LMP Unknown)      Medications:    Current Outpatient Medications:     acetaminophen (TYLENOL) 650 MG 8 hr tablet, Take 1 tablet by mouth Every 8 (Eight) Hours As Needed for Mild Pain., Disp: , Rfl:     amitriptyline (ELAVIL) 10 MG tablet, Take 1 tablet by mouth Every Night., Disp: 30 tablet, Rfl: 2    atorvastatin (LIPITOR) 40 MG tablet, TAKE ONE TABLET BY MOUTH NIGHTLY, Disp: 90 tablet, Rfl: 1    Cholecalciferol (VITAMIN D3) 5000 UNITS tablet, Take 1 tablet by mouth Daily., Disp: , Rfl:     colestipol (COLESTID) 1 g tablet, TAKE ONE TABLET BY MOUTH TWICE DAILY, Disp: 60 tablet, Rfl: 5    denosumab (Prolia) 60 MG/ML solution prefilled syringe syringe, Inject 1 mL under the skin into the appropriate area as directed Every 6 (Six) Months., Disp: 1 mL, Rfl: 1    loperamide (IMODIUM A-D) 2 MG tablet, Take 1/2 tablet daily x10 days then increase to 1/2 tablet twice daily if still having diarrhea.  Decrease dose if constipation occurs, Disp: 30 tablet, Rfl: 1    losartan (COZAAR) 50 MG tablet, TAKE ONE TABLET BY MOUTH DAILY, Disp: 90 tablet, Rfl: 1    metoprolol succinate XL (TOPROL-XL) 25 MG 24 hr tablet, TAKE ONE TABLET BY MOUTH EVERY EVENING, Disp: 90 tablet, Rfl: 1    Multiple Vitamin (MULTIVITAMIN) capsule, Take 1  capsule by mouth Daily. PT HOLDING FOR SURGERY, Disp: , Rfl:     nystatin (MYCOSTATIN) 073789 UNIT/GM cream, Apply 1 application  topically to the appropriate area as directed 2 (Two) Times a Day., Disp: 30 g, Rfl: 0    sertraline (ZOLOFT) 100 MG tablet, TAKE ONE TABLET BY MOUTH DAILY, Disp: 90 tablet, Rfl: 1     Allergies:  No Known Allergies    Physical Examination:  LMP  (LMP Unknown)   General Appearance:  Patient is in no distress.  She is well kept and has an obese build.   Psychiatric:  Patient with appropriate mood and affect. Alert and oriented to self, time, and place.    Breast, RIGHT:  medium sized,  symmetric with the contralateral side. S/p oncoplastic reduction incisions by Dr Romero after a RIGHT bracketed lumpectomy in 8-2023, well healed.  Breast skin is without erythema, edema, rashes.  There are no visible abnormalities upon inspection during the arm-raising maneuver or with hands on hips in the sitting position. There is no nipple retraction, discharge or nipple/areolar skin changes. .There are no masses palpable in the sitting or supine positions.  There is some UOQ density related to the fat necrosis seen on mammogram. No discrete mass.      Breast, LEFT: medium sized,symmetric with the contralateral side. S/p oncoplastic reduction incisions by Dr Romero after a RIGHT bracketed lumpectomy in 8-2023 well healed.  Breast skin is without erythema, edema, rashes.  There are no visible abnormalities upon inspection during the arm-raising maneuver or with hands on hips in the sitting position. There is no nipple retraction, discharge or nipple/areolar skin changes. .There are no masses palpable in the sitting or supine positions.   Well-healed radial biopsy   for radial scar.     Lymphatic:  There is no axillary, cervical, infraclavicular, or supraclavicular adenopathy bilaterally.  Specifically left axilla there is no palpable adenopathy.  There is a well-healed dermatotomy from her remote   lymph node core biopsy.  Eyes:  Pupils are round and reactive to light.  Cardiovascular:  Heart rate and rhythm are regular.  Respiratory:  Lungs are clear bilaterally with no crackles or wheezes in any lung field.  Gastrointestinal:  Abdomen is soft, nondistended, and nontender.     Musculoskeletal:  Good strength in all 4 extremities.   There is good range of motion in both shoulders.    Skin:  No new skin lesions or rashes on the skin excluding the breast (see breast exam above).        Imagin/14/15 [facility]  MAMMO SCREEN EVITA  FLUHR, HEATHER  Benign dermal calcifications. BIRADS category 2: benign.    18 [facility]  MAMMO SCREEN EVITA  FLUHR, HEATHER  Scattered fibroglandular densities anterior one third retroareolar left breast interval development of a cluster of calcifications. BIRADS category 0.    8/3/18  [facility]  MAMMO DIAG LEFT  FLUHR, HEATHER  Microcalcifications within the outer inferior aspect of the left breast. BIRADS category 4.    2019 bilateral screening mammogram with 3D [facility]: Scattered fibroglandular densities.  No evidence for malignancy in either breast.  BI-RADS 1    [facility] point mammography 2020 bilateral screening mammogram with tomosynthesis.  Scattered areas of fibroglandular density.  There is a 1 cm mass in the lower left axilla, axillary tail that is slightly increased in size.  BI-RADS 0  2020 left diagnostic mammogram with left breast ultrasound [facility].  On mammogram visualization of a 1.1 cm round mass consistent with a lymph node in the axillary tail with a cortex that is thickened relative to other visualized node.  On ultrasound axillary tail and left axillary ultrasound showed on the order of 16 cm from the nipple a 1.1 cm lymph node with a thickened cortex.  Impression 1.1 cm rounded left axillary tail lymph node with a thickened cortex recommend ultrasound-guided left breast biopsy BI-RADS 4.    [facility] November 1, 2021 bilateral screening mammogram with tomosynthesis scattered fibroglandular densities.  BI-RADS 1  [facility]'s point March 23, 2023 bilateral screening mammogram with tomosynthesis.  Scattered areas of fibroglandular density.  Question architectural distortion lower outer middle right breast.  BI-RADS 0  Right diagnostic mammogram and right breast ultrasound April 27, 2023 [facility]:  Scattered areas of fibroglandular density.  Lower outer middle right breast there is a persistent area of distortion.  On ultrasound right breast 8:00, 5 cm from the nipple is a 9 x 8 mm irregular hypoechoic mass corresponding distortion which is suspicious.  BI-RADS 4  Also at 8:00, 1 cm from the nipple there is an incidental benign appearing ductal confluence.  We will get her scheduled for a same-day biopsy.    Reviewed Mrs. Bautista"s  MRI and postbiopsy mammogram together today with Dr. Tariq.  The lobular histology makes her MRI interpretation fairly difficult especially with the postbiopsy cavity in the center of the affected area.  When combining the 2 imaging studies there does appear to be a significant 8 cm clip displacement posteriorly.  The mammographic density prebiopsy appears to be the best representation of size estimation per Dr. Tariq, approximately 4.6 cm.  The lesion appears debulked on the postbiopsy mammogram and there are blood products and air in the center of the enhancement on the MRI however the clip is situated 8 cm posterior to this.      [facility] bilateral screening mammogram with tomosynthesis March 27, 2024 scattered fibroglandular densities.  2.9 cm area of fat necrosis right breast middle third upper outer.  BI-RADS 2          Pathology:   8-21-18- left breast stereotactic biopsy: Upper outer quadrant, 5:00: Small radial sclerosing lesion with usual hyperplasia, separate foci of usual hyperplasia, columnar cell change without atypia: Focal stromal " "fibrosis, apically metaplasia, and fibroadenomatoid change.     Concordant per Dr Tariq.        Final Diagnosis   BREAST, LEFT, DESIGNATED \"UPPER OUTER QUADRANT, APPROXIMATE 5 O'CLOCK POSITION\",   STEREOTACTIC BIOPSY:               SMALL RADIAL SCLEROSING LESION WITH ASSOCIATED DUCTAL HYPERPLASIA OF THE                      USUAL TYPE.               SEPARATE FOCI OF DUCTAL HYPERPLASIA OF THE USUAL TYPE.               FOCAL COLUMNAR CELL CHANGE WITHOUT ATYPIA.               FOCAL STROMAL FIBROSIS AND ASSOCIATED CALCIFICATIONS.               FOCAL APOCRINE METAPLASIA.               FOCAL FIBROADENOMATOID CHANGE WITH ASSOCIATED CALCIFICATIONS.               NO EVIDENCE OF MALIGNANCY.     TDJ/ IHC/a/JULIA     CPT CODES:  1. 81391       Pathology from  10-24-18 excision radial scar returend as radial scar and no atypia.          Final Diagnosis   LEFT BREAST LUMPECTOMY:           BENIGN BREAST TISSUE WITH DUCT HYPERPLASIA WITHOUT ATYPIA, ADENOSIS, AND RADIAL                   SCLEROSING LESION (2 MM).          SCAR AND CHANGES OF PRIOR BIOPSY.     COMMENT: I do not detect atypia. The margin is free of radial sclerosing lesion.     K/  IHC/TDJ     Pathology from in office core biopsy 9-9-20 returned as :  Final Diagnosis   1. Lymph Node, Left Low Axillary Tail, Core Biopsy:               A. Core-like fragments of reactive appearing lymph node.               B. No morphologic evidence of lymphoma or carcinoma.     2. Lymph Node, Left Low Axillary Tail, Core Biopsy (Gross Diagnosis Only):               A. Core-like tissue submitted to [facility] for Flow Cytometric Analysis.                     Please see the Flow Cytometry Summary below.     [facility]       Flow cytometry from [facility] dated September 9, 2020 returned as immunophenotyping fails to reveal a monoclonal B cell or abnormal T-cell immunophenotype.  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  The " significance of this is uncertain.  Correlation with tissue morphology with further studies as indicated is advised.     Pathology from an office biopsy May 18, 2023 returned as invasive lobular carcinoma, intermediate grade, 3, 2, 1, largest focus 1.2 cm.  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  No lymphovascular invasion.  Estrogen , progesterone 21-30, HER2/tommy 1+, Ki-67 8%.         Final Diagnosis    1. Right Breast, 8 o'clock, 5 cm from Nipple, Ultrasound-Guided Biopsy for a Mass: INVASIVE LOBULAR CARCINOMA.               A. [place] histologic score II (tubules = 3, nuclei = 2, mitosis = 1).               B. Invasive carcinoma involves multiple tissue cores with the largest contiguous focus measuring 12 mm.               C. 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               D. No lymphovascular nor perineural invasion identified.                                swm/pkm      Electronically signed by Traci Burr MD on 5/20/2023 at 1142    Synoptic Checklist    Breast Biomarker Reporting Template  Protocol posted: 3/22/2023  BREAST: BIOMARKER REPORTING TEMPLATE - All Specimens  Test(s) Performed       Estrogen Receptor (ER) Status   Positive (greater than 10% of cells demonstrate nuclear positivity)    Percentage of Cells with Nuclear Positivity   %    Average Intensity of Staining   Strong    Test Type   Food and Drug Administration (FDA) cleared (test / vendor): ventana    Primary Antibody   SP1    Scoring System   Jayant    Proportion Score   5    Intensity Score   3    Total Jayant Score   8    Test(s) Performed       Progesterone Receptor (PgR) Status   Positive    Percentage of Cells with Nuclear Positivity   21-30%    Average Intensity of Staining   Weak    Test Type   Food and Drug Administration (FDA) cleared (test / vendor): ventana    Primary Antibody   1E2    Scoring System   Jayant     Proportion Score   3    Intensity Score   1    Total Jayant Score   4    Test(s) Performed       HER2 by Immunohistochemistry   Negative (Score 1+)    Test Type   Food and Drug Administration (FDA) cleared (test / vendor): ventana    Primary Antibody   4B5    Test(s) Performed   Ki-67    Ki-67 Percentage of Positive Nuclei   8 %   Primary Antibody   30-9    Cold Ischemia and Fixation Times   Meet requirements specified in latest version of the ASCO / CAP Guidelines    Cold Ischemia Time (minutes)   2 min   Fixation Time (hours)   7.5 hours   Testing Performed on Block Number(s)   1A    METHODS   Fixative   Formalin    Image Analysis   Not performed    .      Comment      Representative slides from this case are reviewed internally with Dr. Pena, who concurs.               8-2-23 Pathology from bracketed needle localized lumpectomy with oncoplastic reduction returned as : 9mm invasive lobular carcinoma, int grade, 3,2,1, LCIS and ALH present, no LVI, margins widely clear. 0/3 nodes.   Path stage T1bN0- IA                 Final Diagnosis   1. Right Breast, Oriented Needle Localization Lumpectomy (129 grams): INVASIVE LOBULAR CARCINOMA.  A. Tumor site: Lower outer quadrant.               B. Histologic grade: Sanam histologic score II (tubules = 3, nuclei = 2, mitosis = 1).               C. Tumor size: 9 x 9 x 7 mm.               D. No ductal carcinoma in-situ identified.               E. 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               F.  No lymphovascular invasion identified.               G. Margins: Uninvolved by invasive carcinoma.                            1. Invasive carcinoma is present 20 mm from the original anterior margin, 30 mm from the medial, lateral,        posterior and superior margins and 60 mm from the inferior margin of excision in specimen 1 (see        synoptic template for final margin status).               H. Lymph Nodes: Three  negative sentinel lymph nodes (see specimen 8 - 10).  I. Hormone receptor status: ER % positive, NV 21-30% positive, HER2 1+ negative, Ki-67 8%        (performed on prior biopsy [id number], slides reviewed).  J. Pathologic stage: pT1b, N(sn)0.     2. Right Breast, Additional Anterior Margin, Oriented Excision:               A. Benign breast tissue with usual ductal hyperplasia.               B. Final anterior margin free by an additional 10 mm.     3. Right Breast, Additional Superior Margin, Oriented Excision:               A. Benign breast tissue with adenosis.               B. Final superior margin free by an additional 15 mm.     4. Right Breast, Additional Lateral Margin, Oriented Excision:               A. Benign unremarkable breast tissue.               B. Final lateral margin free by an additional 13 mm.     5. Right Breast, Additional Inferior Margin, Oriented Excision:               A. Benign unremarkable breast tissue.               B. Final inferior margin free by an additional 20 mm.     6. Right Breast, Additional Medial Margin, Oriented Excision:               A. Benign unremarkable breast tissue.               B. Final medial margin free by an additional 14 mm.     7.   Right Breast, Additional Posterior Margin, Oriented Excision:               A. Benign unremarkable breast tissue.               B. Final posterior margin free by an additional 10 mm.     8. [place] Lymph Node #1, Right Axilla, Excision:               A. One lymph node, negative for metastatic carcinoma (0/1).     9. [place] Lymph Node #2, Right Axilla, Excision:               A. One lymph node, negative for metastatic carcinoma (0/1).     10. [place] Lymph Node #2, Right Axilla, Excision:               A. One lymph node, negative for metastatic carcinoma (0/1).     11. Left Breast, Reduction Mammoplasty (211 grams):               A. Benign skin and breast tissue with sclerosing adenosis.               B. No atypical  hyperplasia, in-situ nor invasive carcinoma identified.     12. Right Breast, Reduction Mammoplasty (209.6 grams):               A. Benign unremarkable skin and breast tissue.                 swm/pkm     Electronically signed by Traci Burr MD on 8/4/2023 at 1205   Synoptic Checklist   INVASIVE CARCINOMA OF THE BREAST: Resection   8th Edition - Protocol posted: 3/22/2023INVASIVE CARCINOMA OF THE BREAST: COMPLETE EXCISION - All Specimens         SPECIMEN   Procedure   Excision (less than total mastectomy)   Specimen Laterality   Right   TUMOR   Tumor Site   Lower outer quadrant   Histologic Type   Invasive lobular carcinoma   Histologic Grade (Sanam Histologic Score)       Glandular (Acinar) / Tubular Differentiation   Score 3   Nuclear Pleomorphism   Score 2   Mitotic Rate   Score 1   Overall Grade   Grade 2 (scores of 6 or 7)   Tumor Size   Greatest dimension of largest invasive focus (Millimeters): 9 mm   Additional Dimension (Millimeters)   9 mm       7 mm   Tumor Focality   Single focus of invasive carcinoma   Ductal Carcinoma In Situ (DCIS)   Not identified   Lobular Carcinoma In Situ (LCIS)   Not identified   Lymphatic and / or Vascular Invasion   Not identified   Dermal Lymphatic and / or Vascular Invasion   Not identified   Microcalcifications   Present in non-neoplastic tissue   Treatment Effect in the Breast   No known presurgical therapy   MARGINS   Distance from Invasive Carcinoma to Anterior Margin   30 mm   Distance from Invasive Carcinoma to Posterior Margin   40 mm   Distance from Invasive Carcinoma to Superior Margin   45 mm   Distance from Invasive Carcinoma to Inferior Margin   80 mm   Distance from Invasive Carcinoma to Medial Margin   43 mm   Distance from Invasive Carcinoma to Lateral Margin   43 mm   REGIONAL LYMPH NODES   Regional Lymph Node Status   All regional lymph nodes negative for tumor   Total Number of Lymph Nodes Examined (sentinel and non-sentinel)   3   Number of  [place] Nodes Examined   3   pTNM CLASSIFICATION (AJCC 8th Edition)   Reporting of pT, pN, and (when applicable) pM categories is based on information available to the pathologist at the time the report is issued. As per the AJCC (Chapter 1, 8th Ed.) it is the managing physician’s responsibility to establish the final pathologic stage based upon all pertinent information, including but potentially not limited to this pathology report.   pT Category   pT1b   pN Category   pN0   N Suffix   (sn)   SPECIAL STUDIES           Estrogen Receptor (ER) Status   Positive (greater than 10% of cells demonstrate nuclear positivity)   Percentage of Cells with Nuclear Positivity   %           Progesterone Receptor (PgR) Status   Positive   Percentage of Cells with Nuclear Positivity   21-30%           HER2 (by immunohistochemistry)   Negative (Score 1+)           Ki-67 Percentage of Positive Nuclei   8 %   Testing Performed on Case Number   [id number]   .      Comment     A representative slide from part 1 of this case is reviewed internally with Dr. Pena, who concurs.                    Oncotype Dx score of 15 dated 8-21-23.                 Note from Dr. 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     The patient has no significant symptoms.  She has no palpable lymphadenopathy or splenomegaly.  Her white count was normal.  This certainly could be a reactive nisreen population.  We discussed potential additional evaluation with peripheral blood flow cytometry and T/Gel gene rearrangement study as well as potential CT scans.  The patient declines further evaluation at this time she is agreeable to a course of observation.  We will see her back in 3 months with a CBC and evaluate clinically.    [facility] breast and gynecology panel showed no mutations.  There were 2 VUS, (BARD1  c.586G>A And STK11 c.631 C>T)for which we will have her in touch with  genetics to discuss.  We will let her know no mutations.      Note from Dr. Feliciano August 22, 2023.  Patient is not a good candidate for aromatase inhibitor due to osteoporosis.  Patient declines tamoxifen due to risk of clot and endometrial cancer.  Patient is scheduled to see radiation oncology tomorrow.             Procedures:  Percutaneous ultrasound-guided vacuum-assisted core breast/axillary tail lymph node biopsy 9-8-20  Indication:  ultrasound-and mammo visible rounded node with thickened cortex  Location: LEFT axillary tail, lowaxilla  Consent:  The risks, benefits, and alternatives to the procedure were discussed with the patient, who understood and wished to proceed.  The risks described included, but were not limited to, bleeding, infection, pneumothorax, and inadequate sampling requiring either repeat percutaneous or open excisional biopsy.  Description of Procedure:   After the patient was positioned supine on the procedure table, I located the lesion using ultrasound.  I prepped and draped the breast/axilla skin in sterile fashion.  I anesthetized the breast skin at the site of anticipated mammotomy with 1% lidocaine with epinephrine.  I then anesthetized the underlying subcutaneous tissue and breast parenchyma surrounding the lesion with 1% lidocaine with epinephrine under ultrasound visualization and guidance. I then made a nicking incision with an 11blade and inserted the 14 G celero biopsy device from inferolateral to superomedial through the lesion under ultrasound guidance.   I then took 4 core samples  of the lesion under direct visualization with ultrasound.   I placed 2 of these cores in formalin and 2 of them in a cup for fresh and sent to pathology.  I withdrew the probe and placed a hydromark marker into the residual rounded node..  We held manual compression for 10 minutes, placed steri -strips at the mammotomy site, and wrapped the patient in a 6 inch super ace wrap and a cold  pack.  Marker placed: hydromark  Tolerance: The patient tolerated the procedure well.  Disposition: We will see her back within a week to review her pathology.      Percutaneous ultrasound-guided vacuum-assisted excisional breast biopsy 5-18-23  Indication:  ultrasound-visible breast mass with mammographic and palpable correlate, BR4  Location: RIGHT 8:00 5 CFN  Consent:  The risks, benefits, and alternatives to the procedure were discussed with the patient, who understood and wished to proceed.  The risks described included, but were not limited to, bleeding, infection, pneumothorax, and inadequate sampling requiring either repeat percutaneous or open excisional biopsy.  Description of Procedure:   After the patient was positioned supine on the procedure table, I located the lesion using ultrasound.  I prepped and draped the breast skin in sterile fashion.  I anesthetized the breast skin at the site of anticipated mammotomy with 1% lidocaine with epinephrine.  I then anesthetized the underlying subcutaneous tissue and breast parenchyma surrounding the lesion with 1% lidocaine with epinephrine under ultrasound visualization and guidance. I then made a nicking incision with an 11blade and inserted the 10G encore biopsy device from inferolateral to superomedial under the lesion under ultrasound guidance.   I then took 10 sequential core samples using the automated sampling of the encore device, until the lesion mostly disappeared on ultrasound. I removed the probe, then placed a hydromark marker, using a stiff introducer, into the biopsy site. We held manual compression for 10 minutes, placed steri-strips at the mammotomy site, and wrapped the patient in a 6 inch super ace wrap with an ice-pack.  Marker placed: hydromark  Tolerance: The patient tolerated the procedure well.  Disposition: We will see her back within a week to review her pathology.      Assessment:   Diagnosis Plan   1. Lobular breast cancer, right   "Mammo Screening Digital Tomosynthesis Bilateral With CAD      2. Radial scar of left breast  Mammo Screening Digital Tomosynthesis Bilateral With CAD      3. Breast calcifications on mammogram        4. Enlarged lymph nodes        5. FH: breast cancer        6. Fat necrosis of right breast        7. Encounter for screening mammogram for malignant neoplasm of breast  Mammo Screening Digital Tomosynthesis Bilateral With CAD          1-2  RIGHT 8:00, 5 CFN- 1.75 cm on exam, distortion on mammogram, 9x8mm on ultrasound 5-2023  Target for ultrasound guided biopsy today  BR4    Pathology from an office biopsy May 18, 2023 returned as invasive lobular carcinoma, intermediate grade, 3, 2, 1, largest focus 1.2 cm.  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  No lymphovascular invasion.  Estrogen , progesterone 21-30, HER2/tommy 1+, Ki-67 8%.    Reviewed Mrs. Bautista"s  MRI and postbiopsy mammogram together with Dr. Tariq.     The lobular histology makes her MRI interpretation fairly difficult especially with the postbiopsy cavity in the center of the affected area.  When combining the 2 imaging studies there does appear to be a significant 8 cm clip displacement posteriorly.  The mammographic density prebiopsy appears to be the best representation of size estimation per Dr. Tariq, approximately 4.6 cm.  The lesion appears debulked on the postbiopsy mammogram and there are blood products and air in the center of the enhancement on the MRI however the clip is situated 8 cm posterior to this.    Clinical stage T2N0- IIA    8-2-23 Pathology from bracketed needle localized lumpectomy with oncoplastic reduction returned as : 9mm invasive lobular carcinoma, int grade, 3,2,1, LCIS and ALH present, no LVI, margins widely clear. 0/3 nodes.   Path stage T1bN0- IA      Dr Feliciano- oncotype 8-21-23- 15- no chemotherapy, no  given due to risk of osteoporosis  Dr Ware- no radiation needed      3-    LEFt " breast central RA- 1.5 cm cluster- asymptomatic- stereotactic biopsy August 21, 2018 lateral to the lower outer quadrant left breast returned as   8-21-18- left breast stereotactic biopsy:  5:00: Small radial sclerosing lesion with usual hyperplasia, separate foci of usual hyperplasia, columnar cell change without atypia: Focal stromal fibrosis, apically metaplasia, and fibroadenomatoid change.   Concordant per Dr Tariq.    Hydromark in vicinity of pre biopsy calcifications.    Pathology from  10-24-18 excision radial scar returned as radial scar and no atypia.  Fu mammogram 7-2019 BR1      4-  LEFT UOQ axillary tail/low axilla-0.1 cm rounded lymph node with thickened cortex seen on August 2020 mammogram and ultrasound BI-RADS 4.  Target for biopsy 9-9-20    Pathology from in office core biopsy 9-9-20 returned as :  Final Diagnosis   1. Lymph Node, Left Low Axillary Tail, Core Biopsy:               A. Core-like fragments of reactive appearing lymph node.               B. No morphologic evidence of lymphoma or carcinoma.     2. Lymph Node, Left Low Axillary Tail, Core Biopsy (Gross Diagnosis Only):               A. Core-like tissue submitted to [facility] for Flow Cytometric Analysis.                     Please see the Flow Cytometry Summary below.     [facility]       Flow cytometry from [facility] dated September 9, 2020 returned as immunophenotyping fails to reveal a monoclonal B cell or abnormal T-cell immunophenotype.  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  The significance of this is uncertain.  Correlation with tissue morphology with further studies as indicated is advised.     -Addendum: Reviewed with Dr. Feliciano and he thinks that it would be best for him to just evaluate her in the clinic.     5-  Paternal 1/2 sister in her 70s  Paternal cousin in her 50s      [facility] breast and gynecology panel showed no mutations.  There were 2 VUS, (BARD1  c.586G>A And  STK11 c.631 C>T)  Has spoken with genetics about the VUS's    6-  RIGHT fat necrosis on mammogram UOQ    Plan:  Mrs. Mckeon is here for her 8 month Fu today.  We reviewed her imaging, examination and interval history with consultants.  There is no evidence of disease.  At this point we will follow her once annually for approximately 5 years after her imaging.  Her next routine mammogram is due March 26, 2025 at [facility].  We will arrange this and she will see Elsy back after.    I saluted her on her weight loss and increase in activity.  We discussed that maintaining healthy body weight can reduce her estrogen levels and reduce her breast cancer recurrence risk.      Shayna Blount MD     Next Appointment:  Return for Next scheduled follow up, after imaging, with Luis Miguel Harris.      Today I spent 20 minutes doing the following: Reviewing records, labs, outside imaging and reports in preparation for the patient visit; obtaining medical history; performing the physical exam; counseling and educating the patient and any available family or caregivers; ordering medications, tests or procedures; coordinating care with any other physicians on her care team as needed, and documenting all of the above in the medical record as well as sending communications with her other healthcare professionals.    EMR Dragon/transcription disclaimer:    Much of this encounter note is an electronic transcription/translocation of spoken language to printed text.  The electronic translation of spoken language may permit erroneous, or at times, nonsensical words or phrases to be inadvertently transcribed.  Although I have reviewed the note from such areas, some may still exist.

## 2024-04-19 ENCOUNTER — OFFICE VISIT (OUTPATIENT)
Dept: SURGERY | Facility: CLINIC | Age: 84
End: 2024-04-19
Payer: MEDICARE

## 2024-04-19 DIAGNOSIS — Z80.3 FH: BREAST CANCER: ICD-10-CM

## 2024-04-19 DIAGNOSIS — R59.9 ENLARGED LYMPH NODES: ICD-10-CM

## 2024-04-19 DIAGNOSIS — Z12.31 ENCOUNTER FOR SCREENING MAMMOGRAM FOR MALIGNANT NEOPLASM OF BREAST: ICD-10-CM

## 2024-04-19 DIAGNOSIS — R92.1 BREAST CALCIFICATIONS ON MAMMOGRAM: ICD-10-CM

## 2024-04-19 DIAGNOSIS — N64.1 FAT NECROSIS OF RIGHT BREAST: ICD-10-CM

## 2024-04-19 DIAGNOSIS — C50.911 LOBULAR BREAST CANCER, RIGHT: Primary | ICD-10-CM

## 2024-04-19 DIAGNOSIS — N64.89 RADIAL SCAR OF LEFT BREAST: ICD-10-CM

## 2024-05-10 ENCOUNTER — CLINICAL SUPPORT (OUTPATIENT)
Dept: INTERNAL MEDICINE | Facility: CLINIC | Age: 84
End: 2024-05-10
Payer: MEDICARE

## 2024-05-10 DIAGNOSIS — M81.6 LOCALIZED OSTEOPOROSIS, UNSPECIFIED PATHOLOGICAL FRACTURE PRESENCE: Primary | ICD-10-CM

## 2024-05-10 PROCEDURE — 96372 THER/PROPH/DIAG INJ SC/IM: CPT | Performed by: INTERNAL MEDICINE

## 2024-05-13 DIAGNOSIS — I10 PRIMARY HYPERTENSION: ICD-10-CM

## 2024-05-13 DIAGNOSIS — E78.5 HYPERLIPIDEMIA, UNSPECIFIED HYPERLIPIDEMIA TYPE: ICD-10-CM

## 2024-05-13 DIAGNOSIS — E11.9 CONTROLLED TYPE 2 DIABETES MELLITUS WITHOUT COMPLICATION, WITHOUT LONG-TERM CURRENT USE OF INSULIN: ICD-10-CM

## 2024-05-15 LAB
ALBUMIN SERPL-MCNC: 4.5 G/DL (ref 3.5–5.2)
ALBUMIN/GLOB SERPL: 1.8 G/DL
ALP SERPL-CCNC: 95 U/L (ref 39–117)
ALT SERPL-CCNC: 14 U/L (ref 1–33)
AST SERPL-CCNC: 21 U/L (ref 1–32)
BASOPHILS # BLD AUTO: 0.03 10*3/MM3 (ref 0–0.2)
BASOPHILS NFR BLD AUTO: 0.5 % (ref 0–1.5)
BILIRUB SERPL-MCNC: 0.6 MG/DL (ref 0–1.2)
BUN SERPL-MCNC: 10 MG/DL (ref 8–23)
BUN/CREAT SERPL: 15.9 (ref 7–25)
CALCIUM SERPL-MCNC: 8.9 MG/DL (ref 8.6–10.5)
CHLORIDE SERPL-SCNC: 100 MMOL/L (ref 98–107)
CHOLEST SERPL-MCNC: 148 MG/DL (ref 0–200)
CO2 SERPL-SCNC: 26.9 MMOL/L (ref 22–29)
CREAT SERPL-MCNC: 0.63 MG/DL (ref 0.57–1)
EGFRCR SERPLBLD CKD-EPI 2021: 87.1 ML/MIN/1.73
EOSINOPHIL # BLD AUTO: 0.15 10*3/MM3 (ref 0–0.4)
EOSINOPHIL NFR BLD AUTO: 2.6 % (ref 0.3–6.2)
ERYTHROCYTE [DISTWIDTH] IN BLOOD BY AUTOMATED COUNT: 12.9 % (ref 12.3–15.4)
GLOBULIN SER CALC-MCNC: 2.5 GM/DL
GLUCOSE SERPL-MCNC: 116 MG/DL (ref 65–99)
HBA1C MFR BLD: 6.5 % (ref 4.8–5.6)
HCT VFR BLD AUTO: 41.6 % (ref 34–46.6)
HDLC SERPL-MCNC: 55 MG/DL (ref 40–60)
HGB BLD-MCNC: 13.3 G/DL (ref 12–15.9)
IMM GRANULOCYTES # BLD AUTO: 0.02 10*3/MM3 (ref 0–0.05)
IMM GRANULOCYTES NFR BLD AUTO: 0.3 % (ref 0–0.5)
LDLC SERPL CALC-MCNC: 72 MG/DL (ref 0–100)
LDLC/HDLC SERPL: 1.27 {RATIO}
LYMPHOCYTES # BLD AUTO: 1.28 10*3/MM3 (ref 0.7–3.1)
LYMPHOCYTES NFR BLD AUTO: 22.4 % (ref 19.6–45.3)
MCH RBC QN AUTO: 29 PG (ref 26.6–33)
MCHC RBC AUTO-ENTMCNC: 32 G/DL (ref 31.5–35.7)
MCV RBC AUTO: 90.6 FL (ref 79–97)
MONOCYTES # BLD AUTO: 0.5 10*3/MM3 (ref 0.1–0.9)
MONOCYTES NFR BLD AUTO: 8.7 % (ref 5–12)
NEUTROPHILS # BLD AUTO: 3.74 10*3/MM3 (ref 1.7–7)
NEUTROPHILS NFR BLD AUTO: 65.5 % (ref 42.7–76)
NRBC BLD AUTO-RTO: 0 /100 WBC (ref 0–0.2)
PLATELET # BLD AUTO: 242 10*3/MM3 (ref 140–450)
POTASSIUM SERPL-SCNC: 4.5 MMOL/L (ref 3.5–5.2)
PROT SERPL-MCNC: 7 G/DL (ref 6–8.5)
RBC # BLD AUTO: 4.59 10*6/MM3 (ref 3.77–5.28)
SODIUM SERPL-SCNC: 139 MMOL/L (ref 136–145)
TRIGL SERPL-MCNC: 117 MG/DL (ref 0–150)
TSH SERPL DL<=0.005 MIU/L-ACNC: 2.13 UIU/ML (ref 0.27–4.2)
VLDLC SERPL CALC-MCNC: 21 MG/DL (ref 5–40)
WBC # BLD AUTO: 5.72 10*3/MM3 (ref 3.4–10.8)

## 2024-05-21 ENCOUNTER — OFFICE VISIT (OUTPATIENT)
Dept: INTERNAL MEDICINE | Facility: CLINIC | Age: 84
End: 2024-05-21
Payer: MEDICARE

## 2024-05-21 ENCOUNTER — HOSPITAL ENCOUNTER (OUTPATIENT)
Dept: GENERAL RADIOLOGY | Facility: HOSPITAL | Age: 84
Discharge: HOME | End: 2024-05-21
Admitting: INTERNAL MEDICINE
Payer: MEDICARE

## 2024-05-21 VITALS
WEIGHT: 170.4 LBS | BODY MASS INDEX: 29.09 KG/M2 | DIASTOLIC BLOOD PRESSURE: 70 MMHG | OXYGEN SATURATION: 97 % | TEMPERATURE: 98.6 F | HEART RATE: 65 BPM | SYSTOLIC BLOOD PRESSURE: 132 MMHG | HEIGHT: 64 IN

## 2024-05-21 DIAGNOSIS — E11.9 CONTROLLED TYPE 2 DIABETES MELLITUS WITHOUT COMPLICATION, WITHOUT LONG-TERM CURRENT USE OF INSULIN: ICD-10-CM

## 2024-05-21 DIAGNOSIS — Z12.11 COLON CANCER SCREENING: ICD-10-CM

## 2024-05-21 DIAGNOSIS — M25.562 ACUTE PAIN OF LEFT KNEE: Primary | ICD-10-CM

## 2024-05-21 DIAGNOSIS — I10 PRIMARY HYPERTENSION: ICD-10-CM

## 2024-05-21 DIAGNOSIS — E78.5 HYPERLIPIDEMIA, UNSPECIFIED HYPERLIPIDEMIA TYPE: ICD-10-CM

## 2024-05-21 PROCEDURE — 1126F AMNT PAIN NOTED NONE PRSNT: CPT | Performed by: INTERNAL MEDICINE

## 2024-05-21 PROCEDURE — 73560 X-RAY EXAM OF KNEE 1 OR 2: CPT

## 2024-05-21 PROCEDURE — 99214 OFFICE O/P EST MOD 30 MIN: CPT | Performed by: INTERNAL MEDICINE

## 2024-05-21 PROCEDURE — 3075F SYST BP GE 130 - 139MM HG: CPT | Performed by: INTERNAL MEDICINE

## 2024-05-21 PROCEDURE — 3078F DIAST BP <80 MM HG: CPT | Performed by: INTERNAL MEDICINE

## 2024-05-21 NOTE — PROGRESS NOTES
Subjective   Shu Mckeon is a 83 y.o. female.   Fu on [place] and complaints of left knee pain    Hypertension    Hyperlipidemia    Diabetes       She has been having chronic left knee pain  she tried voltaren gel  SHe has had issues with this for months but increased swelling and pain for the past week.  She deneis any trauma  Pt has been taking BP meds as prescribed without any problems.  No HA  No episodes of orthostasis  Pt has been taking cholesterol meds as prescribed.  No difficulties with myalgias.     The following portions of the patient's history were reviewed and updated as appropriate: allergies, current medications, past family history, past medical history, past social history, past surgical history, and problem list.  Trouble walking    Review of Systems    Objective   Physical Exam  Vitals reviewed.   Constitutional:       Appearance: She is well-developed.   HENT:      Head: Normocephalic and atraumatic.      Right Ear: External ear normal.      Left Ear: External ear normal.   Eyes:      Conjunctiva/sclera: Conjunctivae normal.      Pupils: Pupils are equal, round, and reactive to light.   Neck:      Thyroid: No thyromegaly.      Trachea: No tracheal deviation.   Cardiovascular:      Rate and Rhythm: Normal rate and regular rhythm.      Heart sounds: Normal heart sounds.   Pulmonary:      Effort: Pulmonary effort is normal.      Breath sounds: Normal breath sounds.   Abdominal:      General: Bowel sounds are normal. There is no distension.      Palpations: Abdomen is soft.      Tenderness: There is no abdominal tenderness.   Musculoskeletal:         General: No deformity. Normal range of motion.      Cervical back: Normal range of motion.   Skin:     General: Skin is warm and dry.   Neurological:      Mental Status: She is alert and oriented to person, place, and time.   Psychiatric:         Behavior: Behavior normal.         Thought Content: Thought content normal.         Judgment: Judgment normal.          Vitals:    05/21/24 1149   BP: 132/70   Pulse: 65   Temp: 98.6 °F (37 °C)   SpO2: 97%     Body mass index is 29.45 kg/m².         Assessment & Plan   Diagnoses and all orders for this visit:    1. Acute pain of left knee (Primary)  -     XR Knee 1 or 2 View Left    2. Hyperlipidemia, unspecified hyperlipidemia type  -     Comprehensive Metabolic Panel; Future  -     Lipid Panel With / Chol / HDL Ratio; Future  -     Hemoglobin A1c; Future  -     CBC & Differential; Future    3. Primary hypertension  -     Comprehensive Metabolic Panel; Future  -     Hemoglobin A1c; Future  -     CBC & Differential; Future    4. Controlled type 2 diabetes mellitus without complication, without long-term current use of insulin  -     Comprehensive Metabolic Panel; Future  -     Hemoglobin A1c; Future  -     CBC & Differential; Future    5. Colon cancer screening  -     Lipid Panel With / Chol / HDL Ratio; Future       Left knee pain-  acute on chroninc-  we will check an xray today and likely refer to ortho  HPL- ok lipitor  HTN-  ok with current meds

## 2024-05-24 ENCOUNTER — TELEPHONE (OUTPATIENT)
Dept: INTERNAL MEDICINE | Facility: CLINIC | Age: 84
End: 2024-05-24

## 2024-05-24 DIAGNOSIS — M25.562 ACUTE PAIN OF LEFT KNEE: Primary | ICD-10-CM

## 2024-05-24 NOTE — TELEPHONE ENCOUNTER
Caller: Shu Mckeon    Relationship: Self    Best call back number: [phone]    Caller requesting test results: PATIENT    What test was performed: XRAY LEFT KNEE    When was the test performed: 5/21/24    Where was the test performed: [facility]    Additional notes: PATIENT REQUEST A CALL BACK WITH XRAY RESULTS PLEASE.

## 2024-06-11 ENCOUNTER — OFFICE VISIT (OUTPATIENT)
Dept: ORTHOPEDIC SURGERY | Facility: CLINIC | Age: 84
End: 2024-06-11
Payer: MEDICARE

## 2024-06-11 VITALS — TEMPERATURE: 96 F | BODY MASS INDEX: 29.19 KG/M2 | WEIGHT: 171 LBS | HEIGHT: 64 IN

## 2024-06-11 DIAGNOSIS — M17.12 PRIMARY OSTEOARTHRITIS OF LEFT KNEE: Primary | ICD-10-CM

## 2024-06-11 RX ORDER — METHYLPREDNISOLONE ACETATE 80 MG/ML
80 INJECTION, SUSPENSION INTRA-ARTICULAR; INTRALESIONAL; INTRAMUSCULAR; SOFT TISSUE
Status: COMPLETED | OUTPATIENT
Start: 2024-06-11 | End: 2024-06-11

## 2024-06-11 RX ADMIN — METHYLPREDNISOLONE ACETATE 80 MG: 80 INJECTION, SUSPENSION INTRA-ARTICULAR; INTRALESIONAL; INTRAMUSCULAR; SOFT TISSUE at 14:04

## 2024-06-11 NOTE — PROGRESS NOTES
Patient: Shu Mckeon  YOB: 1940 83 y.o. female  Medical Record Number: [id number]    Chief Complaints:   Chief Complaint   Patient presents with   • Left Knee - Pain, Initial Evaluation       History of Present Illness:Shu Mckeon is a 83 y.o. female who presents as a new patient both myself as well as to the practice since it has been several years since she was seen.  Dr. Henley replaced her right knee in 2009, no problems with her right knee, complaint today is catching and left knee pain.  The patient reports that started a couple of weeks ago, it unfortunately has progressively gotten worse, patient denies any injury.  She is tried over-the-counter medications with minimal improvement.    Allergies: No Known Allergies    Medications:   Current Outpatient Medications   Medication Sig Dispense Refill   • acetaminophen (TYLENOL) 650 MG 8 hr tablet Take 1 tablet by mouth Every 8 (Eight) Hours As Needed for Mild Pain.     • atorvastatin (LIPITOR) 40 MG tablet TAKE ONE TABLET BY MOUTH NIGHTLY 90 tablet 1   • Cholecalciferol (VITAMIN D3) 5000 UNITS tablet Take 1 tablet by mouth Daily.     • colestipol (COLESTID) 1 g tablet TAKE ONE TABLET BY MOUTH TWICE DAILY 60 tablet 5   • denosumab (Prolia) 60 MG/ML solution prefilled syringe syringe Inject 1 mL under the skin into the appropriate area as directed Every 6 (Six) Months. 1 mL 1   • loperamide (IMODIUM A-D) 2 MG tablet Take 1/2 tablet daily x10 days then increase to 1/2 tablet twice daily if still having diarrhea.  Decrease dose if constipation occurs 30 tablet 1   • losartan (COZAAR) 50 MG tablet TAKE ONE TABLET BY MOUTH DAILY 90 tablet 1   • metoprolol succinate XL (TOPROL-XL) 25 MG 24 hr tablet TAKE ONE TABLET BY MOUTH EVERY EVENING 90 tablet 1   • Multiple Vitamin (MULTIVITAMIN) capsule Take 1 capsule by mouth Daily. PT HOLDING FOR SURGERY     • nystatin (MYCOSTATIN) 610830 UNIT/GM cream Apply 1 application  topically to the appropriate area as  "directed 2 (Two) Times a Day. 30 g 0   • sertraline (ZOLOFT) 100 MG tablet TAKE ONE TABLET BY MOUTH DAILY 90 tablet 1     No current facility-administered medications for this visit.         The following portions of the patient's history were reviewed and updated as appropriate: allergies, current medications, past family history, past medical history, past social history, past surgical history and problem list.    Review of Systems:   14 point review of systems was performed. All systems negative except pertinent positives/negatives listed in HPI above    Physical Exam:   Vitals:    06/11/24 1318   Temp: 96 °F (35.6 °C)   TempSrc: Temporal   Weight: 77.6 kg (171 lb)   Height: 162.6 cm (64\")   PainSc:   7   PainLoc: Knee       General: A and O x 3, ASA, NAD   Skin clear no unusual lesions noted  Left knee the patient has trace amount of effusion noted with 116 degrees flexion neutral in extension positive Talisha negative Lockman calf soft and nontender       Radiology:  Xrays 3views (ap,lateral, sunrise) left knee that were done previously were reviewed patient has near bone-on-bone end-stage osteoarthritis of the medial and patellofemoral compartments    Assessment/Plan: Osteoarthritis left knee with increasing pain    Patient and I discussed options, she would like to proceed with left knee cortisone injection, physical therapy, prescription given for topical ketoprofen lidocaine cream to be used as needed and we will see her back for follow-up as needed    Large Joint Arthrocentesis: L knee  Date/Time: 6/11/2024 2:04 PM  Consent given by: patient  Site marked: site marked  Timeout: Immediately prior to procedure a time out was called to verify the correct patient, procedure, equipment, support staff and site/side marked as required   Supporting Documentation  Indications: pain and joint swelling   Procedure Details  Location: knee - L knee  Preparation: Patient was prepped and draped in the usual sterile " fashion  Needle size: 22 G  Approach: anterolateral  Medications administered: 80 mg methylPREDNISolone acetate 80 MG/ML; 2 mL lidocaine (cardiac)  Patient tolerance: patient tolerated the procedure well with no immediate complications           Alison Burt, APRN  6/11/2024

## 2024-06-13 ENCOUNTER — HOSPITAL ENCOUNTER (OUTPATIENT)
Dept: ULTRASOUND IMAGING | Facility: HOSPITAL | Age: 84
Discharge: HOME | End: 2024-06-13
Admitting: INTERNAL MEDICINE
Payer: MEDICARE

## 2024-06-13 DIAGNOSIS — E04.1 THYROID NODULE: ICD-10-CM

## 2024-06-13 PROCEDURE — 76536 US EXAM OF HEAD AND NECK: CPT

## 2024-06-19 ENCOUNTER — HOSPITAL ENCOUNTER (OUTPATIENT)
Dept: GENERAL RADIOLOGY | Facility: HOSPITAL | Age: 84
Discharge: HOME | End: 2024-06-19
Admitting: NURSE PRACTITIONER
Payer: MEDICARE

## 2024-06-19 ENCOUNTER — OFFICE VISIT (OUTPATIENT)
Dept: INTERNAL MEDICINE | Facility: CLINIC | Age: 84
End: 2024-06-19
Payer: MEDICARE

## 2024-06-19 VITALS
OXYGEN SATURATION: 96 % | SYSTOLIC BLOOD PRESSURE: 122 MMHG | HEIGHT: 64 IN | DIASTOLIC BLOOD PRESSURE: 74 MMHG | HEART RATE: 74 BPM | BODY MASS INDEX: 28.82 KG/M2 | TEMPERATURE: 98.5 F | WEIGHT: 168.8 LBS

## 2024-06-19 DIAGNOSIS — J22 ACUTE LOWER RESPIRATORY INFECTION: Primary | ICD-10-CM

## 2024-06-19 LAB
EXPIRATION DATE: NORMAL
FLUAV AG UPPER RESP QL IA.RAPID: NOT DETECTED
FLUBV AG UPPER RESP QL IA.RAPID: NOT DETECTED
INTERNAL CONTROL: NORMAL
Lab: NORMAL
SARS-COV-2 AG UPPER RESP QL IA.RAPID: NOT DETECTED

## 2024-06-19 PROCEDURE — 99213 OFFICE O/P EST LOW 20 MIN: CPT | Performed by: NURSE PRACTITIONER

## 2024-06-19 PROCEDURE — 1159F MED LIST DOCD IN RCRD: CPT | Performed by: NURSE PRACTITIONER

## 2024-06-19 PROCEDURE — 1125F AMNT PAIN NOTED PAIN PRSNT: CPT | Performed by: NURSE PRACTITIONER

## 2024-06-19 PROCEDURE — 1160F RVW MEDS BY RX/DR IN RCRD: CPT | Performed by: NURSE PRACTITIONER

## 2024-06-19 PROCEDURE — 3074F SYST BP LT 130 MM HG: CPT | Performed by: NURSE PRACTITIONER

## 2024-06-19 PROCEDURE — 87428 SARSCOV & INF VIR A&B AG IA: CPT | Performed by: NURSE PRACTITIONER

## 2024-06-19 PROCEDURE — 3078F DIAST BP <80 MM HG: CPT | Performed by: NURSE PRACTITIONER

## 2024-06-19 PROCEDURE — 71046 X-RAY EXAM CHEST 2 VIEWS: CPT

## 2024-06-19 RX ORDER — MULTIPLE VITAMINS W/ MINERALS TAB 9MG-400MCG
1 TAB ORAL DAILY
COMMUNITY

## 2024-06-19 RX ORDER — BENZONATATE 200 MG/1
200 CAPSULE ORAL 3 TIMES DAILY PRN
Qty: 21 CAPSULE | Refills: 0 | Status: SHIPPED | OUTPATIENT
Start: 2024-06-19

## 2024-06-19 RX ORDER — ALBUTEROL SULFATE 90 UG/1
2 AEROSOL, METERED RESPIRATORY (INHALATION) EVERY 4 HOURS PRN
Qty: 8 G | Refills: 0 | Status: SHIPPED | OUTPATIENT
Start: 2024-06-19

## 2024-06-19 RX ORDER — AMOXICILLIN AND CLAVULANATE POTASSIUM 875; 125 MG/1; MG/1
1 TABLET, FILM COATED ORAL 2 TIMES DAILY
Qty: 14 TABLET | Refills: 0 | Status: SHIPPED | OUTPATIENT
Start: 2024-06-19 | End: 2024-06-26

## 2024-06-19 NOTE — PROGRESS NOTES
Subjective   Shu Mckeon is a 83 y.o. female.     Chief Complaint   Patient presents with    Cough     Pt c/o cough, congestion, wheezing, body aches, ribs pain, chest pain from coughing, HA and sore throat X Saturday.    Wheezing        History of Present Illness   She is here today with complaints of cough, congestion and wheezing.  She notes symptom onset on Saturday with dry cough. She notes worsening cough on Sunday with production of dark green to brown sputum on Monday. She notes a sore throat postnasal drainage along with sinus pain and pressure. She notes subjective fever and malaise. She notes wheezing today. Her chest and ribs hurt from coughing.  She denies any shortness of breath.  She notes cough is worse at bedtime.  She has been taking Zyrtec and mucinex with minimal improvement.   She denies any sick contacts.  Symptoms are becoming worse.   She denies any recent antibiotic use or history of pneumonia.    The following portions of the patient's history were reviewed and updated as appropriate: allergies, current medications, past family history, past medical history, past social history, past surgical history, and problem list.    Review of Systems   Constitutional:  Positive for fatigue and fever. Negative for chills.   HENT:  Positive for congestion, postnasal drip, sinus pressure and sore throat. Negative for ear pain and rhinorrhea.    Respiratory:  Positive for cough and wheezing. Negative for chest tightness and shortness of breath.    Cardiovascular:  Positive for chest pain (with breathing and coughing). Negative for palpitations and leg swelling.   Musculoskeletal:  Positive for myalgias.   Neurological:  Positive for headache.       Objective   Physical Exam  Constitutional:       General: She is awake.      Appearance: She is well-developed. She is ill-appearing.   HENT:      Head: Normocephalic and atraumatic.      Right Ear: Hearing, ear canal and external ear normal. Tympanic  membrane is bulging.      Left Ear: Hearing, ear canal and external ear normal. Tympanic membrane is bulging.      Nose: Congestion present.      Right Turbinates: Not enlarged.      Left Turbinates: Not enlarged.      Right Sinus: Frontal sinus tenderness present. No maxillary sinus tenderness.      Left Sinus: Frontal sinus tenderness present. No maxillary sinus tenderness.      Mouth/Throat:      Lips: Pink.      Mouth: Mucous membranes are moist. No injury or oral lesions.      Dentition: Normal dentition.      Tongue: No lesions. Tongue does not deviate from midline.      Palate: No mass and lesions.      Pharynx: Uvula midline. No pharyngeal swelling, oropharyngeal exudate, posterior oropharyngeal erythema or uvula swelling.      Comments: Posterior pharynx with clear drainage.  Neck:      Thyroid: No thyroid mass, thyromegaly or thyroid tenderness.      Vascular: No carotid bruit.      Trachea: Trachea normal.   Cardiovascular:      Rate and Rhythm: Normal rate and regular rhythm.      Chest Wall: PMI is not displaced.      Pulses:           Radial pulses are 2+ on the right side and 2+ on the left side.        Dorsalis pedis pulses are 2+ on the right side and 2+ on the left side.        Posterior tibial pulses are 2+ on the right side and 2+ on the left side.      Heart sounds: S1 normal and S2 normal.   Pulmonary:      Effort: Pulmonary effort is normal.      Breath sounds: Examination of the right-upper field reveals wheezing and rhonchi. Examination of the left-upper field reveals wheezing and rhonchi. Examination of the right-middle field reveals wheezing, rhonchi and rales. Examination of the right-lower field reveals rhonchi and rales. Wheezing, rhonchi and rales present. No decreased breath sounds.      Comments: Intermittent cough with deep breathing.  Musculoskeletal:      Right lower leg: No edema.      Left lower leg: No edema.   Lymphadenopathy:      Head:      Right side of head: No submental,  submandibular, tonsillar or occipital adenopathy.      Left side of head: No submental, submandibular, tonsillar or occipital adenopathy.      Cervical: No cervical adenopathy.   Skin:     General: Skin is warm and dry.      Capillary Refill: Capillary refill takes less than 2 seconds.      Nails: There is no clubbing.   Neurological:      Mental Status: She is alert and oriented to person, place, and time.   Psychiatric:         Attention and Perception: Attention normal.         Mood and Affect: Mood and affect normal.         Speech: Speech normal.         Behavior: Behavior normal. Behavior is cooperative.         Thought Content: Thought content normal.         Cognition and Memory: Cognition normal.         Vitals:    06/19/24 1257   BP: 122/74   Pulse: 74   Temp: 98.5 °F (36.9 °C)   SpO2: 96%      Body mass index is 28.97 kg/m².    Assessment & Plan   Problems Addressed this Visit    None  Visit Diagnoses       Acute lower respiratory infection    -  Primary    Relevant Medications    amoxicillin-clavulanate (AUGMENTIN) 875-125 MG per tablet    albuterol sulfate  (90 Base) MCG/ACT inhaler    benzonatate (TESSALON) 200 MG capsule    Other Relevant Orders    XR Chest PA & Lateral          Diagnoses         Codes Comments    Acute lower respiratory infection    -  Primary ICD-10-CM: J22  ICD-9-CM: 519.8           1.  Acute lower respiratory infection-concern for pneumonia.  Check chest x-ray today.  Rapid COVID and flu test negative today in office.  Will go ahead and start Augmentin twice daily for 7 days.  Take antibiotic with food, separate from vitamins and supplements.  Prescription sent for Tessalon 200 mg 3 times daily as needed for cough and albuterol inhaler.  Patient educated on appropriate use and side effects with albuterol.  Recommend increasing fluid intake.  Okay to continue plain Mucinex and recommend starting Flonase nasal steroid spray back.  Will call pending chest x-ray results.   Notify for worsening symptoms.

## 2024-08-06 RX ORDER — SERTRALINE HYDROCHLORIDE 100 MG/1
100 TABLET, FILM COATED ORAL DAILY
Qty: 90 TABLET | Refills: 1 | Status: SHIPPED | OUTPATIENT
Start: 2024-08-06

## 2024-08-21 ENCOUNTER — HOSPITAL ENCOUNTER (OUTPATIENT)
Dept: PHYSICAL THERAPY | Facility: HOSPITAL | Age: 84
Discharge: HOME | End: 2024-08-21
Admitting: SURGERY
Payer: MEDICARE

## 2024-08-21 DIAGNOSIS — C50.911 LOBULAR BREAST CANCER, RIGHT: ICD-10-CM

## 2024-08-21 DIAGNOSIS — Z91.89 AT RISK FOR LYMPHEDEMA: Primary | ICD-10-CM

## 2024-08-21 DIAGNOSIS — Z98.890 S/P LUMPECTOMY, RIGHT BREAST: ICD-10-CM

## 2024-08-21 PROCEDURE — 93702 BIS XTRACELL FLUID ANALYSIS: CPT

## 2024-08-21 PROCEDURE — 97535 SELF CARE MNGMENT TRAINING: CPT

## 2024-08-21 NOTE — THERAPY RE-EVALUATION
"  Physical Therapy Lymphedema Re-Evaluation  [facility]     Patient Name: Shu Mckeon  : 1940  MRN: [id number]  Today's Date: 2024      Visit Date: 2024    Visit Dx:    ICD-10-CM ICD-9-CM   1. At risk for lymphedema  Z91.89 V49.89   2. Lobular breast cancer, right  C50.911 174.9   3. S/P lumpectomy, right breast  Z98.890 V45.89       Patient Active Problem List   Diagnosis    Ankle fracture    Diabetes type 2, controlled    Fatigue    HLD (hyperlipidemia)    BP (high blood pressure)    OP (osteoporosis)    Closed fracture of right pelvis    Low back pain    Lumbar spondylolysis    Radial scar of breast    Reactive lymphadenopathy    Calculus of gallbladder with acute cholecystitis without obstruction    Encounter for screening mammogram for breast cancer    Spondylosis of lumbar region without myelopathy or radiculopathy    Personal history of colonic polyps    Diarrhea    Change in bowel habits    Lobular breast cancer, right    Lumbar facet arthropathy        Past Medical History:   Diagnosis Date    Arthritis     Breast cancer     right breast    Chronic low back pain     Depression     HISTORY OF BEING \"EMOTIONAL\"    Encounter for screening mammogram for breast cancer 2021    Fatigue     GERD (gastroesophageal reflux disease)     H/O cardiovascular stress test 2018    NORMAL, DR ARIAS    History of diverticulitis     Hyperlipidemia     Hypertension     Impaired fasting glucose     Left bundle branch block     Macular degeneration     EVITA    Osteoporosis     Radial scar of breast     LEFT    Status post ORIF of fracture of ankle     Thyroid nodule     Urinary frequency         Past Surgical History:   Procedure Laterality Date    BREAST BIOPSY Left 10/25/2018    Procedure: left breast ultrasound-guided excision of radial scar.;  Surgeon: Shayna Blount MD;  Location: [facility] OR [facility];  Service: General    BREAST LUMPECTOMY WITH SENTINEL NODE BIOPSY Right 2023    " Procedure: RIGHT breast bracketed needle localized lumpectomy AND RIGHT axilla sentinel node biopsy;  Surgeon: Shayna Blount MD;  Location: [facility] OR;  Service: General;  Laterality: Right;    BREAST SURGERY Bilateral 08/02/2023    Procedure: RIGHT BREAST ONCOPLASTIC REDUCTION AND LEFT BREAST REDUCTION FOR SYMMETRY;  Surgeon: Greta Romero MD;  Location: [facility] MAIN OR;  Service: Plastics;  Laterality: Bilateral;    CARDIAC CATHETERIZATION      not sure when or where    CARDIAC CATHETERIZATION      CATARACT EXTRACTION WITH INTRAOCULAR LENS IMPLANT      CHOLECYSTECTOMY WITH INTRAOPERATIVE CHOLANGIOGRAM N/A 10/31/2020    Procedure: CHOLECYSTECTOMY LAPAROSCOPIC INTRAOPERATIVE CHOLANGIOGRAM;  Surgeon: Ruth Del Valle MD;  Location: [facility] OR;  Service: General;  Laterality: N/A;    COLONOSCOPY      COLONOSCOPY N/A 10/5/2023    Procedure: COLONOSCOPY  INTO TERMINAL ILEUM WITH BX AND POLYPECTOMY;  Surgeon: James Townsend MD;  Location: [facility] ENDOSCOPY;  Service: Gastroenterology;  Laterality: N/A;  PREOP/ DIARRHEA, HX POLYPS- POSTOP/ DIVERTICULOSIS, POLYP, HEMORRHOIDS    FEMUR FRACTURE SURGERY Right     FEMUR    FRACTURE SURGERY  2015    LT ANKLE    MEDIAL BRANCH BLOCK Right 05/20/2022    Procedure: LUMBAR MEDIAL BRANCH BLOCK Right L4-S1;  Surgeon: Pamela Spaulding MD;  Location: [facility] MAIN OR;  Service: Pain Management;  Laterality: Right;    REPLACEMENT TOTAL KNEE Right        Visit Dx:    ICD-10-CM ICD-9-CM   1. At risk for lymphedema  Z91.89 V49.89   2. Lobular breast cancer, right  C50.911 174.9   3. S/P lumpectomy, right breast  Z98.890 V45.89            Lymphedema       Row Name 08/21/24 1300             Subjective Pain    Able to rate subjective pain? yes  -LB      Pre-Treatment Pain Level 0  -LB         Subjective    Subjective Comments I am doing ok. My  passed away Saturday. I haven't noticed any swelling.  -LB         Lymphedema Assessment    Lymphedema Classification RUE:;at  risk/stage 0  -LB      Lymphedema Cancer Related Sx right;lumpectomy;sentinel node biopsy  -LB      Lymphedema Surgery Comments 8/2/23  -LB      Lymph Nodes Removed # 3  -LB      Positive Lymph Nodes # 0  -LB      Chemo Received no  -LB      Radiation Therapy Received no  -LB      Infections or Cellulitis? no  -LB         Posture/Observations    Posture/Observations Comments forward head, rounded shoulders  -LB         L-Dex Bioimpedence Screening    L-Dex Measurement Extremity RUE  -LB      L-Dex Patient Position Standing  -LB      L-Dex UE Dominate Side Right  -LB      L-Dex UE At Risk Side Right  -LB      L-Dex UE Pre Surgical Value No  -LB      L-Dex UE Score -2.3  -LB      L-Dex UE Baseline Score 4  -LB      L-Dex UE Value Change -6.3  -LB      L-Dex UE Comment WNL  -LB      $ L-Dex Charge yes  -LB                User Key  (r) = Recorded By, (t) = Taken By, (c) = Cosigned By      Initials Name Provider Type    Mary Jo Ellison, SARAH Physical Therapist                                    Therapy Education  Education Details: reviewed s/s of lymphedema, steps to prevention, bioimpedance results and interpretation, HEP for R shoulder pain  Given: Symptoms/condition management, Edema management  Program: Reinforced  How Provided: Verbal  Provided to: Patient  Level of Understanding: Verbalized  35092 - PT Self Care/Mgmt Minutes: 10       OP Exercises       Row Name 08/21/24 1300             Subjective    Subjective Comments I am doing ok. My  passed away Saturday. I haven't noticed any swelling.  -LB         Subjective Pain    Able to rate subjective pain? yes  -LB      Pre-Treatment Pain Level 0  -LB         Exercise 1    Exercise Name 1 shoulder rolls  -LB      Reps 1 10  -LB         Exercise 2    Exercise Name 2 scap retraction  -LB      Reps 2 10  -LB      Time 2 3  -LB         Exercise 3    Exercise Name 3 tband row/extension  -LB      Sets 3 2  -LB      Reps 3 10  -LB      Time 3 RTB  -LB                 User Key  (r) = Recorded By, (t) = Taken By, (c) = Cosigned By      Initials Name Provider Type    Mary Jo Ellison, PT Physical Therapist                                 PT OP Goals       Row Name 08/21/24 1300          Long Term Goals    LTG Date to Achieve 10/12/23  -LB     LTG 1 Pt will maintain LDex bioimpedance score WNL.  -LB     LTG 1 Progress Ongoing  -LB     LTG 1 Progress Comments WNL and stable at -2.3  -LB     LTG 2 Pt will verbalize s/s of lymphedema and steps to prevention.  -LB     LTG 2 Progress Ongoing  -LB     LTG 2 Progress Comments reviewed today  -LB     LTG 3 Pt will verbalize improvement in R breast pain to minimal.  -LB     LTG 3 Progress Met  -LB               User Key  (r) = Recorded By, (t) = Taken By, (c) = Cosigned By      Initials Name Provider Type    Mary Jo Ellison, PT Physical Therapist                     PT Assessment/Plan       Row Name 08/21/24 1338          PT Assessment    Functional Limitations Other (comment)  at risk for lymphedema  -LB     Impairments Impaired flexibility;Impaired lymphatic circulation;Pain;Range of motion;Sensation  -LB     Assessment Comments Pt returns for 3 month follow up reporting resolved R breast pain and intermittent RUE pain especially with sleeping. She demonstrates full AROM BUE and has had inc stress due to spouse passing 3 days ago. LDex bioimpedance score is stable and WNL at -2.3. She denies s/s of lymphedema and no obvious edema today. We reviewed s/s of lymphedema and discussed reasons to return to clinic prior to scheduled appt. She has met 1/3 LTGs and is making progress towards remaining goals. She remains appropriate for continued skilled PT services to monitor RUE and will return to clinic for reassessment in 6 months unless symptoms present sooner.  -LB     Rehab Potential Good  -LB     Patient/caregiver participated in establishment of treatment plan and goals Yes  -LB     Patient would benefit from skilled therapy intervention  Yes  -LB        PT Plan    PT Frequency Other (comment)  -LB     Predicted Duration of Therapy Intervention (PT) repeat bioimpedance in 6 months  -LB     Planned CPT's? PT EVAL LOW COMPLEXITY: 46738;PT RE-EVAL: 05355;PT THER PROC EA 15 MIN: 30119;PT THER ACT EA 15 MIN: 03170;PT MANUAL THERAPY EA 15 MIN: 54340;PT NEUROMUSC RE-EDUCATION EA 15 MIN: 85360;PT SELF CARE/HOME MGMT/TRAIN EA 15: 68816;PT BIS XTRACELL FLUID ANALYSIS: 77397  -LB     PT Plan Comments repeat bioimpedance in 6 months  -LB               User Key  (r) = Recorded By, (t) = Taken By, (c) = Cosigned By      Initials Name Provider Type    LB Mary Jo Lopez, PT Physical Therapist                                 Time Calculation:   Timed Charges  34040 - PT Self Care/Mgmt Minutes: 10  Total Minutes  Timed Charges Total Minutes: 10   Total Minutes: 10   Therapy Charges for Today       Code Description Service Date Service Provider Modifiers Qty    97214553507 HC PT BIS XTRACELL FLUID ANALYSIS 8/21/2024 Mary Jo Lopez, PT  1    24949957346 HC PT SELF CARE/MGMT/TRAIN EA 15 MIN 8/21/2024 Mary Jo Lopez, PT GP 1                      Mary Jo Lopez PT  8/21/2024

## 2024-09-23 ENCOUNTER — OFFICE VISIT (OUTPATIENT)
Dept: ONCOLOGY | Facility: CLINIC | Age: 84
End: 2024-09-23
Payer: MEDICARE

## 2024-09-23 ENCOUNTER — LAB (OUTPATIENT)
Dept: OTHER | Facility: HOSPITAL | Age: 84
End: 2024-09-23
Payer: MEDICARE

## 2024-09-23 VITALS
HEIGHT: 64 IN | HEART RATE: 73 BPM | BODY MASS INDEX: 29.71 KG/M2 | WEIGHT: 174 LBS | OXYGEN SATURATION: 97 % | DIASTOLIC BLOOD PRESSURE: 83 MMHG | RESPIRATION RATE: 16 BRPM | TEMPERATURE: 98 F | SYSTOLIC BLOOD PRESSURE: 141 MMHG

## 2024-09-23 DIAGNOSIS — M54.10 RADICULOPATHY AFFECTING UPPER EXTREMITY: ICD-10-CM

## 2024-09-23 DIAGNOSIS — C50.911 LOBULAR BREAST CANCER, RIGHT: ICD-10-CM

## 2024-09-23 DIAGNOSIS — C50.911 LOBULAR BREAST CANCER, RIGHT: Primary | ICD-10-CM

## 2024-09-23 LAB
ALBUMIN SERPL-MCNC: 4.1 G/DL (ref 3.5–5.2)
ALBUMIN/GLOB SERPL: 1.2 G/DL
ALP SERPL-CCNC: 83 U/L (ref 39–117)
ALT SERPL W P-5'-P-CCNC: 14 U/L (ref 1–33)
ANION GAP SERPL CALCULATED.3IONS-SCNC: 9.7 MMOL/L (ref 5–15)
AST SERPL-CCNC: 22 U/L (ref 1–32)
BASOPHILS # BLD AUTO: 0.04 10*3/MM3 (ref 0–0.2)
BASOPHILS NFR BLD AUTO: 0.6 % (ref 0–1.5)
BILIRUB SERPL-MCNC: 0.7 MG/DL (ref 0–1.2)
BUN SERPL-MCNC: 8 MG/DL (ref 8–23)
BUN/CREAT SERPL: 13.6 (ref 7–25)
CALCIUM SPEC-SCNC: 9.6 MG/DL (ref 8.6–10.5)
CHLORIDE SERPL-SCNC: 102 MMOL/L (ref 98–107)
CO2 SERPL-SCNC: 27.3 MMOL/L (ref 22–29)
CREAT SERPL-MCNC: 0.59 MG/DL (ref 0.57–1)
DEPRECATED RDW RBC AUTO: 47.4 FL (ref 37–54)
EGFRCR SERPLBLD CKD-EPI 2021: 87.9 ML/MIN/1.73
EOSINOPHIL # BLD AUTO: 0.15 10*3/MM3 (ref 0–0.4)
EOSINOPHIL NFR BLD AUTO: 2.2 % (ref 0.3–6.2)
ERYTHROCYTE [DISTWIDTH] IN BLOOD BY AUTOMATED COUNT: 14.3 % (ref 12.3–15.4)
GLOBULIN UR ELPH-MCNC: 3.5 GM/DL
GLUCOSE SERPL-MCNC: 125 MG/DL (ref 65–99)
HCT VFR BLD AUTO: 41.4 % (ref 34–46.6)
HGB BLD-MCNC: 13.2 G/DL (ref 12–15.9)
IMM GRANULOCYTES # BLD AUTO: 0.01 10*3/MM3 (ref 0–0.05)
IMM GRANULOCYTES NFR BLD AUTO: 0.1 % (ref 0–0.5)
LYMPHOCYTES # BLD AUTO: 2.01 10*3/MM3 (ref 0.7–3.1)
LYMPHOCYTES NFR BLD AUTO: 29.2 % (ref 19.6–45.3)
MCH RBC QN AUTO: 28.8 PG (ref 26.6–33)
MCHC RBC AUTO-ENTMCNC: 31.9 G/DL (ref 31.5–35.7)
MCV RBC AUTO: 90.2 FL (ref 79–97)
MONOCYTES # BLD AUTO: 0.62 10*3/MM3 (ref 0.1–0.9)
MONOCYTES NFR BLD AUTO: 9 % (ref 5–12)
NEUTROPHILS NFR BLD AUTO: 4.06 10*3/MM3 (ref 1.7–7)
NEUTROPHILS NFR BLD AUTO: 58.9 % (ref 42.7–76)
NRBC BLD AUTO-RTO: 0 /100 WBC (ref 0–0.2)
PLATELET # BLD AUTO: 239 10*3/MM3 (ref 140–450)
PMV BLD AUTO: 10.1 FL (ref 6–12)
POTASSIUM SERPL-SCNC: 4.4 MMOL/L (ref 3.5–5.2)
PROT SERPL-MCNC: 7.6 G/DL (ref 6–8.5)
RBC # BLD AUTO: 4.59 10*6/MM3 (ref 3.77–5.28)
SODIUM SERPL-SCNC: 139 MMOL/L (ref 136–145)
WBC NRBC COR # BLD AUTO: 6.89 10*3/MM3 (ref 3.4–10.8)

## 2024-09-23 PROCEDURE — 85025 COMPLETE CBC W/AUTO DIFF WBC: CPT | Performed by: INTERNAL MEDICINE

## 2024-09-23 PROCEDURE — 1125F AMNT PAIN NOTED PAIN PRSNT: CPT | Performed by: INTERNAL MEDICINE

## 2024-09-23 PROCEDURE — 1160F RVW MEDS BY RX/DR IN RCRD: CPT | Performed by: INTERNAL MEDICINE

## 2024-09-23 PROCEDURE — 36415 COLL VENOUS BLD VENIPUNCTURE: CPT

## 2024-09-23 PROCEDURE — 99214 OFFICE O/P EST MOD 30 MIN: CPT | Performed by: INTERNAL MEDICINE

## 2024-09-23 PROCEDURE — 1159F MED LIST DOCD IN RCRD: CPT | Performed by: INTERNAL MEDICINE

## 2024-09-23 PROCEDURE — 80053 COMPREHEN METABOLIC PANEL: CPT | Performed by: INTERNAL MEDICINE

## 2024-10-07 ENCOUNTER — TELEPHONE (OUTPATIENT)
Dept: SURGERY | Facility: CLINIC | Age: 84
End: 2024-10-07
Payer: MEDICARE

## 2024-10-07 NOTE — TELEPHONE ENCOUNTER
Lvm for pt to let her know that provider will not be in office on 03/31 so I moved her to 0.4/7 after her appt with dr echeverria at 120    I told her if that does not work to please give me a call back to rsch

## 2024-10-24 ENCOUNTER — HOSPITAL ENCOUNTER (OUTPATIENT)
Dept: MRI IMAGING | Facility: HOSPITAL | Age: 84
Discharge: HOME | End: 2024-10-24
Admitting: INTERNAL MEDICINE
Payer: MEDICARE

## 2024-10-24 DIAGNOSIS — M54.10 RADICULOPATHY AFFECTING UPPER EXTREMITY: ICD-10-CM

## 2024-10-24 PROCEDURE — 72141 MRI NECK SPINE W/O DYE: CPT

## 2024-10-28 DIAGNOSIS — M48.02 NEURAL FORAMINAL STENOSIS OF CERVICAL SPINE: Primary | ICD-10-CM

## 2024-10-29 RX ORDER — ATORVASTATIN CALCIUM 40 MG/1
40 TABLET, FILM COATED ORAL
Qty: 90 TABLET | Refills: 1 | Status: SHIPPED | OUTPATIENT
Start: 2024-10-29

## 2024-10-29 RX ORDER — LOSARTAN POTASSIUM 50 MG/1
50 TABLET ORAL DAILY
Qty: 90 TABLET | Refills: 1 | Status: SHIPPED | OUTPATIENT
Start: 2024-10-29

## 2024-10-29 RX ORDER — METOPROLOL SUCCINATE 25 MG/1
25 TABLET, EXTENDED RELEASE ORAL EVERY EVENING
Qty: 90 TABLET | Refills: 1 | Status: SHIPPED | OUTPATIENT
Start: 2024-10-29

## 2024-11-04 RX ORDER — DENOSUMAB 60 MG/ML
60 INJECTION SUBCUTANEOUS
Qty: 1 ML | Refills: 1 | Status: SHIPPED | OUTPATIENT
Start: 2024-11-04

## 2024-11-11 DIAGNOSIS — I10 PRIMARY HYPERTENSION: ICD-10-CM

## 2024-11-11 DIAGNOSIS — E11.9 CONTROLLED TYPE 2 DIABETES MELLITUS WITHOUT COMPLICATION, WITHOUT LONG-TERM CURRENT USE OF INSULIN: ICD-10-CM

## 2024-11-11 DIAGNOSIS — Z12.11 COLON CANCER SCREENING: ICD-10-CM

## 2024-11-11 DIAGNOSIS — E78.5 HYPERLIPIDEMIA, UNSPECIFIED HYPERLIPIDEMIA TYPE: ICD-10-CM

## 2024-11-13 LAB
ALBUMIN SERPL-MCNC: 4.3 G/DL (ref 3.5–5.2)
ALBUMIN/GLOB SERPL: 1.5 G/DL
ALP SERPL-CCNC: 86 U/L (ref 39–117)
ALT SERPL-CCNC: 16 U/L (ref 1–33)
AST SERPL-CCNC: 21 U/L (ref 1–32)
BASOPHILS # BLD AUTO: 0.04 10*3/MM3 (ref 0–0.2)
BASOPHILS NFR BLD AUTO: 0.6 % (ref 0–1.5)
BILIRUB SERPL-MCNC: 0.6 MG/DL (ref 0–1.2)
BUN SERPL-MCNC: 10 MG/DL (ref 8–23)
BUN/CREAT SERPL: 13.7 (ref 7–25)
CALCIUM SERPL-MCNC: 9.9 MG/DL (ref 8.6–10.5)
CHLORIDE SERPL-SCNC: 103 MMOL/L (ref 98–107)
CHOLEST SERPL-MCNC: 156 MG/DL (ref 0–200)
CHOLEST/HDLC SERPL: 2.56 {RATIO}
CO2 SERPL-SCNC: 28.3 MMOL/L (ref 22–29)
CREAT SERPL-MCNC: 0.73 MG/DL (ref 0.57–1)
EGFRCR SERPLBLD CKD-EPI 2021: 80.2 ML/MIN/1.73
EOSINOPHIL # BLD AUTO: 0.16 10*3/MM3 (ref 0–0.4)
EOSINOPHIL NFR BLD AUTO: 2.5 % (ref 0.3–6.2)
ERYTHROCYTE [DISTWIDTH] IN BLOOD BY AUTOMATED COUNT: 12.2 % (ref 12.3–15.4)
GLOBULIN SER CALC-MCNC: 2.9 GM/DL
GLUCOSE SERPL-MCNC: 124 MG/DL (ref 65–99)
HBA1C MFR BLD: 6.4 % (ref 4.8–5.6)
HCT VFR BLD AUTO: 41.3 % (ref 34–46.6)
HDLC SERPL-MCNC: 61 MG/DL (ref 40–60)
HGB BLD-MCNC: 13.6 G/DL (ref 12–15.9)
IMM GRANULOCYTES # BLD AUTO: 0.02 10*3/MM3 (ref 0–0.05)
IMM GRANULOCYTES NFR BLD AUTO: 0.3 % (ref 0–0.5)
LDLC SERPL CALC-MCNC: 74 MG/DL (ref 0–100)
LYMPHOCYTES # BLD AUTO: 1.43 10*3/MM3 (ref 0.7–3.1)
LYMPHOCYTES NFR BLD AUTO: 22.5 % (ref 19.6–45.3)
MCH RBC QN AUTO: 28.9 PG (ref 26.6–33)
MCHC RBC AUTO-ENTMCNC: 32.9 G/DL (ref 31.5–35.7)
MCV RBC AUTO: 87.9 FL (ref 79–97)
MONOCYTES # BLD AUTO: 0.47 10*3/MM3 (ref 0.1–0.9)
MONOCYTES NFR BLD AUTO: 7.4 % (ref 5–12)
NEUTROPHILS # BLD AUTO: 4.24 10*3/MM3 (ref 1.7–7)
NEUTROPHILS NFR BLD AUTO: 66.7 % (ref 42.7–76)
NRBC BLD AUTO-RTO: 0 /100 WBC (ref 0–0.2)
PLATELET # BLD AUTO: 283 10*3/MM3 (ref 140–450)
POTASSIUM SERPL-SCNC: 4.6 MMOL/L (ref 3.5–5.2)
PROT SERPL-MCNC: 7.2 G/DL (ref 6–8.5)
RBC # BLD AUTO: 4.7 10*6/MM3 (ref 3.77–5.28)
SODIUM SERPL-SCNC: 140 MMOL/L (ref 136–145)
TRIGL SERPL-MCNC: 122 MG/DL (ref 0–150)
VLDLC SERPL CALC-MCNC: 21 MG/DL (ref 5–40)
WBC # BLD AUTO: 6.36 10*3/MM3 (ref 3.4–10.8)

## 2024-11-19 ENCOUNTER — OFFICE VISIT (OUTPATIENT)
Dept: INTERNAL MEDICINE | Facility: CLINIC | Age: 84
End: 2024-11-19
Payer: MEDICARE

## 2024-11-19 VITALS
SYSTOLIC BLOOD PRESSURE: 122 MMHG | TEMPERATURE: 98.7 F | WEIGHT: 176.5 LBS | DIASTOLIC BLOOD PRESSURE: 70 MMHG | HEIGHT: 64 IN | OXYGEN SATURATION: 98 % | BODY MASS INDEX: 30.13 KG/M2 | HEART RATE: 69 BPM

## 2024-11-19 DIAGNOSIS — F32.9 REACTIVE DEPRESSION: ICD-10-CM

## 2024-11-19 DIAGNOSIS — E11.9 CONTROLLED TYPE 2 DIABETES MELLITUS WITHOUT COMPLICATION, WITHOUT LONG-TERM CURRENT USE OF INSULIN: Primary | ICD-10-CM

## 2024-11-19 DIAGNOSIS — M81.6 LOCALIZED OSTEOPOROSIS, UNSPECIFIED PATHOLOGICAL FRACTURE PRESENCE: ICD-10-CM

## 2024-11-19 DIAGNOSIS — M54.50 CHRONIC RIGHT-SIDED LOW BACK PAIN WITHOUT SCIATICA: ICD-10-CM

## 2024-11-19 DIAGNOSIS — I10 PRIMARY HYPERTENSION: ICD-10-CM

## 2024-11-19 DIAGNOSIS — E78.5 HYPERLIPIDEMIA, UNSPECIFIED HYPERLIPIDEMIA TYPE: ICD-10-CM

## 2024-11-19 DIAGNOSIS — G89.29 CHRONIC RIGHT-SIDED LOW BACK PAIN WITHOUT SCIATICA: ICD-10-CM

## 2024-11-19 PROBLEM — M47.816 LUMBAR FACET ARTHROPATHY: Status: RESOLVED | Noted: 2022-05-27 | Resolved: 2024-11-19

## 2024-11-19 PROBLEM — M47.816 LUMBAR FACET ARTHROPATHY: Status: ACTIVE | Noted: 2022-05-27

## 2024-11-19 PROCEDURE — 96372 THER/PROPH/DIAG INJ SC/IM: CPT | Performed by: INTERNAL MEDICINE

## 2024-11-19 PROCEDURE — 1160F RVW MEDS BY RX/DR IN RCRD: CPT | Performed by: INTERNAL MEDICINE

## 2024-11-19 PROCEDURE — 99213 OFFICE O/P EST LOW 20 MIN: CPT | Performed by: INTERNAL MEDICINE

## 2024-11-19 PROCEDURE — 99397 PER PM REEVAL EST PAT 65+ YR: CPT | Performed by: INTERNAL MEDICINE

## 2024-11-19 PROCEDURE — 1125F AMNT PAIN NOTED PAIN PRSNT: CPT | Performed by: INTERNAL MEDICINE

## 2024-11-19 PROCEDURE — 1159F MED LIST DOCD IN RCRD: CPT | Performed by: INTERNAL MEDICINE

## 2024-11-19 RX ORDER — UBIDECARENONE 100 MG
100 CAPSULE ORAL DAILY
COMMUNITY

## 2024-11-19 NOTE — PROGRESS NOTES
"Subjective   Shu Mckeon is a 84 y.o. female and is here for a comprehensive physical exam. The patient reports problems - depression .  She feels so lonely since her husbands death  She hs been on the zoloft   She has been having worsening back pain and left knee pain  she has seen ortho     Pt is UTD with annual gyn exam and mammo     Do you take any herbs or supplements that were not prescribed by a doctor?see list    Social History: no tob no etoh  Social History     Socioeconomic History    Marital status:      Spouse name: Isidoro   Tobacco Use    Smoking status: Never     Passive exposure: Never    Smokeless tobacco: Never   Vaping Use    Vaping status: Never Used   Substance and Sexual Activity    Alcohol use: Yes     Alcohol/week: 7.0 standard drinks of alcohol     Types: 7 Glasses of wine per week     Comment: 1/day-socially    Drug use: No    Sexual activity: Defer       Family History:   Family History   Problem Relation Age of Onset    Heart disease Father     Heart attack Father 81    Breast cancer Sister 70    Heart attack Paternal Uncle     Breast cancer Cousin 50    Malig Hyperthermia Neg Hx     Colon cancer Neg Hx     Colon polyps Neg Hx     Crohn's disease Neg Hx     Irritable bowel syndrome Neg Hx     Ulcerative colitis Neg Hx        Past Medical History:   Past Medical History:   Diagnosis Date    Arthritis     Breast cancer     right breast    Chronic low back pain     Depression     HISTORY OF BEING \"EMOTIONAL\"    Encounter for screening mammogram for breast cancer 05/03/2021    Fatigue     GERD (gastroesophageal reflux disease)     H/O cardiovascular stress test 07/2018    NORMAL, DR ARIAS    History of diverticulitis     Hyperlipidemia     Hypertension     Impaired fasting glucose     Left bundle branch block     Macular degeneration     EVITA    Osteoporosis     Radial scar of breast     LEFT    Status post ORIF of fracture of ankle     Thyroid nodule     Urinary frequency  " "          Review of Systems    Pertinent items are noted in HPI.    Objective   /70 (BP Location: Left arm, Patient Position: Sitting)   Pulse 69   Temp 98.7 °F (37.1 °C) (Oral)   Ht 162 cm (63.78\")   Wt 80.1 kg (176 lb 8 oz)   LMP  (LMP Unknown)   SpO2 98%   BMI 30.51 kg/m²     General Appearance:    Alert, cooperative, no distress, appears stated age   Head:    Normocephalic, without obvious abnormality, atraumatic   Eyes:    PERRL, conjunctiva/corneas clear, EOM's intact, fundi     benign, both eyes   Ears:    Normal TM's and external ear canals, both ears   Nose:   Nares normal, septum midline, mucosa normal, no drainage    or sinus tenderness   Throat:   Lips, mucosa, and tongue normal; teeth and gums normal   Neck:   Supple, symmetrical, trachea midline, no adenopathy;     thyroid:  no enlargement/tenderness/nodules; no carotid    bruit or JVD   Back:     Symmetric, no curvature, ROM normal, no CVA tenderness   Lungs:     Clear to auscultation bilaterally, respirations unlabored   Chest Wall:    No tenderness or deformity    Heart:    Regular rate and rhythm, S1 and S2 normal, no murmur, rub   or gallop       Abdomen:     Soft, non-tender, bowel sounds active all four quadrants,     no masses, no organomegaly           Extremities:   Extremities normal, atraumatic, no cyanosis or edema   Pulses:   2+ and symmetric all extremities   Skin:   Skin color, texture, turgor normal, no rashes or lesions   Lymph nodes:   Cervical, supraclavicular, and axillary nodes normal   Neurologic:   CNII-XII intact, normal strength, sensation and reflexes     throughout       Vitals:    11/19/24 1140   BP: 122/70   Pulse: 69   Temp: 98.7 °F (37.1 °C)   SpO2: 98%     Body mass index is 30.51 kg/m².      Medications:   Current Outpatient Medications:     acetaminophen (TYLENOL) 650 MG 8 hr tablet, Take 1 tablet by mouth Every 8 (Eight) Hours As Needed for Mild Pain., Disp: , Rfl:     atorvastatin (LIPITOR) 40 MG " tablet, TAKE ONE TABLET BY MOUTH NIGHTLY, Disp: 90 tablet, Rfl: 1    Cholecalciferol (VITAMIN D3) 5000 UNITS tablet, Take 1 tablet by mouth Daily., Disp: , Rfl:     coenzyme Q10 100 MG capsule, Take 1 capsule by mouth Daily., Disp: , Rfl:     colestipol (COLESTID) 1 g tablet, TAKE ONE TABLET BY MOUTH TWICE DAILY (Patient taking differently: Take 1 tablet by mouth Daily.), Disp: 60 tablet, Rfl: 5    denosumab (Prolia) 60 MG/ML solution prefilled syringe syringe, Inject 1 mL under the skin into the appropriate area as directed Every 6 (Six) Months., Disp: 1 mL, Rfl: 1    loperamide (IMODIUM A-D) 2 MG tablet, Take 1/2 tablet daily x10 days then increase to 1/2 tablet twice daily if still having diarrhea.  Decrease dose if constipation occurs, Disp: 30 tablet, Rfl: 1    losartan (COZAAR) 50 MG tablet, TAKE ONE TABLET BY MOUTH EVERY DAY, Disp: 90 tablet, Rfl: 1    metoprolol succinate XL (TOPROL-XL) 25 MG 24 hr tablet, TAKE ONE TABLET BY MOUTH EVERY EVENING, Disp: 90 tablet, Rfl: 1    Multiple Vitamin (MULTIVITAMIN) capsule, Take 1 capsule by mouth Daily. PT HOLDING FOR SURGERY, Disp: , Rfl:     multivitamin with minerals (PRESERVISION AREDS PO), Take 1 tablet by mouth Daily., Disp: , Rfl:     sertraline (ZOLOFT) 100 MG tablet, TAKE ONE TABLET BY MOUTH EVERY DAY, Disp: 90 tablet, Rfl: 1    nystatin (MYCOSTATIN) 332134 UNIT/GM cream, Apply 1 application  topically to the appropriate area as directed 2 (Two) Times a Day. (Patient not taking: Reported on 11/19/2024), Disp: 30 g, Rfl: 0    BMI is >= 30 and <35. (Class 1 Obesity). The following options were offered after discussion;: exercise counseling/recommendations and nutrition counseling/recommendations        Assessment & Plan   Healthy female exam.      1. Healthcare Maintenance:  2. Patient Counseling:  --Nutrition: Stressed importance of moderation in sodium/caffeine intake, saturated fat and cholesterol, caloric balance, sufficient intake of fresh fruits,  vegetables, fiber, calcium and vit D  --Exercise: no reg exercise due to back pain and left knee pain  --Substance Abuse: no tob no etoh  --Dental health: she does go to the dentist reg  --Immunizations reviewed.  --Discussed benefits of screening colonoscopy.  3.  HTN-  ok with current meds   4.  HPL-  ok wth current meds   5.  Ataxaia and falls  back pain and knee pain are making her fall risk greater  I really joanne should would benefit from PT   6.  Spinal stenosis in cervical spine-  she has been eferred to NS but will try PT

## 2024-12-12 ENCOUNTER — TELEPHONE (OUTPATIENT)
Dept: INTERNAL MEDICINE | Facility: CLINIC | Age: 84
End: 2024-12-12
Payer: MEDICARE

## 2024-12-12 NOTE — TELEPHONE ENCOUNTER
Marely with [facility] called for verbal for a diagnosis to be added for left knee Osteoarthritis.    Please call Marely back at [phone]

## 2024-12-16 ENCOUNTER — TELEPHONE (OUTPATIENT)
Dept: INTERNAL MEDICINE | Facility: CLINIC | Age: 84
End: 2024-12-16
Payer: MEDICARE

## 2024-12-16 NOTE — TELEPHONE ENCOUNTER
Caller: Shu Mckeon    Relationship: Self    Best call back number: [phone]     What was the call regarding: PATIENT STATES THAT SHE GETS PROLIA INJECTIONS TWICE A YEAR. SHE HAS CHANGED THE ORDER FOR THAT FROM [facility] TO [facility], AND NEEDS TO HAVE ORDER TRANSFERRED.    [facility] [employer] PHONE: [phone]

## 2025-01-08 RX ORDER — DENOSUMAB 60 MG/ML
60 INJECTION SUBCUTANEOUS
Qty: 1 ML | Refills: 1 | Status: SHIPPED | OUTPATIENT
Start: 2025-01-08

## 2025-01-09 ENCOUNTER — TELEPHONE (OUTPATIENT)
Dept: INTERNAL MEDICINE | Facility: CLINIC | Age: 85
End: 2025-01-09
Payer: MEDICARE

## 2025-01-09 NOTE — TELEPHONE ENCOUNTER
----- Message from Araceli LU sent at 1/8/2025  9:47 AM EST -----    ----- Message -----  From: Mairann Fallon MD  Sent: 1/8/2025   9:47 AM EST  To: Araceli Bennett MA    Added Osteoporosis to November's note  ----- Message -----  From: Araceli Bennett MA  Sent: 1/8/2025   9:38 AM EST  To: Mariann Fallon MD    We need documentation for the need for the prolia.  ----- Message -----  From: Iris Hall RegSched Rep  Sent: 1/8/2025   9:30 AM EST  To: Araceli Bennett MA    Pharmacy requested chart note documenting height weight and diagnosis.  Please fax.    [facility], [facility] ([place]) - [place], [place] - [address] [place] [address] - [phone] [facility] [phone] FX

## 2025-01-21 ENCOUNTER — TELEPHONE (OUTPATIENT)
Dept: INTERNAL MEDICINE | Facility: CLINIC | Age: 85
End: 2025-01-21
Payer: MEDICARE

## 2025-01-21 RX ORDER — SERTRALINE HYDROCHLORIDE 100 MG/1
100 TABLET, FILM COATED ORAL DAILY
Qty: 90 TABLET | Refills: 1 | Status: SHIPPED | OUTPATIENT
Start: 2025-01-21

## 2025-01-21 NOTE — TELEPHONE ENCOUNTER
Caller: [facility]    Relationship:     Best call back number: [phone]     What orders are you requesting (i.e. lab or imaging): PSYCHIATRIC NURSING    In what timeframe would the patient need to come in: AS SOON AS POSSIBLE      Additional notes: REQUESTING VERBAL ORDERS FOR PSYCHIATRIC NURSING. PATIENT IS HAVING DIFFICULTY SINCE  PASSED

## 2025-01-22 ENCOUNTER — OFFICE VISIT (OUTPATIENT)
Dept: INTERNAL MEDICINE | Facility: CLINIC | Age: 85
End: 2025-01-22
Payer: MEDICARE

## 2025-01-22 ENCOUNTER — TELEPHONE (OUTPATIENT)
Dept: INTERNAL MEDICINE | Facility: CLINIC | Age: 85
End: 2025-01-22

## 2025-01-22 VITALS
HEIGHT: 64 IN | WEIGHT: 178 LBS | SYSTOLIC BLOOD PRESSURE: 116 MMHG | DIASTOLIC BLOOD PRESSURE: 68 MMHG | HEART RATE: 75 BPM | BODY MASS INDEX: 30.39 KG/M2 | OXYGEN SATURATION: 98 %

## 2025-01-22 DIAGNOSIS — M54.2 NECK PAIN: ICD-10-CM

## 2025-01-22 DIAGNOSIS — R31.9 HEMATURIA, UNSPECIFIED TYPE: ICD-10-CM

## 2025-01-22 DIAGNOSIS — N30.01 ACUTE CYSTITIS WITH HEMATURIA: Primary | ICD-10-CM

## 2025-01-22 LAB
BILIRUB BLD-MCNC: NEGATIVE MG/DL
CLARITY, POC: ABNORMAL
COLOR UR: ABNORMAL
EXPIRATION DATE: ABNORMAL
GLUCOSE UR STRIP-MCNC: NEGATIVE MG/DL
KETONES UR QL: NEGATIVE
LEUKOCYTE EST, POC: ABNORMAL
Lab: ABNORMAL
NITRITE UR-MCNC: NEGATIVE MG/ML
PH UR: 7 [PH] (ref 5–8)
PROT UR STRIP-MCNC: ABNORMAL MG/DL
RBC # UR STRIP: ABNORMAL /UL
SP GR UR: 1.01 (ref 1–1.03)
UROBILINOGEN UR QL: ABNORMAL

## 2025-01-22 PROCEDURE — 1159F MED LIST DOCD IN RCRD: CPT | Performed by: NURSE PRACTITIONER

## 2025-01-22 PROCEDURE — 81003 URINALYSIS AUTO W/O SCOPE: CPT | Performed by: NURSE PRACTITIONER

## 2025-01-22 PROCEDURE — 99214 OFFICE O/P EST MOD 30 MIN: CPT | Performed by: NURSE PRACTITIONER

## 2025-01-22 PROCEDURE — 1160F RVW MEDS BY RX/DR IN RCRD: CPT | Performed by: NURSE PRACTITIONER

## 2025-01-22 RX ORDER — CIPROFLOXACIN 250 MG/1
250 TABLET, FILM COATED ORAL 2 TIMES DAILY
Qty: 6 TABLET | Refills: 0 | Status: SHIPPED | OUTPATIENT
Start: 2025-01-22

## 2025-01-22 NOTE — TELEPHONE ENCOUNTER
Caller: Shu Mckeon    Relationship: Self    Best call back number:   Telephone Information:   Mobile [phone]     What orders are you requesting (i.e. lab or imaging): URINALYSIS     In what timeframe would the patient need to come in: ASAP    Where will you receive your lab/imaging services: OFFICE    Additional notes: PATIENT STATED THAT SHE BELIEVES SHE HAS A UTI. SHE STATED HER SYMPTOMS STARTED LAST NIGHT 1/21/25 WITH FREQUENT URINATION AND PRESSURE. PATIENT STATED THAT USUALLY SHE SPEAKS WITH JOE AND SHE CAN COME IN AND LEAVE A URINE SAMPLE ONLY. PATIENT IS LEAVING OUT OF TOWN 1/27/25 AND IS WANTING TO COME IN AS SOON AS POSSIBLE. PLEASE CALLBACK AND ADIVSE.

## 2025-01-22 NOTE — PROGRESS NOTES
Subjective   Shu Mckeon is a 84 y.o. female.     History of Present Illness    The patient is here today with c/o urinary frequency, she doesn't feel like she is emptying her bladder completely. This started last night.    She has been having more pain in her neck and muscle aches.     The following portions of the patient's history were reviewed and updated as appropriate: allergies, current medications, past family history, past medical history, past social history, past surgical history and problem list.    Review of Systems   Constitutional:  Negative for chills and fever.   Respiratory: Negative.     Cardiovascular: Negative.    Genitourinary:  Positive for frequency. Negative for dysuria, flank pain, hematuria and urgency.   Musculoskeletal:  Positive for neck pain. Myalgias: neck discomfort.  Psychiatric/Behavioral:  Negative for dysphoric mood and suicidal ideas. The patient is not nervous/anxious.        Objective   Physical Exam  Constitutional:       Appearance: Normal appearance. She is well-developed.   Neck:      Thyroid: No thyromegaly.   Cardiovascular:      Rate and Rhythm: Normal rate and regular rhythm.      Heart sounds: Normal heart sounds.   Pulmonary:      Effort: Pulmonary effort is normal.      Breath sounds: Normal breath sounds.   Abdominal:      Tenderness: There is no right CVA tenderness or left CVA tenderness.   Musculoskeletal:      Cervical back: Normal range of motion and neck supple.   Lymphadenopathy:      Cervical: No cervical adenopathy.   Skin:     General: Skin is warm and dry.   Neurological:      Mental Status: She is alert.   Psychiatric:         Behavior: Behavior normal.         Thought Content: Thought content normal.         Judgment: Judgment normal.         Vitals:    01/22/25 1313   BP: 116/68   Pulse: 75   SpO2: 98%     Body mass index is 30.76 kg/m².    Current Outpatient Medications:     acetaminophen (TYLENOL) 650 MG 8 hr tablet, Take 1 tablet by mouth Every 8  (Eight) Hours As Needed for Mild Pain., Disp: , Rfl:     atorvastatin (LIPITOR) 40 MG tablet, TAKE ONE TABLET BY MOUTH NIGHTLY, Disp: 90 tablet, Rfl: 1    Cholecalciferol (VITAMIN D3) 5000 UNITS tablet, Take 1 tablet by mouth Daily., Disp: , Rfl:     coenzyme Q10 100 MG capsule, Take 1 capsule by mouth Daily., Disp: , Rfl:     colestipol (COLESTID) 1 g tablet, TAKE ONE TABLET BY MOUTH TWICE DAILY (Patient taking differently: Take 1 tablet by mouth Daily.), Disp: 60 tablet, Rfl: 5    denosumab (Prolia) 60 MG/ML solution prefilled syringe syringe, Inject 1 mL under the skin into the appropriate area as directed Every 6 (Six) Months., Disp: 1 mL, Rfl: 1    loperamide (IMODIUM A-D) 2 MG tablet, Take 1/2 tablet daily x10 days then increase to 1/2 tablet twice daily if still having diarrhea.  Decrease dose if constipation occurs, Disp: 30 tablet, Rfl: 1    losartan (COZAAR) 50 MG tablet, TAKE ONE TABLET BY MOUTH EVERY DAY, Disp: 90 tablet, Rfl: 1    metoprolol succinate XL (TOPROL-XL) 25 MG 24 hr tablet, TAKE ONE TABLET BY MOUTH EVERY EVENING, Disp: 90 tablet, Rfl: 1    Multiple Vitamin (MULTIVITAMIN) capsule, Take 1 capsule by mouth Daily. PT HOLDING FOR SURGERY, Disp: , Rfl:     multivitamin with minerals (PRESERVISION AREDS PO), Take 1 tablet by mouth Daily., Disp: , Rfl:     sertraline (ZOLOFT) 100 MG tablet, TAKE ONE TABLET BY MOUTH EVERY DAY, Disp: 90 tablet, Rfl: 1    ciprofloxacin (Cipro) 250 MG tablet, Take 1 tablet by mouth 2 (Two) Times a Day., Disp: 6 tablet, Rfl: 0    nystatin (MYCOSTATIN) 062818 UNIT/GM cream, Apply 1 application  topically to the appropriate area as directed 2 (Two) Times a Day. (Patient not taking: Reported on 6/19/2024), Disp: 30 g, Rfl: 0       Assessment & Plan   Diagnoses and all orders for this visit:    1. Acute cystitis with hematuria (Primary)    2. Hematuria, unspecified type  -     POCT urinalysis dipstick, automated  -     Urine Culture - Urine, Urine, Clean Catch    3. Neck  pain    Other orders  -     ciprofloxacin (Cipro) 250 MG tablet; Take 1 tablet by mouth 2 (Two) Times a Day.  Dispense: 6 tablet; Refill: 0                 1. UTI- Rx Cipro, cx  2. Neck pain- discussed medication, will try heat first.

## 2025-01-24 ENCOUNTER — OFFICE VISIT (OUTPATIENT)
Dept: ORTHOPEDIC SURGERY | Facility: CLINIC | Age: 85
End: 2025-01-24
Payer: MEDICARE

## 2025-01-24 VITALS — TEMPERATURE: 97.5 F | BODY MASS INDEX: 30.39 KG/M2 | HEIGHT: 64 IN | WEIGHT: 178 LBS

## 2025-01-24 DIAGNOSIS — M17.12 PRIMARY OSTEOARTHRITIS OF LEFT KNEE: ICD-10-CM

## 2025-01-24 DIAGNOSIS — R52 PAIN: Primary | ICD-10-CM

## 2025-01-24 PROCEDURE — 99212 OFFICE O/P EST SF 10 MIN: CPT | Performed by: NURSE PRACTITIONER

## 2025-01-24 RX ORDER — METHYLPREDNISOLONE 4 MG/1
TABLET ORAL
Qty: 21 TABLET | Refills: 0 | Status: SHIPPED | OUTPATIENT
Start: 2025-01-24

## 2025-01-24 NOTE — PROGRESS NOTES
Patient: Shu Mckeon  YOB: 1940 84 y.o. female  Medical Record Number: [id number]    Chief Complaints:   Chief Complaint   Patient presents with    Left Knee - Follow-up, Pain       History of Present Illness:Shu Mckeon is a 84 y.o. female who presents for follow-up of left knee pain.  Patient chronic left knee osteoarthritis that we have been conservatively treating.  Patient reports that she is about to go out of town to [place] and wanted to try an injection considering they have helped significantly in the past.  However patient states that she was diagnosed on Tuesday with an active UTI and just started antibiotics.    Allergies: No Known Allergies    Medications:   Current Outpatient Medications   Medication Sig Dispense Refill    acetaminophen (TYLENOL) 650 MG 8 hr tablet Take 1 tablet by mouth Every 8 (Eight) Hours As Needed for Mild Pain.      atorvastatin (LIPITOR) 40 MG tablet TAKE ONE TABLET BY MOUTH NIGHTLY 90 tablet 1    Cholecalciferol (VITAMIN D3) 5000 UNITS tablet Take 1 tablet by mouth Daily.      ciprofloxacin (Cipro) 250 MG tablet Take 1 tablet by mouth 2 (Two) Times a Day. 6 tablet 0    coenzyme Q10 100 MG capsule Take 1 capsule by mouth Daily.      colestipol (COLESTID) 1 g tablet TAKE ONE TABLET BY MOUTH TWICE DAILY (Patient taking differently: Take 1 tablet by mouth Daily.) 60 tablet 5    denosumab (Prolia) 60 MG/ML solution prefilled syringe syringe Inject 1 mL under the skin into the appropriate area as directed Every 6 (Six) Months. 1 mL 1    loperamide (IMODIUM A-D) 2 MG tablet Take 1/2 tablet daily x10 days then increase to 1/2 tablet twice daily if still having diarrhea.  Decrease dose if constipation occurs 30 tablet 1    losartan (COZAAR) 50 MG tablet TAKE ONE TABLET BY MOUTH EVERY DAY 90 tablet 1    metoprolol succinate XL (TOPROL-XL) 25 MG 24 hr tablet TAKE ONE TABLET BY MOUTH EVERY EVENING 90 tablet 1    Multiple Vitamin (MULTIVITAMIN) capsule Take 1 capsule by  "mouth Daily. PT HOLDING FOR SURGERY      multivitamin with minerals (PRESERVISION AREDS PO) Take 1 tablet by mouth Daily.      sertraline (ZOLOFT) 100 MG tablet TAKE ONE TABLET BY MOUTH EVERY DAY 90 tablet 1    methylPREDNISolone (MEDROL) 4 MG dose pack Use as directed by package instructions 21 tablet 0     No current facility-administered medications for this visit.         The following portions of the patient's history were reviewed and updated as appropriate: allergies, current medications, past family history, past medical history, past social history, past surgical history and problem list.    Review of Systems:   Pertinent positives/negative listed in HPI above    Physical Exam:   Vitals:    01/24/25 1050   Temp: 97.5 °F (36.4 °C)   TempSrc: Temporal   Weight: 80.7 kg (178 lb)   Height: 162.6 cm (64\")   PainSc:   4   PainLoc: Knee         Assessment/Plan: Primary osteoarthritis left knee    I did discuss with the patient that considering she has an active UTI he would not be wise to go forward with giving her an intra-articular joint injection today.  I instructed her that she needs around 2 weeks before we would be able to give her a cortisone injection.  Patient states that she will be in [place] at that time.  Rather than giving her an injection today, I am going to give her a prescription for a Medrol Dosepak to take with her and begin 2 weeks from today and after she has finished her current antibiotic regiment.  She will follow back up with us once she returns back from [place] for reevaluation of the knee.      Roman Moreno, APRN  1/24/2025  "

## 2025-01-25 LAB
BACTERIA UR CULT: ABNORMAL
OTHER ANTIBIOTIC SUSC ISLT: ABNORMAL

## 2025-01-27 ENCOUNTER — TELEPHONE (OUTPATIENT)
Dept: INTERNAL MEDICINE | Facility: CLINIC | Age: 85
End: 2025-01-27

## 2025-01-27 NOTE — TELEPHONE ENCOUNTER
Caller: Shu cMkeon    Relationship: Self    Best call back number: [phone]    Caller requesting test results: SELF     What test was performed:  URINE CULTURE     When was the test performed: 1/22/25    Where was the test performed: IN OFFICE     Additional notes: PLEASE CALL WITH [facility]S NOTES

## 2025-02-24 ENCOUNTER — TELEPHONE (OUTPATIENT)
Dept: ORTHOPEDIC SURGERY | Facility: CLINIC | Age: 85
End: 2025-02-24

## 2025-02-24 NOTE — PATIENT INSTRUCTIONS
2025       Ruben Mares MD  [address]  [facility] [address]  Via In Basket      Patient: Cyndi Cortes   YOB: 1986   Date of Visit: 2025       Dear Dr. Mares:    Thank you for referring Cyndi Cortes to me for evaluation. Below are my notes for this visit with her.    If you have questions, please do not hesitate to call me. I look forward to following your patient along with you.      Sincerely,        Chaz Aragon MD        CC: No Recipients    Chaz Aragon MD  2025  3:10 PM  Signed  MATERNAL FETAL MEDICINE CONSULT      Cyndi Cortes   2025    Referring  Ruben Mares MD    Reason for the visit   Mrs. Cortes is a pleasant 38 year old  who is currently at 20w3d by an established DALLIN of 2025 who presents today for a  comprehensive fetal ultrasound and a consultation visit.     She currently denies symptoms of vaginal bleeding, uterine contractions or gush of vaginal fluid.  She reports good fetal movements.    High risk pregnancy concerns  Maternal  AMA  H/o   History of recurrent UTI  Fetal  none    Aneuploidy screening this pregnancy was performed. Low risk cell free fetal DNA screening.       I have reviewed Mrs. Cortes's previous OB ultrasound reports, pertinent prenatal labwork and testing provided by her referring OB physician.     Medications  Current Outpatient Medications   Medication Sig Dispense Refill   • Prenatal Vit-Fe Fumarate-FA (PRENATAL VITAMINS PO)        No current facility-administered medications for this visit.          Allergies   ALLERGIES:  No Known Allergies    OB History    Para Term  AB Living   5 1 1 0 3 1   SAB IAB Ectopic Molar Multiple Live Births   3 0 0 0 0 1      # Outcome Date GA Lbr Uvaldo/2nd Weight Sex Type Anes PTL Lv   5 Current            4 2023     Miscar      3 2021     Miscar      2 2020     Miscar      1 Term 14    F CS-LTranv Gen N MASSIEL       Past    Medicare Wellness  Personal Prevention Plan of Service     Date of Office Visit:    Encounter Provider:  Mariann Fallon MD  Place of Service:  [facility]  Patient Name: Shu Mckeon  :  1940    As part of the Medicare Wellness portion of your visit today, we are providing you with this personalized preventive plan of services (PPPS). This plan is based upon recommendations of the [place] Preventive Services Task Force (USPSTF) and the Advisory Committee on Immunization Practices (ACIP).    This lists the preventive care services that should be considered, and provides dates of when you are due. Items listed as completed are up-to-date and do not require any further intervention.    Health Maintenance   Topic Date Due    TDAP/TD VACCINES (1 - Tdap) Never done    COVID-19 Vaccine (4 - Booster for Moderna series) 2022    ZOSTER VACCINE (2 of 2) 2022 (Originally 2017)    INFLUENZA VACCINE  10/01/2022    DIABETIC EYE EXAM  2023    HEMOGLOBIN A1C  2023    DXA SCAN  2023    LIPID PANEL  2023    ANNUAL WELLNESS VISIT  2023    MAMMOGRAM  2023    Pneumococcal Vaccine 65+  Completed    URINE MICROALBUMIN  Discontinued       No orders of the defined types were placed in this encounter.      No follow-ups on file.           Medical History:   Diagnosis Date   • History of multiple miscarriages     x   • History of recurrent UTIs        Past Surgical History:   Procedure Laterality Date   •  section, low transverse         Family History   Problem Relation Age of Onset   • Patient is unaware of any medical problems Mother    • Patient is unaware of any medical problems Father    • Patient is unaware of any medical problems Daughter         Social History     Socioeconomic History   • Marital status: /Civil Union     Spouse name: Sunny Cortes   • Number of children: 1   • Years of education: Not on file   • Highest education level: Not on file   Occupational History   • Occupation: homemaker   Tobacco Use   • Smoking status: Never   • Smokeless tobacco: Never   Vaping Use   • Vaping status: never used   Substance and Sexual Activity   • Alcohol use: Not Currently   • Drug use: Never   • Sexual activity: Yes     Partners: Male   Other Topics Concern   • Not on file   Social History Narrative   • Not on file     Social Determinants of Health     Financial Resource Strain: Not on file   Food Insecurity: Low Risk  (2025)    Food Insecurity    • Worried about Food: Never true    • Food is Gone: Never true   Transportation Needs: Not At Risk (2025)    Transportation Needs    • Lack of Reliable Transportation: No   Physical Activity: Not on file   Stress: Not on file   Social Connections: Not on file   Interpersonal Safety: Not on file         Review of systems  Constitutional: Normal.   Cardiovascular: No lower extremity edema.    Gastrointestinal: Normal.   Genitourinary: Normal.   Psych: Normal.       all other systems negative unless noted in the HPI    Vitals   Visit Vitals  /66 (BP Location: LUE - Left upper extremity, Patient Position: Sitting, Cuff Size: Regular)   Pulse 93   Ht 5' 3\" (1.6 m)   Wt 58.7 kg (129 lb 5.5 oz)   LMP 10/04/2024 (Exact Date)   Breastfeeding No   BMI 22.91 kg/m²        Physical  exam  General: Alert and oriented x3, pleasant   Pelvic Exam: deferred    Ultrasound results    Estimated fetal weight is 405 g.  This is the 83rd percentile.  Fetal heart rate is 145 bpm and the deepest vertical pocket is 4.3 cm    The ultrasound does not display evidence of fetal syndromes or birth defects    There is no ultrasound evidence of placenta accreta spectrum       Impression  Mrs. Cortes is a pleasant 38 year old  who is currently at 20w3d by an established DALLIN of 2025 with pregnancy complicated by:    Advanced maternal age  History of  x 1  History of recurrent UTI        Discussion     The patient was counseled about advanced maternal age and increased risk of obstetric complications not limited to gestational diabetes, hypertensive disorders, low birth weight infants,  delivery, and risk for  birth. I discussed the baseline/a priori risk of approximately 3% for major birth defects in the general population.  Cell free DNA testing was low risk.  We reviewed that amniocentesis is the definitive method to assess for chromosomal abnormalities.    There is a history of  x 1.  There is no ultrasound evidence of placenta accreta spectrum.  We reviewed the option of .  With the  there is a 0.3 to 0.9% risk of uterine rupture    During the visit today we discussed the importance of fetal kick counting which should begin between 26 and 28 weeks.  We also reviewed that it is very important to monitor for signs and symptoms of preeclampsia and we reviewed the signs and symptoms.    During today's visit we also discussed nutrition and exercise as a means to avoid development of gestational diabetes and hypertensive disorders of pregnancy      Recommendations    1.  Growth ultrasound and assessment of the uterine placental interface in 10 weeks with MFM  2.  Weekly NST and IVAN starting at 32 weeks  3.  Fetal kick counts twice daily beginning at 28 weeks  gestational age  4.  Recommend delivery between 39 and 40 weeks or earlier if clinically indicated  5.  Monitor for signs and symptoms of preeclampsia  6.  Monitor for signs and symptoms of UTI as the pregnancy progresses.      Thank you for the opportunity to assist with Mrs. Cortes's obstetrical care.        A letter regarding this visit has been sent to the referring physician.      Chaz Aragon MD  Maternal-Fetal Medicine      Total time, on the day of service, performing chart review, obtaining history and exam, counseling and documenting/coordinating care:  30 minutes.

## 2025-02-24 NOTE — TELEPHONE ENCOUNTER
Caller: Shu Mckeon    Relationship to patient: Self    Best call back number: [phone]    Chief complaint: LEFT KNEE PAIN    Type of visit: GEL INJECTION    Requested date: ASAP     Additional notes:PATIENT STATES SHE DISCUSSED GEL INJECTION AT HER LAST VISIT, WOULD LIKE TO PROCEED. SHE WILL BE BACK FROM [place] ON 03/01/25.

## 2025-03-18 ENCOUNTER — CLINICAL SUPPORT (OUTPATIENT)
Dept: ORTHOPEDIC SURGERY | Facility: CLINIC | Age: 85
End: 2025-03-18
Payer: MEDICARE

## 2025-03-18 VITALS — WEIGHT: 180.8 LBS | BODY MASS INDEX: 30.87 KG/M2 | HEIGHT: 64 IN | TEMPERATURE: 97.6 F

## 2025-03-18 DIAGNOSIS — M17.12 PRIMARY OSTEOARTHRITIS OF LEFT KNEE: Primary | ICD-10-CM

## 2025-03-18 PROCEDURE — 20610 DRAIN/INJ JOINT/BURSA W/O US: CPT | Performed by: NURSE PRACTITIONER

## 2025-03-18 RX ORDER — LIDOCAINE HYDROCHLORIDE 10 MG/ML
3 INJECTION, SOLUTION EPIDURAL; INFILTRATION; INTRACAUDAL; PERINEURAL
Status: COMPLETED | OUTPATIENT
Start: 2025-03-18 | End: 2025-03-18

## 2025-03-18 RX ADMIN — LIDOCAINE HYDROCHLORIDE 3 ML: 10 INJECTION, SOLUTION EPIDURAL; INFILTRATION; INTRACAUDAL; PERINEURAL at 14:08

## 2025-03-18 NOTE — PROGRESS NOTES
3/18/2025    Shu Mckeon is here today for worsening knee pain. Pt has undergone injection of the knee in the past with good resolution of symptoms. Pt is requesting a repeat injection.     KNEE Injection Procedure Note:    Large Joint Arthrocentesis: L knee  Date/Time: 3/18/2025 2:08 PM  Consent given by: patient  Site marked: site marked  Timeout: Immediately prior to procedure a time out was called to verify the correct patient, procedure, equipment, support staff and site/side marked as required   Supporting Documentation  Indications: pain and joint swelling   Procedure Details  Location: knee - L knee  Preparation: Patient was prepped and draped in the usual sterile fashion  Needle size: 22 G  Approach: anterolateral  Medications administered: 3 mL lidocaine PF 1% 1 %; 60 mg Sodium Hyaluronate 60 MG/3ML  Patient tolerance: patient tolerated the procedure well with no immediate complications    (3 mL of lidocaine was used for anesthetic purposes)    At the conclusion of the injection I discussed the importance of continued quad strengthening exercises on a daily basis. I will see the patient back if the symptoms should fail to improve or worsen.    Roman Moreno, APRN  3/18/2025

## 2025-03-26 ENCOUNTER — HOSPITAL ENCOUNTER (OUTPATIENT)
Dept: MAMMOGRAPHY | Facility: HOSPITAL | Age: 85
Discharge: HOME | End: 2025-03-26
Admitting: SURGERY
Payer: MEDICARE

## 2025-03-26 DIAGNOSIS — Z12.31 ENCOUNTER FOR SCREENING MAMMOGRAM FOR MALIGNANT NEOPLASM OF BREAST: ICD-10-CM

## 2025-03-26 DIAGNOSIS — N64.89 RADIAL SCAR OF LEFT BREAST: ICD-10-CM

## 2025-03-26 DIAGNOSIS — C50.911 LOBULAR BREAST CANCER, RIGHT: ICD-10-CM

## 2025-03-26 PROCEDURE — 77067 SCR MAMMO BI INCL CAD: CPT

## 2025-03-26 PROCEDURE — 77063 BREAST TOMOSYNTHESIS BI: CPT

## 2025-03-28 NOTE — PROGRESS NOTES
Chief Complaint: Shu Mckeon is a 84 y.o. female who was seen in consultation at the request of Mariann Fallon MD  for newly diagnosed breast cancer and a followup visit    History of Present Illness:  2018  Patient presents with abnormal breast imaging, LEFT breast. She noted no new masses, skin changes, nipple discharge, nipple changes prior to her most recent imaging.  Her most recent imaging includes the followin/14/15 [facility]  MAMMO SCREEN EVITA  FLUHR, SHU  Benign dermal calcifications. BIRADS category 2: benign.    18 [facility]  MAMMO SCREEN EVITA  FLUHR, SHU  Scattered fibroglandular densities anterior one third retroareolar left breast interval development of a cluster of calcifications. BIRADS category 0.    8/3/18  [facility]  MAMMO DIAG LEFT  FLUHR, SHU  Microcalcifications within the outer inferior aspect of the left breast. BIRADS category 4.  She has not had a breast biopsy in the past.  She has her uterus and ovaries, is postmenopausal, and takes nor hormones.  Her family history includes the following: She has one daughter, one sister, no maternal aunts, 2 paternal aunts.  She has a half sister on paternal he based who had breast cancer in her 70s and a paternal cousin who had breast cancer in her 50s.    2018- left breast stereotactic biopsy: Upper outer quadrant, 5:00: Small radial sclerosing lesion with usual hyperplasia, separate foci of usual hyperplasia, columnar cell change without atypia: Focal stromal fibrosis, apically metaplasia, and fibroadenomatoid change.   Concordant per Dr Tariq.    She denies significant bruising at her biopsy site.  She denies any redness warmth or drainage from the mammotomy.    2018  Pathology from  10-24-18 excision radial scar returned as radial scar and no atypia.  She denies any redness, warmth, drainage from her incision.    2019  In the interim,  Shu Mckeon  has done well.  She has noted no changes in her breast exam. No new  masses, skin changes, nipple changes, nipple discharge either breast.     Her most recent imaging includes the following:  July 19, 2019 bilateral screening mammogram with 3D [facility]: Scattered fibroglandular densities.  No evidence for malignancy in either breast.  BI-RADS 1    9/9/2020  In the interim,  Shu Mckeon  has done well.  She has noted no changes in her breast exam. No new masses, skin changes, nipple changes, nipple discharge either breast.     Her most recent imaging includes the following:  [facility] point mammography July 20, 2020 bilateral screening mammogram with tomosynthesis.  Scattered areas of fibroglandular density.  There is a 1 cm mass in the lower left axilla, axillary tail that is slightly increased in size.  BI-RADS 0  August 12, 2020 left diagnostic mammogram with left breast ultrasound [facility].  On mammogram visualization of a 1.1 cm round mass consistent with a lymph node in the axillary tail with a cortex that is thickened relative to other visualized node.  On ultrasound axillary tail and left axillary ultrasound showed on the order of 16 cm from the nipple a 1.1 cm lymph node with a thickened cortex.  Impression 1.1 cm rounded left axillary tail lymph node with a thickened cortex recommend ultrasound-guided left breast biopsy BI-RADS 4.    She has gained 11#.    9/18/2020  Shu has done well since her core biopsy.  She denies any swelling or bruising or discomfort in her left axilla.  Denies any redness warmth or drainage from her dermatotomy    Pathology from in office core biopsy 9-9-20 returned as :  Final Diagnosis   1. Lymph Node, Left Low Axillary Tail, Core Biopsy:               A. Core-like fragments of reactive appearing lymph node.               B. No morphologic evidence of lymphoma or carcinoma.     2. Lymph Node, Left Low Axillary Tail, Core Biopsy (Gross Diagnosis Only):               A. Core-like tissue submitted to [facility]  Lab for Flow Cytometric Analysis.                     Please see the Flow Cytometry Summary below.     [facility]       Flow cytometry from [facility] dated September 9, 2020 returned as immunophenotyping fails to reveal a monoclonal B cell or abnormal T-cell immunophenotype.  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  The significance of this is uncertain.  Correlation with tissue morphology with further studies as indicated is advised.       -Addendum: Reviewed with Dr. Feliciano and he thinks that it would be best for him to just evaluate her in the clinic.       5/18/2023  In the interim,  Shu Mckeon  has done well.  She has noted no changes in her breast exam. No new masses, skin changes, nipple changes, nipple discharge either breast.   She denies headache, bone pain, belly pain, cough, changes in vision or gait.  She has had an intentional additional 6 # weight loss.    Her most recent imaging includes the following:  [facility] November 1, 2021 bilateral screening mammogram with tomosynthesis scattered fibroglandular densities.  BI-RADS 1  [facility]'s [place] March 23, 2023 bilateral screening mammogram with tomosynthesis.  Scattered areas of fibroglandular density.  Question architectural distortion lower outer middle right breast.  BI-RADS 0  Right diagnostic mammogram and right breast ultrasound April 27, 2023 [facility]:  Scattered areas of fibroglandular density.  Lower outer middle right breast there is a persistent area of distortion.  On ultrasound right breast 8:00, 5 cm from the nipple is a 9 x 8 mm irregular hypoechoic mass corresponding distortion which is suspicious.  BI-RADS 4  Also at 8:00, 1 cm from the nipple there is an incidental benign appearing ductal confluence.    5/25/2023  Pathology from an office biopsy May 18, 2023 returned as invasive lobular carcinoma, intermediate grade, 3, 2, 1, largest  "focus 1.2 cm.  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  No lymphovascular invasion.  Estrogen , progesterone 21-30, HER2/tommy 1+, Ki-67 8%.    She reports significant bruising extending onto her abdominal wall.    I then had her to get a post biopsy mammogram and MRI:  Reviewed Mrs. Mckeon\"s  MRI and postbiopsy mammogram together with Dr. Tariq.     The lobular histology makes her MRI interpretation fairly difficult especially with the postbiopsy cavity in the center of the affected area.  When combining the 2 imaging studies there does appear to be a significant 8 cm clip displacement posteriorly.  The mammographic density prebiopsy appears to be the best representation of size estimation per Dr. Tariq, approximately 4.6 cm.  The lesion appears debulked on the postbiopsy mammogram and there are blood products and air in the center of the enhancement on the MRI however the clip is situated 8 cm posterior to this.      I feel that there was clip migration due to the hematoma.    Dr Tariq felt that he could give a reasonably accurate attempt at bracketing this difficult to image lobular carcinoma.    8/21/23 8-2-23 Pathology from bracketed needle localized lumpectomy with oncoplastic reduction by Dr Romero returned as : 9mm invasive lobular carcinoma, int grade, 3,2,1, LCIS and ALH present, no LVI, margins widely clear. 0/3 nodes.   Path stage T1bN0- IA  She denies any redness warmth or drainage from her incisions.  New she has seen Dr. Romero.  She is here for review    4/19/23  In the interim,  Shu Mckeon  has done well.  She saw Dr. Feliciano and he felt that the risk of osteoporosis or blood clot with any of the hormone blocking medications outweighed any benefit at her age.  She saw Dr. Ornelas who did not feel that she needed radiation.  She has lost 12 pounds in the interim due to increased activity at her prison home.  Her most recent mammogram was in good order see " below.    She has noted no changes in her breast exam. No new masses, skin changes, nipple changes, nipple discharge either breast.   She denies headache, bone pain, belly pain, cough, changes in vision or gait.  Her most recent imaging includes the following: March 25, 2024 [facility] bilateral screening mammogram with tomosynthesis interval development of postsurgical change bilaterally.  A 2.9 cm of fat necrosis in the right breast middle third upper outer quadrant is seen.  BI-RADS 2    4/7/2025 Interval History  Patient presents today for routine follow up and exam.  Saw oncology on 9/23/2024 where she declined having adjunctive endocrine therapy as well as radiation treatment.  Bilateral screening mammogram on 3/25/2025 resulted in 2.  No new breast complaints or concerns.            Review of Systems:  Review of Systems   All other systems reviewed and are negative.      Past Medical and Surgical History:  Breast Biopsy History:  Patient has not had a breast biopsy in the past.  Breast Cancer HIstory:  Patient does not have a past medical history of breast cancer.  Breast Operations, and year:  NONE   Obstetric/Gynecologic History:  Age menstrual periods began: 11  Patient is postmenopausal, entered menopause naturally at age:    Number of pregnancies: 3  Number of live births: 3  Number of abortions or miscarriages: 0  Age of delivery of first child: 20  Patient did not breast feed.  Length of time taking birth control pills: 10-12 YRS   Patient took hormone replacement during the following dates: 10 YRS   PATIENT STILL HAS UTERUS AND OVARIES.     Past Surgical History:   Procedure Laterality Date    BREAST BIOPSY Left 10/25/2018    Procedure: left breast ultrasound-guided excision of radial scar.;  Surgeon: Shayna Blount MD;  [place] [facility] [place] [facility]  Service: General    BREAST LUMPECTOMY WITH SENTINEL NODE BIOPSY Right 08/02/2023    Procedure: RIGHT breast bracketed needle localized  "lumpectomy AND RIGHT axilla sentinel node biopsy;  Surgeon: Shayna Blount MD;  Location: [facility] OR;  Service: General;  Laterality: Right;    BREAST SURGERY Bilateral 08/02/2023    Procedure: RIGHT BREAST ONCOPLASTIC REDUCTION AND LEFT BREAST REDUCTION FOR SYMMETRY;  Surgeon: Greta Romero MD;  Location: [facility] OR;  Service: Plastics;  Laterality: Bilateral;    CARDIAC CATHETERIZATION      not sure when or where    CARDIAC CATHETERIZATION      CATARACT EXTRACTION WITH INTRAOCULAR LENS IMPLANT      CHOLECYSTECTOMY WITH INTRAOPERATIVE CHOLANGIOGRAM N/A 10/31/2020    Procedure: CHOLECYSTECTOMY LAPAROSCOPIC INTRAOPERATIVE CHOLANGIOGRAM;  Surgeon: Ruth Del Valle MD;  Location: [facility] OR;  Service: General;  Laterality: N/A;    COLONOSCOPY      COLONOSCOPY N/A 10/5/2023    Procedure: COLONOSCOPY  INTO TERMINAL ILEUM WITH BX AND POLYPECTOMY;  Surgeon: James Townsend MD;  Location: [facility] ENDOSCOPY;  Service: Gastroenterology;  Laterality: N/A;  PREOP/ DIARRHEA, HX POLYPS- POSTOP/ DIVERTICULOSIS, POLYP, HEMORRHOIDS    FEMUR FRACTURE SURGERY Right     FEMUR    FRACTURE SURGERY  2015    LT ANKLE    MEDIAL BRANCH BLOCK Right 05/20/2022    Procedure: LUMBAR MEDIAL BRANCH BLOCK Right L4-S1;  Surgeon: Pamela Spaulding MD;  [facility];  Service: Pain Management;  Laterality: Right;    REPLACEMENT TOTAL KNEE Right      Past Medical History:   Diagnosis Date    Arthritis     Breast cancer     right breast    Chronic low back pain     Depression     HISTORY OF BEING \"EMOTIONAL\"    Encounter for screening mammogram for breast cancer 05/03/2021    Fatigue     GERD (gastroesophageal reflux disease)     H/O cardiovascular stress test 07/2018    NORMAL, DR ARIAS    History of diverticulitis     Hyperlipidemia     Hypertension     Impaired fasting glucose     Left bundle branch block     Macular degeneration     EVITA    Osteoporosis     Radial scar of breast     LEFT    Status post ORIF of " fracture of ankle     Thyroid nodule     Urinary frequency        Prior Hospitalizations, other than for surgery or childbirth, and year:  NONE     Social History     Socioeconomic History    Marital status:      Spouse name: Isidoro   Tobacco Use    Smoking status: Never     Passive exposure: Never    Smokeless tobacco: Never   Vaping Use    Vaping status: Never Used   Substance and Sexual Activity    Alcohol use: Yes     Alcohol/week: 7.0 standard drinks of alcohol     Types: 7 Glasses of wine per week     Comment: 1/day-socially    Drug use: No    Sexual activity: Defer     Patient is .  Patient is retired.  Patient drinks 2 servings of caffeine per day.    Family History:  Family History   Problem Relation Age of Onset    Heart disease Father     Heart attack Father 81    Breast cancer Sister 70    Heart attack Paternal Uncle     Breast cancer Cousin 50    Malig Hyperthermia Neg Hx     Colon cancer Neg Hx     Colon polyps Neg Hx     Crohn's disease Neg Hx     Irritable bowel syndrome Neg Hx     Ulcerative colitis Neg Hx        Vital Signs:  LMP  (LMP Unknown)      Medications:    Current Outpatient Medications:     acetaminophen (TYLENOL) 650 MG 8 hr tablet, Take 1 tablet by mouth Every 8 (Eight) Hours As Needed for Mild Pain., Disp: , Rfl:     atorvastatin (LIPITOR) 40 MG tablet, TAKE ONE TABLET BY MOUTH NIGHTLY, Disp: 90 tablet, Rfl: 1    Cholecalciferol (VITAMIN D3) 5000 UNITS tablet, Take 1 tablet by mouth Daily., Disp: , Rfl:     ciprofloxacin (Cipro) 250 MG tablet, Take 1 tablet by mouth 2 (Two) Times a Day. (Patient not taking: Reported on 3/18/2025), Disp: 6 tablet, Rfl: 0    coenzyme Q10 100 MG capsule, Take 1 capsule by mouth Daily. (Patient not taking: Reported on 3/18/2025), Disp: , Rfl:     colestipol (COLESTID) 1 g tablet, TAKE ONE TABLET BY MOUTH TWICE DAILY (Patient taking differently: Take 1 tablet by mouth Daily.), Disp: 60 tablet, Rfl: 5    denosumab (Prolia) 60 MG/ML solution  prefilled syringe syringe, Inject 1 mL under the skin into the appropriate area as directed Every 6 (Six) Months., Disp: 1 mL, Rfl: 1    loperamide (IMODIUM A-D) 2 MG tablet, Take 1/2 tablet daily x10 days then increase to 1/2 tablet twice daily if still having diarrhea.  Decrease dose if constipation occurs, Disp: 30 tablet, Rfl: 1    losartan (COZAAR) 50 MG tablet, TAKE ONE TABLET BY MOUTH EVERY DAY, Disp: 90 tablet, Rfl: 1    methylPREDNISolone (MEDROL) 4 MG dose pack, Use as directed by package instructions (Patient not taking: Reported on 3/18/2025), Disp: 21 tablet, Rfl: 0    metoprolol succinate XL (TOPROL-XL) 25 MG 24 hr tablet, TAKE ONE TABLET BY MOUTH EVERY EVENING, Disp: 90 tablet, Rfl: 1    Multiple Vitamin (MULTIVITAMIN) capsule, Take 1 capsule by mouth Daily. PT HOLDING FOR SURGERY, Disp: , Rfl:     multivitamin with minerals (PRESERVISION AREDS PO), Take 1 tablet by mouth Daily., Disp: , Rfl:     sertraline (ZOLOFT) 100 MG tablet, TAKE ONE TABLET BY MOUTH EVERY DAY, Disp: 90 tablet, Rfl: 1     Allergies:  No Known Allergies    Physical Examination:  LMP  (LMP Unknown)   General Appearance:  Patient is in no distress.  She is well kept and has an obese build.   Psychiatric:  Patient with appropriate mood and affect. Alert and oriented to self, time, and place.    Breast, RIGHT:  medium sized,  symmetric with the contralateral side. S/p oncoplastic reduction incisions by Dr Romero after a RIGHT bracketed lumpectomy in 8-2023, well healed.  Breast skin is without erythema, edema, rashes.  There are no visible abnormalities upon inspection during the arm-raising maneuver or with hands on hips in the sitting position. There is no nipple retraction, discharge or nipple/areolar skin changes. .There are no masses palpable in the sitting or supine positions.  There is some UOQ density related to the fat necrosis seen on mammogram. No discrete mass.      Breast, LEFT: medium sized,symmetric with the  contralateral side. S/p oncoplastic reduction incisions by Dr Romero after a RIGHT bracketed lumpectomy in  well healed.  Breast skin is without erythema, edema, rashes.  There are no visible abnormalities upon inspection during the arm-raising maneuver or with hands on hips in the sitting position. There is no nipple retraction, discharge or nipple/areolar skin changes. .There are no masses palpable in the sitting or supine positions.   Well-healed radial biopsy   for radial scar.     Lymphatic:  There is no axillary, cervical, infraclavicular, or supraclavicular adenopathy bilaterally.  Specifically left axilla there is no palpable adenopathy.  There is a well-healed dermatotomy from her remote  lymph node core biopsy.  Eyes:  Pupils are round and reactive to light.  Cardiovascular:  Heart rate and rhythm are regular.  Respiratory:  Lungs are clear bilaterally with no crackles or wheezes in any lung field.  Gastrointestinal:  Abdomen is soft, nondistended, and nontender.     Musculoskeletal:  Good strength in all 4 extremities.   There is good range of motion in both shoulders.    Skin:  No new skin lesions or rashes on the skin excluding the breast (see breast exam above).        Imagin/14/15 [facility]  MAMMO SCREEN EVITA  FLUHR, HEATHER  Benign dermal calcifications. BIRADS category 2: benign.    18 [facility]  MAMMO SCREEN EVITA  FLUHR, HEATHER  Scattered fibroglandular densities anterior one third retroareolar left breast interval development of a cluster of calcifications. BIRADS category 0.    8/3/18  [facility]  MAMMO DIAG LEFT  FLUHR, HEATHER  Microcalcifications within the outer inferior aspect of the left breast. BIRADS category 4.    2019 bilateral screening mammogram with 3D [facility]: Scattered fibroglandular densities.  No evidence for malignancy in either breast.  BI-RADS 1    [facility] mammography 2020 bilateral screening mammogram with  "tomosynthesis.  Scattered areas of fibroglandular density.  There is a 1 cm mass in the lower left axilla, axillary tail that is slightly increased in size.  BI-RADS 0  August 12, 2020 left diagnostic mammogram with left breast ultrasound [facility].  On mammogram visualization of a 1.1 cm round mass consistent with a lymph node in the axillary tail with a cortex that is thickened relative to other visualized node.  On ultrasound axillary tail and left axillary ultrasound showed on the order of 16 cm from the nipple a 1.1 cm lymph node with a thickened cortex.  Impression 1.1 cm rounded left axillary tail lymph node with a thickened cortex recommend ultrasound-guided left breast biopsy BI-RADS 4.    [facility] November 1, 2021 bilateral screening mammogram with tomosynthesis scattered fibroglandular densities.  BI-RADS 1  [facility]'s point March 23, 2023 bilateral screening mammogram with tomosynthesis.  Scattered areas of fibroglandular density.  Question architectural distortion lower outer middle right breast.  BI-RADS 0  Right diagnostic mammogram and right breast ultrasound April 27, 2023 [facility]:  Scattered areas of fibroglandular density.  Lower outer middle right breast there is a persistent area of distortion.  On ultrasound right breast 8:00, 5 cm from the nipple is a 9 x 8 mm irregular hypoechoic mass corresponding distortion which is suspicious.  BI-RADS 4  Also at 8:00, 1 cm from the nipple there is an incidental benign appearing ductal confluence.  We will get her scheduled for a same-day biopsy.    Reviewed Mrs. Mckeon\"s  MRI and postbiopsy mammogram together today with Dr. Tariq.  The lobular histology makes her MRI interpretation fairly difficult especially with the postbiopsy cavity in the center of the affected area.  When combining the 2 imaging studies there does appear to be a significant 8 cm clip displacement posteriorly.  The " "mammographic density prebiopsy appears to be the best representation of size estimation per Dr. Tariq, approximately 4.6 cm.  The lesion appears debulked on the postbiopsy mammogram and there are blood products and air in the center of the enhancement on the MRI however the clip is situated 8 cm posterior to this.      [facility] bilateral screening mammogram with tomosynthesis March 27, 2024 scattered fibroglandular densities.  2.9 cm area of fat necrosis right breast middle third upper outer.  BI-RADS 2        3/6/2025 bilateral screening mammogram at [facility]  BREAST  DENSITY:  There are scattered areas of fibroglandular density.  There are no suspicious masses, calcifications, or areas of  architectural distortion. There is postsurgical scarring in the upper  outer right breast unchanged from previous exams. There are no findings  in either breast to suggest malignancy.  IMPRESSION/RECOMMENDATION(S):  Benign mammogram showing no change from 3/25/2024  Recommend annual screening mammogram in one year.  BI-RADS Category 2: Benign           Pathology:   8-21-18- left breast stereotactic biopsy: Upper outer quadrant, 5:00: Small radial sclerosing lesion with usual hyperplasia, separate foci of usual hyperplasia, columnar cell change without atypia: Focal stromal fibrosis, apically metaplasia, and fibroadenomatoid change.     Concordant per Dr Tariq.        Final Diagnosis   BREAST, LEFT, DESIGNATED \"UPPER OUTER QUADRANT, APPROXIMATE 5 O'CLOCK POSITION\",   STEREOTACTIC BIOPSY:               SMALL RADIAL SCLEROSING LESION WITH ASSOCIATED DUCTAL HYPERPLASIA OF THE                      USUAL TYPE.               SEPARATE FOCI OF DUCTAL HYPERPLASIA OF THE USUAL TYPE.               FOCAL COLUMNAR CELL CHANGE WITHOUT ATYPIA.               FOCAL STROMAL FIBROSIS AND ASSOCIATED CALCIFICATIONS.               FOCAL APOCRINE METAPLASIA.               FOCAL FIBROADENOMATOID CHANGE WITH ASSOCIATED CALCIFICATIONS.               NO EVIDENCE OF " MALIGNANCY.     TDJ/ IHC/a/JULIA     CPT CODES:  1. 33955       Pathology from  10-24-18 excision radial scar returend as radial scar and no atypia.          Final Diagnosis   LEFT BREAST LUMPECTOMY:           BENIGN BREAST TISSUE WITH DUCT HYPERPLASIA WITHOUT ATYPIA, ADENOSIS, AND RADIAL                   SCLEROSING LESION (2 MM).          SCAR AND CHANGES OF PRIOR BIOPSY.     COMMENT: I do not detect atypia. The margin is free of radial sclerosing lesion.     K/  IHC/TDJ     Pathology from in office core biopsy 9-9-20 returned as :  Final Diagnosis   1. Lymph Node, Left Low Axillary Tail, Core Biopsy:               A. Core-like fragments of reactive appearing lymph node.               B. No morphologic evidence of lymphoma or carcinoma.     2. Lymph Node, Left Low Axillary Tail, Core Biopsy (Gross Diagnosis Only):               A. Core-like tissue submitted to [facility] for Flow Cytometric Analysis.                     Please see the Flow Cytometry Summary below.     [facility]       Flow cytometry from [facility] dated September 9, 2020 returned as immunophenotyping fails to reveal a monoclonal B cell or abnormal T-cell immunophenotype.  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  The significance of this is uncertain.  Correlation with tissue morphology with further studies as indicated is advised.     Pathology from an office biopsy May 18, 2023 returned as invasive lobular carcinoma, intermediate grade, 3, 2, 1, largest focus 1.2 cm.  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  No lymphovascular invasion.  Estrogen , progesterone 21-30, HER2/tommy 1+, Ki-67 8%.         Final Diagnosis    1. Right Breast, 8 o'clock, 5 cm from Nipple, Ultrasound-Guided Biopsy for a Mass: INVASIVE LOBULAR CARCINOMA.               A. [place] histologic score II (tubules = 3, nuclei = 2, mitosis = 1).               B. Invasive carcinoma  show involves multiple tissue cores with the largest contiguous focus measuring 12 mm.               C. 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               D. No lymphovascular nor perineural invasion identified.                                swm/pkm      Electronically signed by Traci Burr MD on 5/20/2023 at 1142    Synoptic Checklist    Breast Biomarker Reporting Template  Protocol posted: 3/22/2023  BREAST: BIOMARKER REPORTING TEMPLATE - All Specimens  Test(s) Performed       Estrogen Receptor (ER) Status   Positive (greater than 10% of cells demonstrate nuclear positivity)    Percentage of Cells with Nuclear Positivity   %    Average Intensity of Staining   Strong    Test Type   Food and Drug Administration (FDA) cleared (test / vendor): ventana    Primary Antibody   SP1    Scoring System   Jayant    Proportion Score   5    Intensity Score   3    Total Jayant Score   8    Test(s) Performed       Progesterone Receptor (PgR) Status   Positive    Percentage of Cells with Nuclear Positivity   21-30%    Average Intensity of Staining   Weak    Test Type   Food and Drug Administration (FDA) cleared (test / vendor): ventana    Primary Antibody   1E2    Scoring System   Jayant    Proportion Score   3    Intensity Score   1    Total Jayant Score   4    Test(s) Performed       HER2 by Immunohistochemistry   Negative (Score 1+)    Test Type   Food and Drug Administration (FDA) cleared (test / vendor): ventana    Primary Antibody   4B5    Test(s) Performed   Ki-67    Ki-67 Percentage of Positive Nuclei   8 %   Primary Antibody   30-9    Cold Ischemia and Fixation Times   Meet requirements specified in latest version of the ASCO / CAP Guidelines    Cold Ischemia Time (minutes)   2 min   Fixation Time (hours)   7.5 hours   Testing Performed on Block Number(s)   1A    METHODS   Fixative   Formalin    Image Analysis   Not performed    .      Comment       Representative slides from this case are reviewed internally with Dr. Pena, who concurs.               8-2-23 Pathology from bracketed needle localized lumpectomy with oncoplastic reduction returned as : 9mm invasive lobular carcinoma, int grade, 3,2,1, LCIS and ALH present, no LVI, margins widely clear. 0/3 nodes.   Path stage T1bN0- IA                 Final Diagnosis   1. Right Breast, Oriented Needle Localization Lumpectomy (129 grams): INVASIVE LOBULAR CARCINOMA.  A. Tumor site: Lower outer quadrant.               B. Histologic grade: Sanam histologic score II (tubules = 3, nuclei = 2, mitosis = 1).               C. Tumor size: 9 x 9 x 7 mm.               D. No ductal carcinoma in-situ identified.               E. 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               F.  No lymphovascular invasion identified.               G. Margins: Uninvolved by invasive carcinoma.                            1. Invasive carcinoma is present 20 mm from the original anterior margin, 30 mm from the medial, lateral,        posterior and superior margins and 60 mm from the inferior margin of excision in specimen 1 (see        synoptic template for final margin status).               H. Lymph Nodes: Three negative sentinel lymph nodes (see specimen 8 - 10).  I. Hormone receptor status: ER % positive, MN 21-30% positive, HER2 1+ negative, Ki-67 8%        (performed on prior biopsy [id number], slides reviewed).  J. Pathologic stage: pT1b, N(sn)0.     2. Right Breast, Additional Anterior Margin, Oriented Excision:               A. Benign breast tissue with usual ductal hyperplasia.               B. Final anterior margin free by an additional 10 mm.     3. Right Breast, Additional Superior Margin, Oriented Excision:               A. Benign breast tissue with adenosis.               B. Final superior margin free by an additional 15 mm.     4. Right Breast, Additional Lateral  Margin, Oriented Excision:               A. Benign unremarkable breast tissue.               B. Final lateral margin free by an additional 13 mm.     5. Right Breast, Additional Inferior Margin, Oriented Excision:               A. Benign unremarkable breast tissue.               B. Final inferior margin free by an additional 20 mm.     6. Right Breast, Additional Medial Margin, Oriented Excision:               A. Benign unremarkable breast tissue.               B. Final medial margin free by an additional 14 mm.     7.   Right Breast, Additional Posterior Margin, Oriented Excision:               A. Benign unremarkable breast tissue.               B. Final posterior margin free by an additional 10 mm.     8. [place] Lymph Node #1, Right Axilla, Excision:               A. One lymph node, negative for metastatic carcinoma (0/1).     9. [place] Lymph Node #2, Right Axilla, Excision:               A. One lymph node, negative for metastatic carcinoma (0/1).     10. [place] Lymph Node #2, Right Axilla, Excision:               A. One lymph node, negative for metastatic carcinoma (0/1).     11. Left Breast, Reduction Mammoplasty (211 grams):               A. Benign skin and breast tissue with sclerosing adenosis.               B. No atypical hyperplasia, in-situ nor invasive carcinoma identified.     12. Right Breast, Reduction Mammoplasty (209.6 grams):               A. Benign unremarkable skin and breast tissue.                 swm/pkm     Electronically signed by Traci Burr MD on 8/4/2023 at 1205   Synoptic Checklist   INVASIVE CARCINOMA OF THE BREAST: Resection   8th Edition - Protocol posted: 3/22/2023INVASIVE CARCINOMA OF THE BREAST: COMPLETE EXCISION - All Specimens         SPECIMEN   Procedure   Excision (less than total mastectomy)   Specimen Laterality   Right   TUMOR   Tumor Site   Lower outer quadrant   Histologic Type   Invasive lobular carcinoma   Histologic Grade (Sanam Histologic Score)        Glandular (Acinar) / Tubular Differentiation   Score 3   Nuclear Pleomorphism   Score 2   Mitotic Rate   Score 1   Overall Grade   Grade 2 (scores of 6 or 7)   Tumor Size   Greatest dimension of largest invasive focus (Millimeters): 9 mm   Additional Dimension (Millimeters)   9 mm       7 mm   Tumor Focality   Single focus of invasive carcinoma   Ductal Carcinoma In Situ (DCIS)   Not identified   Lobular Carcinoma In Situ (LCIS)   Not identified   Lymphatic and / or Vascular Invasion   Not identified   Dermal Lymphatic and / or Vascular Invasion   Not identified   Microcalcifications   Present in non-neoplastic tissue   Treatment Effect in the Breast   No known presurgical therapy   MARGINS   Distance from Invasive Carcinoma to Anterior Margin   30 mm   Distance from Invasive Carcinoma to Posterior Margin   40 mm   Distance from Invasive Carcinoma to Superior Margin   45 mm   Distance from Invasive Carcinoma to Inferior Margin   80 mm   Distance from Invasive Carcinoma to Medial Margin   43 mm   Distance from Invasive Carcinoma to Lateral Margin   43 mm   REGIONAL LYMPH NODES   Regional Lymph Node Status   All regional lymph nodes negative for tumor   Total Number of Lymph Nodes Examined (sentinel and non-sentinel)   3   Number of [place] Nodes Examined   3   pTNM CLASSIFICATION (AJCC 8th Edition)   Reporting of pT, pN, and (when applicable) pM categories is based on information available to the pathologist at the time the report is issued. As per the AJCC (Chapter 1, 8th Ed.) it is the managing physician’s responsibility to establish the final pathologic stage based upon all pertinent information, including but potentially not limited to this pathology report.   pT Category   pT1b   pN Category   pN0   N Suffix   (sn)   SPECIAL STUDIES           Estrogen Receptor (ER) Status   Positive (greater than 10% of cells demonstrate nuclear positivity)   Percentage of Cells with Nuclear Positivity   %            Progesterone Receptor (PgR) Status   Positive   Percentage of Cells with Nuclear Positivity   21-30%           HER2 (by immunohistochemistry)   Negative (Score 1+)           Ki-67 Percentage of Positive Nuclei   8 %   Testing Performed on Case Number   [id number]   .      Comment     A representative slide from part 1 of this case is reviewed internally with Dr. Pena, who concurs.                    Oncotype Dx score of 15 dated 8-21-23.                 Note from Dr. 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     The patient has no significant symptoms.  She has no palpable lymphadenopathy or splenomegaly.  Her white count was normal.  This certainly could be a reactive nisreen population.  We discussed potential additional evaluation with peripheral blood flow cytometry and T/Gel gene rearrangement study as well as potential CT scans.  The patient declines further evaluation at this time she is agreeable to a course of observation.  We will see her back in 3 months with a CBC and evaluate clinically.    [facility] breast and gynecology panel showed no mutations.  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  We will let her know no mutations.      Note from Dr. Feliciano August 22, 2023.  Patient is not a good candidate for aromatase inhibitor due to osteoporosis.  Patient declines tamoxifen due to risk of clot and endometrial cancer.  Patient is scheduled to see radiation oncology tomorrow.             Procedures:  Percutaneous ultrasound-guided vacuum-assisted core breast/axillary tail lymph node biopsy 9-8-20  Indication:  ultrasound-and mammo visible rounded node with thickened cortex  Location: LEFT axillary tail, lowaxilla  Consent:  The risks, benefits, and alternatives to the procedure were discussed with the patient, who understood and wished to proceed.  The risks  described included, but were not limited to, bleeding, infection, pneumothorax, and inadequate sampling requiring either repeat percutaneous or open excisional biopsy.  Description of Procedure:   After the patient was positioned supine on the procedure table, I located the lesion using ultrasound.  I prepped and draped the breast/axilla skin in sterile fashion.  I anesthetized the breast skin at the site of anticipated mammotomy with 1% lidocaine with epinephrine.  I then anesthetized the underlying subcutaneous tissue and breast parenchyma surrounding the lesion with 1% lidocaine with epinephrine under ultrasound visualization and guidance. I then made a nicking incision with an 11blade and inserted the 14 G celero biopsy device from inferolateral to superomedial through the lesion under ultrasound guidance.   I then took 4 core samples  of the lesion under direct visualization with ultrasound.   I placed 2 of these cores in formalin and 2 of them in a cup for fresh and sent to pathology.  I withdrew the probe and placed a hydromark marker into the residual rounded node..  We held manual compression for 10 minutes, placed steri -strips at the mammotomy site, and wrapped the patient in a 6 inch super ace wrap and a cold pack.  Marker placed: hydromark  Tolerance: The patient tolerated the procedure well.  Disposition: We will see her back within a week to review her pathology.      Percutaneous ultrasound-guided vacuum-assisted excisional breast biopsy 5-18-23  Indication:  ultrasound-visible breast mass with mammographic and palpable correlate, BR4  Location: RIGHT 8:00 5 CFN  Consent:  The risks, benefits, and alternatives to the procedure were discussed with the patient, who understood and wished to proceed.  The risks described included, but were not limited to, bleeding, infection, pneumothorax, and inadequate sampling requiring either repeat percutaneous or open excisional biopsy.  Description of Procedure:    "After the patient was positioned supine on the procedure table, I located the lesion using ultrasound.  I prepped and draped the breast skin in sterile fashion.  I anesthetized the breast skin at the site of anticipated mammotomy with 1% lidocaine with epinephrine.  I then anesthetized the underlying subcutaneous tissue and breast parenchyma surrounding the lesion with 1% lidocaine with epinephrine under ultrasound visualization and guidance. I then made a nicking incision with an 11blade and inserted the 10G encore biopsy device from inferolateral to superomedial under the lesion under ultrasound guidance.   I then took 10 sequential core samples using the automated sampling of the encore device, until the lesion mostly disappeared on ultrasound. I removed the probe, then placed a hydromark marker, using a stiff introducer, into the biopsy site. We held manual compression for 10 minutes, placed steri-strips at the mammotomy site, and wrapped the patient in a 6 inch super ace wrap with an ice-pack.  Marker placed: hydromark  Tolerance: The patient tolerated the procedure well.  Disposition: We will see her back within a week to review her pathology.      Assessment:  No diagnosis found.      1-2  RIGHT 8:00, 5 CFN- 1.75 cm on exam, distortion on mammogram, 9x8mm on ultrasound 5-2023  Target for ultrasound guided biopsy today  BR4    Pathology from an office biopsy May 18, 2023 returned as invasive lobular carcinoma, intermediate grade, 3, 2, 1, largest focus 1.2 cm.  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  No lymphovascular invasion.  Estrogen , progesterone 21-30, HER2/tommy 1+, Ki-67 8%.    Reviewed Mrs. Mckeon\"s  MRI and postbiopsy mammogram together with Dr. Tariq.     The lobular histology makes her MRI interpretation fairly difficult especially with the postbiopsy cavity in the center of the affected area.  When combining the 2 imaging studies there does appear to be a " significant 8 cm clip displacement posteriorly.  The mammographic density prebiopsy appears to be the best representation of size estimation per Dr. Tariq, approximately 4.6 cm.  The lesion appears debulked on the postbiopsy mammogram and there are blood products and air in the center of the enhancement on the MRI however the clip is situated 8 cm posterior to this.    Clinical stage T2N0- IIA    8-2-23 Pathology from bracketed needle localized lumpectomy with oncoplastic reduction returned as : 9mm invasive lobular carcinoma, int grade, 3,2,1, LCIS and ALH present, no LVI, margins widely clear. 0/3 nodes.   Path stage T1bN0- IA      Dr Feliciano- oncotype 8-21-23- 15- no chemotherapy, no  given due to risk of osteoporosis  Dr Ware- no radiation needed      3-    LEFt breast central RA- 1.5 cm cluster- asymptomatic- stereotactic biopsy August 21, 2018 lateral to the lower outer quadrant left breast returned as   8-21-18- left breast stereotactic biopsy:  5:00: Small radial sclerosing lesion with usual hyperplasia, separate foci of usual hyperplasia, columnar cell change without atypia: Focal stromal fibrosis, apically metaplasia, and fibroadenomatoid change.   Concordant per Dr Tariq.    Hydromark in vicinity of pre biopsy calcifications.    Pathology from  10-24-18 excision radial scar returned as radial scar and no atypia.  Fu mammogram 7-2019 BR1      4-  LEFT UOQ axillary tail/low axilla-0.1 cm rounded lymph node with thickened cortex seen on August 2020 mammogram and ultrasound BI-RADS 4.  Target for biopsy 9-9-20    Pathology from in office core biopsy 9-9-20 returned as :  Final Diagnosis   1. Lymph Node, Left Low Axillary Tail, Core Biopsy:               A. Core-like fragments of reactive appearing lymph node.               B. No morphologic evidence of lymphoma or carcinoma.     2. Lymph Node, Left Low Axillary Tail, Core Biopsy (Gross Diagnosis Only):               A. Core-like tissue submitted to [facility]  for Flow Cytometric Analysis.                     Please see the Flow Cytometry Summary below.     [facility]       Flow cytometry from [facility] dated September 9, 2020 returned as immunophenotyping fails to reveal a monoclonal B cell or abnormal T-cell immunophenotype.  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  The significance of this is uncertain.  Correlation with tissue morphology with further studies as indicated is advised.     -Addendum: Reviewed with Dr. Feliciano and he thinks that it would be best for him to just evaluate her in the clinic.     5-  Paternal 1/2 sister in her 70s  Paternal cousin in her 50s      [facility] breast and gynecology panel showed no mutations.  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      Today I spent 20 minutes doing the following: Reviewing records, labs, outside imaging and reports in preparation for the patient visit; obtaining medical history; performing the physical exam; counseling and educating the patient and any available family or caregivers; ordering medications, tests or procedures; coordinating care with any other physicians on her care team as needed, and documenting all of the above in the medical record as well as sending communications with her other healthcare professionals.    EMR Dragon/transcription disclaimer:    Much of this encounter note is an electronic transcription/translocation of spoken language to printed text.  The electronic translation of spoken language may permit erroneous, or at times, nonsensical words or phrases to be inadvertently transcribed.  Although I have reviewed the note from such areas, some may still exist.

## 2025-03-29 NOTE — PROGRESS NOTES
Subjective   Patient ID: Shu Mckeon is a 84 y.o. female is being seen for consultation today at the request of Brown Feliciano Jr., MD NP Neural foraminal stenosis of cervical spine MRI CERVICAL 10/24/24 [facility]     Treatment: No treatment    Today patient states pain her neck, stiffness, cracking sounds, numbness and tingling in both arms. No headaches.    Patient is with her daughter.  She has a history of breast cancer.  She also has a history of low back pain for which she has gotten epidural blocks and some arthritis in her knees.  About 6 months she has had mild amount of neck pain but she has been awakened by numbness and tingling in both arms, worse on the left.  Is not her hands but is  her entire arm and hand.  No Tinel's sign.  She does a mildly positive Spurling sign towards the right.  She has no motor deficits.  We discussed her cervical MRI which does show some multilevel foraminal stenosis which I think accounts for her symptoms.  No motor deficits.  No myelopathy.  Fortunately has not a lot of pain.  Will try some physical therapy including cervical traction to see if that helps.  We might consider home traction unit later but I like to see if traction with the therapist helps before we do that.  Will give her a soft cervical collar to use at night.  This is most bothersome to her at night.  She does not really feel it as much during the daytime.  I reassured her that there is no evidence of metastatic disease to her cervical spine.  Will see her in 3 months    History of Present Illness    The following portions of the patient's history were reviewed and updated as appropriate: allergies, current medications, past family history, past medical history, past social history, past surgical history, and problem list.    Review of Systems   All other systems reviewed and are negative.      Objective   Physical Exam  Constitutional:       General: She is awake.      Appearance: She is well-developed.    HENT:      Head: Normocephalic and atraumatic.   Eyes:      General: Lids are normal.      Extraocular Movements: Extraocular movements intact.      Conjunctiva/sclera: Conjunctivae normal.      Pupils: Pupils are equal, round, and reactive to light.   Neck:      Vascular: No carotid bruit.   Neurological:      Mental Status: She is alert.      Coordination: Coordination is intact.      Deep Tendon Reflexes:      Reflex Scores:       Tricep reflexes are 2+ on the right side and 2+ on the left side.       Bicep reflexes are 2+ on the right side and 2+ on the left side.       Brachioradialis reflexes are 2+ on the right side and 2+ on the left side.       Patellar reflexes are 2+ on the right side and 2+ on the left side.       Achilles reflexes are 2+ on the right side and 2+ on the left side.  Psychiatric:         Speech: Speech normal.       Neurological Exam  Mental Status  Awake and alert. Oriented only to person, place, time and situation. Recent and remote memory are intact. Speech is normal. Language is fluent with no aphasia. Attention and concentration are normal. Fund of knowledge is appropriate for level of education.    Cranial Nerves  CN II: Visual acuity is normal. Visual fields full to confrontation.  CN III, IV, VI: Extraocular movements intact bilaterally. Normal lids and orbits bilaterally. Pupils equal round and reactive to light bilaterally.  CN V: Facial sensation is normal.  CN VII: Full and symmetric facial movement.  CN IX, X: Palate elevates symmetrically. Normal gag reflex.  CN XI: Shoulder shrug strength is normal.  CN XII: Tongue midline without atrophy or fasciculations.    Motor  Normal muscle bulk throughout. Normal muscle tone.                                               Right                     Left  Rhomboids                            5                          5  Infraspinatus                          5                          5  Supraspinatus                       5                           5  Deltoid                                   5                          5   Biceps                                   5                          5  Brachioradialis                      5                          5   Triceps                                  5                          5   Pronator                                5                          5   Supinator                              5                           5   Wrist flexor                            5                          5   Wrist extensor                       5                          5   Finger flexor                          5                          5   Finger extensor                     5                          5   Interossei                              5                          5   Abductor pollicis brevis         5                          5   Flexor pollicis brevis             5                          5   Opponens pollicis                 5                          5  Extensor digitorum               5                          5  Abductor digiti minimi           5                          5   Abdominal                            5                          5  Glutei                                    5                          5  Hip abductor                         5                          5  Hip adductor                         5                          5   Iliopsoas                               5                          5   Quadriceps                           5                          5   Hamstring                             5                          5   Gastrocnemius                     5                           5   Anterior tibialis                      5                          5   Posterior tibialis                    5                          5   Peroneal                               5                          5  Ankle dorsiflexor                   5                          5  Ankle plantar  flexor              5                           5  Extensor hallucis longus      5                           5    Sensory  Light touch is normal in upper and lower extremities. Proprioception is normal in upper and lower extremities.     Reflexes                                            Right                      Left  Brachioradialis                    2+                         2+  Biceps                                 2+                         2+  Triceps                                2+                         2+  Finger flex                           2+                         2+  Hamstring                            2+                         2+  Patellar                                2+                         2+  Achilles                                2+                         2+    Coordination    Finger-to-nose, rapid alternating movements and heel-to-shin normal bilaterally without dysmetria.    Gait  Casual gait is normal including stance, stride, and arm swing.Normal toe walking. Normal heel walking. Normal tandem gait.       Assessment & Plan   Independent Review of Radiographic Studies:      The cervical MRI done on 10/24/2024 shows multilevel cervical stenosis with foraminal entrapment, worse at C4-C5 on the left but there is some bilateral foraminal narrowing bilaterally at C5-C6 and C6-C7 and C7-T1.  Agree with the report.    Medical Decision Making:      Will start with physical therapy working on things like mild traction and range of motion exercises.  We might consider a home cervical traction unit later.  A soft collar will be provided that she can use at night.  Will have her come back in 3 months for follow-up.  If the arm numbness and tingling turns into pain, she will let us know.      Diagnoses and all orders for this visit:    1. Cervical spinal stenosis (Primary)  -     Collar Cervical Foam LG 3IN  -     Ambulatory Referral to Physical Therapy for Evaluation & Treatment    2.  Cervical radiculopathy  -     Collar Cervical Foam LG 3IN  -     Ambulatory Referral to Physical Therapy for Evaluation & Treatment    3. Numbness and tingling of both upper extremities  -     Collar Cervical Foam LG 3IN  -     Ambulatory Referral to Physical Therapy for Evaluation & Treatment      Return in about 3 months (around 6/30/2025) for face to face.

## 2025-03-31 ENCOUNTER — TELEPHONE (OUTPATIENT)
Dept: INTERNAL MEDICINE | Facility: CLINIC | Age: 85
End: 2025-03-31

## 2025-03-31 ENCOUNTER — OFFICE VISIT (OUTPATIENT)
Dept: NEUROSURGERY | Facility: CLINIC | Age: 85
End: 2025-03-31
Payer: MEDICARE

## 2025-03-31 VITALS — HEART RATE: 71 BPM | OXYGEN SATURATION: 95 % | DIASTOLIC BLOOD PRESSURE: 78 MMHG | SYSTOLIC BLOOD PRESSURE: 124 MMHG

## 2025-03-31 DIAGNOSIS — M54.12 CERVICAL RADICULOPATHY: ICD-10-CM

## 2025-03-31 DIAGNOSIS — R20.2 NUMBNESS AND TINGLING OF BOTH UPPER EXTREMITIES: ICD-10-CM

## 2025-03-31 DIAGNOSIS — R20.0 NUMBNESS AND TINGLING OF BOTH UPPER EXTREMITIES: ICD-10-CM

## 2025-03-31 DIAGNOSIS — M48.02 CERVICAL SPINAL STENOSIS: Primary | ICD-10-CM

## 2025-03-31 PROCEDURE — 99204 OFFICE O/P NEW MOD 45 MIN: CPT | Performed by: NEUROLOGICAL SURGERY

## 2025-03-31 PROCEDURE — 1160F RVW MEDS BY RX/DR IN RCRD: CPT | Performed by: NEUROLOGICAL SURGERY

## 2025-03-31 PROCEDURE — 1159F MED LIST DOCD IN RCRD: CPT | Performed by: NEUROLOGICAL SURGERY

## 2025-03-31 NOTE — TELEPHONE ENCOUNTER
Transferred from the [facility], PT believed she has UTI and wants to see Dr. Fallon. Advised Dr. Fallon does not have opening until Wed but could be seen at Urgent Care. PT does not want to wait, req to speak with Araceli.

## 2025-04-02 ENCOUNTER — OFFICE VISIT (OUTPATIENT)
Dept: INTERNAL MEDICINE | Facility: CLINIC | Age: 85
End: 2025-04-02
Payer: MEDICARE

## 2025-04-02 VITALS
BODY MASS INDEX: 30.59 KG/M2 | HEIGHT: 64 IN | HEART RATE: 83 BPM | WEIGHT: 179.2 LBS | SYSTOLIC BLOOD PRESSURE: 114 MMHG | OXYGEN SATURATION: 95 % | TEMPERATURE: 98.8 F | DIASTOLIC BLOOD PRESSURE: 62 MMHG

## 2025-04-02 DIAGNOSIS — N39.0 URINARY TRACT INFECTION WITHOUT HEMATURIA, SITE UNSPECIFIED: Primary | ICD-10-CM

## 2025-04-02 LAB
BILIRUB BLD-MCNC: NEGATIVE MG/DL
CLARITY, POC: ABNORMAL
COLOR UR: YELLOW
EXPIRATION DATE: ABNORMAL
GLUCOSE UR STRIP-MCNC: NEGATIVE MG/DL
KETONES UR QL: ABNORMAL
LEUKOCYTE EST, POC: ABNORMAL
Lab: ABNORMAL
NITRITE UR-MCNC: NEGATIVE MG/ML
PH UR: 6.5 [PH] (ref 5–8)
PROT UR STRIP-MCNC: ABNORMAL MG/DL
RBC # UR STRIP: ABNORMAL /UL
SP GR UR: 1.02 (ref 1–1.03)
UROBILINOGEN UR QL: ABNORMAL

## 2025-04-02 PROCEDURE — 1125F AMNT PAIN NOTED PAIN PRSNT: CPT | Performed by: INTERNAL MEDICINE

## 2025-04-02 PROCEDURE — 1159F MED LIST DOCD IN RCRD: CPT | Performed by: INTERNAL MEDICINE

## 2025-04-02 PROCEDURE — 1160F RVW MEDS BY RX/DR IN RCRD: CPT | Performed by: INTERNAL MEDICINE

## 2025-04-02 PROCEDURE — 99214 OFFICE O/P EST MOD 30 MIN: CPT | Performed by: INTERNAL MEDICINE

## 2025-04-02 PROCEDURE — 81003 URINALYSIS AUTO W/O SCOPE: CPT | Performed by: INTERNAL MEDICINE

## 2025-04-02 RX ORDER — CEPHALEXIN 500 MG/1
500 CAPSULE ORAL 2 TIMES DAILY
Qty: 14 CAPSULE | Refills: 0 | Status: SHIPPED | OUTPATIENT
Start: 2025-04-02

## 2025-04-06 LAB
BACTERIA UR CULT: ABNORMAL
BACTERIA UR CULT: ABNORMAL
OTHER ANTIBIOTIC SUSC ISLT: ABNORMAL

## 2025-04-07 ENCOUNTER — OFFICE VISIT (OUTPATIENT)
Dept: SURGERY | Facility: CLINIC | Age: 85
End: 2025-04-07
Payer: MEDICARE

## 2025-04-07 VITALS
DIASTOLIC BLOOD PRESSURE: 88 MMHG | OXYGEN SATURATION: 94 % | WEIGHT: 179 LBS | SYSTOLIC BLOOD PRESSURE: 138 MMHG | HEIGHT: 64 IN | BODY MASS INDEX: 30.56 KG/M2 | HEART RATE: 77 BPM

## 2025-04-07 DIAGNOSIS — Z12.31 ENCOUNTER FOR SCREENING MAMMOGRAM FOR MALIGNANT NEOPLASM OF BREAST: Primary | ICD-10-CM

## 2025-04-07 PROCEDURE — 99213 OFFICE O/P EST LOW 20 MIN: CPT | Performed by: NURSE PRACTITIONER

## 2025-04-07 PROCEDURE — 1160F RVW MEDS BY RX/DR IN RCRD: CPT | Performed by: NURSE PRACTITIONER

## 2025-04-07 PROCEDURE — 1159F MED LIST DOCD IN RCRD: CPT | Performed by: NURSE PRACTITIONER

## 2025-04-07 PROCEDURE — G2211 COMPLEX E/M VISIT ADD ON: HCPCS | Performed by: NURSE PRACTITIONER

## 2025-04-14 DIAGNOSIS — R19.7 DIARRHEA FOLLOWING GASTROINTESTINAL SURGERY: ICD-10-CM

## 2025-04-14 DIAGNOSIS — Z90.49 HISTORY OF CHOLECYSTECTOMY: ICD-10-CM

## 2025-04-14 DIAGNOSIS — Z98.890 DIARRHEA FOLLOWING GASTROINTESTINAL SURGERY: ICD-10-CM

## 2025-04-14 RX ORDER — ATORVASTATIN CALCIUM 40 MG/1
40 TABLET, FILM COATED ORAL
Qty: 90 TABLET | Refills: 1 | Status: SHIPPED | OUTPATIENT
Start: 2025-04-14

## 2025-04-14 RX ORDER — COLESTIPOL HYDROCHLORIDE 1 G/1
1 TABLET ORAL 2 TIMES DAILY
Qty: 60 TABLET | Refills: 5 | OUTPATIENT
Start: 2025-04-14

## 2025-04-14 RX ORDER — LOSARTAN POTASSIUM 50 MG/1
50 TABLET ORAL DAILY
Qty: 90 TABLET | Refills: 1 | Status: SHIPPED | OUTPATIENT
Start: 2025-04-14

## 2025-04-14 RX ORDER — METOPROLOL SUCCINATE 25 MG/1
25 TABLET, EXTENDED RELEASE ORAL EVERY EVENING
Qty: 90 TABLET | Refills: 1 | Status: SHIPPED | OUTPATIENT
Start: 2025-04-14

## 2025-04-14 RX ORDER — SERTRALINE HYDROCHLORIDE 100 MG/1
100 TABLET, FILM COATED ORAL DAILY
Qty: 90 TABLET | Refills: 1 | OUTPATIENT
Start: 2025-04-14

## 2025-04-15 ENCOUNTER — TREATMENT (OUTPATIENT)
Dept: PHYSICAL THERAPY | Facility: CLINIC | Age: 85
End: 2025-04-15
Payer: MEDICARE

## 2025-04-15 DIAGNOSIS — M54.2 PAIN, NECK: ICD-10-CM

## 2025-04-15 DIAGNOSIS — M48.02 CERVICAL STENOSIS OF SPINE: Primary | ICD-10-CM

## 2025-04-15 PROCEDURE — 97110 THERAPEUTIC EXERCISES: CPT | Performed by: PHYSICAL THERAPIST

## 2025-04-15 PROCEDURE — 97161 PT EVAL LOW COMPLEX 20 MIN: CPT | Performed by: PHYSICAL THERAPIST

## 2025-04-15 PROCEDURE — 97530 THERAPEUTIC ACTIVITIES: CPT | Performed by: PHYSICAL THERAPIST

## 2025-04-15 NOTE — PROGRESS NOTES
Physical Therapy Initial Evaluation and Plan of Care        Patient: Shu Mckeon   : 1940  Diagnosis/ICD-10 Code:  Cervical stenosis of spine [M48.02]  Referring practitioner: Bhargav Hylton MD  Date of Initial Visit: 4/15/2025  Today's Date: 4/15/2025  Patient seen for 1 sessions           Subjective Questionnaire: Oswestry: 20/50 or 40% disability      Subjective Evaluation    History of Present Illness  Mechanism of injury: Shu is a 84 year old female who presents with neck pain and numbness/tingling in her arms/hands. She is sleeping in a collar at night. She sleeps on her side (R) and she can't hardly lay on her arm anymore. She had lumpectomy and reconstruction of bilateral breasts on 2023 and is being monitored for R lymphedema. She goes in a week to get measured again. She's had a lot of R shoulder pain in the last couple days. She experiences N/T in both hands in her last 2-3 fingers. She can't hardly look over her shoulder, rotating her neck causes cracking and popping. She is noticing difficulty with buttons. She takes ibuprofen/tylenol as needed.    Pain  Current pain ratin  Quality: needle-like, sharp and dull ache  Relieving factors: ice and heat  Aggravating factors: overhead activity, lifting, movement, stairs, prolonged positioning, outstretched reach and repetitive movement    Hand dominance: right    Treatments  Current treatment: physical therapy  Patient Goals  Patient goals for therapy: decreased pain, improved balance, increased motion, increased strength, independence with ADLs/IADLs and return to sport/leisure activities           Objective          Postural Observations  Seated posture: poor  Standing posture: poor  Correction of posture: makes symptoms better      Active Range of Motion   Cervical/Thoracic Spine   Cervical    Flexion: 43 degrees   Extension: 20 degrees with pain  Left lateral flexion: 25 degrees   Right lateral flexion: 15 degrees   Left  rotation: 42 degrees   Right rotation: 55 degrees     Thoracic   Left rotation: 25 degrees   Right rotation: 25 degrees   Left Shoulder   Flexion: 130 degrees   Abduction: 125 degrees with pain    Right Shoulder   Flexion: 130 degrees with pain  Abduction: 125 degrees with pain    Additional Active Range of Motion Details  Thoracic rotation ROM reported as a percentage of normal range.      Strength/Myotome Testing     Left Shoulder     Planes of Motion   Flexion: 4+   Abduction: 4+     Right Shoulder     Planes of Motion   Flexion: 4   Abduction: 4+     Left Elbow   Flexion: 4+  Extension: 4+    Right Elbow   Flexion: 4+  Extension: 4+    Left Wrist/Hand   Wrist extension: 4+  Wrist flexion: 4+     (2nd hand position)     Trial 1: 35 lbs    Trial 2: 25 lbs    Trial 3: 31 lbs    Average: 30.33 lbs    Right Wrist/Hand   Wrist extension: 4+  Wrist flexion: 4+     (2nd hand position)     Trial 1: 20 lbs    Trial 2: 25 lbs    Trial 3: 20 lbs    Average: 21.67 lbs    Tests   Cervical     Left   Positive ULTT3.     Right   Positive ULTT3.     Right Shoulder   Positive Neer's and painful arc.   Negative drop arm, Hawkin's and passive horizontal adduction.           Assessment & Plan       Assessment  Impairments: abnormal muscle firing, abnormal muscle tone, abnormal or restricted ROM, activity intolerance, impaired physical strength, lacks appropriate home exercise program and pain with function   Functional limitations: carrying objects, lifting, sleeping, uncomfortable because of pain, moving in bed, standing, reaching overhead and unable to perform repetitive tasks   Assessment details: Pt exhibits the following functional limitations: limited cervical ROM, decreased strength of neck and postural musculature, poor posture, tenderness to palpation of cervical structures. Pt's signs and symptoms are consistent with referring diagnosis. Pt would benefit from additional skilled therapy to address the problems  listed above and in order to return to prior level of functional with minimal limitations and decreased pain.  Prognosis: good    Goals  Plan Goals: Short Term Goals (achieve in 4 weeks):  1. Pt will report decreased current pain to 3/10 or less.  2. Pt will be I with HEP.  3. Pt will exhibit cervical AROM rotation to 60 degrees bilaterally in order to drive/check blind spot with no difficulty.  4. Patient will exhibit no positive median or ulnar nerve tension tests to show decreased neural tension/paresthesias.  Long Term Goals (achieve 6-8 weeks):  5. Pt will exhibit improved posture by visual examination.   6. Pt will improve tenderness to palpation of upper trapezius, levator scapulae, scalenes and suboccipitals to no tenderness.  7. Pt will report Oswestry score less than 10/50.  8. Patient will report worst pain 5/10 or less.    Plan  Therapy options: will be seen for skilled therapy services  Planned modality interventions: cryotherapy, TENS, thermotherapy (hydrocollator packs), traction, ultrasound, iontophoresis, dry needling, electrical stimulation/Russian stimulation, high voltage pulsed current (dermal wound therapy), high voltage pulsed current (pain management) and high voltage pulsed current (spasm management)  Planned therapy interventions: ADL retraining, manual therapy, motor coordination training, neuromuscular re-education, postural training, soft tissue mobilization, spinal/joint mobilization, strengthening, stretching, therapeutic activities, joint mobilization, IADL retraining, home exercise program, functional ROM exercises and flexibility  Frequency: 2x week  Duration in weeks: 8  Treatment plan discussed with: patient        Visit Diagnoses:    ICD-10-CM ICD-9-CM   1. Cervical stenosis of spine  M48.02 723.0   2. Pain, neck  M54.2 723.1       Timed:  Manual Therapy:         mins  68414;  Therapeutic Exercise:    10     mins  85294;     Neuromuscular Tahira:        mins  38797;    Therapeutic  Activity:     15     mins  79143;     Gait Training:           mins  81563;     Ultrasound:          mins  36893;    Electrical Stimulation:         mins  20321 ( );    Untimed:  Electrical Stimulation:         mins  35973 ( );  Mechanical Traction:         mins  08233;     Timed Treatment:   25   mins   Total Treatment:     60   mins    PT SIGNATURE: Pasquale Nagy PT, DPT, OCS  DATE TREATMENT INITIATED: 4/15/2025      Initial Certification  Certification Period: 7/14/2025  I certify that the therapy services are furnished while this patient is under my care.  The services outlined above are required by this patient, and will be reviewed every 90 days.     PHYSICIAN: Bhargav Hylton MD      DATE:     Please sign and return via fax to [phone].. Thank you, [facility].

## 2025-04-18 ENCOUNTER — TREATMENT (OUTPATIENT)
Dept: PHYSICAL THERAPY | Facility: CLINIC | Age: 85
End: 2025-04-18
Payer: MEDICARE

## 2025-04-18 DIAGNOSIS — M48.02 CERVICAL STENOSIS OF SPINE: Primary | ICD-10-CM

## 2025-04-18 DIAGNOSIS — M54.2 PAIN, NECK: ICD-10-CM

## 2025-04-18 NOTE — PROGRESS NOTES
Physical Therapy Daily Treatment Note  [facility] [place]   [address], [address]  [place] [address]  P: [phone]  F: [phone]    Patient: Shu Mckeon   : 1940  Referring practitioner: Bhargav Hylton MD  Date of Initial Visit: Type: THERAPY  Noted: 4/15/2025  Today's Date: 2025  Patient seen for 2 sessions       Visit Diagnoses:    ICD-10-CM ICD-9-CM   1. Cervical stenosis of spine  M48.02 723.0   2. Pain, neck  M54.2 723.1         Subjective     Shu Mckeon reports: Have not experienced any N&T in my hands since my Dr started having me sleep in a neck brace. Rates neck/shoulder pain a 4/10. Chief complaint is R lateral shoulder, with pain radiating down lateral arm to elbow.        Objective   See Exercise, Manual, and Modality Logs for complete treatment.       Assessment:  Pt responded positively to trial of cervical traction following STM and exercises this visit. Gentle cervical ROM added, with no adverse effects associated. Pt continues to have restrictions in lateral rotation and lateral flexion, bilaterally. Pt will continue to benefit from gentle strengthening and ROM interventions to address.         Plan:  Progress per Plan of Care            Timed:         Manual Therapy:    15     mins  46421;     Therapeutic Exercise:         mins  92956;     Neuromuscular Tahira:        mins  12214;    Therapeutic Activity:     15     mins  40485;     Gait Training:           mins  52237;     Ultrasound:          mins  09824;    Ionto                                   mins  43387  Self Care                            mins  10564    Un-Timed:  Electrical Stimulation:         mins  95422 ( );  Traction     10     mins 29843        Timed Treatment:   30   mins   Total Treatment:     57   mins      Elpidio Sands, Physical Therapist Assistant  [place] License #: [id number]

## 2025-04-22 ENCOUNTER — TREATMENT (OUTPATIENT)
Dept: PHYSICAL THERAPY | Facility: CLINIC | Age: 85
End: 2025-04-22
Payer: MEDICARE

## 2025-04-22 DIAGNOSIS — M54.2 PAIN, NECK: ICD-10-CM

## 2025-04-22 DIAGNOSIS — Z90.49 HISTORY OF CHOLECYSTECTOMY: ICD-10-CM

## 2025-04-22 DIAGNOSIS — Z98.890 DIARRHEA FOLLOWING GASTROINTESTINAL SURGERY: ICD-10-CM

## 2025-04-22 DIAGNOSIS — R19.7 DIARRHEA FOLLOWING GASTROINTESTINAL SURGERY: ICD-10-CM

## 2025-04-22 DIAGNOSIS — M48.02 CERVICAL STENOSIS OF SPINE: Primary | ICD-10-CM

## 2025-04-22 PROCEDURE — 97530 THERAPEUTIC ACTIVITIES: CPT | Performed by: PHYSICAL THERAPIST

## 2025-04-22 PROCEDURE — 97012 MECHANICAL TRACTION THERAPY: CPT | Performed by: PHYSICAL THERAPIST

## 2025-04-22 PROCEDURE — 97110 THERAPEUTIC EXERCISES: CPT | Performed by: PHYSICAL THERAPIST

## 2025-04-22 PROCEDURE — 97140 MANUAL THERAPY 1/> REGIONS: CPT | Performed by: PHYSICAL THERAPIST

## 2025-04-22 RX ORDER — COLESTIPOL HYDROCHLORIDE 1 G/1
1 TABLET ORAL 2 TIMES DAILY
Qty: 60 TABLET | Refills: 5 | OUTPATIENT
Start: 2025-04-22

## 2025-04-22 NOTE — PROGRESS NOTES
Physical Therapy Daily Treatment Note  [facility] [place]   [address], [address]  [place] [address]  P: [phone]  F: [phone]    Patient: Shu Mckeon   : 1940  Referring practitioner: Bhargav Hylton MD  Date of Initial Visit: Type: THERAPY  Noted: 4/15/2025  Today's Date: 2025  Patient seen for 3 sessions       Visit Diagnoses:    ICD-10-CM ICD-9-CM   1. Cervical stenosis of spine  M48.02 723.0   2. Pain, neck  M54.2 723.1         Subjective     Shu Mckeon reports: My neck hurts very badly. It is not bothersome at night when I have my brace on. Without my brace it hurts.         Objective   See Exercise, Manual, and Modality Logs for complete treatment.       Assessment:  Continued with manual PROM and mechanical cervical traction, secondary to subjective reports of decreased cervical pain following last visit. Introduced new postural strengthening exercises for improved stability to C-spine, which pt tolerated with no adverse effects. Pt lives in Independent Living with access to a [employer] machine. She would like to start learning how to exercise with it in next several visits.         Plan:  Progress per Plan of Care            Timed:         Manual Therapy:    10     mins  17492;     Therapeutic Exercise:    20     mins  22363;     Neuromuscular Tahira:        mins  88681;    Therapeutic Activity:     18     mins  63444;     Gait Training:           mins  19352;     Ultrasound:          mins  48167;    Ionto                                   mins  96819  Self Care                            mins  17960    Un-Timed:  Electrical Stimulation:         mins  79428 ( );  Traction     10     mins 26117        Timed Treatment:   48   mins   Total Treatment:     58   mins      Elpidio Sands, Physical Therapist Assistant  [place] License #: [id number]

## 2025-04-24 ENCOUNTER — TELEPHONE (OUTPATIENT)
Dept: PHYSICAL THERAPY | Facility: CLINIC | Age: 85
End: 2025-04-24

## 2025-04-24 NOTE — TELEPHONE ENCOUNTER
Caller: Shu Mckeon    Relationship: Self         What was the call regarding: NOT FEELING WELL

## 2025-04-25 ENCOUNTER — TRANSCRIBE ORDERS (OUTPATIENT)
Dept: PHYSICAL THERAPY | Facility: HOSPITAL | Age: 85
End: 2025-04-25
Payer: MEDICARE

## 2025-04-25 DIAGNOSIS — C50.911 LOBULAR BREAST CANCER, RIGHT: Primary | ICD-10-CM

## 2025-04-28 ENCOUNTER — HOSPITAL ENCOUNTER (OUTPATIENT)
Dept: PHYSICAL THERAPY | Facility: HOSPITAL | Age: 85
Setting detail: THERAPIES SERIES
Discharge: HOME | End: 2025-04-28
Payer: MEDICARE

## 2025-04-28 DIAGNOSIS — Z91.89 AT RISK FOR LYMPHEDEMA: Primary | ICD-10-CM

## 2025-04-28 DIAGNOSIS — Z98.890 S/P LUMPECTOMY, RIGHT BREAST: ICD-10-CM

## 2025-04-28 DIAGNOSIS — C50.911 LOBULAR BREAST CANCER, RIGHT: ICD-10-CM

## 2025-04-28 PROCEDURE — 97535 SELF CARE MNGMENT TRAINING: CPT

## 2025-04-28 PROCEDURE — 93702 BIS XTRACELL FLUID ANALYSIS: CPT

## 2025-04-28 NOTE — THERAPY RE-EVALUATION
"  Physical Therapy Lymphedema Re-Evaluation  [facility]     Patient Name: Shu Mckeon  : 1940  MRN: [id number]  Today's Date: 2025      Visit Date: 2025    Visit Dx:    ICD-10-CM ICD-9-CM   1. At risk for lymphedema  Z91.89 V49.89   2. Lobular breast cancer, right  C50.911 174.9   3. S/P lumpectomy, right breast  Z98.890 V45.89       Patient Active Problem List   Diagnosis    Diabetes type 2, controlled    Fatigue    HLD (hyperlipidemia)    BP (high blood pressure)    OP (osteoporosis)    Low back pain    Lumbar spondylolysis    Radial scar of breast    Reactive lymphadenopathy    Calculus of gallbladder with acute cholecystitis without obstruction    Encounter for screening mammogram for breast cancer    Spondylosis of lumbar region without myelopathy or radiculopathy    Personal history of colonic polyps    Diarrhea    Change in bowel habits    Lobular breast cancer, right    Radiculopathy affecting upper extremity    Lumbar facet arthropathy    Cervical spinal stenosis    Cervical radiculopathy    Numbness and tingling of both upper extremities        Past Medical History:   Diagnosis Date    Arthritis     Breast cancer     right breast    Chronic low back pain     Depression     HISTORY OF BEING \"EMOTIONAL\"    Encounter for screening mammogram for breast cancer 2021    Fatigue     GERD (gastroesophageal reflux disease)     H/O cardiovascular stress test 2018    NORMAL, DR ARIAS    History of diverticulitis     Hyperlipidemia     Hypertension     Impaired fasting glucose     Left bundle branch block     Macular degeneration     EVITA    Osteoporosis     Radial scar of breast     LEFT    Status post ORIF of fracture of ankle     Thyroid nodule     Urinary frequency         Past Surgical History:   Procedure Laterality Date    BREAST BIOPSY Left 10/25/2018    Procedure: left breast ultrasound-guided excision of radial scar.;  Surgeon: Shayna Blount MD;  Location: [facility] OR " [facility];  Service: General    BREAST LUMPECTOMY WITH SENTINEL NODE BIOPSY Right 08/02/2023    Procedure: RIGHT breast bracketed needle localized lumpectomy AND RIGHT axilla sentinel node biopsy;  Surgeon: Shayna Blount MD;  Location: [facility];  Service: General;  Laterality: Right;    BREAST SURGERY Bilateral 08/02/2023    Procedure: RIGHT BREAST ONCOPLASTIC REDUCTION AND LEFT BREAST REDUCTION FOR SYMMETRY;  Surgeon: Greta Romero MD;  Location: [facility] OR;  Service: Plastics;  Laterality: Bilateral;    CARDIAC CATHETERIZATION      not sure when or where    CARDIAC CATHETERIZATION      CATARACT EXTRACTION WITH INTRAOCULAR LENS IMPLANT      CHOLECYSTECTOMY WITH INTRAOPERATIVE CHOLANGIOGRAM N/A 10/31/2020    Procedure: CHOLECYSTECTOMY LAPAROSCOPIC INTRAOPERATIVE CHOLANGIOGRAM;  Surgeon: Ruth Del Valle MD;  Location: [facility];  Service: General;  Laterality: N/A;    COLONOSCOPY      COLONOSCOPY N/A 10/5/2023    Procedure: COLONOSCOPY  INTO TERMINAL ILEUM WITH BX AND POLYPECTOMY;  Surgeon: James Townsend MD;  Location: [facility] ENDOSCOPY;  Service: Gastroenterology;  Laterality: N/A;  PREOP/ DIARRHEA, HX POLYPS- POSTOP/ DIVERTICULOSIS, POLYP, HEMORRHOIDS    FEMUR FRACTURE SURGERY Right     FEMUR    FRACTURE SURGERY  2015    LT ANKLE    MEDIAL BRANCH BLOCK Right 05/20/2022    Procedure: LUMBAR MEDIAL BRANCH BLOCK Right L4-S1;  Surgeon: Pamlea Spaulding MD;  Location: [facility] OR;  Service: Pain Management;  Laterality: Right;    REPLACEMENT TOTAL KNEE Right        Visit Dx:    ICD-10-CM ICD-9-CM   1. At risk for lymphedema  Z91.89 V49.89   2. Lobular breast cancer, right  C50.911 174.9   3. S/P lumpectomy, right breast  Z98.890 V45.89            Lymphedema       Row Name 04/28/25 1400             Subjective Pain    Able to rate subjective pain? yes  -LB      Pre-Treatment Pain Level 0  -LB      Post-Treatment Pain Level 0  -LB         Subjective    Subjective Comments I am doing ok. My knee  and my hands are giving me fits.  -LB         Lymphedema Assessment    Lymphedema Classification RUE:;at risk/stage 0  -LB      Lymphedema Cancer Related Sx right;lumpectomy;sentinel node biopsy  -LB      Lymphedema Surgery Comments 8/2/23  -LB      Lymph Nodes Removed # 3  -LB      Positive Lymph Nodes # 0  -LB      Chemo Received no  -LB      Radiation Therapy Received no  -LB      Infections or Cellulitis? no  -LB         Posture/Observations    Posture/Observations Comments forward head, rounded shoulders  -LB         LUE Quick Girth (cm)    Axilla 33.9 cm  -LB      Mid upper arm 32.3 cm  -LB      Elbow 26.7 cm  -LB      Mid forearm 22 cm  -LB      Wrist crease 15.7 cm  -LB      Web space 19 cm  -LB      Met-heads 19.2 cm  -LB      LUE Quick Girth Total 168.8  -LB         RUE Quick Girth (cm)    Axilla 33.7 cm  -LB      Mid upper arm 32.4 cm  -LB      Elbow 25.8 cm  -LB      Mid forearm 22 cm  -LB      Wrist crease 15.5 cm  -LB      Web space 18.4 cm  -LB      Met-heads 18.7 cm  -LB      RUE Quick Girth Total 166.5  -LB         L-Dex Bioimpedence Screening    L-Dex Measurement Extremity RUE  -LB      L-Dex Patient Position Standing  -LB      L-Dex UE Dominate Side Right  -LB      L-Dex UE At Risk Side Right  -LB      L-Dex UE Pre Surgical Value No  -LB      L-Dex UE Score 1.7  -LB      L-Dex UE Baseline Score 4  -LB      L-Dex UE Value Change -2.3  -LB      L-Dex UE Comment WNL  -LB      $ L-Dex Charge yes  -LB                User Key  (r) = Recorded By, (t) = Taken By, (c) = Cosigned By      Initials Name Provider Type    Mary Jo Ellison PT Physical Therapist                                    Therapy Education  Education Details: reviewed s/s of lymphedema, steps to prevention, bioimpedance results and interpretation  Given: Symptoms/condition management, Edema management  Program: Reinforced  How Provided: Verbal  Provided to: Patient  Level of Understanding: Verbalized  75278 - PT Self Care/Mgmt Minutes:  10       OP Exercises       Row Name 04/28/25 1400             Subjective    Subjective Comments I am doing ok. My knee and my hands are giving me fits.  -LB         Subjective Pain    Able to rate subjective pain? yes  -LB      Pre-Treatment Pain Level 0  -LB      Post-Treatment Pain Level 0  -LB                User Key  (r) = Recorded By, (t) = Taken By, (c) = Cosigned By      Initials Name Provider Type    Mary Jo Ellison, PT Physical Therapist                                 PT OP Goals       Row Name 04/28/25 1500          Long Term Goals    LTG Date to Achieve 10/12/23  -LB     LTG 1 Pt will maintain LDex bioimpedance score WNL.  -LB     LTG 1 Progress Ongoing  -LB     LTG 1 Progress Comments WNL at 1.7  -LB     LTG 2 Pt will verbalize s/s of lymphedema and steps to prevention.  -LB     LTG 2 Progress Ongoing  -LB     LTG 2 Progress Comments reviewed today  -LB     LTG 3 Pt will verbalize improvement in R breast pain to minimal.  -LB     LTG 3 Progress Met  -LB               User Key  (r) = Recorded By, (t) = Taken By, (c) = Cosigned By      Initials Name Provider Type    Mary Jo Ellison, PT Physical Therapist                     PT Assessment/Plan       Row Name 04/28/25 1514          PT Assessment    Functional Limitations Other (comment)  at risk for lymphedema  -LB     Impairments Impaired flexibility;Impaired lymphatic circulation;Pain;Range of motion;Sensation  -LB     Assessment Comments Pt returns for 6 month follow up reporting B hand tingling and L knee pain but denies issues with RUE. She did have one episode of inc RUE pain that has now resolved. LDex bioimpedance score is stable and WNL at 1.7. She denies s/s of lymphedema and no obvious edema today. We reviewed s/s of lymphedema and discussed reasons to return to clinic prior to scheduled appt. She has met 1/3 LTGs and is making progress towards remaining goals. She remains appropriate for continued skilled PT services to monitor RUE and will  return to clinic for reassessment in 6 months unless symptoms present sooner.  -LB     Rehab Potential Good  -LB     Patient/caregiver participated in establishment of treatment plan and goals Yes  -LB     Patient would benefit from skilled therapy intervention Yes  -LB        PT Plan    PT Frequency Other (comment)  -LB     Predicted Duration of Therapy Intervention (PT) repeat bioimpedance in 6 months  -LB     Planned CPT's? PT EVAL LOW COMPLEXITY: 23993;PT RE-EVAL: 10156;PT THER PROC EA 15 MIN: 01863;PT THER ACT EA 15 MIN: 13865;PT MANUAL THERAPY EA 15 MIN: 30206;PT NEUROMUSC RE-EDUCATION EA 15 MIN: 81124;PT SELF CARE/HOME MGMT/TRAIN EA 15: 50869;PT BIS XTRACELL FLUID ANALYSIS: 22065  -LB     PT Plan Comments repeat bioimpedance in 6 months  -LB               User Key  (r) = Recorded By, (t) = Taken By, (c) = Cosigned By      Initials Name Provider Type    LB Mary Jo Lopez, PT Physical Therapist                                 Time Calculation:   Start Time: 1445  Stop Time: 1515  Time Calculation (min): 30 min  Total Timed Code Minutes- PT: 15 minute(s)  Timed Charges  40815 - PT Self Care/Mgmt Minutes: 10  Total Minutes  Timed Charges Total Minutes: 10   Total Minutes: 10   Therapy Charges for Today       Code Description Service Date Service Provider Modifiers Qty    38788868523 HC PT BIS XTRACELL FLUID ANALYSIS 4/28/2025 Mary Jo Lopez, PT  1    23426201081 HC PT SELF CARE/MGMT/TRAIN EA 15 MIN 4/28/2025 Mary Jo Lopez, PT GP 1                      Mary Jo Lopez PT  4/28/2025

## 2025-04-29 ENCOUNTER — TREATMENT (OUTPATIENT)
Dept: PHYSICAL THERAPY | Facility: CLINIC | Age: 85
End: 2025-04-29
Payer: MEDICARE

## 2025-04-29 DIAGNOSIS — M48.02 CERVICAL STENOSIS OF SPINE: Primary | ICD-10-CM

## 2025-04-29 DIAGNOSIS — M54.2 PAIN, NECK: ICD-10-CM

## 2025-04-29 PROCEDURE — 97530 THERAPEUTIC ACTIVITIES: CPT

## 2025-04-29 PROCEDURE — 97110 THERAPEUTIC EXERCISES: CPT

## 2025-04-29 NOTE — PROGRESS NOTES
Re-Assessment / Re-Certification    Time In 1110     Time Out 1148    Patient: Shu Mckeon   : 1940  Diagnosis/ICD-10 Code:  Cervical stenosis of spine [M48.02]  Referring practitioner: Bhargav Hylton MD  Date of Initial Visit: Type: THERAPY  Noted: 4/15/2025  Today's Date: 2025  Patient seen for 4 sessions      Subjective:   Shu Mckeon reports: she is so dizzy today she is unable to fill out Oswestry index. Pt filled out Oswestry with PT guidance. Overall, she voices her neck has been better. Reports she no longer has to use heat pad at night. Also reports since she started wearing night collar, she has not noticed any numbness into her arm. Denies and current neck pain but admits it feels stiff. Reports pain occurs at bilateral upper traps now and is not an issue at her cervical spine. Pain reproduced at L upper trap with L cervical rotation.  Subjective Questionnaire: Oswestry: 34%  Clinical Progress: improved  Home Program Compliance: Yes  Treatment has included: therapeutic exercise, neuromuscular re-education, manual therapy, therapeutic activity, electrical stimulation, and traction       Objective          Palpation   Left   Tenderness of the upper trapezius.     Right Tenderness of the upper trapezius.     Active Range of Motion   Cervical/Thoracic Spine   Cervical    Flexion: 35 degrees   Extension: 31 degrees   Left lateral flexion: 20 degrees   Right lateral flexion: 25 degrees   Left rotation: 52 degrees with pain  Right rotation: 45 degrees     Strength/Myotome Testing     Left Wrist/Hand      (2nd hand position)     Trial 1: 40 lbs    Trial 2: 39 lbs    Trial 3: 39 lbs    Average: 39.33 lbs    Right Wrist/Hand      (2nd hand position)     Trial 1: 30 lbs    Trial 2: 32 lbs    Trial 3: 35 lbs    Average: 32.33 lbs    Tests   Cervical     Left   Positive ULTT3.     Right   Positive ULTT3.       Assessment/Plan  Re-assess performed today. Pt is progressing towards her goals  appropriately. Symptoms are improving based on subjective reports and function appears to be improving based on objective measures. She will continue to benefit from skilled PT services.  Progress toward previous goals: Partially Met    Goals  Plan Goals: Short Term Goals (achieve in 4 weeks):  1. Pt will report decreased current pain to 3/10 or less. (Met)  2. Pt will be I with HEP. (Met)  3. Pt will exhibit cervical AROM rotation to 60 degrees bilaterally in order to drive/check blind spot with no difficulty. (Not met)  4. Patient will exhibit no positive median or ulnar nerve tension tests to show decreased neural tension/paresthesias. (Not met)  Long Term Goals (achieve 6-8 weeks):  5. Pt will exhibit improved posture by visual examination. (Not met)  6. Pt will improve tenderness to palpation of upper trapezius, levator scapulae, scalenes and suboccipitals to no tenderness. (Not met)  7. Pt will report Oswestry score less than 10/50. (Not met)  8. Patient will report worst pain 5/10 or less. (Not met)      Recommendations: Continue as planned  Timeframe: 6 weeks  Prognosis to achieve goals: good    PT Signature: Shashank Garcia, PT  [place] License #: [id number]    Based upon review of the patient's progress and continued therapy plan, it is my medical opinion that Shu Mckeon should continue physical therapy treatment at [facility]  [address] [place] [id number] [phone].    Signature: __________________________________  Bhargav Hylton MD    Manual Therapy:         mins  30414;  Therapeutic Exercise:    13     mins  30873;     Neuromuscular Tahira:        mins  03387;    Therapeutic Activity:     25     mins  20627;     Gait Training:           mins  83130;     Ultrasound:          mins  42470;    Electrical Stimulation:         mins  81910 ( );  Dry Needling          mins self-pay    Timed Treatment:   38   mins   Total Treatment:     38    mins

## 2025-05-05 ENCOUNTER — LAB (OUTPATIENT)
Dept: OTHER | Facility: HOSPITAL | Age: 85
End: 2025-05-05
Payer: MEDICARE

## 2025-05-05 ENCOUNTER — OFFICE VISIT (OUTPATIENT)
Dept: ONCOLOGY | Facility: CLINIC | Age: 85
End: 2025-05-05
Payer: MEDICARE

## 2025-05-05 VITALS
RESPIRATION RATE: 17 BRPM | SYSTOLIC BLOOD PRESSURE: 98 MMHG | OXYGEN SATURATION: 95 % | WEIGHT: 174.9 LBS | BODY MASS INDEX: 29.86 KG/M2 | HEIGHT: 64 IN | DIASTOLIC BLOOD PRESSURE: 65 MMHG | HEART RATE: 77 BPM

## 2025-05-05 DIAGNOSIS — C50.911 LOBULAR BREAST CANCER, RIGHT: ICD-10-CM

## 2025-05-05 DIAGNOSIS — M54.10 RADICULOPATHY AFFECTING UPPER EXTREMITY: ICD-10-CM

## 2025-05-05 DIAGNOSIS — C50.911 LOBULAR BREAST CANCER, RIGHT: Primary | ICD-10-CM

## 2025-05-05 LAB
ALBUMIN SERPL-MCNC: 4.2 G/DL (ref 3.5–5.2)
ALBUMIN/GLOB SERPL: 1.4 G/DL
ALP SERPL-CCNC: 78 U/L (ref 39–117)
ALT SERPL W P-5'-P-CCNC: 13 U/L (ref 1–33)
ANION GAP SERPL CALCULATED.3IONS-SCNC: 11.5 MMOL/L (ref 5–15)
AST SERPL-CCNC: 18 U/L (ref 1–32)
BASOPHILS # BLD AUTO: 0.04 10*3/MM3 (ref 0–0.2)
BASOPHILS NFR BLD AUTO: 0.5 % (ref 0–1.5)
BILIRUB SERPL-MCNC: 0.5 MG/DL (ref 0–1.2)
BUN SERPL-MCNC: 10 MG/DL (ref 8–23)
BUN/CREAT SERPL: 13.5 (ref 7–25)
CALCIUM SPEC-SCNC: 9.9 MG/DL (ref 8.6–10.5)
CHLORIDE SERPL-SCNC: 96 MMOL/L (ref 98–107)
CO2 SERPL-SCNC: 27.5 MMOL/L (ref 22–29)
CREAT SERPL-MCNC: 0.74 MG/DL (ref 0.57–1)
DEPRECATED RDW RBC AUTO: 45.1 FL (ref 37–54)
EGFRCR SERPLBLD CKD-EPI 2021: 78.9 ML/MIN/1.73
EOSINOPHIL # BLD AUTO: 0.14 10*3/MM3 (ref 0–0.4)
EOSINOPHIL NFR BLD AUTO: 1.7 % (ref 0.3–6.2)
ERYTHROCYTE [DISTWIDTH] IN BLOOD BY AUTOMATED COUNT: 13.5 % (ref 12.3–15.4)
GLOBULIN UR ELPH-MCNC: 3 GM/DL
GLUCOSE SERPL-MCNC: 142 MG/DL (ref 65–99)
HCT VFR BLD AUTO: 39.4 % (ref 34–46.6)
HGB BLD-MCNC: 12.6 G/DL (ref 12–15.9)
IMM GRANULOCYTES # BLD AUTO: 0.03 10*3/MM3 (ref 0–0.05)
IMM GRANULOCYTES NFR BLD AUTO: 0.4 % (ref 0–0.5)
LYMPHOCYTES # BLD AUTO: 1.8 10*3/MM3 (ref 0.7–3.1)
LYMPHOCYTES NFR BLD AUTO: 22.1 % (ref 19.6–45.3)
MCH RBC QN AUTO: 29 PG (ref 26.6–33)
MCHC RBC AUTO-ENTMCNC: 32 G/DL (ref 31.5–35.7)
MCV RBC AUTO: 90.8 FL (ref 79–97)
MONOCYTES # BLD AUTO: 0.59 10*3/MM3 (ref 0.1–0.9)
MONOCYTES NFR BLD AUTO: 7.2 % (ref 5–12)
NEUTROPHILS NFR BLD AUTO: 5.56 10*3/MM3 (ref 1.7–7)
NEUTROPHILS NFR BLD AUTO: 68.1 % (ref 42.7–76)
NRBC BLD AUTO-RTO: 0 /100 WBC (ref 0–0.2)
PLATELET # BLD AUTO: 252 10*3/MM3 (ref 140–450)
PMV BLD AUTO: 10.5 FL (ref 6–12)
POTASSIUM SERPL-SCNC: 4.4 MMOL/L (ref 3.5–5.2)
PROT SERPL-MCNC: 7.2 G/DL (ref 6–8.5)
RBC # BLD AUTO: 4.34 10*6/MM3 (ref 3.77–5.28)
SODIUM SERPL-SCNC: 135 MMOL/L (ref 136–145)
WBC NRBC COR # BLD AUTO: 8.16 10*3/MM3 (ref 3.4–10.8)

## 2025-05-05 PROCEDURE — 1126F AMNT PAIN NOTED NONE PRSNT: CPT | Performed by: NURSE PRACTITIONER

## 2025-05-05 PROCEDURE — 85025 COMPLETE CBC W/AUTO DIFF WBC: CPT | Performed by: INTERNAL MEDICINE

## 2025-05-05 PROCEDURE — 99214 OFFICE O/P EST MOD 30 MIN: CPT | Performed by: NURSE PRACTITIONER

## 2025-05-05 PROCEDURE — 80053 COMPREHEN METABOLIC PANEL: CPT | Performed by: INTERNAL MEDICINE

## 2025-05-05 PROCEDURE — 36415 COLL VENOUS BLD VENIPUNCTURE: CPT

## 2025-05-05 PROCEDURE — G2211 COMPLEX E/M VISIT ADD ON: HCPCS | Performed by: NURSE PRACTITIONER

## 2025-05-05 NOTE — PROGRESS NOTES
"Chief Complaint  Anatomic stage IA/pathologic prognostic stage IA right breast cancer ([id number]), ER/TX positive and HER2/tommy negative.  Left axillary lymph node biopsy with reactive changes and clonal T LGL population by flow cytometry    Subjective        History of Present Illness    Patient returns today in somewhat delayed follow-up, 8 months out from her last visit, in regards to her right stage IA breast cancer.  She elected not to pursue adjuvant endocrine therapy as she was not a good candidate for aromatase inhibitor due to osteoporosis and did not wish to take tamoxifen with potential risks for DVT and endometrial carcinoma.  She also did not wish to pursue radiation therapy.  We have been following her expectantly therefore.  She did undergo most recent screening mammogram 3/26/2025 with thankfully benign findings.  As she is reviewed back today she denies any new concerns in regards to her breasts.  She is having significant pain in her left knee noting that it is bone-on-bone.  She has tried some injections without improvement.  She may very well have to proceed with knee surgery.  She did have previous right knee replacement.    She denies other concerns at this time.    Objective   Vital Signs:   BP 98/65   Pulse 77   Resp 17   Ht 162.6 cm (64.02\")   Wt 79.3 kg (174 lb 14.4 oz)   SpO2 95%   BMI 30.01 kg/m²     Physical Exam  Constitutional:       Appearance: She is well-developed.   Eyes:      Conjunctiva/sclera: Conjunctivae normal.   Neck:      Thyroid: No thyromegaly.   Cardiovascular:      Rate and Rhythm: Normal rate and regular rhythm.      Heart sounds: No murmur heard.     No friction rub. No gallop.   Pulmonary:      Effort: No respiratory distress.      Breath sounds: Normal breath sounds.      Comments: Postsurgical changes from lumpectomy on the right and from surgery on the left breast for symmetry, no abnormal or concerning findings on exam  Abdominal:      General: Bowel sounds " are normal. There is no distension.      Palpations: Abdomen is soft.      Tenderness: There is no abdominal tenderness.   Lymphadenopathy:      Head:      Right side of head: No submandibular adenopathy.      Cervical: No cervical adenopathy.      Upper Body:      Right upper body: No supraclavicular adenopathy.      Left upper body: No supraclavicular adenopathy.   Skin:     General: Skin is warm and dry.      Findings: No rash.   Neurological:      Mental Status: She is alert and oriented to person, place, and time.      Cranial Nerves: No cranial nerve deficit.      Motor: No abnormal muscle tone.      Deep Tendon Reflexes: Reflexes normal.   Psychiatric:         Behavior: Behavior normal.            Result Review :   Results from last 7 days   Lab Units 05/05/25  1354   WBC 10*3/mm3 8.16   NEUTROS ABS 10*3/mm3 5.56   HEMOGLOBIN g/dL 12.6   HEMATOCRIT % 39.4   PLATELETS 10*3/mm3 252                  Assessment and Plan     *Anatomic stage IA/pathologic prognostic stage IA right breast cancer ([id number]), ER/AK positive and HER2/tommy negative  Patient underwent previous left breast biopsy on 8/21/2018 that showed small radial sclerosing lesion with associated ductal hyperplasia of the usual type, focal columnar cell change without atypia, apocrine metaplasia, fibroadenomatoid change with associated calcifications, no malignancy.    She underwent left lumpectomy on 10/25/2018 with benign breast tissue with ductal hyperplasia without atypia, adenosis, radial sclerosing lesion (2 mm), scar, prior biopsy change.  Margins were negative for radial sclerosing lesion.  Screening mammogram on 3/23/2023 showed right BI-RADS 0 result.    Diagnostic right mammogram and ultrasound on 4/27/2023 showed a 0.9 cm mass in the 8 o'clock position of the right breast, BI-RADS 4.    Biopsy was performed right breast 8 o'clock position 5/18/2023 with invasive lobular carcinoma, grade 2, 1.2 cm with associated atypical lobular hyperplasia  and LCIS, no evidence of lymphovascular or perineural invasion, ER positive (%), WY positive (21-30%), HER2/tommy negative (1+ IHC), Ki-67 8%.    Patient with family history of paternal half sister with breast cancer in her 70s and paternal cousin with breast cancer in her 50s.    [facility] panel test on 5/25/2023 which showed VUS x2 (BARD1 c.586G>A and STK11 c.631C>T).    Bilateral breast MRI on 5/22/2023 showed 1.5 cm enhancement in the 7 o'clock position right breast with associated postbiopsy change, recommended repeat mammogram for preoperative planning to assess clip placement.  There was an incompletely assessed 2.1 cm right thyroid nodule.    Diagnostic right mammogram on 5/20/2023 showed possibility of clip migration or that a different lesion was biopsied.    Thyroid ultrasound on 6/9/2023 showed a 1.9 cm right thyroid nodule that was TI-RADS 3, recommended 1 year interval follow-up ultrasound.    The patient underwent surgery on 8/2/2023 with right lumpectomy with oncoplastic closure and left breast mammoplasty for symmetry.  Pathology showed invasive lobular carcinoma, grade 2, 9 mm, atypical lobular hyperplasia, LCIS, negative margins, 3 negative sentinel lymph nodes.  Pathology from left breast tissue reduction mammoplasty was negative for malignancy.  Patient was seen in consultation on 8/22/2024 postoperatively.  We reviewed that she had pT1c primary lesion with 1.2 cm invasive lobular carcinoma, grade 2 that is ER positive (91-1 1%), weakly WY positive (21-30%), and HER2/tommy negative (IHC 1+) with Ki-67 8%.  On right lumpectomy specimen 8/2/2023, there was 9 mm residual grade 2 invasive lobular carcinoma, negative margins, 3 benign sentinel lymph nodes.  Oncotype was sent which returned in the low risk range at 15 indicating less than 1% benefit from adjuvant chemotherapy and 4% risk of distant recurrence at 5 years with endocrine therapy.  I reviewed all of this at length with the patient and her  daughter.  She is seeing radiation oncology tomorrow to discuss potential adjuvant radiation therapy.  Adjuvant chemotherapy was not recommended given the patient's age, low risk disease, Oncotype Dx score of 15.  Discussed potential pursuit of adjuvant endocrine therapy.  Patient with underlying osteoporosis receiving Prolia with recent DEXA scan that showed maximal T score -3.1 in the left hip.  Given the patient's age and potential to worsen the patient's bone density further, aromatase inhibitor therapy was not recommended.  Discussion regarding potential use of tamoxifen with associated risk of thrombotic complications and endometrial cancer.  We discussed the overall the excellent prognosis from her malignancy given the small size, favorable prognostic features, node-negative status.  The patient made informed decision to forego the risks of adjuvant endocrine therapy and to pursue a course of observation/surveillance following surgery.  Plan follow-up every 6 months for 5 years after surgery to monitor her clinically for evidence of recurrent disease.  Patient was seen by radiation oncology on 8/23/2024, elected to forego adjuvant radiation therapy.  5/5/2025 patient seen back today in follow-up continuing on a course of observation/surveillance.  Most recent screening mammogram 3/26/2025 was benign.  KEISHA on exam today.  Will continue to follow her every 6 months in observation.    *Left axillary lymph node biopsy with reactive changes and clonal T LGL population by flow cytometry, no findings to suggest malignancy:  The patient underwent previous left breast biopsy 8/21/2018 with benign findings including ductal hyperplasia of the usual type, focal stromal fibrosis, focal columnar cell change without atypia, a progression metaplasia, fibroadenomatoid change.    The patient is followed by Dr. Blount and underwent routine screening mammogram on 7/20/2020 with finding of increased size of the left  axillary/axillary tail mass that may represent a lymph node, BI-RADS 0.    She underwent a diagnostic left mammogram and ultrasound on 8/12/2020 which showed a left axillary tail lymph node 1.1 cm with thickened cortex relative to other lymph nodes and biopsy was recommended.    On 9/9/2020, biopsy was performed showing a reactive lymph node.  Flow cytometry was sent showing an increased CD4 to CD8 ratio, no aberrant T-cell population or B-cell population however there was a clonal T LGL population detected by V beta analysis comprising 7% of total events (TCR alpha/beta positive, CD20 6+).  There was no clonal CD4 positive T-cell population.  Results were not diagnostic for malignancy.   The patient was seen in initial consultation on 9/28/2020.  She had no palpable lymphadenopathy or splenomegaly.  Her counts were reviewed showing normal WBC at 6.96 with normal differential, no evidence of anemia nor thrombocytopenia.  The clonal population seen on flow cytometry from lymph node biopsy certainly could be a reactive population.  There was no evidence to suggest LGL leukemia.  We did discuss potential additional evaluation with peripheral blood flow cytometry and T-cell gene rearrangement study as well as potential CT scans of the chest abdomen and pelvis to evaluate for additional lymphadenopathy and/or hepatosplenomegaly.  The patient however declined further evaluation but was agreeable to a course of observation.    The patient developed abdominal pain and underwent CT abdomen pelvis on 10/27/2020 which showed cholelithiasis, no evidence of adenopathy or splenomegaly.  She ultimately underwent laparoscopic cholecystectomy with pathology showing mild chronic calculus cholecystitis.  Patient was seen in follow-up on 1/4/2021.  There was no clinical evidence of underlying LGL leukemia (normal WBC and differential, no evidence of anemia nor thrombocytopenia) and patient did not wish to pursue any further evaluation.    Patient today continues with normal WBC and differential.    *Osteoporosis  Patient has been receiving Prolia for a number of years  DEXA scan on 4/27/2023 with T score lumbar spine -0.5, left hip -3.1, left forearm -2.8  As above, patient is not a good candidate for aromatase inhibitor therapy regarding her breast cancer with potential to worsen her bone density.    *Chronic intermittent diarrhea  Patient reports chronic mild intermittent diarrhea, improved after short course of Imodium in July  Colonoscopy on 10/5/2024 was negative    *Chronic low back pain  Patient continues follow-up with pain management    *Cervical radiculopathy  9/2024 Patient notes previous symptoms of numbness in her left upper extremity when sleeping on her side which would resolve shortly after getting up.  She reports recent symptoms similar to those that have been occurring when she is carrying something with her left arm and it has become bothersome.  Symptoms are suspicious for a cervical radiculopathy does extend into the upper arm.   10/24/2024 MRI cervical spine showing multilevel cervical spondylosis and multilevel foraminal narrowing.    Plan:  Patient is continuing on a course of observation/surveillance with no clinical evidence of recurrent breast cancer  She will continue annual screening mammograms in March.  Otherwise will return for MD follow-up in 6 months with CBC, CMP.    I spent 35 minutes caring for Shu on this date of service. This time includes time spent by me in the following activities: preparing for the visit, reviewing tests, performing a medically appropriate examination and/or evaluation, counseling and educating the patient/family/caregiver, documenting information in the medical record, ordering test(s), obtaining a separately obtained history, and reviewing a separately obtained history

## 2025-05-06 ENCOUNTER — TREATMENT (OUTPATIENT)
Dept: PHYSICAL THERAPY | Facility: CLINIC | Age: 85
End: 2025-05-06
Payer: MEDICARE

## 2025-05-06 DIAGNOSIS — M48.02 CERVICAL STENOSIS OF SPINE: Primary | ICD-10-CM

## 2025-05-06 DIAGNOSIS — M54.2 PAIN, NECK: ICD-10-CM

## 2025-05-06 NOTE — PROGRESS NOTES
Physical Therapy Daily Note    [facility]  [address]  [address]  [place] [address]      Patient: Shu Mckeon   : 1940  Referring practitioner: Bhargav Hylton MD  Date of Initial Visit:   Type: THERAPY  Noted: 4/15/2025  Today's Date: 2025  Patient seen for 5 sessions         Shu Mckeon reports: her neck is really sore today, she thinks she slept weird. She struggles to rotate to the L side due to pain/stiffness.        Subjective     Objective   See Exercise, Manual, and Modality Logs for complete treatment.       Assessment/Plan    Shu required 5 minutes of moist heat prior to treatment to help with pain and stiffness. She is limited in what she can tolerate due to poor standing tolerance and lying supine aggravates dizziness, so today she was limited to seated treatment with occasional standing exercise. She continues to lack at least 50% of normal cervical lateral flexion and rotation ROM, worse on L side today.     Patient requires verbal and tactile cueing from therapist for proper form with all interventions provided. Patient would continue to benefit from skilled therapy to address the limitations listed above.    Progress per POC    Visit Diagnoses:    ICD-10-CM ICD-9-CM   1. Cervical stenosis of spine  M48.02 723.0   2. Pain, neck  M54.2 723.1                  Timed:  Manual Therapy:    8     mins  69552;  Therapeutic Exercise:         mins  06368;     Neuromuscular Tahira:    15    mins  08543;    Therapeutic Activity:     15     mins  26056;     Gait Training:           mins  91005;  Ultrasound:          mins  32044;    Electrical Stimulation:         mins  29900 ( );    Untimed:  Group Therapy: ___  mins  01401;   Electrical Stimulation:         mins  91331 ( );  Mechanical Traction:         mins  17539;   Iontophoresis:             ___  mins 27731    Timed Treatment:   38   mins   Total Treatment:     38   mins    Pasquale Nagy  PT, DPT, OCS  Physical Therapist   [place] License #[id number]

## 2025-05-08 ENCOUNTER — TREATMENT (OUTPATIENT)
Dept: PHYSICAL THERAPY | Facility: CLINIC | Age: 85
End: 2025-05-08
Payer: MEDICARE

## 2025-05-08 ENCOUNTER — CLINICAL SUPPORT (OUTPATIENT)
Dept: INTERNAL MEDICINE | Facility: CLINIC | Age: 85
End: 2025-05-08
Payer: MEDICARE

## 2025-05-08 DIAGNOSIS — M48.02 CERVICAL STENOSIS OF SPINE: Primary | ICD-10-CM

## 2025-05-08 DIAGNOSIS — M54.2 PAIN, NECK: ICD-10-CM

## 2025-05-08 DIAGNOSIS — R30.0 DYSURIA: Primary | ICD-10-CM

## 2025-05-08 LAB
BILIRUB BLD-MCNC: NEGATIVE MG/DL
CLARITY, POC: ABNORMAL
COLOR UR: YELLOW
EXPIRATION DATE: ABNORMAL
GLUCOSE UR STRIP-MCNC: NEGATIVE MG/DL
KETONES UR QL: NEGATIVE
LEUKOCYTE EST, POC: ABNORMAL
Lab: ABNORMAL
NITRITE UR-MCNC: NEGATIVE MG/ML
PH UR: 6 [PH] (ref 5–8)
PROT UR STRIP-MCNC: NEGATIVE MG/DL
RBC # UR STRIP: ABNORMAL /UL
SP GR UR: 1.01 (ref 1–1.03)
UROBILINOGEN UR QL: NORMAL

## 2025-05-08 RX ORDER — CIPROFLOXACIN 250 MG/1
250 TABLET, FILM COATED ORAL 2 TIMES DAILY
Qty: 10 TABLET | Refills: 0 | Status: SHIPPED | OUTPATIENT
Start: 2025-05-08

## 2025-05-08 NOTE — PROGRESS NOTES
Physical Therapy Daily Note    [facility]  [address]  [address]  [place] [address]      Patient: Shu Mckeon   : 1940  Referring practitioner: Bhargav Hylton MD  Date of Initial Visit:   Type: THERAPY  Noted: 4/15/2025  Today's Date: 2025  Patient seen for 6 sessions         Shu Mckeon reports: she's been diagnosed with a UTI, starting anti-biotics today.        Subjective     Objective   See Exercise, Manual, and Modality Logs for complete treatment.       Assessment/Plan    Shu continues to be limited in her ability to tolerate standing exercises, we focused on seated thoracic mobility and chest stretching today. She continues to exhibit poor sitting posture and needs cueing to avoid upper trap compensations.    Patient requires verbal and tactile cueing from therapist for proper form with all interventions provided. Patient would continue to benefit from skilled therapy to address the limitations listed above.    Progress per POC    Visit Diagnoses:    ICD-10-CM ICD-9-CM   1. Cervical stenosis of spine  M48.02 723.0   2. Pain, neck  M54.2 723.1                  Timed:  Manual Therapy:    8     mins  51727;  Therapeutic Exercise:    10     mins  11960;     Neuromuscular Tahira:        mins  18902;    Therapeutic Activity:     10     mins  78607;     Gait Training:           mins  55713;  Ultrasound:          mins  58082;    Electrical Stimulation:         mins  56715 ( );    Untimed:  Group Therapy: ___  mins  35184;   Electrical Stimulation:         mins  95336 ( );  Mechanical Traction:         mins  44036;   Iontophoresis:             ___  mins 33438    Timed Treatment:   28   mins   Total Treatment:     38   mins    Pasquale Nagy PT, DPT, OCS  Physical Therapist   [place] License #[id number]

## 2025-05-12 ENCOUNTER — TELEPHONE (OUTPATIENT)
Dept: PHYSICAL THERAPY | Facility: CLINIC | Age: 85
End: 2025-05-12

## 2025-05-12 DIAGNOSIS — E11.43 TYPE 2 DIABETES MELLITUS WITH AUTONOMIC NEUROPATHY, UNSPECIFIED WHETHER LONG TERM INSULIN USE: ICD-10-CM

## 2025-05-12 DIAGNOSIS — Z00.00 MEDICARE ANNUAL WELLNESS VISIT, SUBSEQUENT: Primary | ICD-10-CM

## 2025-05-12 DIAGNOSIS — I10 PRIMARY HYPERTENSION: ICD-10-CM

## 2025-05-12 DIAGNOSIS — E78.5 HYPERLIPIDEMIA, UNSPECIFIED HYPERLIPIDEMIA TYPE: ICD-10-CM

## 2025-05-12 NOTE — TELEPHONE ENCOUNTER
Caller: Shu Mckeon    Relationship: Self       What was the call regarding: [place]ON IN [place] TO HELP MOVE HER

## 2025-05-13 LAB
BACTERIA UR CULT: ABNORMAL

## 2025-05-14 LAB
ALBUMIN SERPL-MCNC: 4.3 G/DL (ref 3.5–5.2)
ALBUMIN/GLOB SERPL: 1.7 G/DL
ALP SERPL-CCNC: 77 U/L (ref 39–117)
ALT SERPL-CCNC: 12 U/L (ref 1–33)
AST SERPL-CCNC: 18 U/L (ref 1–32)
BASOPHILS # BLD AUTO: 0.04 10*3/MM3 (ref 0–0.2)
BASOPHILS NFR BLD AUTO: 0.6 % (ref 0–1.5)
BILIRUB SERPL-MCNC: 0.5 MG/DL (ref 0–1.2)
BUN SERPL-MCNC: 10 MG/DL (ref 8–23)
BUN/CREAT SERPL: 14.7 (ref 7–25)
CALCIUM SERPL-MCNC: 9.6 MG/DL (ref 8.6–10.5)
CHLORIDE SERPL-SCNC: 99 MMOL/L (ref 98–107)
CHOLEST SERPL-MCNC: 114 MG/DL (ref 0–200)
CO2 SERPL-SCNC: 24.6 MMOL/L (ref 22–29)
CREAT SERPL-MCNC: 0.68 MG/DL (ref 0.57–1)
EGFRCR SERPLBLD CKD-EPI 2021: 84.9 ML/MIN/1.73
EOSINOPHIL # BLD AUTO: 0.13 10*3/MM3 (ref 0–0.4)
EOSINOPHIL NFR BLD AUTO: 2.1 % (ref 0.3–6.2)
ERYTHROCYTE [DISTWIDTH] IN BLOOD BY AUTOMATED COUNT: 12.5 % (ref 12.3–15.4)
GLOBULIN SER CALC-MCNC: 2.6 GM/DL
GLUCOSE SERPL-MCNC: 120 MG/DL (ref 65–99)
HBA1C MFR BLD: 6.5 % (ref 4.8–5.6)
HCT VFR BLD AUTO: 39.3 % (ref 34–46.6)
HDLC SERPL-MCNC: 53 MG/DL (ref 40–60)
HGB BLD-MCNC: 12.5 G/DL (ref 12–15.9)
IMM GRANULOCYTES # BLD AUTO: 0.01 10*3/MM3 (ref 0–0.05)
IMM GRANULOCYTES NFR BLD AUTO: 0.2 % (ref 0–0.5)
LDLC SERPL CALC-MCNC: 44 MG/DL (ref 0–100)
LDLC/HDLC SERPL: 0.83 {RATIO}
LYMPHOCYTES # BLD AUTO: 1.43 10*3/MM3 (ref 0.7–3.1)
LYMPHOCYTES NFR BLD AUTO: 22.8 % (ref 19.6–45.3)
MCH RBC QN AUTO: 29.2 PG (ref 26.6–33)
MCHC RBC AUTO-ENTMCNC: 31.8 G/DL (ref 31.5–35.7)
MCV RBC AUTO: 91.8 FL (ref 79–97)
MONOCYTES # BLD AUTO: 0.44 10*3/MM3 (ref 0.1–0.9)
MONOCYTES NFR BLD AUTO: 7 % (ref 5–12)
NEUTROPHILS # BLD AUTO: 4.22 10*3/MM3 (ref 1.7–7)
NEUTROPHILS NFR BLD AUTO: 67.3 % (ref 42.7–76)
NRBC BLD AUTO-RTO: 0 /100 WBC (ref 0–0.2)
PLATELET # BLD AUTO: 273 10*3/MM3 (ref 140–450)
POTASSIUM SERPL-SCNC: 4.3 MMOL/L (ref 3.5–5.2)
PROT SERPL-MCNC: 6.9 G/DL (ref 6–8.5)
RBC # BLD AUTO: 4.28 10*6/MM3 (ref 3.77–5.28)
SODIUM SERPL-SCNC: 137 MMOL/L (ref 136–145)
TRIGL SERPL-MCNC: 84 MG/DL (ref 0–150)
TSH SERPL DL<=0.005 MIU/L-ACNC: 1.7 UIU/ML (ref 0.27–4.2)
VLDLC SERPL CALC-MCNC: 17 MG/DL (ref 5–40)
WBC # BLD AUTO: 6.27 10*3/MM3 (ref 3.4–10.8)

## 2025-05-16 ENCOUNTER — TELEPHONE (OUTPATIENT)
Dept: PHYSICAL THERAPY | Facility: CLINIC | Age: 85
End: 2025-05-16

## 2025-05-16 NOTE — TELEPHONE ENCOUNTER
Caller: Shu Mckeon    Relationship: Self         What was the call regarding: STILL ISSUES WITH MOVING      .”

## 2025-05-19 ENCOUNTER — TREATMENT (OUTPATIENT)
Dept: PHYSICAL THERAPY | Facility: CLINIC | Age: 85
End: 2025-05-19
Payer: MEDICARE

## 2025-05-19 DIAGNOSIS — M48.02 CERVICAL STENOSIS OF SPINE: Primary | ICD-10-CM

## 2025-05-19 DIAGNOSIS — M54.2 PAIN, NECK: ICD-10-CM

## 2025-05-19 PROCEDURE — 97140 MANUAL THERAPY 1/> REGIONS: CPT | Performed by: PHYSICAL THERAPIST

## 2025-05-19 PROCEDURE — 97530 THERAPEUTIC ACTIVITIES: CPT | Performed by: PHYSICAL THERAPIST

## 2025-05-19 PROCEDURE — 97110 THERAPEUTIC EXERCISES: CPT | Performed by: PHYSICAL THERAPIST

## 2025-05-19 NOTE — PROGRESS NOTES
Physical Therapy Daily Treatment Note  [facility] [place]   [address]  [place] [address]  P: [phone]  F: [phone]    Patient: Shu Mckeon   : 1940  Referring practitioner: Bhargav Hylton MD  Date of Initial Visit: Type: THERAPY  Noted: 4/15/2025  Today's Date: 2025  Patient seen for 7 sessions       Visit Diagnoses:    ICD-10-CM ICD-9-CM   1. Cervical stenosis of spine  M48.02 723.0   2. Pain, neck  M54.2 723.1         Subjective     Shu Mckeon reports: My neck has been feeling better since I've finished my move. I would prefer to do my exercises seated.         Objective   See Exercise, Manual, and Modality Logs for complete treatment.       Assessment:  Pt continues to respond positively to cervical MH prior to mobility exercises and manual traction. Pt continues to be restricted in L cervical lateral flexion and rotation. Pt benefits from tactile cues with chin tucks for improved muscle engagement.         Plan:  Progress per Plan of Care            Timed:         Manual Therapy:    10     mins  91622;     Therapeutic Exercise:    20     mins  02492;     Neuromuscular Tahira:        mins  31452;    Therapeutic Activity:     23     mins  36689;     Gait Training:           mins  26418;     Ultrasound:          mins  47296;    Ionto                                   mins  35472  Self Care                            mins  00240    Un-Timed:  Electrical Stimulation:         mins  06354 ( );  Traction          mins 27157        Timed Treatment:   53   mins   Total Treatment:     58   mins      Elpidio Sands, Physical Therapist Assistant  [place] License #: [id number]

## 2025-05-20 ENCOUNTER — OFFICE VISIT (OUTPATIENT)
Dept: INTERNAL MEDICINE | Facility: CLINIC | Age: 85
End: 2025-05-20
Payer: MEDICARE

## 2025-05-20 VITALS
HEIGHT: 64 IN | OXYGEN SATURATION: 95 % | HEART RATE: 62 BPM | WEIGHT: 178.1 LBS | SYSTOLIC BLOOD PRESSURE: 130 MMHG | TEMPERATURE: 97.9 F | DIASTOLIC BLOOD PRESSURE: 64 MMHG | BODY MASS INDEX: 30.4 KG/M2

## 2025-05-20 DIAGNOSIS — Z98.890 DIARRHEA FOLLOWING GASTROINTESTINAL SURGERY: ICD-10-CM

## 2025-05-20 DIAGNOSIS — M25.562 ACUTE PAIN OF LEFT KNEE: ICD-10-CM

## 2025-05-20 DIAGNOSIS — R19.7 DIARRHEA FOLLOWING GASTROINTESTINAL SURGERY: ICD-10-CM

## 2025-05-20 DIAGNOSIS — M81.6 LOCALIZED OSTEOPOROSIS, UNSPECIFIED PATHOLOGICAL FRACTURE PRESENCE: ICD-10-CM

## 2025-05-20 DIAGNOSIS — N39.0 URINARY TRACT INFECTION WITHOUT HEMATURIA, SITE UNSPECIFIED: ICD-10-CM

## 2025-05-20 DIAGNOSIS — Z00.00 MEDICARE ANNUAL WELLNESS VISIT, SUBSEQUENT: Primary | ICD-10-CM

## 2025-05-20 DIAGNOSIS — Z90.49 HISTORY OF CHOLECYSTECTOMY: ICD-10-CM

## 2025-05-20 DIAGNOSIS — M79.605 LEFT LEG PAIN: ICD-10-CM

## 2025-05-20 LAB
BILIRUB BLD-MCNC: NEGATIVE MG/DL
CLARITY, POC: CLEAR
COLOR UR: YELLOW
EXPIRATION DATE: NORMAL
GLUCOSE UR STRIP-MCNC: NEGATIVE MG/DL
KETONES UR QL: NEGATIVE
LEUKOCYTE EST, POC: NEGATIVE
Lab: NORMAL
NITRITE UR-MCNC: NEGATIVE MG/ML
PH UR: 6.5 [PH] (ref 5–8)
PROT UR STRIP-MCNC: NEGATIVE MG/DL
RBC # UR STRIP: NEGATIVE /UL
SP GR UR: 1.01 (ref 1–1.03)
UROBILINOGEN UR QL: NORMAL

## 2025-05-20 RX ORDER — MELOXICAM 7.5 MG/1
7.5 TABLET ORAL DAILY
Qty: 30 TABLET | Refills: 0 | Status: SHIPPED | OUTPATIENT
Start: 2025-05-20

## 2025-05-20 RX ORDER — GABAPENTIN 300 MG/1
300 CAPSULE ORAL 2 TIMES DAILY
Qty: 60 CAPSULE | Refills: 1 | Status: SHIPPED | OUTPATIENT
Start: 2025-05-20

## 2025-05-20 RX ORDER — GABAPENTIN 300 MG/1
300 CAPSULE ORAL 2 TIMES DAILY
Qty: 60 CAPSULE | Refills: 1 | OUTPATIENT
Start: 2025-05-20

## 2025-05-20 RX ORDER — COLESTIPOL HYDROCHLORIDE 1 G/1
1 TABLET ORAL 2 TIMES DAILY
Qty: 60 TABLET | Refills: 5 | Status: SHIPPED | OUTPATIENT
Start: 2025-05-20

## 2025-05-20 NOTE — PROGRESS NOTES
Subjective   Shu Mckeon is a 84 y.o. female.   Fu with F:L  History of Present Illness   Recent UTI  she has been treated with cipro  she says she is feeling better though she is overall tired  she was found to have salmonella  She has no GI sx and urinary sx have since resolved.    She does have a stye on the right eyelid  She does tale the colestipol and that does seem to help  She is having a lot of [ain on and off in her left knee/leg  tylenol is not helping  she says tramadol does not help  Pt has been taking BP meds as prescribed without any problems.  No HA  No episodes of orthostasis    The following portions of the patient's history were reviewed and updated as appropriate: allergies, current medications, past family history, past medical history, past social history, past surgical history, and problem list.    Review of Systems    Objective   Physical Exam  Vitals reviewed.   Constitutional:       Appearance: She is well-developed.   HENT:      Head: Normocephalic and atraumatic.      Right Ear: External ear normal.      Left Ear: External ear normal.   Eyes:      Conjunctiva/sclera: Conjunctivae normal.      Pupils: Pupils are equal, round, and reactive to light.   Neck:      Thyroid: No thyromegaly.      Trachea: No tracheal deviation.   Cardiovascular:      Rate and Rhythm: Normal rate and regular rhythm.      Heart sounds: Normal heart sounds.   Pulmonary:      Effort: Pulmonary effort is normal.      Breath sounds: Normal breath sounds.   Abdominal:      General: Bowel sounds are normal. There is no distension.      Palpations: Abdomen is soft.      Tenderness: There is no abdominal tenderness.   Musculoskeletal:         General: No deformity. Normal range of motion.      Cervical back: Normal range of motion.   Skin:     General: Skin is warm and dry.   Neurological:      Mental Status: She is alert and oriented to person, place, and time.   Psychiatric:         Behavior: Behavior normal.          Thought Content: Thought content normal.         Judgment: Judgment normal.         Vitals:    05/20/25 1330   BP: 130/64   Pulse: 62   Temp: 97.9 °F (36.6 °C)   SpO2: 95%     Body mass index is 30.56 kg/m².         Assessment & Plan   Diagnoses and all orders for this visit:    1. Medicare annual wellness visit, subsequent (Primary)    2. History of cholecystectomy  -     colestipol (COLESTID) 1 g tablet; Take 1 tablet by mouth 2 (Two) Times a Day.  Dispense: 60 tablet; Refill: 5    3. Diarrhea following gastrointestinal surgery  -     colestipol (COLESTID) 1 g tablet; Take 1 tablet by mouth 2 (Two) Times a Day.  Dispense: 60 tablet; Refill: 5    4. Acute pain of left knee  -     gabapentin (NEURONTIN) 300 MG capsule; Take 1 capsule by mouth 2 (Two) Times a Day.  Dispense: 60 capsule; Refill: 1  -     Ambulatory Referral to Pain Management    5. Left leg pain  -     Ambulatory Referral to Pain Management    6. Localized osteoporosis, unspecified pathological fracture presence  -     denosumab (PROLIA) syringe 60 mg    7. Urinary tract infection without hematuria, site unspecified  -     POC Urinalysis Dipstick, Automated    Other orders  -     meloxicam (Mobic) 7.5 MG tablet; Take 1 tablet by mouth Daily.  Dispense: 30 tablet; Refill: 0    1.  UTI: Bacteria was Salmonella.  She was treated with Cipro urinary symptoms have resolved.  Rechecked urinalysis today and it was negative.  2.  Chronic pain: Her pain in her posterior knee and lateral calf down to ankle seems to be getting worse.  When I reviewed her MRI that was done in 2022 it looks like this was L3-L4 which would be consistent with where her pain is.  She was supposed to be getting injections at that time but it was during COVID and the doctor went out on maternity leave and somehow she got lost to follow-up.  Now the pain is getting a lot worse.  Tramadol did not help we will try gabapentin at night meloxicam in the morning.  I am going to place a  referral back to pain management  3.  Diarrhea: She does have some chronic diarrhea on and off and has done well with colestipol.  She does needs a refill on this  4   HTN-  ok with current meds  5.  Osteoporosis: She has had compression fractures.  Continue to recommend weightbearing exercise and calcium with vitamin D.  She is also taking Prolia.

## 2025-05-20 NOTE — PROGRESS NOTES
" Subjective   The ABCs of the Annual Wellness Visit  Medicare Wellness Visit      Shu Mckeon is a 84 y.o. patient who presents for a Medicare Wellness Visit.    The following portions of the patient's history were reviewed and   updated as appropriate: {history reviewed:20406::\"allergies\",\"current medications\",\"past family history\",\"past medical history\",\"past social history\",\"past surgical history\",\"problem list\"}.    Compared to one year ago, the patient's physical   health is {better worse same:67973}.  Compared to one year ago, the patient's mental   health is {better worse same:84724}.    Recent Hospitalizations:  {Hospital Admission Status in the last 365 days:57610}    Current Medical Providers:  Patient Care Team:  Mariann Fallon MD as PCP - General  Mariann Fallon MD as PCP - Family Medicine  Nicolas Latif MD as Consulting Physician (Cardiology)  Shayna Blount MD as Referring Physician (Breast Surgery)  Brown Feliciano Jr., MD as Consulting Physician (Hematology and Oncology)    Outpatient Medications Prior to Visit   Medication Sig Dispense Refill   • acetaminophen (TYLENOL) 650 MG 8 hr tablet Take 1 tablet by mouth Every 8 (Eight) Hours As Needed for Mild Pain.     • atorvastatin (LIPITOR) 40 MG tablet Take 1 tablet by mouth every night at bedtime. 90 tablet 1   • Cholecalciferol (VITAMIN D3) 5000 UNITS tablet Take 1 tablet by mouth Daily.     • denosumab (Prolia) 60 MG/ML solution prefilled syringe syringe Inject 1 mL under the skin into the appropriate area as directed Every 6 (Six) Months. 1 mL 1   • loperamide (IMODIUM A-D) 2 MG tablet Take 1/2 tablet daily x10 days then increase to 1/2 tablet twice daily if still having diarrhea.  Decrease dose if constipation occurs 30 tablet 1   • losartan (COZAAR) 50 MG tablet Take 1 tablet by mouth Daily. 90 tablet 1   • metoprolol succinate XL (TOPROL-XL) 25 MG 24 hr tablet Take 1 tablet by mouth Every Evening. 90 tablet 1   • Multiple Vitamin " (MULTIVITAMIN) capsule Take 1 capsule by mouth Daily. PT HOLDING FOR SURGERY     • multivitamin with minerals (PRESERVISION AREDS PO) Take 1 tablet by mouth Daily.     • sertraline (ZOLOFT) 100 MG tablet TAKE ONE TABLET BY MOUTH EVERY DAY 90 tablet 1   • colestipol (COLESTID) 1 g tablet TAKE ONE TABLET BY MOUTH TWICE DAILY (Patient taking differently: Take 1 tablet by mouth Daily.) 60 tablet 5   • cephalexin (KEFLEX) 500 MG capsule Take 1 capsule by mouth 2 (Two) Times a Day. (Patient not taking: Reported on 5/20/2025) 14 capsule 0   • ciprofloxacin (Cipro) 250 MG tablet Take 1 tablet by mouth 2 (Two) Times a Day. (Patient not taking: Reported on 5/20/2025) 10 tablet 0   • coenzyme Q10 100 MG capsule Take 1 capsule by mouth Daily. (Patient not taking: Reported on 5/5/2025)       No facility-administered medications prior to visit.     No opioid medication identified on active medication list. I have reviewed chart for other potential  high risk medication/s and harmful drug interactions in the elderly.      Aspirin is not on active medication list.  {ASPIRIN NOT ON MEDICATION LIST INDICATED/NOT INDICATED:13016}.    Patient Active Problem List   Diagnosis   • Diabetes type 2, controlled   • Fatigue   • HLD (hyperlipidemia)   • BP (high blood pressure)   • OP (osteoporosis)   • Low back pain   • Lumbar spondylolysis   • Radial scar of breast   • Reactive lymphadenopathy   • Calculus of gallbladder with acute cholecystitis without obstruction   • Encounter for screening mammogram for breast cancer   • Spondylosis of lumbar region without myelopathy or radiculopathy   • Personal history of colonic polyps   • Diarrhea   • Change in bowel habits   • Lobular breast cancer, right   • Radiculopathy affecting upper extremity   • Lumbar facet arthropathy   • Cervical spinal stenosis   • Cervical radiculopathy   • Numbness and tingling of both upper extremities     Advance Care Planning {Advance Care Planning  "Hyperlink:23}Advance Directive is on file.  {ACP Discussion, Advance Directive in EMR:79957}            Objective   Vitals:    05/20/25 1330   BP: 130/64   BP Location: Left arm   Patient Position: Sitting   Pulse: 62   Temp: 97.9 °F (36.6 °C)   TempSrc: Oral   SpO2: 95%   Weight: 80.8 kg (178 lb 1.6 oz)   Height: 162.6 cm (64.02\")   PainSc: 4    PainLoc: Leg  Comment: back, left leg, left knee       Estimated body mass index is 30.56 kg/m² as calculated from the following:    Height as of this encounter: 162.6 cm (64.02\").    Weight as of this encounter: 80.8 kg (178 lb 1.6 oz).         {Help Text;  If you change Medicare Wellness reason for visit to a Welcome to Medicare reason for visit after the encounter has started, please add SmartPhrase .GAITBALANCE to your note for proper gait and balance documentation. This text will disappear after the note is signed:71691}       Does the patient have evidence of cognitive impairment? {Yes/No:76551}  Lab Results   Component Value Date    CHLPL 114 05/13/2025    TRIG 84 05/13/2025    HDL 53 05/13/2025    LDL 44 05/13/2025    VLDL 17 05/13/2025    HGBA1C 6.50 (H) 05/13/2025                                                                                               Health  Risk Assessment    Smoking Status:  Social History     Tobacco Use   Smoking Status Never   • Passive exposure: Never   Smokeless Tobacco Never     Alcohol Consumption:  Social History     Substance and Sexual Activity   Alcohol Use Yes   • Alcohol/week: 7.0 standard drinks of alcohol   • Types: 7 Glasses of wine per week    Comment: 1/day-socially       Fall Risk Screen{Jump to Steadi Fall Risk Flowsheet:23}  STEADI Fall Risk Assessment was completed, and patient is at LOW risk for falls.Assessment completed on:5/20/2025    Depression Screening{Jump to PHQ SmartForm:23}   Little interest or pleasure in doing things? Not at all   Feeling down, depressed, or hopeless? Not at all   PHQ-2 Total Score 0    "   Health Habits and Functional and Cognitive Screenin/20/2025     1:32 PM   Functional & Cognitive Status   Do you have difficulty preparing food and eating? No   Do you have difficulty bathing yourself, getting dressed or grooming yourself? No   Do you have difficulty using the toilet? No   Do you have difficulty moving around from place to place? Yes   Do you have trouble with steps or getting out of a bed or a chair? Yes   Current Diet Well Balanced Diet   Dental Exam Up to date   Eye Exam Up to date   Exercise (times per week) 2 times per week   Current Exercises Include Other        Exercise Comment physical therapy exercises   Do you need help using the phone?  No   Are you deaf or do you have serious difficulty hearing?  No   Do you need help to go to places out of walking distance? Yes   Do you need help shopping? No   Do you need help preparing meals?  No   Do you need help with housework?  No   Do you need help with laundry? No   Do you need help taking your medications? No   Do you need help managing money? No   Do you ever drive or ride in a car without wearing a seat belt? No   Have you felt unusual stress, anger or loneliness in the last month? Yes   Who do you live with? Alone   If you need help, do you have trouble finding someone available to you? No   Have you been bothered in the last four weeks by sexual problems? No   Do you have difficulty concentrating, remembering or making decisions? No           Age-appropriate Screening Schedule:  Refer to the list below for future screening recommendations based on patient's age, sex and/or medical conditions. Orders for these recommended tests are listed in the plan section. The patient has been provided with a written plan.    Health Maintenance List  Health Maintenance   Topic Date Due   • RSV Vaccine - Adults (1 - 1-dose 75+ series) Never done   • ZOSTER VACCINE (2 of 2) 2017   • DIABETIC FOOT EXAM  2019   • COVID-19 Vaccine (4 -  2024-25 season) 09/01/2024   • ANNUAL WELLNESS VISIT  01/22/2025   • DXA SCAN  04/27/2025   • INFLUENZA VACCINE  07/01/2025   • HEMOGLOBIN A1C  11/13/2025   • DIABETIC EYE EXAM  03/03/2026   • LIPID PANEL  05/13/2026   • COLORECTAL CANCER SCREENING  10/05/2028   • TDAP/TD VACCINES (2 - Tdap) 03/06/2033   • Pneumococcal Vaccine 50+  Completed   • MAMMOGRAM  Discontinued   • URINE MICROALBUMIN-CREATININE RATIO (uACR)  Discontinued                                                                                                                                                CMS Preventative Services Quick Reference  Risk Factors Identified During Encounter  {Medicare Wellness Risk Factors:39660}    The above risks/problems have been discussed with the patient.  Pertinent information has been shared with the patient in the After Visit Summary.  An After Visit Summary and PPPS were made available to the patient.    Follow Up:{Wrapup  Review (Popup)  Advance Care Planning  Labs  CC  Problem List  Visit Diagnosis  Medications  Result Review  Imaging  [facility]  SnapShot  Encounters  Notes  Media  Procedures :23}   Next Medicare Wellness visit to be scheduled in 1 year.     Assessment & Plan  History of cholecystectomy    Orders:  •  colestipol (COLESTID) 1 g tablet; Take 1 tablet by mouth 2 (Two) Times a Day.    Diarrhea following gastrointestinal surgery    Orders:  •  colestipol (COLESTID) 1 g tablet; Take 1 tablet by mouth 2 (Two) Times a Day.    Medicare annual wellness visit, subsequent         Acute pain of left knee    Orders:  •  gabapentin (NEURONTIN) 300 MG capsule; Take 1 capsule by mouth 2 (Two) Times a Day.         Follow Up:{Wrapup  Review (Popup)  Advance Care Planning  Labs  CC  Problem List  Visit Diagnosis  Medications  Result Review  Imaging  [facility]  SnapShot  Encounters  Notes  Media   Procedures :23}   Return in about 3 months (around 8/20/2025).

## 2025-05-21 ENCOUNTER — TREATMENT (OUTPATIENT)
Dept: PHYSICAL THERAPY | Facility: CLINIC | Age: 85
End: 2025-05-21
Payer: MEDICARE

## 2025-05-21 DIAGNOSIS — M54.2 PAIN, NECK: ICD-10-CM

## 2025-05-21 DIAGNOSIS — M48.02 CERVICAL STENOSIS OF SPINE: Primary | ICD-10-CM

## 2025-05-21 NOTE — PROGRESS NOTES
Physical Therapy Daily Note    [facility]  [address]  [address]  [place] [address]      Patient: Shu Mckeon   : 1940  Referring practitioner: Bhargav Hylton MD  Date of Initial Visit:   Type: THERAPY  Noted: 4/15/2025  Today's Date: 2025  Patient seen for 8 sessions         Shu Mckeon reports: she has a stye on her R eye, she hasn't been sleeping well because she's so uncomfortable         Subjective     Objective   See Exercise, Manual, and Modality Logs for complete treatment.       Assessment/Plan    Shu exhibits no palpable TTP of her upper traps and no irritable neural tension in either arm today. She continues to exhibit limited cervical rotation and sidebending ROM with pain at end range.    Patient requires verbal and tactile cueing from therapist for proper form with all interventions provided. Patient would continue to benefit from skilled therapy to address the limitations listed above.    Progress per POC    Visit Diagnoses:    ICD-10-CM ICD-9-CM   1. Cervical stenosis of spine  M48.02 723.0   2. Pain, neck  M54.2 723.1                  Timed:  Manual Therapy:         mins  36585;  Therapeutic Exercise:    15     mins  34298;     Neuromuscular Tahira:        mins  76300;    Therapeutic Activity:     15     mins  78563;     Gait Training:           mins  60987;  Ultrasound:          mins  16032;    Electrical Stimulation:         mins  17298 ( );    Untimed:  Group Therapy: ___  mins  92754;   Electrical Stimulation:         mins  85453 ( );  Mechanical Traction:         mins  09903;   Iontophoresis:             ___  mins 72458    Timed Treatment:   30   mins   Total Treatment:     40   mins    Pasquale Nagy PT, DPT, OCS  Physical Therapist   [place] License #[id number]

## 2025-05-30 LAB — REQUEST PROBLEM: NORMAL

## 2025-06-03 ENCOUNTER — TREATMENT (OUTPATIENT)
Dept: PHYSICAL THERAPY | Facility: CLINIC | Age: 85
End: 2025-06-03
Payer: MEDICARE

## 2025-06-03 DIAGNOSIS — M48.02 CERVICAL STENOSIS OF SPINE: Primary | ICD-10-CM

## 2025-06-03 DIAGNOSIS — M54.2 PAIN, NECK: ICD-10-CM

## 2025-06-03 NOTE — PROGRESS NOTES
Physical Therapy Re-Evaluation      Patient: Shu Mckeon   : 1940  Diagnosis/ICD-10 Code:  Cervical stenosis of spine [M48.02]  Referring practitioner: No ref. provider found  Date of Initial Visit: Type: THERAPY  Noted: 4/15/2025  Today's Date: 6/3/2025  Patient seen for 9 sessions      Subjective:   Shu Mckeon reports: she hasn't had much pain at all in her neck over the last two weeks. Occasionally her L shoulder and elbow will be sore but overall much better as far as pain goes.  Subjective Questionnaire: NDI: not given, macular degeneration prevents her from seeing the paperwork well  Clinical Progress: improved  Home Program Compliance: Yes  Treatment has included: therapeutic exercise, neuromuscular re-education, manual therapy, therapeutic activity, gait training, moist heat, and cryotherapy    Subjective Evaluation    Pain  Current pain ratin  At worst pain rating: 3       Objective          Palpation   Left   No palpable tenderness to the rhomboids and upper trapezius.     Right   No palpable tenderness to the rhomboids and upper trapezius.     Active Range of Motion   Cervical/Thoracic Spine   Cervical    Left lateral flexion: 25 degrees   Right lateral flexion: 31 degrees   Left rotation: 55 degrees   Right rotation: 57 degrees     Strength/Myotome Testing     Left Shoulder     Planes of Motion   Flexion: 4+   Abduction: 4+     Right Shoulder     Planes of Motion   Flexion: 4   Abduction: 4     Tests   Cervical     Left   Negative Spurling's sign, ULTT1 and ULTT2.     Right   Negative Spurling's sign, ULTT1 and ULTT2.       Assessment & Plan       Plan  Therapy options: will not be seen for skilled therapy services  Treatment plan discussed with: patient        Visit Diagnoses:    ICD-10-CM ICD-9-CM   1. Cervical stenosis of spine  M48.02 723.0   2. Pain, neck  M54.2 723.1       Progress toward previous goals: All Met  +  Short Term Goals (achieve in 4 weeks):  1. Pt will report decreased  current pain to 3/10 or less. (Met)  2. Pt will be I with [facility]. (Met)  3. Pt will exhibit cervical AROM rotation to 60 degrees bilaterally in order to drive/check blind spot with no difficulty. (Not met)  4. Patient will exhibit no positive median or ulnar nerve tension tests to show decreased neural tension/paresthesias. (met)  Long Term Goals (achieve 6-8 weeks):  5. Pt will exhibit improved posture by visual examination. (met)  6. Pt will improve tenderness to palpation of upper trapezius, levator scapulae, scalenes and suboccipitals to no tenderness. (met)  7. Pt will report Oswestry score less than 10/50. (Not met)  8. Patient will report worst pain 5/10 or less. (met)      Recommendations: Discharge to [facility]    Prognosis to achieve goals: good    PT Signature: Pasquale Nagy PT, DPT, OCS      Based upon review of the patient's progress and continued therapy plan, it is my medical opinion that Shu Mckeon should continue physical therapy treatment at [facility]  [address] [place] [id number] [phone].    Signature: __________________________________      Timed:  Manual Therapy:         mins  12129;  Therapeutic Exercise:    10     mins  77278;     Neuromuscular Tahira:        mins  42119;    Therapeutic Activity:     15     mins  71515;     Gait Training:           mins  64272;     Ultrasound:          mins  92179;    Electrical Stimulation:         mins  12048 ( );    Untimed:  Electrical Stimulation:         mins  84766 ( );  Mechanical Traction:         mins  62727;     Timed Treatment:   25   mins   Total Treatment:     25   mins

## 2025-06-20 ENCOUNTER — OFFICE VISIT (OUTPATIENT)
Dept: INTERNAL MEDICINE | Facility: CLINIC | Age: 85
End: 2025-06-20
Payer: MEDICARE

## 2025-06-20 ENCOUNTER — TELEPHONE (OUTPATIENT)
Dept: INTERNAL MEDICINE | Facility: CLINIC | Age: 85
End: 2025-06-20

## 2025-06-20 VITALS
TEMPERATURE: 98.9 F | BODY MASS INDEX: 30.29 KG/M2 | OXYGEN SATURATION: 96 % | DIASTOLIC BLOOD PRESSURE: 64 MMHG | HEART RATE: 74 BPM | SYSTOLIC BLOOD PRESSURE: 114 MMHG | HEIGHT: 64 IN | WEIGHT: 177.4 LBS

## 2025-06-20 DIAGNOSIS — Z87.440 HISTORY OF RECURRENT UTIS: ICD-10-CM

## 2025-06-20 DIAGNOSIS — R30.0 DYSURIA: Primary | ICD-10-CM

## 2025-06-20 LAB
BILIRUB BLD-MCNC: NEGATIVE MG/DL
CLARITY, POC: CLEAR
COLOR UR: YELLOW
EXPIRATION DATE: ABNORMAL
GLUCOSE UR STRIP-MCNC: NEGATIVE MG/DL
KETONES UR QL: NEGATIVE
LEUKOCYTE EST, POC: ABNORMAL
Lab: ABNORMAL
NITRITE UR-MCNC: NEGATIVE MG/ML
PH UR: 6.5 [PH] (ref 5–8)
PROT UR STRIP-MCNC: NEGATIVE MG/DL
RBC # UR STRIP: NEGATIVE /UL
SP GR UR: 1.01 (ref 1–1.03)
UROBILINOGEN UR QL: NORMAL

## 2025-06-20 PROCEDURE — 1125F AMNT PAIN NOTED PAIN PRSNT: CPT | Performed by: NURSE PRACTITIONER

## 2025-06-20 PROCEDURE — 81003 URINALYSIS AUTO W/O SCOPE: CPT | Performed by: NURSE PRACTITIONER

## 2025-06-20 PROCEDURE — 1160F RVW MEDS BY RX/DR IN RCRD: CPT | Performed by: NURSE PRACTITIONER

## 2025-06-20 PROCEDURE — 1159F MED LIST DOCD IN RCRD: CPT | Performed by: NURSE PRACTITIONER

## 2025-06-20 PROCEDURE — 99213 OFFICE O/P EST LOW 20 MIN: CPT | Performed by: NURSE PRACTITIONER

## 2025-06-20 RX ORDER — CEFDINIR 300 MG/1
300 CAPSULE ORAL 2 TIMES DAILY
Qty: 10 CAPSULE | Refills: 0 | Status: SHIPPED | OUTPATIENT
Start: 2025-06-20 | End: 2025-06-25

## 2025-06-20 NOTE — TELEPHONE ENCOUNTER
Caller: Shu Mckeon    Relationship: Self    Best call back number: [phone]     What medication are you requesting:      What are your current symptoms:      How long have you been experiencing symptoms:      Have you had these symptoms before:    [] Yes  [] No    Have you been treated for these symptoms before:   [] Yes  [] No    If a prescription is needed, what is your preferred pharmacy and phone number: [facility] - [place], [place] - [address] [address] [phone]  - [phone] FX     Additional notes: PATIENT CALLED SHE IS HAVING SYMPTOMS PAIN WHEN URINATING, BUT NOT ABLE TO GO, URINARY FREQUENCY. SHE HAS THINKS ITS UTI. PATIENT WANTS TO KNOW IF DR GIL CAN SEND IN MEDICATION FOR HER.

## 2025-06-20 NOTE — PROGRESS NOTES
Subjective   Shu Mckeon is a 84 y.o. female.     Chief Complaint   Patient presents with    Dysuria     Pt c/po urgency, bladder pressure /spasm, urgency and frequency X 4-5 days.         History of Present Illness   She notes over the past 4-5 days with urinary urgency, frequency and dysuria. She has had a few episodes of incontinence.   She notes difficulty fully emptying the bladder for years.  She wears incontinence briefs, changes these several times a day.  She notes over the past year at least 5 UTIs.   She was recently treated with ciprofloxacin for UTI in May, culture positive for Salmonella.  Denies any fever, chills but does have fatigue.  She denies any hematuria, pelvic pain, change in vaginal discharge, flank pain.    The following portions of the patient's history were reviewed and updated as appropriate: allergies, current medications, past family history, past medical history, past social history, past surgical history, and problem list.    Review of Systems   Constitutional:  Positive for fatigue. Negative for chills and fever.   Respiratory: Negative.     Cardiovascular: Negative.    Gastrointestinal:  Negative for nausea and vomiting.   Genitourinary:  Positive for difficulty urinating, dysuria, frequency, pelvic pressure, urgency and urinary incontinence. Negative for flank pain, hematuria, pelvic pain and vaginal discharge.       Objective   Physical Exam  Constitutional:       Appearance: She is well-developed.   Neck:      Thyroid: No thyroid mass, thyromegaly or thyroid tenderness.      Vascular: No carotid bruit.      Trachea: Trachea normal.   Cardiovascular:      Rate and Rhythm: Normal rate and regular rhythm.      Chest Wall: PMI is not displaced.      Pulses:           Radial pulses are 2+ on the right side and 2+ on the left side.        Dorsalis pedis pulses are 2+ on the right side and 2+ on the left side.        Posterior tibial pulses are 2+ on the right side and 2+ on the left  side.      Heart sounds: S1 normal and S2 normal.   Pulmonary:      Effort: Pulmonary effort is normal.      Breath sounds: Normal breath sounds.   Abdominal:      General: Bowel sounds are normal. There is no distension or abdominal bruit. There are no signs of injury.      Palpations: Abdomen is soft.      Tenderness: There is abdominal tenderness in the suprapubic area. There is no right CVA tenderness or left CVA tenderness.   Musculoskeletal:      Right lower leg: No edema.      Left lower leg: No edema.   Lymphadenopathy:      Head:      Right side of head: No submental, submandibular, tonsillar or occipital adenopathy.      Left side of head: No submental, submandibular, tonsillar or occipital adenopathy.      Cervical: No cervical adenopathy.   Skin:     General: Skin is warm and dry.      Capillary Refill: Capillary refill takes less than 2 seconds.      Nails: There is no clubbing.   Neurological:      Mental Status: She is alert and oriented to person, place, and time.   Psychiatric:         Attention and Perception: Attention normal.         Mood and Affect: Mood and affect normal.         Speech: Speech normal.         Behavior: Behavior normal.         Thought Content: Thought content normal.         Cognition and Memory: Cognition normal.         Vitals:    06/20/25 1352   BP: 114/64   Pulse: 74   Temp: 98.9 °F (37.2 °C)   SpO2: 96%      Body mass index is 30.43 kg/m².    Assessment & Plan   Problems Addressed this Visit    None  Visit Diagnoses         Dysuria    -  Primary    Relevant Medications    cefdinir (OMNICEF) 300 MG capsule    Other Relevant Orders    POCT urinalysis dipstick, automated (Completed)    Urine Culture - Urine, Urine, Clean Catch      History of recurrent UTIs        Relevant Orders    Ambulatory Referral to Gynecologic Urology (Completed)          Diagnoses         Codes Comments      Dysuria    -  Primary ICD-10-CM: R30.0  ICD-9-CM: 788.1       History of recurrent UTIs      ICD-10-CM: Z87.440  ICD-9-CM: V13.02           1.  Dysuria-urine dip positive for 1+ leuks, urine sent for culture.  Patient is currently symptomatic.  Will start cefdinir 300 mg twice daily for 5 days.  Taken back with food, start probiotic or yogurt separate from the antibiotic.  I have encouraged her to hydrate well with fluids and discussed double voiding.  Change incontinence briefs as soon as they are saturated.  With history of recurrent UTIs I have placed a referral to urogynecology for further evaluation and management.

## 2025-06-22 LAB
BACTERIA UR CULT: NORMAL
BACTERIA UR CULT: NORMAL

## 2025-06-24 ENCOUNTER — TELEPHONE (OUTPATIENT)
Dept: INTERNAL MEDICINE | Facility: CLINIC | Age: 85
End: 2025-06-24
Payer: MEDICARE

## 2025-06-24 NOTE — TELEPHONE ENCOUNTER
Caller: Shu Mckeon    Relationship to patient: Self    Best call back number: [phone]      Patient is needing: SHE WOULD LIKE TO SPEAK WITH JOE GIL REGARDING HER RECENT VISIT.  SHE HAS QUESTIONS ABOUT HER VISIT THAT SHE FEELS LIKE SHE NEEDS TO HEAR FROM HER DR.  THERE ARE SEVERAL QUESTIONS.  ONE BEING HER LAB RESULTS AND A REFERRAL.

## 2025-07-07 DIAGNOSIS — M25.562 ACUTE PAIN OF LEFT KNEE: ICD-10-CM

## 2025-07-07 DIAGNOSIS — R19.7 DIARRHEA FOLLOWING GASTROINTESTINAL SURGERY: ICD-10-CM

## 2025-07-07 DIAGNOSIS — Z90.49 HISTORY OF CHOLECYSTECTOMY: ICD-10-CM

## 2025-07-07 DIAGNOSIS — Z98.890 DIARRHEA FOLLOWING GASTROINTESTINAL SURGERY: ICD-10-CM

## 2025-07-07 RX ORDER — LOSARTAN POTASSIUM 50 MG/1
50 TABLET ORAL DAILY
Qty: 90 TABLET | Refills: 1 | OUTPATIENT
Start: 2025-07-07

## 2025-07-07 RX ORDER — ATORVASTATIN CALCIUM 40 MG/1
40 TABLET, FILM COATED ORAL
Qty: 90 TABLET | Refills: 1 | OUTPATIENT
Start: 2025-07-07

## 2025-07-07 RX ORDER — COLESTIPOL HYDROCHLORIDE 1 G/1
1 TABLET ORAL 2 TIMES DAILY
Qty: 60 TABLET | Refills: 5 | OUTPATIENT
Start: 2025-07-07

## 2025-07-07 NOTE — TELEPHONE ENCOUNTER
CSA NEEDS TO BE UPDATED  CINTHYA NEEDS TO BE UPDATED  LAST OV 5/20/2025  F/U SCHEDULED FOR 8/19/2025  LAST FILL DATE 5/20/2025

## 2025-07-08 DIAGNOSIS — M25.562 ACUTE PAIN OF LEFT KNEE: ICD-10-CM

## 2025-07-08 RX ORDER — GABAPENTIN 300 MG/1
300 CAPSULE ORAL 2 TIMES DAILY
Qty: 60 CAPSULE | Refills: 1 | OUTPATIENT
Start: 2025-07-08

## 2025-07-08 RX ORDER — SERTRALINE HYDROCHLORIDE 100 MG/1
100 TABLET, FILM COATED ORAL DAILY
Qty: 90 TABLET | Refills: 1 | Status: SHIPPED | OUTPATIENT
Start: 2025-07-08

## 2025-07-08 RX ORDER — GABAPENTIN 300 MG/1
300 CAPSULE ORAL 2 TIMES DAILY
Qty: 60 CAPSULE | Refills: 1 | Status: SHIPPED | OUTPATIENT
Start: 2025-07-08

## 2025-07-30 ENCOUNTER — TELEPHONE (OUTPATIENT)
Dept: PAIN MEDICINE | Facility: CLINIC | Age: 85
End: 2025-07-30
Payer: MEDICARE

## 2025-07-30 NOTE — TELEPHONE ENCOUNTER
LEFT PATIENT A VOICEMAIL. REMINDER CALL TO ARRIVE 15 MIN EARLY FOR PAPERWORK AND INSURANCE CARDS.    No none

## 2025-07-31 ENCOUNTER — OFFICE VISIT (OUTPATIENT)
Dept: INTERNAL MEDICINE | Facility: CLINIC | Age: 85
End: 2025-07-31
Payer: MEDICARE

## 2025-07-31 VITALS
TEMPERATURE: 98.7 F | SYSTOLIC BLOOD PRESSURE: 134 MMHG | HEART RATE: 58 BPM | DIASTOLIC BLOOD PRESSURE: 76 MMHG | WEIGHT: 179.2 LBS | BODY MASS INDEX: 30.59 KG/M2 | OXYGEN SATURATION: 95 % | HEIGHT: 64 IN

## 2025-07-31 DIAGNOSIS — U07.1 COVID-19 VIRUS INFECTION: ICD-10-CM

## 2025-07-31 DIAGNOSIS — N39.0 URINARY TRACT INFECTION WITHOUT HEMATURIA, SITE UNSPECIFIED: Primary | ICD-10-CM

## 2025-07-31 LAB
BILIRUB BLD-MCNC: NEGATIVE MG/DL
CLARITY, POC: ABNORMAL
COLOR UR: YELLOW
EXPIRATION DATE: ABNORMAL
EXPIRATION DATE: ABNORMAL
FLUAV AG UPPER RESP QL IA.RAPID: NOT DETECTED
FLUBV AG UPPER RESP QL IA.RAPID: NOT DETECTED
GLUCOSE UR STRIP-MCNC: NEGATIVE MG/DL
INTERNAL CONTROL: ABNORMAL
KETONES UR QL: NEGATIVE
LEUKOCYTE EST, POC: ABNORMAL
Lab: ABNORMAL
Lab: ABNORMAL
NITRITE UR-MCNC: NEGATIVE MG/ML
PH UR: 7 [PH] (ref 5–8)
PROT UR STRIP-MCNC: ABNORMAL MG/DL
RBC # UR STRIP: ABNORMAL /UL
SARS-COV-2 AG UPPER RESP QL IA.RAPID: DETECTED
SP GR UR: 1.01 (ref 1–1.03)
UROBILINOGEN UR QL: ABNORMAL

## 2025-07-31 PROCEDURE — 81003 URINALYSIS AUTO W/O SCOPE: CPT | Performed by: INTERNAL MEDICINE

## 2025-07-31 PROCEDURE — 99214 OFFICE O/P EST MOD 30 MIN: CPT | Performed by: INTERNAL MEDICINE

## 2025-07-31 PROCEDURE — 87428 SARSCOV & INF VIR A&B AG IA: CPT | Performed by: INTERNAL MEDICINE

## 2025-07-31 PROCEDURE — 1125F AMNT PAIN NOTED PAIN PRSNT: CPT | Performed by: INTERNAL MEDICINE

## 2025-07-31 RX ORDER — CEPHALEXIN 500 MG/1
500 CAPSULE ORAL 2 TIMES DAILY
Qty: 14 CAPSULE | Refills: 0 | Status: SHIPPED | OUTPATIENT
Start: 2025-07-31 | End: 2025-08-07

## 2025-07-31 NOTE — PROGRESS NOTES
Subjective   Shu Mckeon is a 84 y.o. female.     Dysuria  Fatigue  Symptoms: fatigue and dysuria    Back Pain  Associated symptoms: dysuria     SHE HAS SOME DRAINAGE AND A COUGH BUT LAST NUGHT SHE STARTED HAVING SOME URINARY PRESSURE      The following portions of the patient's history were reviewed and updated as appropriate: allergies, current medications, past family history, past medical history, past social history, past surgical history, and problem list.    Review of Systems   Constitutional:  Positive for fatigue.   Genitourinary:  Positive for dysuria.   Musculoskeletal:  Positive for back pain.       Objective   Physical Exam  Vitals reviewed.   Constitutional:       Appearance: She is well-developed.   HENT:      Head: Normocephalic and atraumatic.      Right Ear: External ear normal.      Left Ear: External ear normal.   Eyes:      Conjunctiva/sclera: Conjunctivae normal.      Pupils: Pupils are equal, round, and reactive to light.   Neck:      Thyroid: No thyromegaly.      Trachea: No tracheal deviation.   Cardiovascular:      Rate and Rhythm: Normal rate and regular rhythm.      Heart sounds: Normal heart sounds.   Pulmonary:      Effort: Pulmonary effort is normal.      Breath sounds: Normal breath sounds.   Abdominal:      General: Bowel sounds are normal. There is no distension.      Palpations: Abdomen is soft.      Tenderness: There is no abdominal tenderness.   Musculoskeletal:         General: No deformity. Normal range of motion.      Cervical back: Normal range of motion.   Skin:     General: Skin is warm and dry.   Neurological:      Mental Status: She is alert and oriented to person, place, and time.   Psychiatric:         Behavior: Behavior normal.         Thought Content: Thought content normal.         Judgment: Judgment normal.       Vitals:    07/31/25 0852   BP: 134/76   Pulse: 58   Temp: 98.7 °F (37.1 °C)   SpO2: 95%     Body mass index is 30.74 kg/m².         Assessment & Plan    Diagnoses and all orders for this visit:    1. Urinary tract infection without hematuria, site unspecified (Primary)  -     POC Urinalysis Dipstick, Automated  -     Urine Culture - Urine, Urine, Clean Catch    2. COVID-19 virus infection  -     POCT SARS-CoV-2 Antigen NITIN + Flu    Other orders  -     cephalexin (KEFLEX) 500 MG capsule; Take 1 capsule by mouth 2 (Two) Times a Day for 7 days.  Dispense: 14 capsule; Refill: 0    1.  UTI: We will go ahead and treat her with Keflex twice a day.  I also discussed with her trying something like d-mannose.  We do have a pending referral to urogyn for her recurrent infections  2.  COVID she has some mild upper respiratory symptoms no fevers no shortness of breath no wheezing.  She does have a slight cough I advised her to use Delsym and she can take some Claritin for the drainage if symptoms worsen or fail to improve she will let me know

## 2025-08-04 ENCOUNTER — TELEPHONE (OUTPATIENT)
Dept: INTERNAL MEDICINE | Facility: CLINIC | Age: 85
End: 2025-08-04
Payer: MEDICARE

## 2025-08-06 LAB
BACTERIA UR CULT: ABNORMAL
OTHER ANTIBIOTIC SUSC ISLT: ABNORMAL

## 2025-08-15 ENCOUNTER — TELEPHONE (OUTPATIENT)
Dept: INTERNAL MEDICINE | Facility: CLINIC | Age: 85
End: 2025-08-15
Payer: MEDICARE

## 2025-08-19 ENCOUNTER — TELEMEDICINE (OUTPATIENT)
Dept: INTERNAL MEDICINE | Facility: CLINIC | Age: 85
End: 2025-08-19
Payer: MEDICARE

## 2025-08-19 DIAGNOSIS — M54.50 CHRONIC RIGHT-SIDED LOW BACK PAIN WITHOUT SCIATICA: ICD-10-CM

## 2025-08-19 DIAGNOSIS — K80.00 CALCULUS OF GALLBLADDER WITH ACUTE CHOLECYSTITIS WITHOUT OBSTRUCTION: ICD-10-CM

## 2025-08-19 DIAGNOSIS — I10 PRIMARY HYPERTENSION: Primary | ICD-10-CM

## 2025-08-19 DIAGNOSIS — Z98.890 DIARRHEA FOLLOWING GASTROINTESTINAL SURGERY: ICD-10-CM

## 2025-08-19 DIAGNOSIS — G89.29 CHRONIC RIGHT-SIDED LOW BACK PAIN WITHOUT SCIATICA: ICD-10-CM

## 2025-08-19 DIAGNOSIS — R19.7 DIARRHEA FOLLOWING GASTROINTESTINAL SURGERY: ICD-10-CM

## 2025-08-19 DIAGNOSIS — Z90.49 HISTORY OF CHOLECYSTECTOMY: ICD-10-CM

## 2025-08-19 DIAGNOSIS — R19.4 CHANGE IN BOWEL HABITS: ICD-10-CM

## 2025-08-19 DIAGNOSIS — R19.7 DIARRHEA, UNSPECIFIED TYPE: ICD-10-CM

## 2025-08-19 PROCEDURE — 1125F AMNT PAIN NOTED PAIN PRSNT: CPT | Performed by: INTERNAL MEDICINE

## 2025-08-19 PROCEDURE — 99214 OFFICE O/P EST MOD 30 MIN: CPT | Performed by: INTERNAL MEDICINE

## 2025-08-19 RX ORDER — COLESTIPOL HYDROCHLORIDE 1 G/1
1 TABLET ORAL 3 TIMES DAILY PRN
Qty: 90 TABLET | Refills: 3 | Status: SHIPPED | OUTPATIENT
Start: 2025-08-19

## 2025-08-27 ENCOUNTER — TELEPHONE (OUTPATIENT)
Dept: INTERNAL MEDICINE | Facility: CLINIC | Age: 85
End: 2025-08-27
Payer: MEDICARE

## (undated) DEVICE — PATIENT RETURN ELECTRODE, SINGLE-USE, CONTACT QUALITY MONITORING, ADULT, WITH 9FT CORD, FOR PATIENTS WEIGING OVER 33LBS. (15KG): Brand: MEGADYNE

## (undated) DEVICE — DRAPE,CHEST,FENES,15X10,STERIL: Brand: MEDLINE

## (undated) DEVICE — ELECTRD BLD EZ CLN MOD XLNG 2.75IN

## (undated) DEVICE — GLV SURG TRIUMPH PF LTX 7.5 STRL

## (undated) DEVICE — TBG INSUFL W FLTR STRL

## (undated) DEVICE — APPL CHLORAPREP W/TINT 26ML ORNG

## (undated) DEVICE — CANN O2 ETCO2 FITS ALL CONN CO2 SMPL A/ 7IN DISP LF

## (undated) DEVICE — INTENDED FOR TISSUE SEPARATION, AND OTHER PROCEDURES THAT REQUIRE A SHARP SURGICAL BLADE TO PUNCTURE OR CUT.: Brand: BARD-PARKER ® CARBON RIB-BACK BLADES

## (undated) DEVICE — GLV SURG SENSICARE ORTHO PF LF 6.5 STRL

## (undated) DEVICE — TBG PENCL TELESCP MEGADYNE SMOKE EVAC 10FT

## (undated) DEVICE — SKIN AFFIX SURG ADHESIVE 72/CS 0.55ML: Brand: MEDLINE

## (undated) DEVICE — SUT VIC 0 TN 27IN DYED JTN0G

## (undated) DEVICE — CATH IV INSYTE AUTOGARD 14G 1 1/2IN ORNG

## (undated) DEVICE — ADAPT CLN BIOGUARD AIR/H2O DISP

## (undated) DEVICE — PK UNIV COMPL 40

## (undated) DEVICE — ENDOPATH XCEL UNIVERSAL TROCAR STABLILITY SLEEVES: Brand: ENDOPATH XCEL

## (undated) DEVICE — JACKSON-PRATT 100CC BULB RESERVOIR: Brand: CARDINAL HEALTH

## (undated) DEVICE — CATH CHOLANG 4.5F18IN BRGNDY

## (undated) DEVICE — ENDOPATH XCEL BLADELESS TROCARS WITH STABILITY SLEEVES: Brand: ENDOPATH XCEL

## (undated) DEVICE — PAD,ABDOMINAL,8"X10",ST,LF: Brand: MEDLINE

## (undated) DEVICE — PK LAP GEN 90

## (undated) DEVICE — Device

## (undated) DEVICE — BIOPATCH™ ANTIMICROBIAL DRESSING WITH CHLORHEXIDINE GLUCONATE IS A HYDROPHILLIC POLYURETHANE ABSORPTIVE FOAM WITH CHLORHEXIDINE GLUCONATE (CHG) WHICH INHIBITS BACTERIAL GROWTH UNDER THE DRESSING. THE DRESSING IS INTENDED TO BE USED TO ABSORB EXUDATE, COVER A WOUND CAUSED BY VASCULAR AND NONVASCULAR PERCUTANEOUS MEDICAL DEVICES DURING SURGERY, AS WELL AS REDUCE LOCAL INFECTION AND COLONIZATION OF MICROORGANISMS.: Brand: BIOPATCH

## (undated) DEVICE — DRSNG WND GZ PAD BORDERED 4X8IN STRL

## (undated) DEVICE — SUT ETHLN 4/0 PS2 PLSTC 1667G

## (undated) DEVICE — DRP C/ARM 41X74IN

## (undated) DEVICE — LAG MINOR PROCEDURE: Brand: MEDLINE INDUSTRIES, INC.

## (undated) DEVICE — CONTAINER,SPECIMEN,OR STERILE,4OZ: Brand: MEDLINE

## (undated) DEVICE — TUBING, SUCTION, 1/4" X 10', STRAIGHT: Brand: MEDLINE

## (undated) DEVICE — CVR TRANSD CIV FLX TPR 11.9 TO 3.8X61CM

## (undated) DEVICE — PROXIMATE RH ROTATING HEAD SKIN STAPLERS (35 WIDE) CONTAINS 35 STAINLESS STEEL STAPLES: Brand: PROXIMATE

## (undated) DEVICE — MARKR SKIN W/RULR AND LBL

## (undated) DEVICE — CONN TBG Y 5 IN 1 LF STRL

## (undated) DEVICE — SPNG LAP 18X18IN LF STRL PK/5

## (undated) DEVICE — EXTENSION SET, MALE LUER LOCK ADAPTER WITH RETRACTABLE COLLAR

## (undated) DEVICE — TOTAL TRAY, 16FR 10ML SIL FOLEY, URN: Brand: MEDLINE

## (undated) DEVICE — SKIN PREP TRAY W/CHG: Brand: MEDLINE INDUSTRIES, INC.

## (undated) DEVICE — SUT VIC 2/0 CT1 36IN

## (undated) DEVICE — EXTRCT STPL SKIN STRL BX/12

## (undated) DEVICE — ENDOPATH PNEUMONEEDLE INSUFFLATION NEEDLES WITH LUER LOCK CONNECTORS 120MM: Brand: ENDOPATH

## (undated) DEVICE — COVER,LIGHT HANDLE,FLX,2/PK: Brand: MEDLINE INDUSTRIES, INC.

## (undated) DEVICE — BNDG ELAS ELITE V/CLOSE 6IN 5YD LF STRL

## (undated) DEVICE — UNDYED BRAIDED (POLYGLACTIN 910), SYNTHETIC ABSORBABLE SUTURE: Brand: COATED VICRYL

## (undated) DEVICE — DRSNG WND BORDR/ADHS NONADHR/GZ LF 4X4IN STRL

## (undated) DEVICE — ENDOCUT SCISSOR TIP, DISPOSABLE: Brand: RENEW

## (undated) DEVICE — LN SMPL CO2 SHTRM SD STREAM W/M LUER

## (undated) DEVICE — NDL SPINE 22G 31/2IN BLK

## (undated) DEVICE — SUT MNCRYL 3/0 PS2 18IN MCP497G

## (undated) DEVICE — SUT VIC 3/0 SH CR8 18IN J864D

## (undated) DEVICE — ANTIBACTERIAL UNDYED BRAIDED (POLYGLACTIN 910), SYNTHETIC ABSORBABLE SUTURE: Brand: COATED VICRYL

## (undated) DEVICE — PK PROC MINOR TOWER 40

## (undated) DEVICE — ENDOPOUCH RETRIEVER SPECIMEN RETRIEVAL BAGS: Brand: ENDOPOUCH RETRIEVER

## (undated) DEVICE — STPCK 3WY D201 DISCOFIX

## (undated) DEVICE — CVR PROB GEN PURP W ISOSILK 6X48

## (undated) DEVICE — EPIDURAL TRAY: Brand: MEDLINE INDUSTRIES, INC.

## (undated) DEVICE — SMOKE EVACUATION TUBING WITH 7/8 IN TO 1/4 IN REDUCER: Brand: BUFFALO FILTER

## (undated) DEVICE — GLV SURG BIOGEL LTX PF 7

## (undated) DEVICE — 2, DISPOSABLE SUCTION/IRRIGATOR WITH DISPOSABLE TIP: Brand: STRYKEFLOW

## (undated) DEVICE — SUT MNCRYL PLS ANTIB UD 4/0 PS2 18IN

## (undated) DEVICE — DRSNG SURESITE WNDW 4X4.5

## (undated) DEVICE — TRAP FLD MINIVAC MEGADYNE 100ML

## (undated) DEVICE — SYR LUERLOK 30CC

## (undated) DEVICE — SENSR O2 OXIMAX FNGR A/ 18IN NONSTR

## (undated) DEVICE — SUT VIC 3/0 TIES 18IN J110T

## (undated) DEVICE — LAPAROSCOPIC SMOKE FILTRATION SYSTEM: Brand: PALL LAPAROSHIELD® PLUS LAPAROSCOPIC SMOKE FILTRATION SYSTEM

## (undated) DEVICE — STPLR SKIN SUBCUTICULAR INSORB 2030

## (undated) DEVICE — SYR LUERLOK 20CC BX/50

## (undated) DEVICE — PROXIMATE RH ROTATING HEAD SKIN STAPLERS (35 REGULAR) CONTAINS 35 STAINLESS STEEL STAPLES: Brand: PROXIMATE

## (undated) DEVICE — MAT FLR ABSORBENT LG 4FT 10 2.5FT

## (undated) DEVICE — STRIP,CLOSURE,WOUND,MEDI-STRIP,1/2X4: Brand: MEDLINE

## (undated) DEVICE — SUT MNCRYL 4/0 PS2 18 IN

## (undated) DEVICE — KT ORCA ORCAPOD DISP STRL

## (undated) DEVICE — 3M™ STERI-STRIP™ REINFORCED ADHESIVE SKIN CLOSURES, R1547, 1/2 IN X 4 IN (12 MM X 100 MM), 6 STRIPS/ENVELOPE: Brand: 3M™ STERI-STRIP™

## (undated) DEVICE — IRRIGATOR BULB ASEPTO 60CC STRL

## (undated) DEVICE — GLV SURG BIOGEL M LTX PF 6 1/2